# Patient Record
Sex: FEMALE | Race: WHITE | NOT HISPANIC OR LATINO | Employment: OTHER | URBAN - METROPOLITAN AREA
[De-identification: names, ages, dates, MRNs, and addresses within clinical notes are randomized per-mention and may not be internally consistent; named-entity substitution may affect disease eponyms.]

---

## 2017-03-02 ENCOUNTER — TELEPHONE (OUTPATIENT)
Dept: CARDIOLOGY | Facility: CLINIC | Age: 67
End: 2017-03-02

## 2017-03-02 NOTE — TELEPHONE ENCOUNTER
----- Message from RT Georgie sent at 3/1/2017 10:06 AM CST -----  Contact: pt    pt , requesting medication refill: Pravastatin 10 mg, [facility] [place],  Thanks.

## 2017-04-18 ENCOUNTER — TELEPHONE (OUTPATIENT)
Dept: CARDIOLOGY | Facility: CLINIC | Age: 67
End: 2017-04-18

## 2017-04-18 NOTE — TELEPHONE ENCOUNTER
----- Message from Theron Salazar sent at 4/18/2017 12:02 PM CDT -----  Pt called stated that she need lab orders before the next visit/pls call when orders are put in at [phone]

## 2017-04-19 NOTE — TELEPHONE ENCOUNTER
----- Message from Gloria Obrien sent at 4/19/2017  3:22 PM CDT -----  Patient states that Rx Toprol was denied by insurance and need to speak to you.  Call [phone].

## 2017-04-24 RX ORDER — PNV NO.95/FERROUS FUM/FOLIC AC 28MG-0.8MG
1 TABLET ORAL 2 TIMES DAILY WITH MEALS
COMMUNITY
End: 2021-02-12

## 2017-04-24 RX ORDER — METOPROLOL SUCCINATE 25 MG/1
37.5 TABLET, EXTENDED RELEASE ORAL NIGHTLY
Refills: 0 | COMMUNITY
Start: 2017-03-28 | End: 2017-04-27 | Stop reason: SDUPTHER

## 2017-04-24 RX ORDER — PRAVASTATIN SODIUM 10 MG/1
TABLET ORAL
Refills: 0 | COMMUNITY
Start: 2017-04-01 | End: 2017-04-27 | Stop reason: SINTOL

## 2017-04-24 RX ORDER — FLUCONAZOLE 150 MG/1
TABLET ORAL
Refills: 0 | COMMUNITY
Start: 2017-02-14 | End: 2017-04-27

## 2017-04-24 RX ORDER — MULTIVITAMIN
1 TABLET ORAL DAILY
COMMUNITY

## 2017-04-24 RX ORDER — ASPIRIN 81 MG/1
81 TABLET ORAL DAILY
COMMUNITY

## 2017-04-24 RX ORDER — GLUCOSAMINE/CHONDRO SU A 500-400 MG
1 TABLET ORAL DAILY
COMMUNITY
End: 2017-04-27

## 2017-04-24 RX ORDER — RAMIPRIL 1.25 MG/1
1.25 CAPSULE ORAL DAILY
COMMUNITY
End: 2017-04-27

## 2017-04-24 RX ORDER — UBIDECARENONE 30 MG
200 CAPSULE ORAL 2 TIMES DAILY
COMMUNITY
End: 2020-01-21

## 2017-04-24 RX ORDER — AZELASTINE 1 MG/ML
1 SPRAY, METERED NASAL
COMMUNITY
End: 2023-03-07 | Stop reason: SDUPTHER

## 2017-04-24 RX ORDER — AMLODIPINE BESYLATE 2.5 MG/1
2.5 TABLET ORAL DAILY
COMMUNITY
End: 2017-04-27

## 2017-04-27 ENCOUNTER — OFFICE VISIT (OUTPATIENT)
Dept: CARDIOLOGY | Facility: CLINIC | Age: 67
End: 2017-04-27
Payer: MEDICARE

## 2017-04-27 ENCOUNTER — TELEPHONE (OUTPATIENT)
Dept: CARDIOLOGY | Facility: CLINIC | Age: 67
End: 2017-04-27

## 2017-04-27 VITALS
WEIGHT: 109.38 LBS | DIASTOLIC BLOOD PRESSURE: 82 MMHG | HEART RATE: 102 BPM | BODY MASS INDEX: 20.65 KG/M2 | SYSTOLIC BLOOD PRESSURE: 138 MMHG | HEIGHT: 61 IN

## 2017-04-27 DIAGNOSIS — I49.49 PREMATURE BEATS: ICD-10-CM

## 2017-04-27 DIAGNOSIS — T50.905A DRUG SIDE EFFECTS, INITIAL ENCOUNTER: ICD-10-CM

## 2017-04-27 DIAGNOSIS — E78.00 HYPERCHOLESTEROLEMIA: ICD-10-CM

## 2017-04-27 DIAGNOSIS — I10 ESSENTIAL HYPERTENSION: Primary | ICD-10-CM

## 2017-04-27 PROCEDURE — 99213 OFFICE O/P EST LOW 20 MIN: CPT | Mod: S$PBB,,, | Performed by: INTERNAL MEDICINE

## 2017-04-27 PROCEDURE — 99213 OFFICE O/P EST LOW 20 MIN: CPT | Mod: PBBFAC,PO | Performed by: INTERNAL MEDICINE

## 2017-04-27 PROCEDURE — 99999 PR PBB SHADOW E&M-EST. PATIENT-LVL III: CPT | Mod: PBBFAC,,, | Performed by: INTERNAL MEDICINE

## 2017-04-27 RX ORDER — OMEPRAZOLE 20 MG/1
40 CAPSULE, DELAYED RELEASE ORAL DAILY
COMMUNITY
End: 2019-05-01

## 2017-04-27 RX ORDER — LORAZEPAM 0.5 MG/1
0.25 TABLET ORAL EVERY 6 HOURS PRN
COMMUNITY
End: 2018-01-31

## 2017-04-27 RX ORDER — METOPROLOL SUCCINATE 25 MG/1
37.5 TABLET, EXTENDED RELEASE ORAL NIGHTLY
Qty: 45 TABLET | Refills: 6 | Status: SHIPPED | OUTPATIENT
Start: 2017-04-27 | End: 2017-11-08 | Stop reason: SDUPTHER

## 2017-04-27 NOTE — TELEPHONE ENCOUNTER
Pt c/o pain in shoulders and neck, advised to make appt with PCP for muscle pain, pt has appt with Dr Lisa at 3pm today, pt verbalizes understanding.

## 2017-04-27 NOTE — PROGRESS NOTES
Subjective:    Patient ID:  Cecilia Valenzuela is a 67 y.o. female who presents for Hypertension  premature beats    HPI  NO LABS, STOPPED RAMIPRIL B/O LOW BP, INTOLERANT OF GENERIC METOPROLOL B/O SE'S AND NIGHTMARES, HAS BEEN SITTING WITH HER SICK MOTHER IN COLD ENVIRONMENT, STIFF, , PT DOES NOT WANT TO CHANGE MEDS, BP LOG OK, SEE ROS  Past Medical History:   Diagnosis Date    Coronary artery disease     Hyperlipidemia     Hypertension     Neurofibromatosis     Osteopenia     Premature beats     Raynaud's disease     Thyroid nodule      Past Surgical History:   Procedure Laterality Date    BACK SURGERY      CATARACT EXTRACTION Bilateral      SECTION      x5     History reviewed. No pertinent family history.  Social History     Social History    Marital status:      Spouse name: N/A    Number of children: N/A    Years of education: N/A     Social History Main Topics    Smoking status: Never Smoker    Smokeless tobacco: Never Used    Alcohol use No    Drug use: No    Sexual activity: Not Asked     Other Topics Concern    None     Social History Narrative       Review of patient's allergies indicates:   Allergen Reactions    Augmentin [amoxicillin-pot clavulanate]     Dilaudid [hydromorphone]     Neosporin [hydrocortisone]     Norvasc [amlodipine]     Tetracyclines        Current Outpatient Prescriptions:     aspirin (ECOTRIN) 81 MG EC tablet, Take 81 mg by mouth once daily., Disp: , Rfl:     azelastine (ASTELIN) 137 mcg (0.1 %) nasal spray, 1 spray by Nasal route 2 (two) times daily., Disp: , Rfl:     calcium-vitamin D3 (CALCIUM 500 + D) 500 mg(1,250mg) -200 unit per tablet, Take 1 tablet by mouth 2 (two) times daily with meals., Disp: , Rfl:     co-enzyme Q-10 30 mg capsule, Take 100 mg by mouth 3 (three) times daily. 200mg daily, Disp: , Rfl:     lorazepam (ATIVAN) 0.5 MG tablet, Take 0.25 mg by mouth every 6 (six) hours as needed for Anxiety., Disp: , Rfl:      multivitamin (THERAGRAN) per tablet, Take 1 tablet by mouth once daily., Disp: , Rfl:     omeprazole (PRILOSEC) 20 MG capsule, Take 40 mg by mouth once daily., Disp: , Rfl:     PATADAY 0.2 % Drop, instill 1 drop into both eyes once daily, Disp: , Rfl: 0    TOPROL XL 25 mg 24 hr tablet, Take 1.5 tablets (37.5 mg total) by mouth nightly., Disp: 45 tablet, Rfl: 6    Review of Systems   Constitution: Negative for chills, diaphoresis, weakness, malaise/fatigue and night sweats.   HENT: Negative for congestion, hearing loss and nosebleeds.    Eyes: Negative for blurred vision, discharge, double vision and visual disturbance.   Cardiovascular: Negative for chest pain, claudication, cyanosis, dyspnea on exertion, irregular heartbeat, leg swelling, near-syncope, orthopnea, palpitations, paroxysmal nocturnal dyspnea and syncope.   Respiratory: Negative for cough, hemoptysis, shortness of breath, sleep disturbances due to breathing, sputum production and wheezing.    Endocrine: Negative for cold intolerance, heat intolerance and polyuria.   Hematologic/Lymphatic: Negative for adenopathy. Does not bruise/bleed easily.   Skin: Negative for color change, itching and nail changes.   Musculoskeletal: Positive for stiffness. Negative for back pain, falls and joint pain. Muscle cramps: KNOTS.   Gastrointestinal: Negative for bloating, abdominal pain, change in bowel habit, constipation, dysphagia, flatus, heartburn, hematemesis, jaundice, melena and vomiting.   Genitourinary: Negative for dysuria, flank pain, frequency and incomplete emptying.   Neurological: Negative for brief paralysis, difficulty with concentration, disturbances in coordination, dizziness, focal weakness, light-headedness, loss of balance, numbness, paresthesias, seizures, sensory change and tremors.   Psychiatric/Behavioral: Negative for altered mental status, depression, memory loss and substance abuse. The patient is not nervous/anxious.   "  Allergic/Immunologic: Negative for persistent infections.        Objective:      Vitals:    04/27/17 1449   BP: (!) 157/87   Pulse: 102   Weight: 49.6 kg (109 lb 5.6 oz)   Height: 5' 1" (1.549 m)   PainSc: 10-Worst pain ever     Body mass index is 20.66 kg/(m^2).    Physical Exam   Constitutional: She is oriented to person, place, and time. She appears well-developed and well-nourished. She is active.   HENT:   Head: Normocephalic and atraumatic.   Mouth/Throat: Oropharynx is clear and moist and mucous membranes are normal.   Eyes: Conjunctivae and EOM are normal. Pupils are equal, round, and reactive to light.   Neck: Trachea normal and normal range of motion. Neck supple. Normal carotid pulses, no hepatojugular reflux and no JVD present. Carotid bruit is not present. No tracheal deviation, no edema and no erythema present. No thyromegaly present.   Cardiovascular: Normal rate, regular rhythm and normal heart sounds.   No extrasystoles are present. PMI is not displaced.  Exam reveals no gallop and no friction rub.    No murmur heard.  Pulses:       Carotid pulses are 2+ on the right side, and 2+ on the left side.       Radial pulses are 2+ on the right side, and 2+ on the left side.        Femoral pulses are 2+ on the right side, and 2+ on the left side.       Popliteal pulses are 2+ on the right side, and 2+ on the left side.        Dorsalis pedis pulses are 2+ on the right side, and 2+ on the left side.        Posterior tibial pulses are 2+ on the right side, and 2+ on the left side.   Pulmonary/Chest: Effort normal and breath sounds normal. No tachypnea and no bradypnea. She has no wheezes. She has no rales. She exhibits no tenderness.   Abdominal: Soft. Bowel sounds are normal. She exhibits no distension and no mass. There is no hepatosplenomegaly. There is no tenderness. There is no guarding and no CVA tenderness.   Musculoskeletal: Normal range of motion. She exhibits no edema or tenderness.   SLOW GAIT "   Lymphadenopathy:     She has no cervical adenopathy.   Neurological: She is alert and oriented to person, place, and time. She has normal strength. She displays no tremor. No cranial nerve deficit. She exhibits normal muscle tone. Coordination normal.   Skin: Skin is warm and dry. No rash noted. No cyanosis or erythema. No pallor.   Psychiatric: She has a normal mood and affect. Her speech is normal and behavior is normal. Judgment and thought content normal.               ..    Chemistry    No results found for: NA, K, CL, CO2, BUN, CREATININE, GLU No results found for: CALCIUM, ALKPHOS, AST, ALT, BILITOT         ..No results found for: CHOL  No results found for: HDL  No results found for: LDLCALC  No results found for: TRIG  No results found for: CHOLHDL  ..No results found for: WBC, HGB, HCT, MCV, PLT    Test(s) Reviewed  I have reviewed the following in detail:  [] Stress test   [] Angiography   [] Echocardiogram   [] Labs   [x] Other:       Assessment:         ICD-10-CM ICD-9-CM   1. Essential hypertension I10 401.9   2. Premature beats I49.49 427.60   3. Drug side effects, initial encounter T88.7XXA E947.9   4. Hypercholesterolemia E78.00 272.0     Problem List Items Addressed This Visit        Cardiology Problems    Essential hypertension - Primary    Relevant Orders    Comprehensive metabolic panel    Lipid panel    Premature beats    Relevant Orders    Comprehensive metabolic panel    Lipid panel    Hypercholesterolemia    Relevant Orders    Comprehensive metabolic panel    Lipid panel      Other Visit Diagnoses     Drug side effects, initial encounter               Plan:     DC PRAVACHOL, ? SE'S,CA SCORE WAS 0,  WATCH BP, CONTINUE TOPROL, PT DECLINES CHANGE B/O SE'S, ALL CV CLINICALLY STABLE, NO ANGINA, NO HF, NO TIA, NO CLINICAL ARRHYTHMIA,CONTINUE CURRENT MEDS, EDUCATION, DIET, EXERCISE, RTC IN 6-7 MO WITH LABS, BP LOG      Essential hypertension  -     Comprehensive metabolic panel; Future; Expected  date: 4/27/17  -     Lipid panel; Future; Expected date: 4/27/17    Premature beats  -     Comprehensive metabolic panel; Future; Expected date: 4/27/17  -     Lipid panel; Future; Expected date: 4/27/17    Drug side effects, initial encounter    Hypercholesterolemia  -     Comprehensive metabolic panel; Future; Expected date: 4/27/17  -     Lipid panel; Future; Expected date: 4/27/17    Other orders  -     TOPROL XL 25 mg 24 hr tablet; Take 1.5 tablets (37.5 mg total) by mouth nightly.  Dispense: 45 tablet; Refill: 6    RTC Low level/low impact aerobic exercise 5x's/wk. Heart healthy diet and risk factor modification.    See labs and med orders.    Aerobic exercise 5x's/wk. Heart healthy diet and risk factor modification.    See labs and med orders.

## 2017-04-27 NOTE — PATIENT INSTRUCTIONS
About Arrhythmias    Electrical impulses cause the normal heart to beat 60 to 100 times a minute while at rest. These impulses come from a natural pacemaker deep inside the heart muscle. Each impulse causes the heart muscle to contract. This causes the blood to flow through the heart and out to the tissues and organs of your body.  An arrhythmia is a change from the normal speed or pattern of these electrical impulses. This can cause the heart to beat too fast (tachycardia); or too slow (bradycardia); or in an unsteady pattern (irregular rhythm).  Symptoms of arrhythmias  Different people experience arrhythmias differently. Sometimes they may not have symptoms, but just notice a change in their pulse. Symptoms can include:  · Fluttering feeling in the chest  · Shortness of breath  · Chest pain or pressure  · Neck fullness  · Lightheadedness or dizziness  · Fainting or almost fainting  · Palpitations (the sense that your heart is fluttering or beating fast or hard or irregularly)  · Tiredness, fatigue, or weakness  · Cardiac arrest  Causes of arrhythmias  Arrhythmias are most often due to heart disease such as:  · Coronary artery disease  · Heart valve disease  · Enlarged heart  · High blood pressure  · Heart failure  Other causes of  arrhythmia include:  · Certain medicines (such as asthma inhalers and decongestants)  · Some herbal supplements  · Cardiac stimulant drugs (such as cocaine, amphetamine, diet pills, certain decongestant cold medicines, caffeine, and nicotine)  · Excessive alcohol use  · Anxiety and panic disorder  · Thyroid disease  · Anemia  · Diabetes  · Sleep apnea  · Obesity  · Congenital heart disease  · Cardiac genetic diseases  Arrhythmias can often be prevented. The cause and type of arrhythmia determines the best treatment. Sometimes your doctor may want to monitor your heart rate over a 24-hour period or longer. This can help identify the cause of your arrhythmia and find the best treatment.  This can be done with a Holter monitor, a portable EKG recording device attached by wires to your chest. Or you may get an event monitor, which you can place over the skin in front of your heart to record heart rhythms. You can carry this with you as you go about your routine activities during the monitoring period. Implantable loop recorders may also be used to monitor the heart rhythm for up to 2 years. This miniature device is placed underneath the skin overlying the heart.  Home care  The following guidelines will help you care for yourself at home:  · Avoid cardiac stimulants (such as cocaine, amphetamine, diet pills, certain decongestant cold medicines, caffeine, and nicotine).  · If you smoke, stop smoking. Contact your doctor or a local stop-smoking program for help.  · Tell your doctor about any prescription, over-the-counter, or herbal medicines you take. These may be affecting your heart rhythm.  Follow-up care  Follow up with your healthcare provider, or as advised. If a Holter monitor has been recommended, contact the cardiologist you have been referred to as soon as you can  the device. Other outpatient tests may also be arranged for you at that time.  Call 911  This is the fastest and safest way to get to the emergency department. The paramedics can also start treatment on the way to the hospital, if needed.  Don't wait until your symptoms are severe to call 911. Other reasons to call 911 besides chest pain include:  · Chest, shoulder, arm, neck, or back pain  · Shortness of breath  · Feeling lightheaded, faint, or dizzy  · Unexplained fainting  · Rapid heart beat  · Slower than usual heart rate compared to your normal  · Very irregular heartbeat  · Chest pain (angina) with weakness, dizziness, heavy sweating, nausea, or vomiting  · Extreme drowsiness, or confusion  · Weakness of an arm or leg or one side of the face  · Difficulty with speech or vision  When to seek medical advice  Remember,  "things are not always like they are on TV. Sometimes it is not so obvious. You may only feel weak or just "not right." If it is not clear or if you have any doubt, call for advice.  · Seek help for chest pain, or it feels different from usual, even if your symptoms are mild.  · Don't drive yourself. Have someone else drive. If no one can drive you, call 911.  · If your doctor has given you medicines to take when you have symptoms, take them, but do not delay getting help while trying to find them.  Date Last Reviewed: 4/25/2016 © 2000-2016 [employer]. [address] [place] [address], [place] [address]. All rights reserved. This information is not intended as a substitute for professional medical care. Always follow your healthcare professional's instructions.        Heart Disease Education    The heart beats 60 to 100 times per minute, 24 hours a day. This equals almost 1000,000 times a day. It pumps blood with oxygen and nutrients to the tissues and organs of the body. But the heart is a muscle and needs its own supply of blood. Blood flow to the heart is supplied by the coronary arteries. Coronary artery disease (atherosclerosis) is a result of cholesterol, saturated fat, and calcium deposits (plaques) that build up inside the walls. This causes inflammation within the coronary arteries. These plaques narrow the artery and reduce blood flow to the heart muscle. The reduction in blood flow to the heart muscle decreases oxygen supply to the heart. If the narrowing is significant enough, the oxygen supply to one or more regions of the heart can be temporarily or permanently shut down. This can cause chest pain, and possibly death of heart tissue (heart attack).  Types of chest pain  Angina is the name for pain in the heart muscle. Angina is a warning sign of serious heart disease. When untreated it can lead to a heart attack, also known as acute myocardial infarction, or AMI. Angina occurs when there is not " enough blood and oxygen flowing to the heart for the amount of work it is doing. This most often happens during physical exertion, when the heart is working hardest. It is usually relieved by rest or nitroglycerin. Angina may also occur after a large meal when extra blood is sent to the digestive organs and less goes to the heart. In the case of advanced or unstable heart disease, angina can occur at rest or awaken you from sleep. Angina usually lasts from a few minutes up to 20 minutes or more. When treated early, the effects of angina can be reversed without permanent damage to the heart. Angina is a serious condition and needs to be evaluated by a medical professional immediately.  There are two types of angina -- stable and unstable:  · Stable angina usually occurs with a predictable level of activity. Being stable, its character, severity, and occurrence do not change much over time. It usually starts with activity, and resolves with rest or taking your medicine as instructed by your doctor. The symptoms usually do not last long.  · Unstable angina changes or gets worse over time. It is different from whatever you are used to. It may feel different or worse, begin without cause, occur with exercise or exertion, wake you up from sleep, and last longer. It may not respond in the same way as it does when you take your usual medicines for an attack. This type of angina can be a warning sign of an impending heart attack.     A heart attack is usually the result of a blood clot that suddenly forms in a coronary artery that has been narrowed with plaque. When this occurs, blood flow may be cut off to a part of the heart muscle, causing the cells to die. This weakens the pumping action of the heart, which affects the delivery of blood to all the other organs in the body including the brain. This damage is not reversible. However, early treatment can limit the amount of damage.  The pain you feel with angina and a heart  "attack may have a similar quality. However, it is usually different in intensity and duration. Here are some typical descriptions of a heart attack:  · It is most often experienced as a squeezing, crushing, pressure-like sensation in the center of the chest.  · It is sometimes described as something heavy sitting on my chest.  · It may feel more like a bad case of indigestion.  · The pain may spread from the chest to the arm, shoulder, throat or jaw.  · Sometimes the pain is not felt in the chest at all, but only in the arm, shoulder, throat or jaw.  · There may also be nausea, vomiting, dizziness or light-headedness, sweating and trouble breathing.  · Palpitations, or your heart beating rapidly  · A new, irregular heart beat  · Unexplained weakness  You may not be able to tell the difference between "bad" angina and a heart attack at home. Seek help if your symptoms are different than usual. Do not be in denial or just try to "tough it out."  Call 911  This is the fastest and safest way to get to the emergency department. The paramedics can also start treatment on the way to the hospital, saving valuable time for your heart.  · If the angina gets worse, if it continues, or if it stops and returns, call 911 immediately. Do not delay. You may be having a heart attack.  · After you call 911, take a second tablet or spray unless instructed otherwise. When repeating doses, sit down if possible, because it can make you feel lightheaded or dizzy. Wait another 5 minutes. If the angina still does not go away, take a third tablet or spray. Do not take more than 3 tablets or sprays within 15 minutes. Stay on the phone with 911 for further instruction.  · Your healthcare provider may give you slightly different instructions than those above. If so, follow them carefully.  Do not wait until symptoms become severe to call 911.  Other reasons to call 911 include:  · Trouble breathing  · Feeling lightheaded, faint, or " "dizzy  · Rapid heart beat  · Slower than usual heart rate compared to your normal  · Angina with weakness, dizziness, fainting, heavy sweating, nausea, or vomiting  · Extreme drowsiness, confusion  · Weakness of an arm or leg or one side of the face  · Difficulty with speech or vision  When to seek medical care  Remember, the signs and symptoms of a heart attack are not always like they are on TV. Sometimes they are not so obvious. You may only feel weak, or just not right. If it is not clear or if you have any doubt, call for advice.  · Seek help if there is a change in the type of pain, if it feels different, or if your symptoms are mild.  · Do not drive yourself. Have someone else drive you. If no one can drive, call 911.  · Do not delay. Fast diagnosis and treatment can prevent or limit the amount of heart damage during a heart attack.  · Do not go to your doctor's office or a clinic as they may not be able to provide all the testing and treatment required for this condition.  · If your doctor has given you medicine to take when symptoms occur, take them but don't delay getting help trying to locate medicines.  What happens in the emergency department  The emergency department is connected to your local emergency medical system (EMS) through 911. That's why during a cardiac emergency, calling 911 is the fastest way to get help. The goal of the emergency department is to rapidly screen, evaluate, and treat people.  Once you are there, an electrocardiogram (ECG or heart tracing) will be done. Blood samples may be taken to look for the presence of heart enzymes that leak from damaged heart cells and show if a heart attack is occurring. You will often be evaluated by a heart specialist (cardiologist) who decides the best course of action. In the case of severe angina or early heart attack, and depending on the circumstances, powerful "clot busting" medicines can be used to dissolve blood clots in the coronary " artery. In other cases, you may be taken to a cardiac catheterization lab. Here, a tiny balloon-tipped catheter is advanced through blood vessels to the heart. There the balloon is inflated pushing open the blood vessel restoring blood flow.  Risk factors for heart disease  Risk factors for heart disease are a combination of genetic and lifestyle. Many risk factors work by either directly or indirectly damaging the blood vessels of the heart, or by increasing the risk of forming blood or cholesterol clots, which then clog up and block the arteries.     Examples of physical lifestyle risk factors:  · Cigarette smoking  · High blood pressure  · High blood cholesterol  · Use of stimulant drugs such as cocaine, crack, and amphetamines  · Eating a high-fat, high-cholesterol meal  · Diabetes   · Obesity which increases risk for diabetes and high blood pressure  · Lack of regular physical activity     Examples of emotional lifestyle factors:  · Chronic high stress levels release stress hormones. These raise blood pressure and cholesterol level and makes blood clot more easily.  · Held-in anger, hostile or cynical attitude  · Social and emotional isolation, lack of intimacy  · Loss of relationship  · Depression  Other factors that increase the risk of heart attack that you cannot control :  · Age. The older you get beyond 40, the greater is your risk of significant coronary artery disease.  · Gender. More men than women get heart disease; but once past menopause, women who are not taking estrogen replacement have the same risk as men for a heart attack.  · Family history. If your mother, father, brother or sister has coronary artery disease, your risk of having it is higher than a person your age without this family history.  What can you do to decrease your risk  To reduce your risk of heart disease:  · Get regular checkups with your doctor.  · Take your medicines for blood pressure, cholesterol or diabetes as  directed.  · Watch your diet. Eat a heart healthy diet choosing fresh foods, less salt, cholesterol, and fat  · Stop smoking. Get help if needed.  · Get regular exercise.  · Manage stress.  · Carry a list of medicines and doses in your wallet.  Date Last Reviewed: 12/30/2015  © 6607-5975 [employer]. [address] [place] [address], [place] [address]. All rights reserved. This information is not intended as a substitute for professional medical care. Always follow your healthcare professional's instructions.        Controlling High Blood Pressure  High blood pressure (hypertension) is often called the silent killer. This is because many people who have it dont know it. High blood pressure is defined as 140/90 mm Hg or higher. Know your blood pressure and remember to check it regularly. Doing so can save your life. Here are some things you can do to help control your blood pressure.    Choose heart-healthy foods  · Select low-salt, low-fat foods. Limit sodium intake to 2,400 mg per day or the amount suggested by your healthcare provider.  · Limit canned, dried, cured, packaged, and fast foods. These can contain a lot of salt.  · Eat 8 to 10 servings of fruits and vegetables every day.  · Choose lean meats, fish, or chicken.  · Eat whole-grain pasta, brown rice, and beans.  · Eat 2 to 3 servings of low-fat or fat-free dairy products.  · Ask your doctor about the DASH eating plan. This plan helps reduce blood pressure.  · When you go to a restaurant, ask that your meal be prepared with no added salt.  Maintain a healthy weight  · Ask your healthcare provider how many calories to eat a day. Then stick to that number.  · Ask your healthcare provider what weight range is healthiest for you. If you are overweight, a weight loss of only 3% to 5% of your body weight can help lower blood pressure. Generally, a good weight loss goal is to lose 10% of your body weight in a year.  · Limit snacks and sweets.  · Get regular  exercise.  Get up and get active  · Choose activities you enjoy. Find ones you can do with friends or family. This includes bicycling, dancing, walking, and jogging.  · Park farther away from building entrances.  · Use stairs instead of the elevator.  · When you can, walk or bike instead of driving.  · [place] leaves, garden, or do household repairs.  · Be active at a moderate to vigorous level of physical activity for at least 40 minutes for a minimum of 3 to 4 days a week.   Manage stress  · Make time to relax and enjoy life. Find time to laugh.  · Communicate your concerns with your loved ones and your healthcare provider.  · Visit with family and friends, and keep up with hobbies.  Limit alcohol and quit smoking  · Men should have no more than 2 drinks per day.  · Women should have no more than 1 drink per day.  · Talk with your healthcare provider about quitting smoking. Smoking significantly increases your risk for heart disease and stroke. Ask your healthcare provider about community smoking cessation programs and other options.  Medicines  If lifestyle changes arent enough, your healthcare provider may prescribe high blood pressure medicine. Take all medicines as prescribed. If you have any questions about your medicines, ask your healthcare provider before stopping or changing them.   Date Last Reviewed: 4/27/2016  © 1402-7150 [employer]. [address] [place] [address], [place] [address]. All rights reserved. This information is not intended as a substitute for professional medical care. Always follow your healthcare professional's instructions.

## 2017-04-27 NOTE — MR AVS SNAPSHOT
[place] - [facility]iology  [address]  [facility] [address]  Phone: [phone]                  Cecilia Valenzuela   2017 3:00 PM   Office Visit    Description:  Female : 1950   Provider:  Aida Lisa MD   Department:  [place] - Cardiology           Reason for Visit     Hypertension           Diagnoses this Visit        Comments    Essential hypertension    -  Primary     Premature beats         Drug side effects, initial encounter         Hypercholesterolemia                To Do List           Goals (5 Years of Data)     None       These Medications        Disp Refills Start End    TOPROL XL 25 mg 24 hr tablet 45 tablet 6 2017     Take 1.5 tablets (37.5 mg total) by mouth nightly. - Oral    [facility] [facility], [place] - [address] [facility] Ph #: [phone]         [facility]     [facility] -  Assistance  Unless otherwise directed by your provider, please contact [facility], our nurse care line that is available for  assistance.     Registered nurses in the [facility] provide: appointment scheduling, clinical advisement, health education, and other advisory services.  Call: [phone] (toll free)               Medications           Message regarding Medications     Verify the changes and/or additions to your medication regime listed below are the same as discussed with your clinician today.  If any of these changes or additions are incorrect, please notify your healthcare provider.        CHANGE how you are taking these medications     Start Taking Instead of    TOPROL XL 25 mg 24 hr tablet TOPROL XL 25 mg 24 hr tablet    Dosage:  Take 1.5 tablets (37.5 mg total) by mouth nightly. Dosage:  Take 37.5 mg by mouth nightly.    Reason for Change:  Reorder       STOP taking these medications     amlodipine (NORVASC) 2.5 MG tablet Take 2.5 mg by mouth once daily.    fluconazole (DIFLUCAN)  "150 MG Tab take 1 tablet by mouth every 72 HOURS    glucosamine-chondroitin 500-400 mg tablet Take 1 tablet by mouth once daily.    ramipril (ALTACE) 1.25 MG capsule Take 1.25 mg by mouth once daily.    pravastatin (PRAVACHOL) 10 MG tablet            Verify that the below list of medications is an accurate representation of the medications you are currently taking.  If none reported, the list may be blank. If incorrect, please contact your healthcare provider. Carry this list with you in case of emergency.           Current Medications     aspirin (ECOTRIN) 81 MG EC tablet Take 81 mg by mouth once daily.    azelastine (ASTELIN) 137 mcg (0.1 %) nasal spray 1 spray by Nasal route 2 (two) times daily.    calcium-vitamin D3 (CALCIUM 500 + D) 500 mg(1,250mg) -200 unit per tablet Take 1 tablet by mouth 2 (two) times daily with meals.    co-enzyme Q-10 30 mg capsule Take 100 mg by mouth 3 (three) times daily. 200mg daily    lorazepam (ATIVAN) 0.5 MG tablet Take 0.25 mg by mouth every 6 (six) hours as needed for Anxiety.    multivitamin (THERAGRAN) per tablet Take 1 tablet by mouth once daily.    omeprazole (PRILOSEC) 20 MG capsule Take 40 mg by mouth once daily.    PATADAY 0.2 % Drop instill 1 drop into both eyes once daily    TOPROL XL 25 mg 24 hr tablet Take 1.5 tablets (37.5 mg total) by mouth nightly.           Clinical Reference Information           Your Vitals Were     BP Pulse Height Weight BMI    138/82 102 5' 1" (1.549 m) 49.6 kg (109 lb 5.6 oz) 20.66 kg/m2      Blood Pressure          Most Recent Value    BP  138/82      Allergies as of 4/27/2017     Augmentin [Amoxicillin-pot Clavulanate]    Dilaudid [Hydromorphone]    Neosporin [Hydrocortisone]    Norvasc [Amlodipine]    Tetracyclines      Immunizations Administered on Date of Encounter - 4/27/2017     None      Orders Placed During Today's Visit     Future Labs/Procedures Expected by Expires    Comprehensive metabolic panel  4/27/2017 6/26/2018    Lipid panel "  4/27/2017 6/26/2018      Instructions      About Arrhythmias    Electrical impulses cause the normal heart to beat 60 to 100 times a minute while at rest. These impulses come from a natural pacemaker deep inside the heart muscle. Each impulse causes the heart muscle to contract. This causes the blood to flow through the heart and out to the tissues and organs of your body.  An arrhythmia is a change from the normal speed or pattern of these electrical impulses. This can cause the heart to beat too fast (tachycardia); or too slow (bradycardia); or in an unsteady pattern (irregular rhythm).  Symptoms of arrhythmias  Different people experience arrhythmias differently. Sometimes they may not have symptoms, but just notice a change in their pulse. Symptoms can include:  · Fluttering feeling in the chest  · Shortness of breath  · Chest pain or pressure  · Neck fullness  · Lightheadedness or dizziness  · Fainting or almost fainting  · Palpitations (the sense that your heart is fluttering or beating fast or hard or irregularly)  · Tiredness, fatigue, or weakness  · Cardiac arrest  Causes of arrhythmias  Arrhythmias are most often due to heart disease such as:  · Coronary artery disease  · Heart valve disease  · Enlarged heart  · High blood pressure  · Heart failure  Other causes of  arrhythmia include:  · Certain medicines (such as asthma inhalers and decongestants)  · Some herbal supplements  · Cardiac stimulant drugs (such as cocaine, amphetamine, diet pills, certain decongestant cold medicines, caffeine, and nicotine)  · Excessive alcohol use  · Anxiety and panic disorder  · Thyroid disease  · Anemia  · Diabetes  · Sleep apnea  · Obesity  · Congenital heart disease  · Cardiac genetic diseases  Arrhythmias can often be prevented. The cause and type of arrhythmia determines the best treatment. Sometimes your doctor may want to monitor your heart rate over a 24-hour period or longer. This can help identify the cause of  your arrhythmia and find the best treatment. This can be done with a Holter monitor, a portable EKG recording device attached by wires to your chest. Or you may get an event monitor, which you can place over the skin in front of your heart to record heart rhythms. You can carry this with you as you go about your routine activities during the monitoring period. Implantable loop recorders may also be used to monitor the heart rhythm for up to 2 years. This miniature device is placed underneath the skin overlying the heart.  Home care  The following guidelines will help you care for yourself at home:  · Avoid cardiac stimulants (such as cocaine, amphetamine, diet pills, certain decongestant cold medicines, caffeine, and nicotine).  · If you smoke, stop smoking. Contact your doctor or a local stop-smoking program for help.  · Tell your doctor about any prescription, over-the-counter, or herbal medicines you take. These may be affecting your heart rhythm.  Follow-up care  Follow up with your healthcare provider, or as advised. If a Holter monitor has been recommended, contact the cardiologist you have been referred to as soon as you can  the device. Other outpatient tests may also be arranged for you at that time.  Call 911  This is the fastest and safest way to get to the emergency department. The paramedics can also start treatment on the way to the hospital, if needed.  Don't wait until your symptoms are severe to call 911. Other reasons to call 911 besides chest pain include:  · Chest, shoulder, arm, neck, or back pain  · Shortness of breath  · Feeling lightheaded, faint, or dizzy  · Unexplained fainting  · Rapid heart beat  · Slower than usual heart rate compared to your normal  · Very irregular heartbeat  · Chest pain (angina) with weakness, dizziness, heavy sweating, nausea, or vomiting  · Extreme drowsiness, or confusion  · Weakness of an arm or leg or one side of the face  · Difficulty with speech or  "vision  When to seek medical advice  Remember, things are not always like they are on TV. Sometimes it is not so obvious. You may only feel weak or just "not right." If it is not clear or if you have any doubt, call for advice.  · Seek help for chest pain, or it feels different from usual, even if your symptoms are mild.  · Don't drive yourself. Have someone else drive. If no one can drive you, call 911.  · If your doctor has given you medicines to take when you have symptoms, take them, but do not delay getting help while trying to find them.  Date Last Reviewed: 4/25/2016  © 6023-1930 [employer]. [address] [place] [address], [place] [address]. All rights reserved. This information is not intended as a substitute for professional medical care. Always follow your healthcare professional's instructions.        Heart Disease Education    The heart beats 60 to 100 times per minute, 24 hours a day. This equals almost 1000,000 times a day. It pumps blood with oxygen and nutrients to the tissues and organs of the body. But the heart is a muscle and needs its own supply of blood. Blood flow to the heart is supplied by the coronary arteries. Coronary artery disease (atherosclerosis) is a result of cholesterol, saturated fat, and calcium deposits (plaques) that build up inside the walls. This causes inflammation within the coronary arteries. These plaques narrow the artery and reduce blood flow to the heart muscle. The reduction in blood flow to the heart muscle decreases oxygen supply to the heart. If the narrowing is significant enough, the oxygen supply to one or more regions of the heart can be temporarily or permanently shut down. This can cause chest pain, and possibly death of heart tissue (heart attack).  Types of chest pain  Angina is the name for pain in the heart muscle. Angina is a warning sign of serious heart disease. When untreated it can lead to a heart attack, also known as acute myocardial " infarction, or AMI. Angina occurs when there is not enough blood and oxygen flowing to the heart for the amount of work it is doing. This most often happens during physical exertion, when the heart is working hardest. It is usually relieved by rest or nitroglycerin. Angina may also occur after a large meal when extra blood is sent to the digestive organs and less goes to the heart. In the case of advanced or unstable heart disease, angina can occur at rest or awaken you from sleep. Angina usually lasts from a few minutes up to 20 minutes or more. When treated early, the effects of angina can be reversed without permanent damage to the heart. Angina is a serious condition and needs to be evaluated by a medical professional immediately.  There are two types of angina -- stable and unstable:  · Stable angina usually occurs with a predictable level of activity. Being stable, its character, severity, and occurrence do not change much over time. It usually starts with activity, and resolves with rest or taking your medicine as instructed by your doctor. The symptoms usually do not last long.  · Unstable angina changes or gets worse over time. It is different from whatever you are used to. It may feel different or worse, begin without cause, occur with exercise or exertion, wake you up from sleep, and last longer. It may not respond in the same way as it does when you take your usual medicines for an attack. This type of angina can be a warning sign of an impending heart attack.     A heart attack is usually the result of a blood clot that suddenly forms in a coronary artery that has been narrowed with plaque. When this occurs, blood flow may be cut off to a part of the heart muscle, causing the cells to die. This weakens the pumping action of the heart, which affects the delivery of blood to all the other organs in the body including the brain. This damage is not reversible. However, early treatment can limit the amount  "of damage.  The pain you feel with angina and a heart attack may have a similar quality. However, it is usually different in intensity and duration. Here are some typical descriptions of a heart attack:  · It is most often experienced as a squeezing, crushing, pressure-like sensation in the center of the chest.  · It is sometimes described as something heavy sitting on my chest.  · It may feel more like a bad case of indigestion.  · The pain may spread from the chest to the arm, shoulder, throat or jaw.  · Sometimes the pain is not felt in the chest at all, but only in the arm, shoulder, throat or jaw.  · There may also be nausea, vomiting, dizziness or light-headedness, sweating and trouble breathing.  · Palpitations, or your heart beating rapidly  · A new, irregular heart beat  · Unexplained weakness  You may not be able to tell the difference between "bad" angina and a heart attack at home. Seek help if your symptoms are different than usual. Do not be in denial or just try to "tough it out."  Call 911  This is the fastest and safest way to get to the emergency department. The paramedics can also start treatment on the way to the hospital, saving valuable time for your heart.  · If the angina gets worse, if it continues, or if it stops and returns, call 911 immediately. Do not delay. You may be having a heart attack.  · After you call 911, take a second tablet or spray unless instructed otherwise. When repeating doses, sit down if possible, because it can make you feel lightheaded or dizzy. Wait another 5 minutes. If the angina still does not go away, take a third tablet or spray. Do not take more than 3 tablets or sprays within 15 minutes. Stay on the phone with 911 for further instruction.  · Your healthcare provider may give you slightly different instructions than those above. If so, follow them carefully.  Do not wait until symptoms become severe to call 911.  Other reasons to call 911 include:  · Trouble " "breathing  · Feeling lightheaded, faint, or dizzy  · Rapid heart beat  · Slower than usual heart rate compared to your normal  · Angina with weakness, dizziness, fainting, heavy sweating, nausea, or vomiting  · Extreme drowsiness, confusion  · Weakness of an arm or leg or one side of the face  · Difficulty with speech or vision  When to seek medical care  Remember, the signs and symptoms of a heart attack are not always like they are on TV. Sometimes they are not so obvious. You may only feel weak, or just not right. If it is not clear or if you have any doubt, call for advice.  · Seek help if there is a change in the type of pain, if it feels different, or if your symptoms are mild.  · Do not drive yourself. Have someone else drive you. If no one can drive, call 911.  · Do not delay. Fast diagnosis and treatment can prevent or limit the amount of heart damage during a heart attack.  · Do not go to your doctor's office or a clinic as they may not be able to provide all the testing and treatment required for this condition.  · If your doctor has given you medicine to take when symptoms occur, take them but don't delay getting help trying to locate medicines.  What happens in the emergency department  The emergency department is connected to your local emergency medical system (EMS) through 911. That's why during a cardiac emergency, calling 911 is the fastest way to get help. The goal of the emergency department is to rapidly screen, evaluate, and treat people.  Once you are there, an electrocardiogram (ECG or heart tracing) will be done. Blood samples may be taken to look for the presence of heart enzymes that leak from damaged heart cells and show if a heart attack is occurring. You will often be evaluated by a heart specialist (cardiologist) who decides the best course of action. In the case of severe angina or early heart attack, and depending on the circumstances, powerful "clot busting" medicines can be used to " dissolve blood clots in the coronary artery. In other cases, you may be taken to a cardiac catheterization lab. Here, a tiny balloon-tipped catheter is advanced through blood vessels to the heart. There the balloon is inflated pushing open the blood vessel restoring blood flow.  Risk factors for heart disease  Risk factors for heart disease are a combination of genetic and lifestyle. Many risk factors work by either directly or indirectly damaging the blood vessels of the heart, or by increasing the risk of forming blood or cholesterol clots, which then clog up and block the arteries.     Examples of physical lifestyle risk factors:  · Cigarette smoking  · High blood pressure  · High blood cholesterol  · Use of stimulant drugs such as cocaine, crack, and amphetamines  · Eating a high-fat, high-cholesterol meal  · Diabetes   · Obesity which increases risk for diabetes and high blood pressure  · Lack of regular physical activity     Examples of emotional lifestyle factors:  · Chronic high stress levels release stress hormones. These raise blood pressure and cholesterol level and makes blood clot more easily.  · Held-in anger, hostile or cynical attitude  · Social and emotional isolation, lack of intimacy  · Loss of relationship  · Depression  Other factors that increase the risk of heart attack that you cannot control :  · Age. The older you get beyond 40, the greater is your risk of significant coronary artery disease.  · Gender. More men than women get heart disease; but once past menopause, women who are not taking estrogen replacement have the same risk as men for a heart attack.  · Family history. If your mother, father, brother or sister has coronary artery disease, your risk of having it is higher than a person your age without this family history.  What can you do to decrease your risk  To reduce your risk of heart disease:  · Get regular checkups with your doctor.  · Take your medicines for blood pressure,  cholesterol or diabetes as directed.  · Watch your diet. Eat a heart healthy diet choosing fresh foods, less salt, cholesterol, and fat  · Stop smoking. Get help if needed.  · Get regular exercise.  · Manage stress.  · Carry a list of medicines and doses in your wallet.  Date Last Reviewed: 12/30/2015  © 6703-0354 [employer]. [address] [place] [address], [place] [address]. All rights reserved. This information is not intended as a substitute for professional medical care. Always follow your healthcare professional's instructions.        Controlling High Blood Pressure  High blood pressure (hypertension) is often called the silent killer. This is because many people who have it dont know it. High blood pressure is defined as 140/90 mm Hg or higher. Know your blood pressure and remember to check it regularly. Doing so can save your life. Here are some things you can do to help control your blood pressure.    Choose heart-healthy foods  · Select low-salt, low-fat foods. Limit sodium intake to 2,400 mg per day or the amount suggested by your healthcare provider.  · Limit canned, dried, cured, packaged, and fast foods. These can contain a lot of salt.  · Eat 8 to 10 servings of fruits and vegetables every day.  · Choose lean meats, fish, or chicken.  · Eat whole-grain pasta, brown rice, and beans.  · Eat 2 to 3 servings of low-fat or fat-free dairy products.  · Ask your doctor about the DASH eating plan. This plan helps reduce blood pressure.  · When you go to a restaurant, ask that your meal be prepared with no added salt.  Maintain a healthy weight  · Ask your healthcare provider how many calories to eat a day. Then stick to that number.  · Ask your healthcare provider what weight range is healthiest for you. If you are overweight, a weight loss of only 3% to 5% of your body weight can help lower blood pressure. Generally, a good weight loss goal is to lose 10% of your body weight in a year.  · Limit snacks  and sweets.  · Get regular exercise.  Get up and get active  · Choose activities you enjoy. Find ones you can do with friends or family. This includes bicycling, dancing, walking, and jogging.  · Park farther away from building entrances.  · Use stairs instead of the elevator.  · When you can, walk or bike instead of driving.  · [place] leaves, garden, or do household repairs.  · Be active at a moderate to vigorous level of physical activity for at least 40 minutes for a minimum of 3 to 4 days a week.   Manage stress  · Make time to relax and enjoy life. Find time to laugh.  · Communicate your concerns with your loved ones and your healthcare provider.  · Visit with family and friends, and keep up with hobbies.  Limit alcohol and quit smoking  · Men should have no more than 2 drinks per day.  · Women should have no more than 1 drink per day.  · Talk with your healthcare provider about quitting smoking. Smoking significantly increases your risk for heart disease and stroke. Ask your healthcare provider about community smoking cessation programs and other options.  Medicines  If lifestyle changes arent enough, your healthcare provider may prescribe high blood pressure medicine. Take all medicines as prescribed. If you have any questions about your medicines, ask your healthcare provider before stopping or changing them.   Date Last Reviewed: 4/27/2016 © 2000-2016 [employer]. [address] [place] [address]. All rights reserved. This information is not intended as a substitute for professional medical care. Always follow your healthcare professional's instructions.             Language Assistance Services     ATTENTION: Language assistance services are available, free of charge. Please call [phone].      ATENCIÓN: Si habla español, tiene a springer disposición servicios gratuitos de asistencia lingüística. Llame al [phone].     PACO Ý: N?u b?n nói Ti?ng Vi?t, có các d?ch v? h? tr? ngôn  ng? mi?n phí dành cho b?n. G?i s? [phone].         [facility] complies with applicable Federal civil rights laws and does not discriminate on the basis of race, color, national origin, age, disability, or sex.

## 2017-04-27 NOTE — TELEPHONE ENCOUNTER
----- Message from Esperanza Rod sent at 4/27/2017  8:17 AM CDT -----  Patient is calling because she woke up in a lot of pain this morning and needs advice. She has an appointment this evening at 3:00pm. Please call back at [phone].

## 2017-05-12 ENCOUNTER — LAB VISIT (OUTPATIENT)
Dept: LAB | Facility: HOSPITAL | Age: 67
End: 2017-05-12
Payer: MEDICARE

## 2017-05-12 ENCOUNTER — OFFICE VISIT (OUTPATIENT)
Dept: CARDIOLOGY | Facility: CLINIC | Age: 67
End: 2017-05-12
Payer: MEDICARE

## 2017-05-12 VITALS
WEIGHT: 108.25 LBS | SYSTOLIC BLOOD PRESSURE: 144 MMHG | HEART RATE: 80 BPM | BODY MASS INDEX: 20.44 KG/M2 | DIASTOLIC BLOOD PRESSURE: 84 MMHG | HEIGHT: 61 IN

## 2017-05-12 DIAGNOSIS — E78.00 HYPERCHOLESTEROLEMIA: ICD-10-CM

## 2017-05-12 DIAGNOSIS — I10 ESSENTIAL HYPERTENSION: Primary | ICD-10-CM

## 2017-05-12 DIAGNOSIS — M62.81 MUSCLE WEAKNESS: ICD-10-CM

## 2017-05-12 DIAGNOSIS — I10 ESSENTIAL HYPERTENSION: ICD-10-CM

## 2017-05-12 DIAGNOSIS — M79.10 MYALGIA: ICD-10-CM

## 2017-05-12 LAB
ALBUMIN SERPL BCP-MCNC: 3.7 G/DL
ALP SERPL-CCNC: 89 U/L
ALT SERPL W/O P-5'-P-CCNC: 54 U/L
ANION GAP SERPL CALC-SCNC: 12 MMOL/L
AST SERPL-CCNC: 34 U/L
BILIRUB SERPL-MCNC: 0.3 MG/DL
BUN SERPL-MCNC: 21 MG/DL
CALCIUM SERPL-MCNC: 10.1 MG/DL
CHLORIDE SERPL-SCNC: 102 MMOL/L
CK SERPL-CCNC: 58 U/L
CO2 SERPL-SCNC: 26 MMOL/L
CREAT SERPL-MCNC: 0.8 MG/DL
ERYTHROCYTE [SEDIMENTATION RATE] IN BLOOD BY WESTERGREN METHOD: 25 MM/HR
EST. GFR  (AFRICAN AMERICAN): >60 ML/MIN/1.73 M^2
EST. GFR  (NON AFRICAN AMERICAN): >60 ML/MIN/1.73 M^2
GLUCOSE SERPL-MCNC: 91 MG/DL
POTASSIUM SERPL-SCNC: 4.6 MMOL/L
PROT SERPL-MCNC: 8.6 G/DL
SODIUM SERPL-SCNC: 140 MMOL/L

## 2017-05-12 PROCEDURE — 99212 OFFICE O/P EST SF 10 MIN: CPT | Mod: S$PBB,,, | Performed by: INTERNAL MEDICINE

## 2017-05-12 PROCEDURE — 80053 COMPREHEN METABOLIC PANEL: CPT | Mod: PO

## 2017-05-12 PROCEDURE — 36415 COLL VENOUS BLD VENIPUNCTURE: CPT | Mod: PO

## 2017-05-12 PROCEDURE — 99999 PR PBB SHADOW E&M-EST. PATIENT-LVL III: CPT | Mod: PBBFAC,,, | Performed by: INTERNAL MEDICINE

## 2017-05-12 PROCEDURE — 82550 ASSAY OF CK (CPK): CPT | Mod: PO

## 2017-05-12 PROCEDURE — 85651 RBC SED RATE NONAUTOMATED: CPT | Mod: PO

## 2017-05-12 NOTE — PROGRESS NOTES
Subjective:    Patient ID:  Cecilia Valenzuela is a 67 y.o. female who presents for Follow-up (pain in neck and shoulders)      HPI  REQUESTED OV, FEELS BETTER OFF STATIN, , STILL SOME SHOULDER DISCOMFORT, WEAKNESS, /PAIN, BP FLUCTUATES, MOSTLY OK, TOOK ANTI-FUNGAL MEDS FOR YEAST BACK IN February,SEE ROS  Past Medical History:   Diagnosis Date    Coronary artery disease     Hyperlipidemia     Hypertension     Neurofibromatosis     Osteopenia     Premature beats     Raynaud's disease     Thyroid nodule      Past Surgical History:   Procedure Laterality Date    BACK SURGERY      CATARACT EXTRACTION Bilateral      SECTION      x5     History reviewed. No pertinent family history.  Social History     Social History    Marital status:      Spouse name: N/A    Number of children: N/A    Years of education: N/A     Social History Main Topics    Smoking status: Never Smoker    Smokeless tobacco: Never Used    Alcohol use No    Drug use: No    Sexual activity: Not Asked     Other Topics Concern    None     Social History Narrative       Review of patient's allergies indicates:   Allergen Reactions    Augmentin [amoxicillin-pot clavulanate]     Dilaudid [hydromorphone]     Neosporin [hydrocortisone]     Norvasc [amlodipine]     Tetracyclines        Current Outpatient Prescriptions:     aspirin (ECOTRIN) 81 MG EC tablet, Take 81 mg by mouth once daily., Disp: , Rfl:     azelastine (ASTELIN) 137 mcg (0.1 %) nasal spray, 1 spray by Nasal route 2 (two) times daily., Disp: , Rfl:     calcium-vitamin D3 (CALCIUM 500 + D) 500 mg(1,250mg) -200 unit per tablet, Take 1 tablet by mouth 2 (two) times daily with meals., Disp: , Rfl:     co-enzyme Q-10 30 mg capsule, Take 100 mg by mouth 3 (three) times daily. 200mg daily, Disp: , Rfl:     lorazepam (ATIVAN) 0.5 MG tablet, Take 0.25 mg by mouth every 6 (six) hours as needed for Anxiety., Disp: , Rfl:     multivitamin (THERAGRAN) per tablet,  Take 1 tablet by mouth once daily., Disp: , Rfl:     omeprazole (PRILOSEC) 20 MG capsule, Take 40 mg by mouth once daily., Disp: , Rfl:     PATADAY 0.2 % Drop, instill 1 drop into both eyes once daily, Disp: , Rfl: 0    TOPROL XL 25 mg 24 hr tablet, Take 1.5 tablets (37.5 mg total) by mouth nightly., Disp: 45 tablet, Rfl: 6    Review of Systems   Constitution: Negative for chills, diaphoresis, weakness, malaise/fatigue and night sweats.   HENT: Negative for congestion, hearing loss and nosebleeds.    Eyes: Negative for blurred vision, discharge, double vision and visual disturbance.   Cardiovascular: Negative for chest pain, claudication, cyanosis, dyspnea on exertion, irregular heartbeat, leg swelling, near-syncope, orthopnea, palpitations, paroxysmal nocturnal dyspnea and syncope.   Respiratory: Negative for cough, hemoptysis, shortness of breath, sleep disturbances due to breathing, sputum production and wheezing.    Endocrine: Negative for cold intolerance, heat intolerance and polyuria.   Hematologic/Lymphatic: Negative for adenopathy. Does not bruise/bleed easily.   Skin: Negative for color change, itching and nail changes.   Musculoskeletal: Positive for myalgias (feels like nerve pain). Negative for back pain, falls and joint pain. Muscle cramps: BETTER. Muscle weakness: SOME, SHOULDERS.   Gastrointestinal: Negative for bloating, abdominal pain, change in bowel habit, constipation, dysphagia, flatus, heartburn, hematemesis, jaundice, melena and vomiting.   Genitourinary: Negative for bladder incontinence, dysuria, flank pain and frequency.   Neurological: Negative for brief paralysis, difficulty with concentration, disturbances in coordination, excessive daytime sleepiness, dizziness, focal weakness, light-headedness, loss of balance, numbness, paresthesias, seizures, sensory change and tremors.   Psychiatric/Behavioral: Negative for altered mental status, depression, memory loss and substance abuse. The  "patient is not nervous/anxious.    Allergic/Immunologic: Negative for persistent infections.        Objective:      Vitals:    05/12/17 1100   BP: (!) 149/86   Pulse: 80   Weight: 49.1 kg (108 lb 3.9 oz)   Height: 5' 1" (1.549 m)   PainSc:   6     Body mass index is 20.45 kg/(m^2).    Physical Exam   Constitutional: She is oriented to person, place, and time. She appears well-developed and well-nourished. She is active.   HENT:   Head: Normocephalic and atraumatic.   Mouth/Throat: Oropharynx is clear and moist and mucous membranes are normal.   Eyes: Conjunctivae and EOM are normal. Pupils are equal, round, and reactive to light.   Neck: Trachea normal and normal range of motion. Neck supple. Normal carotid pulses, no hepatojugular reflux and no JVD present. Carotid bruit is not present. No tracheal deviation, no edema and no erythema present. No thyromegaly present.   Cardiovascular: Normal rate, regular rhythm and normal heart sounds.   No extrasystoles are present. PMI is not displaced.  Exam reveals no gallop and no friction rub.    No murmur heard.  Pulses:       Carotid pulses are 2+ on the right side, and 2+ on the left side.       Radial pulses are 2+ on the right side, and 2+ on the left side.        Posterior tibial pulses are 2+ on the right side, and 2+ on the left side.   Pulmonary/Chest: Effort normal and breath sounds normal. No tachypnea and no bradypnea. She has no wheezes. She has no rales. She exhibits no tenderness.   Abdominal: Soft. Bowel sounds are normal. She exhibits no distension and no mass. There is no hepatosplenomegaly. There is no tenderness. There is no guarding and no CVA tenderness.   Musculoskeletal: Normal range of motion. She exhibits no edema or tenderness.   SLOW GAIT   Lymphadenopathy:     She has no cervical adenopathy.   Neurological: She is alert and oriented to person, place, and time. She has normal strength. She displays no tremor. No cranial nerve deficit. She exhibits " normal muscle tone. Coordination normal.   Skin: Skin is warm and dry. No rash noted. No cyanosis or erythema. No pallor.   Psychiatric: Her speech is normal and behavior is normal. Thought content normal. Anxious: SLIGHTLY.               ..    Chemistry    No results found for: NA, K, CL, CO2, BUN, CREATININE, GLU No results found for: CALCIUM, ALKPHOS, AST, ALT, BILITOT         ..No results found for: CHOL  No results found for: HDL  No results found for: LDLCALC  No results found for: TRIG  No results found for: CHOLHDL  ..No results found for: WBC, HGB, HCT, MCV, PLT    Test(s) Reviewed  I have reviewed the following in detail:  [] Stress test   [] Angiography   [] Echocardiogram   [] Labs   [] Other:       Assessment:         ICD-10-CM ICD-9-CM   1. Essential hypertension I10 401.9   2. Muscle weakness M62.81 728.87   3. Hypercholesterolemia E78.00 272.0     Problem List Items Addressed This Visit        Cardiology Problems    Essential hypertension - Primary    Relevant Orders    CK    Sedimentation rate, manual    Comprehensive metabolic panel    Hypercholesterolemia    Relevant Orders    CK    Sedimentation rate, manual    Comprehensive metabolic panel      Other Visit Diagnoses     Muscle weakness        Relevant Orders    CK    Sedimentation rate, manual    Comprehensive metabolic panel           Plan:         CHECK LABS TODAY, SX ?? RHEUMATOLOGIC, TO SEE RHEUMATOLOGIST IN [place], DR ORTEGA, , WATCH BP, BRING MACHINE, RTC IN FEW MON AS SCHEDULED  Essential hypertension  -     CK; Future; Expected date: 5/12/17  -     Sedimentation rate, manual; Future; Expected date: 5/12/17  -     Comprehensive metabolic panel; Future; Expected date: 5/12/17    Muscle weakness  -     CK; Future; Expected date: 5/12/17  -     Sedimentation rate, manual; Future; Expected date: 5/12/17  -     Comprehensive metabolic panel; Future; Expected date: 5/12/17    Hypercholesterolemia  -     CK; Future; Expected date: 5/12/17  -      Sedimentation rate, manual; Future; Expected date: 5/12/17  -     Comprehensive metabolic panel; Future; Expected date: 5/12/17  RTC Low level/low impact aerobic exercise 5x's/wk. Heart healthy diet and risk factor modification.    See labs and med orders.    Aerobic exercise 5x's/wk. Heart healthy diet and risk factor modification.    See labs and med orders.

## 2017-05-12 NOTE — PATIENT INSTRUCTIONS
Eating Heart-Healthy Foods  Eating has a big impact on your heart health. In fact, eating healthier can improve several of your heart risks at once. For instance, it helps you manage weight, cholesterol, and blood pressure. Here are ideas to help you make heart-healthy changes without giving up all the foods and flavors you love.  Getting started  · Talk with your health care provider about eating plans, such as the DASH or Mediterranean diet. You may also be referred to a dietitian.  · Change a few things at a time. Give yourself time to get used to a few eating changes before adding more.  · Work to create a tasty, healthy eating plan that you can stick to for the rest of your life.    Goals for healthy eating  Below are some tips to improve your eating habits:  · Limit saturated fats and trans fats. Saturated fats raise your levels of cholesterol, so keep these fats to a minimum. They are found in foods such as fatty meats, whole milk, cheese, and palm and coconut oils. Avoid trans fats because they lower good cholesterol as well as raise bad cholesterol. Trans fats are most often found in processed foods.  · Reduce sodium (salt) intake. Eating too much salt may increase your blood pressure. Limit your sodium intake to 2,300 milligrams (mg) per day, or less if your health care provider recommends it. Dining out less often and eating fewer processed foods are two great ways to decrease the amount of salt you consume.  · Managing calories. A calorie is a unit of energy. Your body burns calories for fuel, but if you eat more calories than your body burns, the extras are stored as fat. Your health care provider can help you create a diet plan to manage your calories. This will likely include eating healthier foods as well as exercising regularly. To help you track your progress, keep a diary to record what you eat and how often you exercise.  Choose the right foods  Aim to make these foods staples of your diet. If  you have diabetes, you may have different recommendations than what is listed here:  · Fruits and vegetable provide plenty of nutrients without a lot of calories. At meals, fill half your plate with these foods. Split the other half of your plate between whole grains and lean protein.  · Whole grains are high in fiber and rich in vitamins and nutrients. Good choices include whole-wheat bread, pasta, and brown rice.  · Lean proteins give you nutrition with less fat. Good choices include fish, skinless chicken, and beans.  · Low-fat or nonfat dairy provides nutrients without a lot of fat. Try low-fat or nonfat milk, cheese, or yogurt.  · Healthy fats can be good for you in small amounts. These are unsaturated fats, such as olive oil, nuts, and fish. Try to have at least 2 servings per week of fatty fish such as salmon, sardines, mackerel, rainbow trout, and albacore tuna. These contain omega-3 fatty acids, which are good for your heart. Flaxseed is another source of a heart-healthy fat.  More on heart healthy eating    Read food labels  Healthy eating starts at the grocery store. Be sure to pay attention to food labels on packaged foods. Look for products that are high in fiber and protein, and low in saturated fat, cholesterol, and sodium. Avoid products that contain trans fat. And pay close attention to serving size. For instance, if you plan to eat two servings, double all the numbers on the label.  Prepare food right  A key part of healthy cooking is cutting down on added fat and salt. Look on the internet for lower-fat, lower-sodium recipes. Also, try these tips:  · Remove fat from meat and skin from poultry before cooking.  · Skim fat from the surface of soups and sauces.  · Broil, boil, bake, steam, grill, and microwave food without added fats.  · Choose ingredients that spice up your food without adding calories, fat, or sodium. Try these items: horseradish, hot sauce, lemon, mustard, nonfat salad dressings,  and vinegar. For salt-free herbs and spices, try basil, cilantro, cinnamon, pepper, and rosemary.  Date Last Reviewed: 6/25/2015 © 2000-2016 [employer]. [address] [place] [address], [place] [address]. All rights reserved. This information is not intended as a substitute for professional medical care. Always follow your healthcare professional's instructions.        Heart Disease Education    The heart beats 60 to 100 times per minute, 24 hours a day. This equals almost 1000,000 times a day. It pumps blood with oxygen and nutrients to the tissues and organs of the body. But the heart is a muscle and needs its own supply of blood. Blood flow to the heart is supplied by the coronary arteries. Coronary artery disease (atherosclerosis) is a result of cholesterol, saturated fat, and calcium deposits (plaques) that build up inside the walls. This causes inflammation within the coronary arteries. These plaques narrow the artery and reduce blood flow to the heart muscle. The reduction in blood flow to the heart muscle decreases oxygen supply to the heart. If the narrowing is significant enough, the oxygen supply to one or more regions of the heart can be temporarily or permanently shut down. This can cause chest pain, and possibly death of heart tissue (heart attack).  Types of chest pain  Angina is the name for pain in the heart muscle. Angina is a warning sign of serious heart disease. When untreated it can lead to a heart attack, also known as acute myocardial infarction, or AMI. Angina occurs when there is not enough blood and oxygen flowing to the heart for the amount of work it is doing. This most often happens during physical exertion, when the heart is working hardest. It is usually relieved by rest or nitroglycerin. Angina may also occur after a large meal when extra blood is sent to the digestive organs and less goes to the heart. In the case of advanced or unstable heart disease, angina can occur at  rest or awaken you from sleep. Angina usually lasts from a few minutes up to 20 minutes or more. When treated early, the effects of angina can be reversed without permanent damage to the heart. Angina is a serious condition and needs to be evaluated by a medical professional immediately.  There are two types of angina -- stable and unstable:  · Stable angina usually occurs with a predictable level of activity. Being stable, its character, severity, and occurrence do not change much over time. It usually starts with activity, and resolves with rest or taking your medicine as instructed by your doctor. The symptoms usually do not last long.  · Unstable angina changes or gets worse over time. It is different from whatever you are used to. It may feel different or worse, begin without cause, occur with exercise or exertion, wake you up from sleep, and last longer. It may not respond in the same way as it does when you take your usual medicines for an attack. This type of angina can be a warning sign of an impending heart attack.     A heart attack is usually the result of a blood clot that suddenly forms in a coronary artery that has been narrowed with plaque. When this occurs, blood flow may be cut off to a part of the heart muscle, causing the cells to die. This weakens the pumping action of the heart, which affects the delivery of blood to all the other organs in the body including the brain. This damage is not reversible. However, early treatment can limit the amount of damage.  The pain you feel with angina and a heart attack may have a similar quality. However, it is usually different in intensity and duration. Here are some typical descriptions of a heart attack:  · It is most often experienced as a squeezing, crushing, pressure-like sensation in the center of the chest.  · It is sometimes described as something heavy sitting on my chest.  · It may feel more like a bad case of indigestion.  · The pain may  "spread from the chest to the arm, shoulder, throat or jaw.  · Sometimes the pain is not felt in the chest at all, but only in the arm, shoulder, throat or jaw.  · There may also be nausea, vomiting, dizziness or light-headedness, sweating and trouble breathing.  · Palpitations, or your heart beating rapidly  · A new, irregular heart beat  · Unexplained weakness  You may not be able to tell the difference between "bad" angina and a heart attack at home. Seek help if your symptoms are different than usual. Do not be in denial or just try to "tough it out."  Call 911  This is the fastest and safest way to get to the emergency department. The paramedics can also start treatment on the way to the hospital, saving valuable time for your heart.  · If the angina gets worse, if it continues, or if it stops and returns, call 911 immediately. Do not delay. You may be having a heart attack.  · After you call 911, take a second tablet or spray unless instructed otherwise. When repeating doses, sit down if possible, because it can make you feel lightheaded or dizzy. Wait another 5 minutes. If the angina still does not go away, take a third tablet or spray. Do not take more than 3 tablets or sprays within 15 minutes. Stay on the phone with 911 for further instruction.  · Your healthcare provider may give you slightly different instructions than those above. If so, follow them carefully.  Do not wait until symptoms become severe to call 911.  Other reasons to call 911 include:  · Trouble breathing  · Feeling lightheaded, faint, or dizzy  · Rapid heart beat  · Slower than usual heart rate compared to your normal  · Angina with weakness, dizziness, fainting, heavy sweating, nausea, or vomiting  · Extreme drowsiness, confusion  · Weakness of an arm or leg or one side of the face  · Difficulty with speech or vision  When to seek medical care  Remember, the signs and symptoms of a heart attack are not always like they are on TV. " "Sometimes they are not so obvious. You may only feel weak, or just not right. If it is not clear or if you have any doubt, call for advice.  · Seek help if there is a change in the type of pain, if it feels different, or if your symptoms are mild.  · Do not drive yourself. Have someone else drive you. If no one can drive, call 911.  · Do not delay. Fast diagnosis and treatment can prevent or limit the amount of heart damage during a heart attack.  · Do not go to your doctor's office or a clinic as they may not be able to provide all the testing and treatment required for this condition.  · If your doctor has given you medicine to take when symptoms occur, take them but don't delay getting help trying to locate medicines.  What happens in the emergency department  The emergency department is connected to your local emergency medical system (EMS) through 911. That's why during a cardiac emergency, calling 911 is the fastest way to get help. The goal of the emergency department is to rapidly screen, evaluate, and treat people.  Once you are there, an electrocardiogram (ECG or heart tracing) will be done. Blood samples may be taken to look for the presence of heart enzymes that leak from damaged heart cells and show if a heart attack is occurring. You will often be evaluated by a heart specialist (cardiologist) who decides the best course of action. In the case of severe angina or early heart attack, and depending on the circumstances, powerful "clot busting" medicines can be used to dissolve blood clots in the coronary artery. In other cases, you may be taken to a cardiac catheterization lab. Here, a tiny balloon-tipped catheter is advanced through blood vessels to the heart. There the balloon is inflated pushing open the blood vessel restoring blood flow.  Risk factors for heart disease  Risk factors for heart disease are a combination of genetic and lifestyle. Many risk factors work by either directly or indirectly " damaging the blood vessels of the heart, or by increasing the risk of forming blood or cholesterol clots, which then clog up and block the arteries.     Examples of physical lifestyle risk factors:  · Cigarette smoking  · High blood pressure  · High blood cholesterol  · Use of stimulant drugs such as cocaine, crack, and amphetamines  · Eating a high-fat, high-cholesterol meal  · Diabetes   · Obesity which increases risk for diabetes and high blood pressure  · Lack of regular physical activity     Examples of emotional lifestyle factors:  · Chronic high stress levels release stress hormones. These raise blood pressure and cholesterol level and makes blood clot more easily.  · Held-in anger, hostile or cynical attitude  · Social and emotional isolation, lack of intimacy  · Loss of relationship  · Depression  Other factors that increase the risk of heart attack that you cannot control :  · Age. The older you get beyond 40, the greater is your risk of significant coronary artery disease.  · Gender. More men than women get heart disease; but once past menopause, women who are not taking estrogen replacement have the same risk as men for a heart attack.  · Family history. If your mother, father, brother or sister has coronary artery disease, your risk of having it is higher than a person your age without this family history.  What can you do to decrease your risk  To reduce your risk of heart disease:  · Get regular checkups with your doctor.  · Take your medicines for blood pressure, cholesterol or diabetes as directed.  · Watch your diet. Eat a heart healthy diet choosing fresh foods, less salt, cholesterol, and fat  · Stop smoking. Get help if needed.  · Get regular exercise.  · Manage stress.  · Carry a list of medicines and doses in your wallet.  Date Last Reviewed: 12/30/2015 © 2000-2016 [employer]. [address] [place] [address], [place] [address]. All rights reserved. This information is not intended as  a substitute for professional medical care. Always follow your healthcare professional's instructions.        Controlling High Blood Pressure  High blood pressure (hypertension) is often called the silent killer. This is because many people who have it dont know it. High blood pressure is defined as 140/90 mm Hg or higher. Know your blood pressure and remember to check it regularly. Doing so can save your life. Here are some things you can do to help control your blood pressure.    Choose heart-healthy foods  · Select low-salt, low-fat foods. Limit sodium intake to 2,400 mg per day or the amount suggested by your healthcare provider.  · Limit canned, dried, cured, packaged, and fast foods. These can contain a lot of salt.  · Eat 8 to 10 servings of fruits and vegetables every day.  · Choose lean meats, fish, or chicken.  · Eat whole-grain pasta, brown rice, and beans.  · Eat 2 to 3 servings of low-fat or fat-free dairy products.  · Ask your doctor about the DASH eating plan. This plan helps reduce blood pressure.  · When you go to a restaurant, ask that your meal be prepared with no added salt.  Maintain a healthy weight  · Ask your healthcare provider how many calories to eat a day. Then stick to that number.  · Ask your healthcare provider what weight range is healthiest for you. If you are overweight, a weight loss of only 3% to 5% of your body weight can help lower blood pressure. Generally, a good weight loss goal is to lose 10% of your body weight in a year.  · Limit snacks and sweets.  · Get regular exercise.  Get up and get active  · Choose activities you enjoy. Find ones you can do with friends or family. This includes bicycling, dancing, walking, and jogging.  · Park farther away from building entrances.  · Use stairs instead of the elevator.  · When you can, walk or bike instead of driving.  · [place] leaves, garden, or do household repairs.  · Be active at a moderate to vigorous level of physical activity  for at least 40 minutes for a minimum of 3 to 4 days a week.   Manage stress  · Make time to relax and enjoy life. Find time to laugh.  · Communicate your concerns with your loved ones and your healthcare provider.  · Visit with family and friends, and keep up with hobbies.  Limit alcohol and quit smoking  · Men should have no more than 2 drinks per day.  · Women should have no more than 1 drink per day.  · Talk with your healthcare provider about quitting smoking. Smoking significantly increases your risk for heart disease and stroke. Ask your healthcare provider about community smoking cessation programs and other options.  Medicines  If lifestyle changes arent enough, your healthcare provider may prescribe high blood pressure medicine. Take all medicines as prescribed. If you have any questions about your medicines, ask your healthcare provider before stopping or changing them.   Date Last Reviewed: 4/27/2016 © 2000-2016 [employer]. [address] [place] [id number]. All rights reserved. This information is not intended as a substitute for professional medical care. Always follow your healthcare professional's instructions.        Myalgias  Myalgias are another word for muscle aches and soreness. This is a symptom, not a disease. Myalgias can have many causes. A cold, the flu, or an acute infection can cause them. So can any illness with a high fever. They may happen after exertion (such as heavy exercise) or trauma (such as an accident or fall). Some medicines (such as statins and certain antidepressants) can cause myalgias. They can also be a symptom of chronic or ongoing medical problems (such as lupus, chronic fatigue, or hypothyroidism). With these illnesses, other serious symptoms often occur in addition to muscle pain and soreness.    Myalgias most often go away on their own. If they don't go away, come back, or are severe, testing may be needed to help find the cause.  Home  care  · Rest until you feel better.  · Follow instructions that you were given for how to care for yourself. This may depend on the cause of your myalgias.   · If myalgia is thought to be due to a medicine, be sure to talk to the doctor that prescribed the medicine about the best course of action.  · To control pain, take prescription or over-the-counter medicines as directed. Unless told not to, you can try acetaminophen or ibuprofen.  Follow-up care  Follow up with your healthcare provider or as advised by our staff. If your symptoms do not go away in a few days or if they come back, follow up with your healthcare provider for an exam and testing.  When to see medical advice  Call your healthcare provider for any of the following:  · Fever of 100.4°F (38ºC) or higher, or as directed by your healthcare provider  · Pain that gets worse and not better, or that goes away and comes back  · New joint pains  · New rash  · Severe headache, neck pain, drowsiness, or confusion  Date Last Reviewed: 5/14/2015  © 6403-0751 [email or web address]. [address] [place] [address], [place] [address]. All rights reserved. This information is not intended as a substitute for professional medical care. Always follow your healthcare professional's instructions.

## 2017-05-16 ENCOUNTER — TELEPHONE (OUTPATIENT)
Dept: CARDIOLOGY | Facility: CLINIC | Age: 67
End: 2017-05-16

## 2017-05-16 NOTE — TELEPHONE ENCOUNTER
----- Message from Gloria Obrien sent at 5/16/2017 10:00 AM CDT -----  Please call in reference to lab work.  Call [phone]

## 2017-05-17 ENCOUNTER — OFFICE VISIT (OUTPATIENT)
Dept: RHEUMATOLOGY | Facility: CLINIC | Age: 67
End: 2017-05-17
Payer: MEDICARE

## 2017-05-17 ENCOUNTER — HOSPITAL ENCOUNTER (OUTPATIENT)
Dept: RADIOLOGY | Facility: HOSPITAL | Age: 67
Discharge: HOME | End: 2017-05-17
Attending: INTERNAL MEDICINE
Payer: MEDICARE

## 2017-05-17 VITALS
SYSTOLIC BLOOD PRESSURE: 140 MMHG | WEIGHT: 108.25 LBS | HEIGHT: 61 IN | BODY MASS INDEX: 20.44 KG/M2 | DIASTOLIC BLOOD PRESSURE: 84 MMHG | HEART RATE: 70 BPM

## 2017-05-17 DIAGNOSIS — M50.30 DDD (DEGENERATIVE DISC DISEASE), CERVICAL: ICD-10-CM

## 2017-05-17 DIAGNOSIS — M79.10 MYALGIA: ICD-10-CM

## 2017-05-17 DIAGNOSIS — M89.9 DISORDER OF BONE AND CARTILAGE: ICD-10-CM

## 2017-05-17 DIAGNOSIS — M25.50 POLYARTHRALGIA: Primary | ICD-10-CM

## 2017-05-17 DIAGNOSIS — R53.83 FATIGUE, UNSPECIFIED TYPE: ICD-10-CM

## 2017-05-17 DIAGNOSIS — M94.9 DISORDER OF BONE AND CARTILAGE: ICD-10-CM

## 2017-05-17 DIAGNOSIS — M35.3 PMR (POLYMYALGIA RHEUMATICA): ICD-10-CM

## 2017-05-17 DIAGNOSIS — M25.50 POLYARTHRALGIA: ICD-10-CM

## 2017-05-17 DIAGNOSIS — M85.80 OSTEOPENIA, UNSPECIFIED LOCATION: ICD-10-CM

## 2017-05-17 PROCEDURE — 99999 PR PBB SHADOW E&M-EST. PATIENT-LVL III: CPT | Mod: PBBFAC,,, | Performed by: INTERNAL MEDICINE

## 2017-05-17 PROCEDURE — 99213 OFFICE O/P EST LOW 20 MIN: CPT | Mod: PBBFAC,25,PO | Performed by: INTERNAL MEDICINE

## 2017-05-17 PROCEDURE — 99205 OFFICE O/P NEW HI 60 MIN: CPT | Mod: S$PBB,,, | Performed by: INTERNAL MEDICINE

## 2017-05-17 PROCEDURE — 77077 JOINT SURVEY SINGLE VIEW: CPT | Mod: TC

## 2017-05-17 PROCEDURE — 77077 JOINT SURVEY SINGLE VIEW: CPT | Mod: 26,,, | Performed by: RADIOLOGY

## 2017-05-17 RX ORDER — GABAPENTIN 100 MG/1
300 CAPSULE ORAL NIGHTLY
Qty: 90 CAPSULE | Refills: 2 | Status: SHIPPED | OUTPATIENT
Start: 2017-05-17 | End: 2017-08-18

## 2017-05-17 RX ORDER — MAGNESIUM 250 MG
250 TABLET ORAL DAILY
COMMUNITY
End: 2022-10-11

## 2017-05-17 RX ORDER — DICLOFENAC SODIUM 10 MG/G
2 GEL TOPICAL 4 TIMES DAILY
Qty: 1 TUBE | Refills: 2 | Status: SHIPPED | OUTPATIENT
Start: 2017-05-17 | End: 2017-08-18

## 2017-05-17 ASSESSMENT — ROUTINE ASSESSMENT OF PATIENT INDEX DATA (RAPID3)
WHEN YOU AWAKENED IN THE MORNING OVER THE LAST WEEK, PLEASE INDICATE THE AMOUNT OF TIME IT TAKES UNTIL YOU ARE AS LIMBER AS YOU WILL BE FOR THE DAY: 30-40 MIN
TOTAL RAPID3 SCORE: 5.44
PAIN SCORE: 7.5
MDHAQ FUNCTION SCORE: 1.3
FATIGUE SCORE: 4
PATIENT GLOBAL ASSESSMENT SCORE: 4.5
PSYCHOLOGICAL DISTRESS SCORE: 1.1
AM STIFFNESS SCORE: 1, YES

## 2017-05-17 NOTE — MR AVS SNAPSHOT
[facility]  [address] [place]  [place] [address]  Phone: [phone]  Fax: [phone]                  Cecilia Valenzuela   2017 11:00 AM   Office Visit    Description:  Female : 1950   Provider:  Dede Duncan MD   Department:  [facility]           Reason for Visit     Consult           Diagnoses this Visit        Comments    Polyarthralgia    -  Primary     DDD (degenerative disc disease), cervical         Disorder of bone and cartilage                To Do List           Future Appointments        Provider Department Dept Phone    2017 12:10 PM LAB, [facility] [phone]    2017 12:30 PM [facility] XR5PORT [facility] [phone]      Goals (5 Years of Data)     None       These Medications        Disp Refills Start End    gabapentin (NEURONTIN) 100 MG capsule 90 capsule 2 2017    Take 3 capsules (300 mg total) by mouth every evening. - Oral    Pharmacy: [facility] [employer] Ph #: [phone]       diclofenac sodium (VOLTAREN) 1 % Gel 1 Tube 2 2017    Apply 2 g topically 4 (four) times daily. - Topical    Pharmacy: [address] Ph #: [phone]         [facility]     [facility] Care [facility] -  Assistance  Unless otherwise directed by your provider, please contact [facility], our nurse care line that is available for 24/ assistance.     Registered nurses in the [facility] provide: appointment scheduling, clinical advisement, health education, and other advisory services.  Call: [phone] (toll free)               Medications           Message regarding Medications     Verify the changes and/or additions to your medication regime listed below are the same as discussed with your clinician today.  If any of  "these changes or additions are incorrect, please notify your healthcare provider.        START taking these NEW medications        Refills    gabapentin (NEURONTIN) 100 MG capsule 2    Sig: Take 3 capsules (300 mg total) by mouth every evening.    Class: Normal    Route: Oral    diclofenac sodium (VOLTAREN) 1 % Gel 2    Sig: Apply 2 g topically 4 (four) times daily.    Class: Normal    Route: Topical           Verify that the below list of medications is an accurate representation of the medications you are currently taking.  If none reported, the list may be blank. If incorrect, please contact your healthcare provider. Carry this list with you in case of emergency.           Current Medications     aspirin (ECOTRIN) 81 MG EC tablet Take 81 mg by mouth once daily.    azelastine (ASTELIN) 137 mcg (0.1 %) nasal spray 1 spray by Nasal route 2 (two) times daily.    calcium-vitamin D3 (CALCIUM 500 + D) 500 mg(1,250mg) -200 unit per tablet Take 1 tablet by mouth 2 (two) times daily with meals.    co-enzyme Q-10 30 mg capsule Take 100 mg by mouth 3 (three) times daily. 200mg daily    lorazepam (ATIVAN) 0.5 MG tablet Take 0.25 mg by mouth every 6 (six) hours as needed for Anxiety.    magnesium 250 mg Tab Take 250 mg by mouth once daily.    multivitamin (THERAGRAN) per tablet Take 1 tablet by mouth once daily.    omeprazole (PRILOSEC) 20 MG capsule Take 40 mg by mouth once daily.    PATADAY 0.2 % Drop instill 1 drop into both eyes once daily    TOPROL XL 25 mg 24 hr tablet Take 1.5 tablets (37.5 mg total) by mouth nightly.    diclofenac sodium (VOLTAREN) 1 % Gel Apply 2 g topically 4 (four) times daily.    gabapentin (NEURONTIN) 100 MG capsule Take 3 capsules (300 mg total) by mouth every evening.           Clinical Reference Information           Your Vitals Were     BP Pulse Height Weight BMI    140/84 70 5' 1" (1.549 m) 49.1 kg (108 lb 3.9 oz) 20.45 kg/m2      Blood Pressure          Most Recent Value    BP  (!)  140/84 "      Allergies as of 5/17/2017     Augmentin [Amoxicillin-pot Clavulanate]    Dilaudid [Hydromorphone]    Neosporin [Hydrocortisone]    Norvasc [Amlodipine]    Tetracyclines      Immunizations Administered on Date of Encounter - 5/17/2017     None      Orders Placed During Today's Visit     Future Labs/Procedures Expected by Expires    Aldolase  5/17/2017 7/16/2018    MAGDA  5/17/2017 7/16/2018    C-reactive protein  5/17/2017 7/16/2018    Cyclic citrul peptide antibody, IgG  5/17/2017 7/16/2018    Rheumatoid factor  5/17/2017 7/16/2018    Sjogrens syndrome-A extractable nuclear antibody  5/17/2017 7/16/2018    Vitamin D  5/17/2017 7/16/2018    XR Arthritis Survey  5/17/2017 5/17/2018      Language Assistance Services     ATTENTION: Language assistance services are available, free of charge. Please call [phone].      ATENCIÓN: Si habla richañol, tiene a springer disposición servicios gratuitos de asistencia lingüística. Llame al [phone].     CHÚ Ý: N?u b?n nói Ti?ng Vi?t, có các d?ch v? h? tr? ngôn ng? mi?n phí lilih cho b?n. G?i s? [phone].         [facility] complies with applicable Federal civil rights laws and does not discriminate on the basis of race, color, national origin, age, disability, or sex.

## 2017-05-17 NOTE — PROGRESS NOTES
"Subjective:       Patient ID: Cecilia Valenzuela is a 67 y.o. female.    Chief Complaint: Myalgia   HPI She c/o polyarthralgia and myalgia for the last 20 years. Myalgias worsened in April- Parvastatin was dc on April- significantly helped the pain. Has DDD - S/P laminectomy of Lspine 30 years ago.   Still has myalgias in the arms and legs. Pain is aching type, worse with activity, better with rest. No joint effusion. Early morning stiffness lasts for 2-3 hrs  Denies B/L sciatica, saddle anesthesia, spasticity, bladder or bowel incontinence, motor weakness of the upper or lower extremities    She denies fever, weight loss, dry eyes or mouth, photosensitivity, rash, ulcer, raynaud's phenomenon, alopecia, dysphagia, diarrhea or blood in the stools  Mother has H/O RA in remission at this time   Review of Systems   Constitutional: Positive for fatigue. Negative for fever.   HENT: Negative for ear discharge and ear pain.    Eyes: Negative for pain and redness.   Respiratory: Negative for cough and shortness of breath.    Cardiovascular: Negative for chest pain and palpitations.   Gastrointestinal: Negative for abdominal distention and abdominal pain.   Genitourinary: Negative for genital sores and hematuria.   Musculoskeletal: Positive for arthralgias and myalgias. Negative for joint swelling.   Neurological: Negative for tremors and seizures.   Psychiatric/Behavioral: Negative for agitation and hallucinations.         Objective:   BP (!) 140/84  Pulse 70  Ht 5' 1" (1.549 m)  Wt 49.1 kg (108 lb 3.9 oz)  BMI 20.45 kg/m2     Physical Exam   Nursing note and vitals reviewed.  Constitutional: She is oriented to person, place, and time and well-developed, well-nourished, and in no distress.   HENT:   Head: Normocephalic and atraumatic.   Eyes: Conjunctivae and EOM are normal. Pupils are equal, round, and reactive to light.   Neck: Neck supple. No tracheal deviation present. No thyromegaly present.   Cardiovascular: " Normal rate and regular rhythm.  Exam reveals no friction rub.    Pulmonary/Chest: Effort normal and breath sounds normal.   Abdominal: Soft. Bowel sounds are normal. She exhibits no mass.       Right Side Rheumatological Exam     Muscle Strength (0-5 scale):  Neck Flexion:  5  Neck Extension: 5  Deltoid:  5  Biceps: 5/5   Triceps:  5  : 5/5   Iliopsoas: 5  Quadriceps:  5   Distal Lower Extremity: 5    Left Side Rheumatological Exam     Muscle Strength (0-5 scale):  Neck Flexion:  5  Neck Extension: 5  Deltoid:  5  Biceps: 5/5   Triceps:  5  :  5/5   Iliopsoas: 5  Quadriceps:  5   Distal Lower Extremity: 5      Neurological: She is alert and oriented to person, place, and time. She exhibits normal muscle tone.   Skin: Skin is warm and dry.     Psychiatric: Memory and affect normal.   Musculoskeletal: She exhibits no edema.   Neck with decreased range of motion in all directions   Bilateral shoulders with intact ROM   Elbows without any swelling or tenderness  Bilateral first CMC tenderness  No swelling or tenderness of bilateral wrists, MCPs, PIPs, DIPs  SLR T negative  Bilateral hips with external rotation 25° and  internal rotation 15°   Bilateral knee crepitus  Both ankles and feet nontender, without synovitis           No results found for: WBC, HGB, HCT, MCV, PLT  CMP  Sodium   Date Value Ref Range Status   05/12/2017 140 136 - 145 mmol/L Final     Potassium   Date Value Ref Range Status   05/12/2017 4.6 3.5 - 5.1 mmol/L Final     Chloride   Date Value Ref Range Status   05/12/2017 102 95 - 110 mmol/L Final     CO2   Date Value Ref Range Status   05/12/2017 26 23 - 29 mmol/L Final     Glucose   Date Value Ref Range Status   05/12/2017 91 70 - 110 mg/dL Final     BUN, Bld   Date Value Ref Range Status   05/12/2017 21 8 - 23 mg/dL Final     Creatinine   Date Value Ref Range Status   05/12/2017 0.8 0.5 - 1.4 mg/dL Final     Calcium   Date Value Ref Range Status   05/12/2017 10.1 8.7 - 10.5 mg/dL Final      Total Protein   Date Value Ref Range Status   05/12/2017 8.6 (H) 6.0 - 8.4 g/dL Final     Albumin   Date Value Ref Range Status   05/12/2017 3.7 3.5 - 5.2 g/dL Final     Total Bilirubin   Date Value Ref Range Status   05/12/2017 0.3 0.1 - 1.0 mg/dL Final     Comment:     For infants and newborns, interpretation of results should be based  on gestational age, weight and in agreement with clinical  observations.  Premature Infant recommended reference ranges:  Up to 24 hours.............<8.0 mg/dL  Up to 48 hours............<12.0 mg/dL  3-5 days..................<15.0 mg/dL  6-29 days.................<15.0 mg/dL       Alkaline Phosphatase   Date Value Ref Range Status   05/12/2017 89 55 - 135 U/L Final     AST   Date Value Ref Range Status   05/12/2017 34 10 - 40 U/L Final     ALT   Date Value Ref Range Status   05/12/2017 54 (H) 10 - 44 U/L Final     Anion Gap   Date Value Ref Range Status   05/12/2017 12 8 - 16 mmol/L Final     eGFR if    Date Value Ref Range Status   05/12/2017 >60 >60 mL/min/1.73 m^2 Final     eGFR if non    Date Value Ref Range Status   05/12/2017 >60 >60 mL/min/1.73 m^2 Final     Comment:     Calculation used to obtain the estimated glomerular filtration  rate (eGFR) is the CKD-EPI equation. Since race is unknown   in our information system, the eGFR values for   -American and Non--American patients are given   for each creatinine result.       Lab Results   Component Value Date    SEDRATE 25 (H) 05/12/2017         Assessment:   Polyarthralgia- with joint stiffness in the morning-?  PMR  DDD of C spine  OA of multiple joints   Diffused myalgia-Rule out myositis  Fatigue-Rule out disorders of bone and cartilage/vitamin d deficiency  Osteopenia       Plan:   Check MAGDA, SSA, RF, CCP, ESR, CRP, Arthritis survey  Check  Aldolase,  25 hydroxy Vit D   Offered EMG testing but patient refused  Start gabapentin 100 mg by mouth daily at bedtime  Slowly  escalate the dose to 300 mg every daily at bedtime as tolerated.    Discussed side effects of gabapentin, advised to hold for sedation, advised not to operate machinery  Start Voltaren gel for osteoarthritis  Advised to use only one NSAID at the time  Counseled for fall prevention  Return to clinic in 1 month with labs    Addendum-high inflammatory markers + joint stiffness- likely PMR. Patient educated about PMR and GCA.  Discussed prognosis in length and also discussed the consequences of untreated PMR including GCA.  She voiced understanding. Discussed treatment of PMR-patient does have history of steroid induced tachycardia but never took prednisone with Ativan.  She is willing to try prednisone with Ativan.    Patient started on very low-dose of prednisone-2.5 mg in the morning with Ativan 0.25 mg-if she is able to tolerate low-dose prednisone-slowly escalate to 5 mg a day.  Discussed side effects of steroids.  Advised to take in front of a family member.  Call back if she cannot tolerate the medication.  She voiced understanding

## 2017-05-17 NOTE — PROGRESS NOTES
05/17/17 1106   OTHER   MDHAQ Score 1.3   Psychologic Score 1.1   Pain Score 7.5   Morning Stiffness Score Yes   Number of minutes or hours until limber 30-40 min   Fatigue Score 4   Global Health Score 4.5   RAPID3 Score 5.44

## 2017-05-18 ENCOUNTER — TELEPHONE (OUTPATIENT)
Dept: RHEUMATOLOGY | Facility: CLINIC | Age: 67
End: 2017-05-18

## 2017-05-18 RX ORDER — PREDNISONE 2.5 MG/1
2.5 TABLET ORAL DAILY
Qty: 60 TABLET | Refills: 0 | Status: SHIPPED | OUTPATIENT
Start: 2017-05-18 | End: 2017-05-28

## 2017-05-18 NOTE — TELEPHONE ENCOUNTER
----- Message from Dede Duncan MD sent at 5/18/2017  2:18 PM CDT -----  RTC 1 month with ESR and CRP  Thanks SS

## 2017-05-19 ENCOUNTER — TELEPHONE (OUTPATIENT)
Dept: RHEUMATOLOGY | Facility: CLINIC | Age: 67
End: 2017-05-19

## 2017-05-19 ENCOUNTER — TELEPHONE (OUTPATIENT)
Dept: CARDIOLOGY | Facility: CLINIC | Age: 67
End: 2017-05-19

## 2017-05-19 NOTE — TELEPHONE ENCOUNTER
----- Message from Aida Lisa MD sent at 5/18/2017  4:53 PM CDT -----  Contact: pt  yes  ----- Message -----     From: Leah Gamez LPN     Sent: 5/18/2017   2:59 PM       To: Aida Lisa MD        ----- Message -----     From: Rocky Murray     Sent: 5/18/2017   1:28 PM       To: Maria L Parra A Staff    Pt went and seen a Rheumatologist and she wants to put pt on Steroids, pt wants to know if that's ok   Call Back#[phone]  Thanks

## 2017-05-19 NOTE — TELEPHONE ENCOUNTER
----- Message from Erna Somers sent at 5/19/2017 10:14 AM CDT -----  Patient is requesting a return call regarding her medication, contact patient at [phone].    Thank you

## 2017-08-02 ENCOUNTER — TELEPHONE (OUTPATIENT)
Dept: CARDIOLOGY | Facility: CLINIC | Age: 67
End: 2017-08-02

## 2017-08-02 NOTE — TELEPHONE ENCOUNTER
----- Message from Justyna Valenzuela sent at 8/2/2017  2:17 PM CDT -----  [facility] [phone]  Patient is schedule for biopsy on Monday 08/07/2017. The tech called asking for orders to hold aspirin. Thanks!

## 2017-08-03 ENCOUNTER — TELEPHONE (OUTPATIENT)
Dept: CARDIOLOGY | Facility: CLINIC | Age: 67
End: 2017-08-03

## 2017-08-07 ENCOUNTER — TELEPHONE (OUTPATIENT)
Dept: CARDIOLOGY | Facility: CLINIC | Age: 67
End: 2017-08-07

## 2017-08-07 NOTE — TELEPHONE ENCOUNTER
----- Message from Carmelita Rodriguez sent at 8/7/2017  7:08 AM CDT -----  Please call patient in regards to weather she needs to take a an tibiotic before her needle biopsy this morning, [phone] (home)

## 2017-08-10 ENCOUNTER — TELEPHONE (OUTPATIENT)
Dept: SURGERY | Facility: CLINIC | Age: 67
End: 2017-08-10

## 2017-08-10 NOTE — TELEPHONE ENCOUNTER
Received call from Clemencia at [facility] about patient's new cancer diagnosis. Patient is requesting Dr. Barreto for her breast surgeon. Since Dr. Barreto is not at the breast clinic this Friday, scheduled at general surgery for Tuesday. Called patient to see what time she would like to come, she chose 11am. Clemencia aware that patient has been scheduled, gave patient directions to general surgery and my phone number in case she needs anything prior to her appt.

## 2017-08-15 ENCOUNTER — OFFICE VISIT (OUTPATIENT)
Dept: SURGERY | Facility: CLINIC | Age: 67
End: 2017-08-15
Payer: MEDICARE

## 2017-08-15 VITALS
HEIGHT: 61 IN | BODY MASS INDEX: 20.73 KG/M2 | TEMPERATURE: 98 F | HEART RATE: 80 BPM | DIASTOLIC BLOOD PRESSURE: 81 MMHG | SYSTOLIC BLOOD PRESSURE: 165 MMHG | WEIGHT: 109.81 LBS

## 2017-08-15 DIAGNOSIS — C50.111 MALIGNANT NEOPLASM OF CENTRAL PORTION OF RIGHT BREAST IN FEMALE, ESTROGEN RECEPTOR POSITIVE: Primary | ICD-10-CM

## 2017-08-15 DIAGNOSIS — Z17.0 MALIGNANT NEOPLASM OF CENTRAL PORTION OF RIGHT BREAST IN FEMALE, ESTROGEN RECEPTOR POSITIVE: Primary | ICD-10-CM

## 2017-08-15 PROCEDURE — 99204 OFFICE O/P NEW MOD 45 MIN: CPT | Mod: S$PBB,,, | Performed by: SURGERY

## 2017-08-15 PROCEDURE — 3008F BODY MASS INDEX DOCD: CPT | Mod: ,,, | Performed by: SURGERY

## 2017-08-15 PROCEDURE — 3077F SYST BP >= 140 MM HG: CPT | Mod: ,,, | Performed by: SURGERY

## 2017-08-15 PROCEDURE — 1126F AMNT PAIN NOTED NONE PRSNT: CPT | Mod: ,,, | Performed by: SURGERY

## 2017-08-15 PROCEDURE — 99213 OFFICE O/P EST LOW 20 MIN: CPT | Mod: PBBFAC | Performed by: SURGERY

## 2017-08-15 PROCEDURE — 1159F MED LIST DOCD IN RCRD: CPT | Mod: ,,, | Performed by: SURGERY

## 2017-08-15 PROCEDURE — 99999 PR PBB SHADOW E&M-EST. PATIENT-LVL III: CPT | Mod: PBBFAC,,, | Performed by: SURGERY

## 2017-08-15 PROCEDURE — 3079F DIAST BP 80-89 MM HG: CPT | Mod: ,,, | Performed by: SURGERY

## 2017-08-15 NOTE — PROGRESS NOTES
General Surgery Clinic Note - H and P    Subjective:     Procedures:      Cecilia Valenzuela is a 67 y.o. female who presents to clinic for breast cancer. BIRADs 5 lesion and underwent biopsy with showed invasive lobular carcinoma. She reports no breast pain (other than biopsy site), no palpable masses, and no nipple discharge / retractions.       Family hx: 2 sisters with breast Ca, mother w/breast ca, aunt w/breast ca      Past Medical History:   Diagnosis Date    Arthritis     Atherosclerosis     in left carotid artery    GERD (gastroesophageal reflux disease)     Hyperlipidemia     Hypertension     Mitral valve prolapse     Neurofibromatosis     Osteopenia     Premature beats     Raynaud's disease     Seborrheic keratosis     Thyroid nodule     Tinnitus        Past Surgical History:   Procedure Laterality Date    BACK SURGERY      CATARACT EXTRACTION Bilateral      SECTION      x5    HYSTERECTOMY         Social History     Social History    Marital status:      Spouse name: N/A    Number of children: N/A    Years of education: N/A     Occupational History    Not on file.     Social History Main Topics    Smoking status: Never Smoker    Smokeless tobacco: Never Used    Alcohol use No    Drug use: No    Sexual activity: Not on file     Other Topics Concern    Not on file     Social History Narrative    No narrative on file       Review of patient's allergies indicates:   Allergen Reactions    Augmentin [amoxicillin-pot clavulanate]     Dilaudid [hydromorphone]     Neosporin [hydrocortisone]     Norvasc [amlodipine]     Tetracyclines          Objective:     PHYSICAL EXAM:  Vital Signs (Most Recent)  Temp: 97.7 °F (36.5 °C) (08/15/17 1115)  Pulse: 80 (08/15/17 1115)  BP: (!) 165/81 (08/15/17 1115)    ROS    Physical Exam:  General: NAD   Neuro: aaox4, perrl  Right breast 2.5 cm mass some bruising with associated tenderness  No palpable axillary lymphadenopathy  Left  breast with no masses / nipple retraction / discharge  No palpable axillary lymphadenopathy  Respiratory: resps even unlabored  Cardiac: cap refill <2 sec  Abdomen: Normal, benign.  Extremities: Warm dry and intact    Pathology- reviewed  Specimens     None            Assessment:     67 y.o. female with invasive lobular carcinoma    Plan:     - Discussed with her that she is not the ideal candidate for BCT and that we recommend masectomy  - Genetic referral for significant family hx    Oskar Salcido MD   [facility]

## 2017-08-15 NOTE — LETTER
August 15, 2017      Outside Doctor  [address] [place] [id number]           [facility] - General Surgery  [address]  [place] [address]  Phone: [phone]          Patient: Cecilia Valenzuela   MR Number: [id number]   YOB: 1950   Date of Visit: 8/15/2017       Dear Outside Doctor:    Thank you for referring Cecilia Valenzuela to me for evaluation. Attached you will find relevant portions of my assessment and plan of care.    If you have questions, please do not hesitate to call me. I look forward to following Cecilia Valenzuela along with you.    Sincerely,    Oskar Salcido MD    Enclosure  CC:  No Recipients    If you would like to receive this communication electronically, please contact [email or web address] or [phone] to request more information on Language Learning Class Link access.    For providers and/or their staff who would like to refer a patient to [facility], please contact us through our one-stop-shop provider referral line, [facility], at [phone].    If you feel you have received this communication in error or would no longer like to receive these types of communications, please e-mail [email or web address]

## 2017-08-16 ENCOUNTER — OFFICE VISIT (OUTPATIENT)
Dept: GENETICS | Facility: CLINIC | Age: 67
End: 2017-08-16
Payer: MEDICARE

## 2017-08-16 ENCOUNTER — LAB VISIT (OUTPATIENT)
Dept: LAB | Facility: HOSPITAL | Age: 67
End: 2017-08-16
Attending: MEDICAL GENETICS
Payer: MEDICARE

## 2017-08-16 VITALS — WEIGHT: 108.44 LBS | BODY MASS INDEX: 19.96 KG/M2 | HEIGHT: 62 IN

## 2017-08-16 DIAGNOSIS — C50.911 MALIGNANT NEOPLASM OF RIGHT FEMALE BREAST, UNSPECIFIED ESTROGEN RECEPTOR STATUS, UNSPECIFIED SITE OF BREAST: Primary | ICD-10-CM

## 2017-08-16 DIAGNOSIS — Z80.3 FAMILY HISTORY OF BREAST CANCER: ICD-10-CM

## 2017-08-16 DIAGNOSIS — C50.911 MALIGNANT NEOPLASM OF RIGHT FEMALE BREAST, UNSPECIFIED ESTROGEN RECEPTOR STATUS, UNSPECIFIED SITE OF BREAST: ICD-10-CM

## 2017-08-16 PROCEDURE — 99213 OFFICE O/P EST LOW 20 MIN: CPT | Mod: PBBFAC,PO | Performed by: GENETIC COUNSELOR, MS

## 2017-08-16 PROCEDURE — 36415 COLL VENOUS BLD VENIPUNCTURE: CPT | Mod: PO

## 2017-08-16 PROCEDURE — 99499 UNLISTED E&M SERVICE: CPT | Mod: S$PBB,,, | Performed by: GENETIC COUNSELOR, MS

## 2017-08-16 PROCEDURE — 96040 PR GENETIC COUNSELING, EACH 30 MIN: CPT | Mod: ,,, | Performed by: GENETIC COUNSELOR, MS

## 2017-08-16 PROCEDURE — 99999 PR PBB SHADOW E&M-EST. PATIENT-LVL III: CPT | Mod: PBBFAC,,, | Performed by: GENETIC COUNSELOR, MS

## 2017-08-16 NOTE — LETTER
August 17, 2017      Andreas Barreto MD  [address]  [facility] [address]           [facility]  [address]  [facility] [address]  Phone: [phone]          Patient: Cecilia Valenzuela   MR Number: [id number]   YOB: 1950   Date of Visit: 8/16/2017       Dear Dr. Andreas Barreto:    Thank you for referring Cecilia Valenzuela to me for evaluation. Attached you will find relevant portions of my assessment and plan of care.    If you have questions, please do not hesitate to call me. I look forward to following Cecilia Valenzuela along with you.    Sincerely,    Pattie Hernández, MS    Enclosure  CC:  No Recipients    If you would like to receive this communication electronically, please contact [email or web address] or [phone] to request more information on [employer] Link access.    For providers and/or their staff who would like to refer a patient to [facility], please contact us through our one-stop-shop provider referral line, [facility], at [phone].    If you feel you have received this communication in error or would no longer like to receive these types of communications, please e-mail [email or web address]

## 2017-08-17 NOTE — PROGRESS NOTES
Cecilia Valenzuela   DOS: 17  : [birth year]  MRN: [id number]    REFERING MD: Andreas Barreto MD    REASON FOR CONSULT: Our Medical Genetics Service was asked to evaluate this 67-year-old  female with personal and family history of breast cancer.  Mrs. Valenzuela presents to this visit unaccompanied.     HISTORY OF PRESENT ILLNESS: Mrs. Valenzuela presented for a screening mammogram on 3/22/17 which identified focal asymmetry in the right breast. On 17, the patient had her first breast biopsy and pathology showed ER/DE+ invasive lobular carcinoma.     Mrs. Valenzuela reported that menarche was at age 12. She had her first child at 35. The patient had a hysterectomy (ovaries intact) due to heavy bleeding. She started screening mammograms in her 50s (she admits to skipping a few years). Mrs. Valenzuela has never had a colonoscopy.       FAMILY HISTORY: At this visit, a 3 generation pedigree was constructed and will be scanned in the patients chart. Mrs. Valenzuela has 4 children and they have no history of cancer. The patient has two sisters with a history of breast cancer. One sister was diagnosed in her 40s and the other was diagnosed in her 50s. The patients mother had breast cancer in her 80s. There was a maternal aunt who had breast cancer in her 60s. The patients father, who was a smoker, was diagnosed with mouth cancer in his 70s. The remainder of the family history was not consistent with other cancers. Paternal ancestry is [place] and [place]. Maternal ancestry is [place] and [place]. Ashkenazi Scientology ancestry was denied.     IMPRESSION: We reviewed the patients medical and family history and discussed the genetics of cancer, cancer risks associated with a hereditary predisposition to cancer. The benefits, risks, and limitations of genetic testing were discussed in detail.  The patient was counseled about the multifactorial nature of breast cancer.  The patients personal and family history of  breast cancer raises the possibility of a hereditary susceptibility. We discussed Hereditary Breast and Ovarian Cancer Syndrome, also known as HBOC.    HBOC is an autosomal dominant disorder which predisposes individuals to cancer in the variety of organs. HBOC associated cancers include breast (general population 12% vs 46-87% in HBOC), ovary (1.6% versus 16.5-63%), prostate (14% versus up to 20%), pancreas (1.5% versus up to 16%), melanoma (3% versus >3%), and male breast (below 1% versus 6.8%). HBOC is caused by germline mutations in tumor suppressor genes (BRCA1, BRCA2). ?    There are several other genes that may also account for a substantial proportion of hereditary breast cancer cases (MOISES, CDH1, CHEK2, PALB2, PTEN and TP53 in addition to BRCA1 / BRCA2). The National Comprehensive Cancer Network has outlined increased breast screening recommendation for every gene on this panel.  We reviewed that if Mrs. Valenzuela carries a mutation in one of these genes, each of her children would have a 50% chance of having the same mutation. If a mutation is found in any these genes, we can also offer targeted mutation testing to the patients relatives. The risks of identifying a variant of unknown clinical significance were reviewed with the patient. We reviewed that a negative result certainly does not rule out a hereditary predisposition to cancer. Mrs. Valenzuela expressed understanding of the information discussed and has opted to purse testing at this time.       RECOMMENDATIONS:   1. Breast Cancer High/Moderate Risk Panel (MOISES, BRCA1, BRCA2, CDH1, CHEK2, PALB2, PTEN, TP53)  2. If positive, advise the patient for appropriate healthcare management.  3. If positive, offer testing to appropriate relatives.  4. Follow-up when results are complete.        REFERENCE:  Gali N, Taty MB, Bobby GL. BRCA1 and BRCA2 Hereditary Breast and Ovarian Cancer. 1998 Sep 4 [Updated 2013 Sep 26]. In: Wanda RA, Reji TD, Yaya CR, et  al., editors. Carol [Internet]. [place]): [facility], [place]; 1993-. Available from: http://www.ncbi.nlm.nih.gov/books/IOJ2875/        Time spent: 60 minutes, more than 50% was spent in counseling. The note is in epic.       Mian Mckeon M.D.                                                               Pattie Hernández MS  Section Head - Medical Genetics                                                  Genetic Counselor           [facility]

## 2017-08-18 ENCOUNTER — PATIENT MESSAGE (OUTPATIENT)
Dept: SURGERY | Facility: CLINIC | Age: 67
End: 2017-08-18

## 2017-08-18 ENCOUNTER — DOCUMENTATION ONLY (OUTPATIENT)
Dept: SURGERY | Facility: CLINIC | Age: 67
End: 2017-08-18

## 2017-08-18 ENCOUNTER — TELEPHONE (OUTPATIENT)
Dept: SURGERY | Facility: CLINIC | Age: 67
End: 2017-08-18

## 2017-08-18 ENCOUNTER — OFFICE VISIT (OUTPATIENT)
Dept: SURGERY | Facility: CLINIC | Age: 67
End: 2017-08-18
Payer: MEDICARE

## 2017-08-18 VITALS
HEIGHT: 62 IN | HEART RATE: 83 BPM | TEMPERATURE: 98 F | BODY MASS INDEX: 20.06 KG/M2 | DIASTOLIC BLOOD PRESSURE: 83 MMHG | WEIGHT: 109 LBS | SYSTOLIC BLOOD PRESSURE: 178 MMHG

## 2017-08-18 DIAGNOSIS — C50.911 MALIGNANT NEOPLASM OF RIGHT FEMALE BREAST, UNSPECIFIED ESTROGEN RECEPTOR STATUS, UNSPECIFIED SITE OF BREAST: Primary | ICD-10-CM

## 2017-08-18 DIAGNOSIS — Z91.89 AT RISK FOR LYMPHEDEMA: Primary | ICD-10-CM

## 2017-08-18 DIAGNOSIS — Z17.0 MALIGNANT NEOPLASM OF CENTRAL PORTION OF RIGHT BREAST IN FEMALE, ESTROGEN RECEPTOR POSITIVE: Primary | ICD-10-CM

## 2017-08-18 DIAGNOSIS — C50.111 MALIGNANT NEOPLASM OF CENTRAL PORTION OF RIGHT BREAST IN FEMALE, ESTROGEN RECEPTOR POSITIVE: Primary | ICD-10-CM

## 2017-08-18 PROCEDURE — 1125F AMNT PAIN NOTED PAIN PRSNT: CPT | Mod: ,,, | Performed by: SURGERY

## 2017-08-18 PROCEDURE — 3077F SYST BP >= 140 MM HG: CPT | Mod: ,,, | Performed by: SURGERY

## 2017-08-18 PROCEDURE — 99213 OFFICE O/P EST LOW 20 MIN: CPT | Mod: S$PBB,,, | Performed by: SURGERY

## 2017-08-18 PROCEDURE — 99213 OFFICE O/P EST LOW 20 MIN: CPT | Mod: PBBFAC,PO | Performed by: SURGERY

## 2017-08-18 PROCEDURE — 99999 PR PBB SHADOW E&M-EST. PATIENT-LVL III: CPT | Mod: PBBFAC,,, | Performed by: SURGERY

## 2017-08-18 PROCEDURE — 1159F MED LIST DOCD IN RCRD: CPT | Mod: ,,, | Performed by: SURGERY

## 2017-08-18 PROCEDURE — 3008F BODY MASS INDEX DOCD: CPT | Mod: ,,, | Performed by: SURGERY

## 2017-08-18 PROCEDURE — 3079F DIAST BP 80-89 MM HG: CPT | Mod: ,,, | Performed by: SURGERY

## 2017-08-18 NOTE — PROGRESS NOTES
Nurse Navigator Note:     Met with patient during her consult with Dr. Barreto. Patient and I reviewed the information she discussed with Dr. Barreto, including treatment options, diagnosis, and future plans for workup. Patient and I went through the new patient binder, explained some of the information and why it is provided.     Also offered patient consults with our other specialty clinics: Dr. Scott for gynecological health during treatment, Lisa Mota for physical therapy evaluation, Dr. Garay for psychological support, and Elvia Espana for nutritional counseling. Explained to patient that all of these support services are completely optional. Discussed that physical therapy is the only service that is recommended pre-op specifically, everything else can be requested at a later time. Patient was given a copy of her appointments, Dr. Barreto's card, and my card. Encouraged her to call me if she has any questions or concerns or would like to schedule any additional appointments. Verbalized understanding of all information.

## 2017-08-18 NOTE — PROGRESS NOTES
Patient returns with her   Spent 30 minutes reviewing her case  We read through her mammogram and pathology reports and looked at her mammograms on the computer screen  She understands why I think she is a better mastectomy candidate (palpable size is about 3 cm - despite the U/S estimate of a much small tumor)  We also reviewed the location of the tumor in the posterior central part of the breast but a little more lateral and superior than central  The patient is no interested in aesthetics or reconstruction  She prefers to avoid XRT if possible  For all of these reasons she agrees to proceed with mastectomy  Her genetic testing should be back by the week of the 11th  Surgery is scheduled for the 14th  I will see her back on the 13th to answer any lingering questions  The patient knows she can call, email, or return for an appointment before the 13th if necessary

## 2017-08-18 NOTE — TELEPHONE ENCOUNTER
Patient called requesting that we set her up with PT. Scheduled for Monday 9/28 at 11am per patient request. Reviewed location of [facility], no questions or concerns at this time

## 2017-08-21 ENCOUNTER — PATIENT MESSAGE (OUTPATIENT)
Dept: CARDIOLOGY | Facility: CLINIC | Age: 67
End: 2017-08-21

## 2017-08-22 ENCOUNTER — PATIENT MESSAGE (OUTPATIENT)
Dept: CARDIOLOGY | Facility: CLINIC | Age: 67
End: 2017-08-22

## 2017-08-25 ENCOUNTER — TELEPHONE (OUTPATIENT)
Dept: GENETICS | Facility: CLINIC | Age: 67
End: 2017-08-25

## 2017-08-25 LAB
MISCELLANEOUS TEST NAME: NORMAL
REFERENCE LAB: NORMAL
SPECIMEN TYPE: NORMAL
TEST RESULT: NORMAL

## 2017-08-25 NOTE — TELEPHONE ENCOUNTER
Spoke to Mrs. Valenzuela to disclose that there were mutations or variants identified in the  Breast High/Moderate Risk Panel (MOISES, BRCA1, BRCA2, CDH1, CHEK2, PALB2, PTEN, TP53). A copy of the report will be mailed to the home.

## 2017-09-01 ENCOUNTER — OFFICE VISIT (OUTPATIENT)
Dept: SURGERY | Facility: CLINIC | Age: 67
End: 2017-09-01
Payer: MEDICARE

## 2017-09-01 VITALS
HEART RATE: 94 BPM | TEMPERATURE: 98 F | WEIGHT: 110.31 LBS | HEIGHT: 62 IN | DIASTOLIC BLOOD PRESSURE: 85 MMHG | SYSTOLIC BLOOD PRESSURE: 182 MMHG | BODY MASS INDEX: 20.3 KG/M2

## 2017-09-01 DIAGNOSIS — C50.411 MALIGNANT NEOPLASM OF UPPER-OUTER QUADRANT OF RIGHT BREAST IN FEMALE, ESTROGEN RECEPTOR POSITIVE: ICD-10-CM

## 2017-09-01 DIAGNOSIS — Z01.818 PREOP TESTING: Primary | ICD-10-CM

## 2017-09-01 DIAGNOSIS — Z17.0 MALIGNANT NEOPLASM OF UPPER-OUTER QUADRANT OF RIGHT BREAST IN FEMALE, ESTROGEN RECEPTOR POSITIVE: ICD-10-CM

## 2017-09-01 PROCEDURE — 1159F MED LIST DOCD IN RCRD: CPT | Mod: ,,, | Performed by: SURGERY

## 2017-09-01 PROCEDURE — 1126F AMNT PAIN NOTED NONE PRSNT: CPT | Mod: ,,, | Performed by: SURGERY

## 2017-09-01 PROCEDURE — 99213 OFFICE O/P EST LOW 20 MIN: CPT | Mod: PBBFAC,PO | Performed by: SURGERY

## 2017-09-01 PROCEDURE — 99214 OFFICE O/P EST MOD 30 MIN: CPT | Mod: S$PBB,,, | Performed by: SURGERY

## 2017-09-01 PROCEDURE — 99999 PR PBB SHADOW E&M-EST. PATIENT-LVL III: CPT | Mod: PBBFAC,,, | Performed by: SURGERY

## 2017-09-01 PROCEDURE — 3077F SYST BP >= 140 MM HG: CPT | Mod: ,,, | Performed by: SURGERY

## 2017-09-01 PROCEDURE — 3079F DIAST BP 80-89 MM HG: CPT | Mod: ,,, | Performed by: SURGERY

## 2017-09-01 RX ORDER — IBUPROFEN 200 MG
200 TABLET ORAL NIGHTLY
COMMUNITY
End: 2020-04-23

## 2017-09-01 NOTE — PROGRESS NOTES
General Surgery Clinic Note - H and P    Subjective:     Procedures:      Cecilia Valenzuela is a 67 y.o. female who presents to clinic for breast cancer. BIRADs 5 lesion and underwent biopsy with showed invasive lobular carcinoma. She reports no breast pain (other than biopsy site), no palpable masses, and no nipple discharge / retractions.       Family hx: 2 sisters with breast Ca, mother w/breast ca, aunt w/breast ca      Past Medical History:   Diagnosis Date    Arthritis     Atherosclerosis     in left carotid artery    GERD (gastroesophageal reflux disease)     Hyperlipidemia     Hypertension     Mitral valve prolapse     Neurofibromatosis     Osteopenia     Premature beats     Raynaud's disease     Seborrheic keratosis     Thyroid nodule     Tinnitus        Past Surgical History:   Procedure Laterality Date    BACK SURGERY      CATARACT EXTRACTION Bilateral      SECTION      x5    HYSTERECTOMY         Social History     Social History    Marital status:      Spouse name: N/A    Number of children: N/A    Years of education: N/A     Occupational History    Not on file.     Social History Main Topics    Smoking status: Never Smoker    Smokeless tobacco: Never Used    Alcohol use No    Drug use: No    Sexual activity: Not on file     Other Topics Concern    Not on file     Social History Narrative    No narrative on file       Review of patient's allergies indicates:   Allergen Reactions    Augmentin [amoxicillin-pot clavulanate]     Dilaudid [hydromorphone]     Neosporin [hydrocortisone]     Norvasc [amlodipine]     Tetracyclines          Objective:     PHYSICAL EXAM:  Vital Signs (Most Recent)  Temp: 97.9 °F (36.6 °C) (17 1453)  Pulse: 94 (17 1453)  BP: (!) 182/85 (17 1453)    Review of Systems   Constitutional: Negative for chills and fever.   Respiratory: Negative for shortness of breath.    Cardiovascular: Negative for chest pain.    Gastrointestinal: Negative for nausea and vomiting.   Musculoskeletal: Positive for back pain, joint pain and neck pain.       Physical Exam:  General: NAD   Neuro: aaox4, perrl  Right breast 2.5 cm mass some bruising with associated tenderness  No palpable axillary lymphadenopathy  Left breast with no masses / nipple retraction / discharge  No palpable axillary lymphadenopathy  Respiratory: resps even unlabored  Cardiac: cap refill <2 sec  Abdomen: Normal, benign.  Extremities: Warm dry and intact    Pathology- reviewed  Specimens     None            Assessment:     67 y.o. female with invasive lobular carcinoma of the right breast    Plan:     - Plan for right mastectomy with SLNB on 9/14/17  - Genetic testing negative  - All questions and concerns addressed  - Consent signed in clinic    Gifty Waddell MD  Surgery Resident, PGY-2  Pager [phone]  09/01/2017    I have personally performed a detailed history and physical examination on this patient. My findings are summarized in the resident's note included in the record.  Plan on mastectomy (patient not interested in reconstruction)  Understands that we will do a frozen section of her sentinel node  A node dissection might be necessary of the patient proves to be SL node positive on  Subsequent analysis after a negative frozen

## 2017-09-07 ENCOUNTER — PATIENT MESSAGE (OUTPATIENT)
Dept: CARDIOLOGY | Facility: CLINIC | Age: 67
End: 2017-09-07

## 2017-09-08 ENCOUNTER — CLINICAL SUPPORT (OUTPATIENT)
Dept: REHABILITATION | Facility: HOSPITAL | Age: 67
End: 2017-09-08
Attending: SURGERY
Payer: MEDICARE

## 2017-09-08 ENCOUNTER — HOSPITAL ENCOUNTER (OUTPATIENT)
Dept: CARDIOLOGY | Facility: CLINIC | Age: 67
Discharge: HOME | End: 2017-09-08
Payer: MEDICARE

## 2017-09-08 DIAGNOSIS — C50.411 MALIGNANT NEOPLASM OF UPPER-OUTER QUADRANT OF RIGHT FEMALE BREAST: ICD-10-CM

## 2017-09-08 DIAGNOSIS — Z01.818 PREOP TESTING: ICD-10-CM

## 2017-09-08 DIAGNOSIS — Z91.89 AT RISK FOR LYMPHEDEMA: ICD-10-CM

## 2017-09-08 PROCEDURE — G8979 MOBILITY GOAL STATUS: HCPCS | Mod: CH

## 2017-09-08 PROCEDURE — 97162 PT EVAL MOD COMPLEX 30 MIN: CPT

## 2017-09-08 PROCEDURE — 93005 ELECTROCARDIOGRAM TRACING: CPT | Mod: PBBFAC | Performed by: INTERNAL MEDICINE

## 2017-09-08 PROCEDURE — 93010 ELECTROCARDIOGRAM REPORT: CPT | Mod: S$PBB,,, | Performed by: INTERNAL MEDICINE

## 2017-09-08 PROCEDURE — G8980 MOBILITY D/C STATUS: HCPCS | Mod: CH

## 2017-09-08 PROCEDURE — 93702 BIS XTRACELL FLUID ANALYSIS: CPT

## 2017-09-08 PROCEDURE — G8978 MOBILITY CURRENT STATUS: HCPCS | Mod: CH

## 2017-09-08 RX ORDER — SODIUM CHLORIDE 9 MG/ML
INJECTION, SOLUTION INTRAVENOUS CONTINUOUS
Status: CANCELLED | OUTPATIENT
Start: 2017-09-08

## 2017-09-08 NOTE — PROGRESS NOTES
OUTPATIENT PHYSICAL THERAPY  PHYSICAL THERAPY EVALUATION    Name: Cecilia Valenzuela  Clinic Number: [id number]    Diagnosis:   Encounter Diagnosis   Name Primary?    At risk for lymphedema      Physician: Andreas Barreto MD  Treatment Orders: PT Eval and Treat  Past Medical History:   Diagnosis Date    Arthritis     Atherosclerosis     in left carotid artery    GERD (gastroesophageal reflux disease)     Hyperlipidemia     Hypertension     Mitral valve prolapse     Neurofibromatosis     Osteopenia     Premature beats     Raynaud's disease     Seborrheic keratosis     Thyroid nodule     Tinnitus      Past Surgical History:   Procedure Laterality Date    BACK SURGERY      CATARACT EXTRACTION Bilateral      SECTION      x5    HYSTERECTOMY          Evaluation Date: 17  Visit # authorized: 20  Visit #: 1  Authorization period: 17  Plan of care Expiration: will establish after sx  Insurance: Medicare part B    Physician: Andreas Barreto MD  Precautions: none    Time IN: 11:00am  Time out: 12:00pm  1:1 treatment time: 60  History   History of Present Illness: Cecilia is a 67 y.o. female that presents to  [facility] at the [facility] secondary to recent dx at risk for lymphedema.   Dx: R breast cancer - invasive lobular carcinoma of the R breast.     Surgery date: 17 -   R side - R mastectomy, no reconstruction with R SLNB  Pt presents today for L Dex testing: to perform baseline measurements pre-surgery to be able to detect lymphedema post surgery, UE muscle testing, postural and ROM assessment along with education of risk of lymphedema and surgical precautions post surgery. Circumferential measurements will also be taken pre-surgery of BL UEs for early detection of lymphedema post surgery. Pt will also be instructed in exercises to perform pre-surgery to insure best outcomes post surgery.   Pt had many questions during her visit today  "about how her upcoming surgery would affect her orthopedic conditions. She reports that she has had 2 back surgeries 28 years ago, has "abdominal adhesions" from surgeries after her children were born and neck pain since April 2017. She reports that she is concerned her neck pain will increase after surgery. She has "3 degenerating discs with ligament" involvement. She has been managing her neck pain with Advil and tylenol and it seems to be doing well.     Hand dominance: R handed   Prior Therapy: aquatic therapy  - in late 30's, neck after Gloria   Nutrition:  Thin  Social History: lives with    Place of Residence (Steps/Adaptations): house 1 story   Current functional status:  Driving, independent with ADLs and ambulation   Exercise routine prior to onset : walking and using stationary bike - 3/4 x week and walk a mile   Work:  Not working                   Job description includes:  NA    Subjective   Pt states: No significant comment made  Pain: 0/10 on VAS.     Objective   Mental status :alert  - Follows commands: 100% of time   - Speech: no deficits   - Mood/behavior: calm, behavior appropriate to situation   Mental status   - Memory - Intact recent and remote   - Attention/concentration - Normal months-of-year backwards, vigilant during exam   - Language - Naming & repetition intact, +2-step commands   - Fund of knowledge - Aware of current events   - Executive - Well-organized thoughts     Posture/Alignment   Postural examination/scapula alignment: rounded shoulders,   Joint integrity: WFLs  Skin integrity: intact  Edema: none noted    Sensation: Light Touch: Intact       Proprioception: Intact  - appearance: well groomed     ROM:   UPPER EXTREMITY--AROM/PROM  (R) UE: WNLs  (L) UE: WNLs     Shoulder Range of Motion:   ACTIVE ROM LEFT RIGHT   Flexion 150 145   Abduction 130 135   Horizontal Abd WFL WFL   Extension WFL WFL   IR WFL WFL   ER WFL WFL           Strength: manual muscle test grades below "   Upper Extremity Strength   (R) UE (L) UE   Shoulder flexion: 4+/5 4+/5   Shoulder Abduction: 4+/5 4+/5   Shoulder IR 4-/5 4-/5   Shoulder ER 4/5 4/5   Elbow flexion: 4+/5 4+/5   Elbow extension: 4/5 4/5   Wrist flexion: 4/5 4/5   Wrist extension: 4/5 4/5    4+/5 4+/5       L DEX Performed during Evaluation:   Pt denies pacemaker, defibrillator and pregnancy   Hand dominance  R hand   Affected side R   L dex score  .5     Visual/Auditory: denies changes     Coordination:   - fine motor: WFL  - UE coordination: intact     - LE coordination:  Not tested     Functional Mobility (Bed mobility, transfers)  Bed mobility: I =  independent   Roll to left: I  Roll to right: I  Supine to prone: I  Scooting to edge of bed: I  Supine to sit: I  Sit to supine: I  Transfers to bed: I  Transfers to toilet: I  Sit to stand:  I  Stand pivot:  I  Car transfers: I      ADL's:  Feeding: I = independent   Grooming: I  Hygiene: I  UB Dressing: I  LB Dressing: I  Toileting: I  Bathing: I    Gait Assessment:   - AD used: none  - Assistance: independent  - Distance: community distances       Endurance Deficit: none      Patient Education   - role of PT in multi - disciplinary team, goals for PT  - Pt was educated in lymphedema etiology and management plans.    - Pt was provided with written risk reductions and precautions for managing lymphedema.   - Reviewed ODETTE drain care instructions.     ROM/lifting Precautions post surgery discussed -  until drains have been removed:  - do not lift affected arm above 90 degrees of shoulder flexion  - do not lift over 5 lbs  - do not pull or push heavy objects  - do not sleep on your stomach or surgery side     Written Home Exercises Provided and Patient Education: Handouts given   Pt was instructed in and performed therapeutic exercise for postural correction and alignment, stretching and soft tissue mobility, and strengthening.     Exercises included: handout given see patient instructions   -  exaggerated deep breathing and relaxation  - scapular retractions  - ball squeeze  - elbow flexion/extension  - pendulum swing  - wall stretches     Pt was able to demonstrate and report understanding and performance    Pt has no cultural, educational or language barriers to learning provided.    Functional Limitations Reporting     Category: mobility  Tool: FIM for mobility  Score: 0% Limitation   Current/ : CH = 0 % impaired, limited or restricted  Goal/ : CH = 0 % impaired, limited or restricted   Discharge/ CH = 0 % impaired, limited or restricted        Modifier  Impairment Limitation Restriction    CH  0 % impaired, limited or restricted    CI  @ least 1% but less than 20% impaired, limited or restricted    CJ  @ least 20%<40% impaired, limited or restricted    CK  @ least 40%<60% impaired, limited or restricted    CL  @ least 60% <80% impaired, limited or restricted    CM  @ least 80%<100% impaired limited or restricted    CN  100% impaired, limited or restricted     Assessment   This is a 67 y.o. female referred to outpatient physical therapy and presents with a medical diagnosis of at risk for lymphedema secondary to a dx of invasive lobular carcinoma of the R breast and was seen today pre-operatively to screen for strength and ROM impairments, perform L dex testing to determine baseline measurements to aid in the early detection of lymphedema and provide pt education on exercises/precations post surgery on 9/14/17.  Pt demonstrates ROM impairments of BL shoulders as well as some generalized shoulder/postural weakness. Educated patient on stretches and strengthening exercises to perform before surgery.     Pt educated in lymphedema risks/precautions as well as ROM/lifting precautions post surgery - pt demonstrated/verbalized understanding. No goals established this visit as goals for PT will be established post surgery at follow up.      Anticipated barriers to physical therapy: pt lives 1.5 hours  away from clinic      Pt's spiritual, cultural and educational needs considered and pt agreeable to plan of care and goals as stated below:     Medical necessity is demonstrated by the following IMPAIRMENTS/PROMBLEM LIST:  History  Co-morbidities and personal factors that may impact the plan of care Examination  Body Structures and Functions, activity limitations and participation restrictions that may impact the plan of care    Clinical Presentation   Co-morbidities:   Breast cancer  Prior back surgery  Neck pain  Abdominal weakness    Personal Factors:   no deficits Body Regions:   neck  upper extremities  trunk    Body Systems:   gross symmetry  ROM  strength         Activity limitations:   Mobility  no deficits    Self care  no deficits    Domestic Life  no deficits    Interactions/Relationships  no deficits    Life Areas  no deficits    Community and Social Life  no deficits         evolving clinical presentation with changing clinical characteristics                      moderate   moderate  high Decision Making/ Complexity Score:  moderate       Plan   Schedule patient for follow up with Physical therapy post surgery. Goals for therapy post surgery will be established at that time.     Therapist: Lisa Mota, PT  9/8/2017

## 2017-09-08 NOTE — PATIENT INSTRUCTIONS
Scapular Retraction (Standing)    With arms at sides, squeeze shoulder blades together. Do not shrug and do not hold your breath. Hold 5 seconds. Repeat 15 times 3 sessions 1-2 x day.       Exaggerated Breathing and Relaxation      Practice deep breathing frequently in the first few days following surgery even before you begin exercising your arm. Inhale slowly and deeply through the nose and exhale through pursed lips. Concentrate on relaxing as you let the air out of your lungs. Repeat three (3) to four (4) times, remembering to breath in deeply and then relaxing. This exercise helps to ease the sensation of pulling and discomfort that may be experienced while exercising.      Ball Squeeze    Perform this exercise three (3) times a day for three (3) to four (4) minutes each time.    The ball squeeze helps to prevent or reduce temporary swelling that may occur in the affected arm. This exercise may be performed standing, sitting or while lying in bed. During this exercise the affected arm should be slightly bent and held upward. Support your arm with a pillow if you are uncomfortable holding it up.    a. Hold a rubber ball in your hand on the affected side and lift your arm upward.  b. Alternate squeezing and relaxing the ball.      Pendulum Swing    Perform this exercise three (2) times a day with ten (10) repetitions.    a. Rest your other hand on the back of a chair or table to steady yourself. Bend forward at the wrist with the involved arm hanging freely toward the floor.  b. Gently swing the involved arm forward and backward, gradually increasing the size of the swing.  c. Next, gently swing the involved arm side to side, gradually increasing the size of the swing.  d. Then make small circles with the involved arm and gradually increase the size of the [place]. Repeat making circles in the opposite direction beginning with small circles and gradually increasing the [place] size.      Wall Climb    Perform this  exercise three (2) times a day with ten (10) repetitions.    Stand and FACE THE WALL with your toes 10 to 12 inches away from the wall.    a. Place the fingers of your affected arm on the wall and slowly walk your fingers up the wall. Let your fingers climb the wall as high as possible without feeling pulling or pain.  b. Hold this stretch for 15 to 20 seconds then move your fingers back down the wall.  c. Try to go a little higher each time. It may relax you a bit if you rest your head against the wall.    Repeat the above exercises standing with your side to the wall.      Scapular: Retraction in External Rotation    With hands clasped behind head, elbows up, pull elbows back, pinching shoulder blades together. Hold 30 seconds.  Repeat 5 times. Do 1-2 sessions per day.

## 2017-09-13 ENCOUNTER — ANESTHESIA EVENT (OUTPATIENT)
Dept: SURGERY | Facility: HOSPITAL | Age: 67
End: 2017-09-13
Payer: MEDICARE

## 2017-09-13 ENCOUNTER — TELEPHONE (OUTPATIENT)
Dept: SURGERY | Facility: CLINIC | Age: 67
End: 2017-09-13

## 2017-09-13 NOTE — PRE-PROCEDURE INSTRUCTIONS
PreOp Instructions given:     - Verbal medication information (what to hold and what to take)   - NPO guidelines   - Arrival place directions given;- Bathing with antibacterial soap   - Don't wear any jewelry or bring any valuables AM of surgery   - No makeup or moisturizer to face   - No perfume/cologne, powder, lotions or aftershave     Pt. verbalized understanding.     Denies any family history of side effects or issues with anesthesia or sedation.

## 2017-09-13 NOTE — ANESTHESIA PREPROCEDURE EVALUATION
[facility]  Anesthesia Pre-Operative Evaluation         Patient Name: Cecilia Valenzuela  YOB: 1950  MRN: [id number]    SUBJECTIVE:     Pre-operative evaluation for Procedure(s) (LRB):  MASTECTOMY 23 hr stay (Right)  INJECTION-NODE-SENTINEL (Right)  BIOPSY-LYMPH NODE-SENTINEL (Right)     09/13/2017    Cecilia Valenzuela is a 67 y.o. female w/ a significant PMHx of HTN, HLD, GERD, previous mitral valve proplapse (medically managed, sees Dr Lisa) and lobular carcinoma of the R breast who presents for the above procedure.      LDA: None documented.    Prev airway: None documented.    Drips: None documented.    Patient Active Problem List   Diagnosis    Essential hypertension    Premature beats    Hypercholesterolemia    Myalgia    At risk for lymphedema       Review of patient's allergies indicates:   Allergen Reactions    Augmentin [amoxicillin-pot clavulanate]     Dilaudid [hydromorphone]     Neosporin [hydrocortisone]     Norvasc [amlodipine]     Tetracyclines        No current facility-administered medications for this encounter.     Current Outpatient Prescriptions:     acetaminophen (TYLENOL) 325 MG tablet, Take 325 mg by mouth 2 (two) times daily., Disp: , Rfl:     aspirin (ECOTRIN) 81 MG EC tablet, Take 81 mg by mouth once daily., Disp: , Rfl:     azelastine (ASTELIN) 137 mcg (0.1 %) nasal spray, 1 spray by Nasal route as needed. , Disp: , Rfl:     calcium-vitamin D3 (CALCIUM 500 + D) 500 mg(1,250mg) -200 unit per tablet, Take 1 tablet by mouth 2 (two) times daily with meals., Disp: , Rfl:     co-enzyme Q-10 30 mg capsule, Take 200 mg by mouth 2 (two) times daily. 200mg daily , Disp: , Rfl:     GLUCOSAMINE/CHONDR OCHOA A SOD (GLUCOSAMINE-CHONDROITIN) 1,500-1,200 mg/30 mL Liqd, Take 1 tablet by mouth once daily., Disp: , Rfl:     IBUPROFEN (ADVIL ORAL), Take by mouth., Disp: , Rfl:     lorazepam (ATIVAN) 0.5 MG tablet, Take 0.25 mg by mouth every 6 (six) hours  as needed for Anxiety., Disp: , Rfl:     magnesium 250 mg Tab, Take 250 mg by mouth once daily., Disp: , Rfl:     multivitamin (THERAGRAN) per tablet, Take 1 tablet by mouth once daily., Disp: , Rfl:     omeprazole (PRILOSEC) 20 MG capsule, Take 40 mg by mouth once daily., Disp: , Rfl:     PATADAY 0.2 % Drop, instill 1 drop into both eyes once daily, Disp: , Rfl: 0    TOPROL XL 25 mg 24 hr tablet, Take 1.5 tablets (37.5 mg total) by mouth nightly., Disp: 45 tablet, Rfl: 6    No current facility-administered medications on file prior to encounter.      Current Outpatient Prescriptions on File Prior to Encounter   Medication Sig Dispense Refill    acetaminophen (TYLENOL) 325 MG tablet Take 325 mg by mouth 2 (two) times daily.      aspirin (ECOTRIN) 81 MG EC tablet Take 81 mg by mouth once daily.      azelastine (ASTELIN) 137 mcg (0.1 %) nasal spray 1 spray by Nasal route as needed.       calcium-vitamin D3 (CALCIUM 500 + D) 500 mg(1,250mg) -200 unit per tablet Take 1 tablet by mouth 2 (two) times daily with meals.      co-enzyme Q-10 30 mg capsule Take 200 mg by mouth 2 (two) times daily. 200mg daily       lorazepam (ATIVAN) 0.5 MG tablet Take 0.25 mg by mouth every 6 (six) hours as needed for Anxiety.      magnesium 250 mg Tab Take 250 mg by mouth once daily.      multivitamin (THERAGRAN) per tablet Take 1 tablet by mouth once daily.      omeprazole (PRILOSEC) 20 MG capsule Take 40 mg by mouth once daily.      PATADAY 0.2 % Drop instill 1 drop into both eyes once daily  0    TOPROL XL 25 mg 24 hr tablet Take 1.5 tablets (37.5 mg total) by mouth nightly. 45 tablet 6       Past Surgical History:   Procedure Laterality Date    BACK SURGERY      CATARACT EXTRACTION Bilateral      SECTION      x5    HYSTERECTOMY         Social History     Social History    Marital status:      Spouse name: N/A    Number of children: N/A    Years of education: N/A     Occupational History    Not on  file.     Social History Main Topics    Smoking status: Never Smoker    Smokeless tobacco: Never Used    Alcohol use No    Drug use: No    Sexual activity: Not on file     Other Topics Concern    Not on file     Social History Narrative    No narrative on file       OBJECTIVE:     Vital Signs Range (Last 24H):         Significant Labs:  Lab Results   Component Value Date    ALT 54 (H) 2017    AST 34 2017     2017    K 4.6 2017     2017    CREATININE 0.8 2017    BUN 21 2017    CO2 26 2017     No results found for: WBC, HGB, HCT, MCV, PLT      Diagnostic Studies: No relevant studies.    EK2017  Vent. Rate : 067 BPM     Atrial Rate : 067 BPM     P-R Int : 170 ms          QRS Dur : 072 ms      QT Int : 370 ms       P-R-T Axes : 067 068 048 degrees     QTc Int : 390 ms    Normal sinus rhythm  Normal ECG  No previous ECGs available    2D ECHO:  No results found for this or any previous visit.       ASSESSMENT/PLAN:         Anesthesia Evaluation    I have reviewed the Patient Summary Reports.     I have reviewed the Medications.     Review of Systems  Anesthesia Hx:  Denies Hx of Anesthetic complications  History of prior surgery of interest to airway management or planning:  Denies Personal Hx of Anesthesia complications.   EENT/Dental:EENT/Dental Normal   Cardiovascular:   Hypertension Valvular problems/Murmurs, MVP MVP diagnosed about 15 years ago, per patient, she is on Toprol for it. No echo in our system but patient says her EF is normal. Followed by Dr. Lisa at [facility]   Pulmonary:  Pulmonary Normal    Hepatic/GI:   GERD    Musculoskeletal:   Arthritis  Lumbar and C spine arthritis   Endocrine:   Denies Diabetes.        Physical Exam  General:  Well nourished    Airway/Jaw/Neck:  Airway Findings: Mouth Opening: Normal Tongue: Normal  General Airway Assessment: Adult  Mallampati: II  TM Distance: 4 - 6 cm  Jaw/Neck Findings:      Neck ROM: Extension Decreased, Mild, Decreased flexion, Decreased Lateral Motion  Neck Findings: Normal     Dental:  Dental Findings: In tact   Chest/Lungs:  Chest/Lungs Findings: Clear to auscultation, Normal Respiratory Rate     Heart/Vascular:  Heart Findings: Rate: Normal             Anesthesia Plan  Type of Anesthesia, risks & benefits discussed:  Anesthesia Type:  general  Patient's Preference:   Intra-op Monitoring Plan: standard ASA monitors  Intra-op Monitoring Plan Comments:   Post Op Pain Control Plan: per primary service following discharge from PACU and multimodal analgesia  Post Op Pain Control Plan Comments:   Induction:   IV  Beta Blocker:  Patient is on a Beta-Blocker and has received one dose within the past 24 hours (No further documentation required).       Informed Consent: Patient understands risks and agrees with Anesthesia plan.  Questions answered. Anesthesia consent signed with patient.  ASA Score: 3     Day of Surgery Review of History & Physical:    H&P update referred to the surgeon.         Ready For Surgery From Anesthesia Perspective.

## 2017-09-14 ENCOUNTER — SURGERY (OUTPATIENT)
Age: 67
End: 2017-09-14

## 2017-09-14 ENCOUNTER — HOSPITAL ENCOUNTER (OUTPATIENT)
Dept: RADIOLOGY | Facility: HOSPITAL | Age: 67
Discharge: HOME | End: 2017-09-14
Attending: SURGERY
Payer: MEDICARE

## 2017-09-14 ENCOUNTER — HOSPITAL ENCOUNTER (OUTPATIENT)
Facility: HOSPITAL | Age: 67
Discharge: HOME | End: 2017-09-15
Attending: SURGERY | Admitting: SURGERY
Payer: MEDICARE

## 2017-09-14 ENCOUNTER — ANESTHESIA (OUTPATIENT)
Dept: SURGERY | Facility: HOSPITAL | Age: 67
End: 2017-09-14
Payer: MEDICARE

## 2017-09-14 DIAGNOSIS — C50.411 MALIGNANT NEOPLASM OF UPPER-OUTER QUADRANT OF RIGHT FEMALE BREAST: ICD-10-CM

## 2017-09-14 DIAGNOSIS — C50.911 MALIGNANT NEOPLASM OF RIGHT FEMALE BREAST, UNSPECIFIED ESTROGEN RECEPTOR STATUS, UNSPECIFIED SITE OF BREAST: ICD-10-CM

## 2017-09-14 PROCEDURE — 63600175 PHARM REV CODE 636 W HCPCS: Performed by: STUDENT IN AN ORGANIZED HEALTH CARE EDUCATION/TRAINING PROGRAM

## 2017-09-14 PROCEDURE — 27000221 HC OXYGEN, UP TO 24 HOURS

## 2017-09-14 PROCEDURE — 88360 TUMOR IMMUNOHISTOCHEM/MANUAL: CPT | Mod: 26,,, | Performed by: PATHOLOGY

## 2017-09-14 PROCEDURE — 36000706: Performed by: SURGERY

## 2017-09-14 PROCEDURE — 88342 IMHCHEM/IMCYTCHM 1ST ANTB: CPT | Mod: 26,59,, | Performed by: PATHOLOGY

## 2017-09-14 PROCEDURE — 38525 BIOPSY/REMOVAL LYMPH NODES: CPT | Mod: 51,RT,, | Performed by: SURGERY

## 2017-09-14 PROCEDURE — 88307 TISSUE EXAM BY PATHOLOGIST: CPT | Mod: 59 | Performed by: PATHOLOGY

## 2017-09-14 PROCEDURE — 38792 RA TRACER ID OF SENTINL NODE: CPT | Mod: TC

## 2017-09-14 PROCEDURE — 25000003 PHARM REV CODE 250: Performed by: STUDENT IN AN ORGANIZED HEALTH CARE EDUCATION/TRAINING PROGRAM

## 2017-09-14 PROCEDURE — 63600175 PHARM REV CODE 636 W HCPCS: Performed by: ANESTHESIOLOGY

## 2017-09-14 PROCEDURE — S0077 INJECTION, CLINDAMYCIN PHOSP: HCPCS | Performed by: PHYSICIAN ASSISTANT

## 2017-09-14 PROCEDURE — 37000009 HC ANESTHESIA EA ADD 15 MINS: Performed by: SURGERY

## 2017-09-14 PROCEDURE — 25000003 PHARM REV CODE 250: Performed by: PHYSICIAN ASSISTANT

## 2017-09-14 PROCEDURE — 63600175 PHARM REV CODE 636 W HCPCS: Performed by: SURGERY

## 2017-09-14 PROCEDURE — 63600175 PHARM REV CODE 636 W HCPCS

## 2017-09-14 PROCEDURE — 36000707: Performed by: SURGERY

## 2017-09-14 PROCEDURE — 37000008 HC ANESTHESIA 1ST 15 MINUTES: Performed by: SURGERY

## 2017-09-14 PROCEDURE — 71000039 HC RECOVERY, EACH ADD'L HOUR: Performed by: SURGERY

## 2017-09-14 PROCEDURE — 25000003 PHARM REV CODE 250: Performed by: SURGERY

## 2017-09-14 PROCEDURE — 38900 IO MAP OF SENT LYMPH NODE: CPT | Mod: ,,, | Performed by: SURGERY

## 2017-09-14 PROCEDURE — 19303 MAST SIMPLE COMPLETE: CPT | Mod: RT,,, | Performed by: SURGERY

## 2017-09-14 PROCEDURE — 88307 TISSUE EXAM BY PATHOLOGIST: CPT | Mod: 26,,, | Performed by: PATHOLOGY

## 2017-09-14 PROCEDURE — 38792 RA TRACER ID OF SENTINL NODE: CPT | Mod: RT,GC,, | Performed by: RADIOLOGY

## 2017-09-14 PROCEDURE — D9220A PRA ANESTHESIA: Mod: ,,, | Performed by: ANESTHESIOLOGY

## 2017-09-14 PROCEDURE — 71000033 HC RECOVERY, INTIAL HOUR: Performed by: SURGERY

## 2017-09-14 PROCEDURE — 94761 N-INVAS EAR/PLS OXIMETRY MLT: CPT

## 2017-09-14 PROCEDURE — 25000003 PHARM REV CODE 250

## 2017-09-14 PROCEDURE — 88360 TUMOR IMMUNOHISTOCHEM/MANUAL: CPT | Performed by: PATHOLOGY

## 2017-09-14 PROCEDURE — 88331 PATH CONSLTJ SURG 1 BLK 1SPC: CPT | Mod: 26,,, | Performed by: PATHOLOGY

## 2017-09-14 PROCEDURE — C1729 CATH, DRAINAGE: HCPCS | Performed by: SURGERY

## 2017-09-14 RX ORDER — PROPOFOL 10 MG/ML
VIAL (ML) INTRAVENOUS
Status: DISCONTINUED | OUTPATIENT
Start: 2017-09-14 | End: 2017-09-14

## 2017-09-14 RX ORDER — ACETAMINOPHEN 325 MG/1
650 TABLET ORAL EVERY 4 HOURS PRN
Status: DISCONTINUED | OUTPATIENT
Start: 2017-09-14 | End: 2017-09-15 | Stop reason: HOSPADM

## 2017-09-14 RX ORDER — FENTANYL CITRATE 50 UG/ML
INJECTION, SOLUTION INTRAMUSCULAR; INTRAVENOUS
Status: COMPLETED
Start: 2017-09-14 | End: 2017-09-14

## 2017-09-14 RX ORDER — SODIUM CHLORIDE 9 MG/ML
INJECTION, SOLUTION INTRAVENOUS CONTINUOUS
Status: DISCONTINUED | OUTPATIENT
Start: 2017-09-14 | End: 2017-09-14

## 2017-09-14 RX ORDER — DEXAMETHASONE SODIUM PHOSPHATE 4 MG/ML
INJECTION, SOLUTION INTRA-ARTICULAR; INTRALESIONAL; INTRAMUSCULAR; INTRAVENOUS; SOFT TISSUE
Status: DISCONTINUED | OUTPATIENT
Start: 2017-09-14 | End: 2017-09-14

## 2017-09-14 RX ORDER — OXYCODONE AND ACETAMINOPHEN 5; 325 MG/1; MG/1
TABLET ORAL
Status: COMPLETED
Start: 2017-09-14 | End: 2017-09-14

## 2017-09-14 RX ORDER — KETAMINE HCL IN 0.9 % NACL 50 MG/5 ML
SYRINGE (ML) INTRAVENOUS
Status: DISCONTINUED | OUTPATIENT
Start: 2017-09-14 | End: 2017-09-14

## 2017-09-14 RX ORDER — SODIUM CHLORIDE 9 MG/ML
INJECTION, SOLUTION INTRAVENOUS CONTINUOUS
Status: DISCONTINUED | OUTPATIENT
Start: 2017-09-14 | End: 2017-09-15 | Stop reason: HOSPADM

## 2017-09-14 RX ORDER — LIDOCAINE HYDROCHLORIDE 10 MG/ML
1 INJECTION, SOLUTION EPIDURAL; INFILTRATION; INTRACAUDAL; PERINEURAL ONCE
Status: COMPLETED | OUTPATIENT
Start: 2017-09-14 | End: 2017-09-14

## 2017-09-14 RX ORDER — OXYCODONE AND ACETAMINOPHEN 5; 325 MG/1; MG/1
1 TABLET ORAL EVERY 4 HOURS PRN
Status: DISCONTINUED | OUTPATIENT
Start: 2017-09-14 | End: 2017-09-15 | Stop reason: HOSPADM

## 2017-09-14 RX ORDER — GLYCOPYRROLATE 0.2 MG/ML
INJECTION INTRAMUSCULAR; INTRAVENOUS
Status: DISCONTINUED | OUTPATIENT
Start: 2017-09-14 | End: 2017-09-14

## 2017-09-14 RX ORDER — ROCURONIUM BROMIDE 10 MG/ML
INJECTION, SOLUTION INTRAVENOUS
Status: DISCONTINUED | OUTPATIENT
Start: 2017-09-14 | End: 2017-09-14

## 2017-09-14 RX ORDER — KETOROLAC TROMETHAMINE 30 MG/ML
INJECTION, SOLUTION INTRAMUSCULAR; INTRAVENOUS
Status: DISPENSED
Start: 2017-09-14 | End: 2017-09-14

## 2017-09-14 RX ORDER — PHENYLEPHRINE HYDROCHLORIDE 10 MG/ML
INJECTION INTRAVENOUS
Status: DISCONTINUED | OUTPATIENT
Start: 2017-09-14 | End: 2017-09-14

## 2017-09-14 RX ORDER — KETOROLAC TROMETHAMINE 30 MG/ML
15 INJECTION, SOLUTION INTRAMUSCULAR; INTRAVENOUS EVERY 6 HOURS
Status: COMPLETED | OUTPATIENT
Start: 2017-09-14 | End: 2017-09-15

## 2017-09-14 RX ORDER — LIDOCAINE HCL/PF 100 MG/5ML
SYRINGE (ML) INTRAVENOUS
Status: DISCONTINUED | OUTPATIENT
Start: 2017-09-14 | End: 2017-09-14

## 2017-09-14 RX ORDER — ACETAMINOPHEN 10 MG/ML
INJECTION, SOLUTION INTRAVENOUS
Status: DISCONTINUED | OUTPATIENT
Start: 2017-09-14 | End: 2017-09-14

## 2017-09-14 RX ORDER — CLINDAMYCIN PHOSPHATE 900 MG/50ML
900 INJECTION, SOLUTION INTRAVENOUS
Status: COMPLETED | OUTPATIENT
Start: 2017-09-14 | End: 2017-09-14

## 2017-09-14 RX ORDER — FENTANYL CITRATE 50 UG/ML
INJECTION, SOLUTION INTRAMUSCULAR; INTRAVENOUS
Status: DISCONTINUED | OUTPATIENT
Start: 2017-09-14 | End: 2017-09-14

## 2017-09-14 RX ORDER — FENTANYL CITRATE 50 UG/ML
25 INJECTION, SOLUTION INTRAMUSCULAR; INTRAVENOUS EVERY 5 MIN PRN
Status: COMPLETED | OUTPATIENT
Start: 2017-09-14 | End: 2017-09-14

## 2017-09-14 RX ORDER — ISOSULFAN BLUE 50 MG/5ML
INJECTION, SOLUTION SUBCUTANEOUS
Status: DISCONTINUED | OUTPATIENT
Start: 2017-09-14 | End: 2017-09-14 | Stop reason: HOSPADM

## 2017-09-14 RX ORDER — HYDROMORPHONE HYDROCHLORIDE 1 MG/ML
0.5 INJECTION, SOLUTION INTRAMUSCULAR; INTRAVENOUS; SUBCUTANEOUS EVERY 4 HOURS PRN
Status: DISCONTINUED | OUTPATIENT
Start: 2017-09-14 | End: 2017-09-15 | Stop reason: HOSPADM

## 2017-09-14 RX ORDER — OXYCODONE AND ACETAMINOPHEN 5; 325 MG/1; MG/1
1 TABLET ORAL EVERY 4 HOURS PRN
Qty: 61 TABLET | Refills: 0 | Status: SHIPPED | OUTPATIENT
Start: 2017-09-14 | End: 2018-01-31

## 2017-09-14 RX ORDER — MEPERIDINE HYDROCHLORIDE 50 MG/ML
25 INJECTION INTRAMUSCULAR; INTRAVENOUS; SUBCUTANEOUS ONCE
Status: COMPLETED | OUTPATIENT
Start: 2017-09-14 | End: 2017-09-14

## 2017-09-14 RX ORDER — NEOSTIGMINE METHYLSULFATE 1 MG/ML
INJECTION, SOLUTION INTRAVENOUS
Status: DISCONTINUED | OUTPATIENT
Start: 2017-09-14 | End: 2017-09-14

## 2017-09-14 RX ORDER — ONDANSETRON 2 MG/ML
8 INJECTION INTRAMUSCULAR; INTRAVENOUS EVERY 8 HOURS PRN
Status: DISCONTINUED | OUTPATIENT
Start: 2017-09-14 | End: 2017-09-15 | Stop reason: HOSPADM

## 2017-09-14 RX ORDER — ONDANSETRON 2 MG/ML
4 INJECTION INTRAMUSCULAR; INTRAVENOUS ONCE
Status: COMPLETED | OUTPATIENT
Start: 2017-09-14 | End: 2017-09-14

## 2017-09-14 RX ORDER — SODIUM CHLORIDE 0.9 % (FLUSH) 0.9 %
3 SYRINGE (ML) INJECTION
Status: DISCONTINUED | OUTPATIENT
Start: 2017-09-14 | End: 2017-09-14 | Stop reason: HOSPADM

## 2017-09-14 RX ORDER — MEPERIDINE HYDROCHLORIDE 50 MG/ML
INJECTION INTRAMUSCULAR; INTRAVENOUS; SUBCUTANEOUS
Status: DISPENSED
Start: 2017-09-14 | End: 2017-09-14

## 2017-09-14 RX ORDER — MIDAZOLAM HYDROCHLORIDE 1 MG/ML
INJECTION, SOLUTION INTRAMUSCULAR; INTRAVENOUS
Status: DISCONTINUED | OUTPATIENT
Start: 2017-09-14 | End: 2017-09-14

## 2017-09-14 RX ADMIN — FENTANYL CITRATE 25 MCG: 50 INJECTION INTRAMUSCULAR; INTRAVENOUS at 09:09

## 2017-09-14 RX ADMIN — NEOSTIGMINE METHYLSULFATE 2.5 MG: 1 INJECTION INTRAVENOUS at 08:09

## 2017-09-14 RX ADMIN — FENTANYL CITRATE 25 MCG: 50 INJECTION INTRAMUSCULAR; INTRAVENOUS at 10:09

## 2017-09-14 RX ADMIN — PROMETHAZINE HYDROCHLORIDE 6.25 MG: 25 INJECTION INTRAMUSCULAR; INTRAVENOUS at 10:09

## 2017-09-14 RX ADMIN — ONDANSETRON 4 MG: 2 INJECTION INTRAMUSCULAR; INTRAVENOUS at 06:09

## 2017-09-14 RX ADMIN — LIDOCAINE HYDROCHLORIDE 60 MG: 20 INJECTION, SOLUTION INTRAVENOUS at 07:09

## 2017-09-14 RX ADMIN — ONDANSETRON 8 MG: 2 INJECTION INTRAMUSCULAR; INTRAVENOUS at 10:09

## 2017-09-14 RX ADMIN — OXYCODONE HYDROCHLORIDE AND ACETAMINOPHEN 1 TABLET: 5; 325 TABLET ORAL at 09:09

## 2017-09-14 RX ADMIN — MEPERIDINE HYDROCHLORIDE 25 MG: 50 INJECTION INTRAMUSCULAR; INTRAVENOUS; SUBCUTANEOUS at 10:09

## 2017-09-14 RX ADMIN — GLYCOPYRROLATE 0.4 MG: 0.2 INJECTION, SOLUTION INTRAMUSCULAR; INTRAVENOUS at 08:09

## 2017-09-14 RX ADMIN — SODIUM CHLORIDE: 0.9 INJECTION, SOLUTION INTRAVENOUS at 06:09

## 2017-09-14 RX ADMIN — PHENYLEPHRINE HYDROCHLORIDE 100 MCG: 10 INJECTION INTRAVENOUS at 07:09

## 2017-09-14 RX ADMIN — ACETAMINOPHEN 1000 MG: 10 INJECTION, SOLUTION INTRAVENOUS at 07:09

## 2017-09-14 RX ADMIN — KETOROLAC TROMETHAMINE 15 MG: 30 INJECTION, SOLUTION INTRAMUSCULAR at 05:09

## 2017-09-14 RX ADMIN — PROPOFOL 50 MG: 10 INJECTION, EMULSION INTRAVENOUS at 07:09

## 2017-09-14 RX ADMIN — ISOSULFAN BLUE 5 ML: 10 INJECTION, SOLUTION SUBCUTANEOUS at 07:09

## 2017-09-14 RX ADMIN — FENTANYL CITRATE 100 MCG: 50 INJECTION, SOLUTION INTRAMUSCULAR; INTRAVENOUS at 07:09

## 2017-09-14 RX ADMIN — SODIUM CHLORIDE: 0.9 INJECTION, SOLUTION INTRAVENOUS at 09:09

## 2017-09-14 RX ADMIN — LIDOCAINE HYDROCHLORIDE 0.1 ML: 10 INJECTION, SOLUTION EPIDURAL; INFILTRATION; INTRACAUDAL; PERINEURAL at 06:09

## 2017-09-14 RX ADMIN — FENTANYL CITRATE 25 MCG: 50 INJECTION, SOLUTION INTRAMUSCULAR; INTRAVENOUS at 08:09

## 2017-09-14 RX ADMIN — KETOROLAC TROMETHAMINE 15 MG: 30 INJECTION, SOLUTION INTRAMUSCULAR at 11:09

## 2017-09-14 RX ADMIN — CLINDAMYCIN IN 5 PERCENT DEXTROSE 900 MG: 18 INJECTION, SOLUTION INTRAVENOUS at 07:09

## 2017-09-14 RX ADMIN — Medication 20 MG: at 07:09

## 2017-09-14 RX ADMIN — DEXAMETHASONE SODIUM PHOSPHATE 8 MG: 4 INJECTION, SOLUTION INTRAMUSCULAR; INTRAVENOUS at 07:09

## 2017-09-14 RX ADMIN — SODIUM CHLORIDE, SODIUM GLUCONATE, SODIUM ACETATE, POTASSIUM CHLORIDE, MAGNESIUM CHLORIDE, SODIUM PHOSPHATE, DIBASIC, AND POTASSIUM PHOSPHATE: .53; .5; .37; .037; .03; .012; .00082 INJECTION, SOLUTION INTRAVENOUS at 08:09

## 2017-09-14 RX ADMIN — SODIUM CHLORIDE: 0.9 INJECTION, SOLUTION INTRAVENOUS at 04:09

## 2017-09-14 RX ADMIN — KETOROLAC TROMETHAMINE 15 MG: 30 INJECTION, SOLUTION INTRAMUSCULAR at 10:09

## 2017-09-14 RX ADMIN — ROCURONIUM BROMIDE 40 MG: 10 INJECTION, SOLUTION INTRAVENOUS at 07:09

## 2017-09-14 RX ADMIN — FENTANYL CITRATE 50 MCG: 50 INJECTION, SOLUTION INTRAMUSCULAR; INTRAVENOUS at 08:09

## 2017-09-14 RX ADMIN — MIDAZOLAM HYDROCHLORIDE 2 MG: 1 INJECTION, SOLUTION INTRAMUSCULAR; INTRAVENOUS at 07:09

## 2017-09-14 RX ADMIN — PROPOFOL 100 MG: 10 INJECTION, EMULSION INTRAVENOUS at 07:09

## 2017-09-14 NOTE — INTERVAL H&P NOTE
The patient has been examined and the H&P has been reviewed:    No changes from my recent H&P    Anesthesia/Surgery risks, benefits and alternative options discussed and understood by patient/family.          Active Hospital Problems    Diagnosis  POA    Malignant neoplasm of upper-outer quadrant of right female breast [C50.411]  Yes      Resolved Hospital Problems    Diagnosis Date Resolved POA   No resolved problems to display.

## 2017-09-14 NOTE — PROGRESS NOTES
"Pt continues to c/o 10/10 pain to right breast. Pt requesting IV demerol for pain relief; states "dilaudid doesn't work for pain on me; it only makes me nauseated" and "morphine doesn't work". Notified Dr. Britton with anesthesia. New orders received for 25mg IV demerol. Orders implemented; see MAR. Will continue to monitor.   "

## 2017-09-14 NOTE — PLAN OF CARE
Problem: Nausea/Vomiting (Adult)  Goal: Identify Related Risk Factors and Signs and Symptoms  Related risk factors and signs and symptoms are identified upon initiation of Human Response Clinical Practice Guideline (CPG)   Outcome: Ongoing (interventions implemented as appropriate)  Pt tolerated chicken broth, ice chips and water. No c/o N/V. Will try solid food for dinner. Will continue to monitor.

## 2017-09-14 NOTE — ANESTHESIA POSTPROCEDURE EVALUATION
"Anesthesia Post Evaluation    Patient: Cecilia Valenzuela    Procedure(s) Performed: Procedure(s) (LRB):  MASTECTOMY 23 hr stay (Right)  INJECTION-NODE-SENTINEL (Right)  BIOPSY-LYMPH NODE-SENTINEL (Right)    Final Anesthesia Type: general  Patient location during evaluation: PACU  Patient participation: Yes- Able to Participate  Level of consciousness: awake and alert and oriented  Post-procedure vital signs: reviewed and stable  Pain management: adequate  Airway patency: patent  PONV status at discharge: No PONV  Anesthetic complications: no      Cardiovascular status: blood pressure returned to baseline, hemodynamically stable and stable  Respiratory status: unassisted, spontaneous ventilation and room air  Hydration status: euvolemic  Follow-up not needed.        Visit Vitals  BP (!) 121/58   Pulse 79   Temp 36.4 °C (97.6 °F) (Oral)   Resp 16   Ht 5' 2" (1.575 m)   Wt 49.9 kg (110 lb)   SpO2 96%   Breastfeeding? No   BMI 20.12 kg/m²       Pain/Andreas Score: Pain Assessment Performed: Yes (9/14/2017 11:30 AM)  Presence of Pain: other (see comments) (states pain is tolerable) (9/14/2017 11:30 AM)  Pain Rating Prior to Med Admin: 10 (9/14/2017 10:59 AM)  Pain Rating Post Med Admin: 5 (9/14/2017 11:30 AM)  Andreas Score: 9 (9/14/2017 11:00 AM)      "

## 2017-09-14 NOTE — TRANSFER OF CARE
"Anesthesia Transfer of Care Note    Patient: Cecilia Valenzuela    Procedure(s) Performed: Procedure(s) (LRB):  MASTECTOMY 23 hr stay (Right)  INJECTION-NODE-SENTINEL (Right)  BIOPSY-LYMPH NODE-SENTINEL (Right)    Patient location: PACU    Anesthesia Type: general    Transport from OR: Transported from OR on 6-10 L/min O2 by face mask with adequate spontaneous ventilation    Post pain: adequate analgesia    Post assessment: no apparent anesthetic complications    Post vital signs: stable    Level of consciousness: awake, alert and oriented    Nausea/Vomiting: no nausea/vomiting    Complications: none    Transfer of care protocol was followed      Last vitals:   Visit Vitals  BP (!) 150/71   Pulse 92   Temp 36.1 °C (97 °F) (Axillary)   Resp 20   Ht 5' 2" (1.575 m)   Wt 49.9 kg (110 lb)   SpO2 100%   Breastfeeding? No   BMI 20.12 kg/m²     "

## 2017-09-14 NOTE — ANESTHESIA RELEASE NOTE
"Anesthesia Release from PACU Note    Patient: Cecilia Valenzuela    Procedure(s) Performed: Procedure(s) (LRB):  MASTECTOMY 23 hr stay (Right)  INJECTION-NODE-SENTINEL (Right)  BIOPSY-LYMPH NODE-SENTINEL (Right)    Anesthesia type: general    Post pain: Adequate analgesia    Post assessment: no apparent anesthetic complications, tolerated procedure well and no evidence of recall    Last Vitals:   Visit Vitals  BP (!) 121/58   Pulse 79   Temp 36.4 °C (97.6 °F) (Oral)   Resp 16   Ht 5' 2" (1.575 m)   Wt 49.9 kg (110 lb)   SpO2 96%   Breastfeeding? No   BMI 20.12 kg/m²       Post vital signs: stable    Level of consciousness: awake, alert  and oriented    Nausea/Vomiting: no nausea/no vomiting    Complications: none    Airway Patency: patent    Respiratory: unassisted, spontaneous ventilation, room air    Cardiovascular: stable and blood pressure at baseline    Hydration: euvolemic  "

## 2017-09-14 NOTE — NURSING TRANSFER
Nursing Transfer Note      9/14/2017     Transfer OBS 2    Transfer via stretcher    Transfer with    Transported by PCT    Medicines sent: IVF infusing    Chart send with patient: Yes    Notified: spouse    Patient reassessed at: 9/14/17 at 1146    Report given to receiving RNZia. Pt resting quietly in bed; arouses to voice; orientedx4. VSS. No distress noted at this time.

## 2017-09-14 NOTE — PROGRESS NOTES
"Pt c/o 10/10 pain to right breast; states pain is constant and she hasn't had any pain relief since arrival to PACU. Total of 5mg PO oxycodone and 150mcg IV fentanyl administered per orders. Pt states the "IV pain medicine isn't helping." VSS. No distress noted at this time. Pt resting quietly. Spoke with Dr. Bradford with surgery. Ok to administer IV toradol now per MD. MD to place new orders; see MAR. Will continue to monitor.   "

## 2017-09-14 NOTE — PLAN OF CARE
Problem: Patient Care Overview  Goal: Plan of Care Review  Outcome: Ongoing (interventions implemented as appropriate)  Report received from Tawanna RN in PACU. Pt arrived to unit via stretcher. VSS on RA. IVF infusing to L hand @ 125 cc/hr. ODETTE to R breast noted with bloody drainage. Telfa island drsg to R breast. CDI. Gauze fluff and surgical bra in place. TEDs/SCDs on. Pt c/o nausea would like to try some chicken broth. Will place order.  Bed in lowest locked position and call light within reach.  and son at bedside. NAD noted. Will continue to monitor.

## 2017-09-14 NOTE — PROGRESS NOTES
Dr. Barreto at bedside. Stated pt may take home meds. Pt to take Toprol 25 mg daily, Prilosec 20 mg daily and Co-enzyme Q-10 30 mg twice daily. Pt informed to notify nurse of home meds taken. Pt verbalized understanding.

## 2017-09-14 NOTE — H&P (VIEW-ONLY)
General Surgery Clinic Note - H and P    Subjective:     Procedures:      Cecilia Valenzuela is a 67 y.o. female who presents to clinic for breast cancer. BIRADs 5 lesion and underwent biopsy with showed invasive lobular carcinoma. She reports no breast pain (other than biopsy site), no palpable masses, and no nipple discharge / retractions.       Family hx: 2 sisters with breast Ca, mother w/breast ca, aunt w/breast ca      Past Medical History:   Diagnosis Date    Arthritis     Atherosclerosis     in left carotid artery    GERD (gastroesophageal reflux disease)     Hyperlipidemia     Hypertension     Mitral valve prolapse     Neurofibromatosis     Osteopenia     Premature beats     Raynaud's disease     Seborrheic keratosis     Thyroid nodule     Tinnitus        Past Surgical History:   Procedure Laterality Date    BACK SURGERY      CATARACT EXTRACTION Bilateral      SECTION      x5    HYSTERECTOMY         Social History     Social History    Marital status:      Spouse name: N/A    Number of children: N/A    Years of education: N/A     Occupational History    Not on file.     Social History Main Topics    Smoking status: Never Smoker    Smokeless tobacco: Never Used    Alcohol use No    Drug use: No    Sexual activity: Not on file     Other Topics Concern    Not on file     Social History Narrative    No narrative on file       Review of patient's allergies indicates:   Allergen Reactions    Augmentin [amoxicillin-pot clavulanate]     Dilaudid [hydromorphone]     Neosporin [hydrocortisone]     Norvasc [amlodipine]     Tetracyclines          Objective:     PHYSICAL EXAM:  Vital Signs (Most Recent)  Temp: 97.9 °F (36.6 °C) (17 1453)  Pulse: 94 (17 1453)  BP: (!) 182/85 (17 1453)    Review of Systems   Constitutional: Negative for chills and fever.   Respiratory: Negative for shortness of breath.    Cardiovascular: Negative for chest pain.    Gastrointestinal: Negative for nausea and vomiting.   Musculoskeletal: Positive for back pain, joint pain and neck pain.       Physical Exam:  General: NAD   Neuro: aaox4, perrl  Right breast 2.5 cm mass some bruising with associated tenderness  No palpable axillary lymphadenopathy  Left breast with no masses / nipple retraction / discharge  No palpable axillary lymphadenopathy  Respiratory: resps even unlabored  Cardiac: cap refill <2 sec  Abdomen: Normal, benign.  Extremities: Warm dry and intact    Pathology- reviewed  Specimens     None            Assessment:     67 y.o. female with invasive lobular carcinoma of the right breast    Plan:     - Plan for right mastectomy with SLNB on 9/14/17  - Genetic testing negative  - All questions and concerns addressed  - Consent signed in clinic    Gifty Waddell MD  Surgery Resident, PGY-2  Pager [phone]  09/01/2017    I have personally performed a detailed history and physical examination on this patient. My findings are summarized in the resident's note included in the record.  Plan on mastectomy (patient not interested in reconstruction)  Understands that we will do a frozen section of her sentinel node  A node dissection might be necessary of the patient proves to be SL node positive on  Subsequent analysis after a negative frozen

## 2017-09-14 NOTE — PLAN OF CARE
Problem: Patient Care Overview  Goal: Plan of Care Review  Outcome: Ongoing (interventions implemented as appropriate)  Plan of care reviewed with pt; pt verbalizes understanding. VSS. Pain rates pain 5/10 and tolerable at this time. Pt states nausea has resolved and tolerating sips of water. Surgical site stable. Dressing clean, dry and intact. ODETTE drainx1 intact with bloody drainage noted. TEDs/SCDs in place. See epic flowsheet for full assessment and vital signs.

## 2017-09-14 NOTE — OP NOTE
DATE OF PROCEDURE:  09/14/2017    PREOPERATIVE DIAGNOSIS:  Right breast cancer.    POSTOPERATIVE DIAGNOSIS:  Right breast cancer.    PROCEDURE:  Right total mastectomy, sentinel lymph node mapping and biopsy.    ANESTHESIA:  General.    ESTIMATED BLOOD LOSS:  Minimal.    SURGEON:  Andreas Barreto M.D.    ASSISTANT:  Timothy Bradford M.D. (RES).    INDICATIONS:  A 67-year-old patient diagnosed with invasive right breast cancer   who has opted for mastectomy.  The patient deferred surgery until the results of   her genetic testing returned, which were negative.  This led to her decision to   have a unilateral mastectomy.    OPERATIVE REPORT IN DETAIL:  The patient was brought to the Operating Room and   placed in supine position, prepped and draped in sterile fashion.  Once   satisfactory general anesthesia was induced, prior to the initiation of the   procedure, a retroareolar injection of technetium sulfur colloid and Lymphazurin   were performed until a concentrated focus of radioactivity was detected in the   axilla.  Incision was made in the standard elliptical fashion, oriented from   inferomedial to superolateral, flaps were then circumferentially raised cephalad   to the clavicle, medially to the midline, laterally to the mid clavicular line,   and inferiorly to the inframammary fold.  The breast was dissected off the   underlying pectoralis fascia and the lateral edge of the pectoralis was   skeletonized.  A Neoprobe was utilized to identify the concentrated focus of   radioactivity that soft tissue was grasped with an Allis clamp and then a single   sentinel node was identified and sent to Pathology for frozen section where it   was negative for tumor.  The superolateral portion of the breast was divided   arbitrarily from the inferior portion of the axilla.  The breast was oriented   with a lateral suture on the areolar edge.  This was sent for permanent section.    Hemostasis was achieved, a 19 Graeme drain  was brought in through an inferior   stab incision and then the overlying soft tissues were reapproximated with   absorbable suture and the drain was secured with nylon.  Needle, sponge and   instrument counts were correct.  The patient remained stable throughout the   procedure.      GF/HN  dd: 09/14/2017 08:33:16 (CDT)  td: 09/14/2017 09:45:44 (CDT)  Doc ID   #[id number]  Job ID #[id number]    CC:

## 2017-09-14 NOTE — PROGRESS NOTES
Pt's daughter and  at bedside and updated on plan of care. All questions answered. Family verbalizes understanding.

## 2017-09-14 NOTE — BRIEF OP NOTE
[facility]  Brief Operative Note    SUMMARY     Surgery Date: 9/14/2017     Surgeon(s) and Role:     * Andreas Barreto MD - Primary     * Timothy Bradford MD - Resident - Assisting        Pre-op Diagnosis:  Malignant neoplasm of right female breast, unspecified estrogen receptor status, unspecified site of breast [C50.911]    Post-op Diagnosis:  Post-Op Diagnosis Codes:     * Malignant neoplasm of right female breast, unspecified estrogen receptor status, unspecified site of breast [C50.911]    Procedure(s) (LRB):  MASTECTOMY 23 hr stay (Right)  INJECTION-NODE-SENTINEL (Right)  BIOPSY-LYMPH NODE-SENTINEL (Right)    Anesthesia: General    Description of Procedure: right mastectomy with right sentinel lymph node biopsy     Description of the findings of the procedure: Negative sentinel on frozen, no complications    Estimated Blood Loss: * No values recorded between 9/14/2017  7:35 AM and 9/14/2017  8:56 AM *         Specimens:   Specimen (12h ago through future)    Start     Ordered    09/14/17 0818  Specimen to Pathology - Surgery  Once     Comments:  1- right axillary sentinel node - frozen2- right total mastectomy. Stitch marks lateral areola - Permanent      09/14/17 0820

## 2017-09-15 VITALS
HEIGHT: 62 IN | RESPIRATION RATE: 18 BRPM | SYSTOLIC BLOOD PRESSURE: 159 MMHG | BODY MASS INDEX: 20.24 KG/M2 | WEIGHT: 110 LBS | DIASTOLIC BLOOD PRESSURE: 73 MMHG | OXYGEN SATURATION: 100 % | HEART RATE: 66 BPM | TEMPERATURE: 99 F

## 2017-09-15 PROCEDURE — 63600175 PHARM REV CODE 636 W HCPCS: Performed by: STUDENT IN AN ORGANIZED HEALTH CARE EDUCATION/TRAINING PROGRAM

## 2017-09-15 PROCEDURE — 25000003 PHARM REV CODE 250: Performed by: STUDENT IN AN ORGANIZED HEALTH CARE EDUCATION/TRAINING PROGRAM

## 2017-09-15 RX ADMIN — SODIUM CHLORIDE: 0.9 INJECTION, SOLUTION INTRAVENOUS at 12:09

## 2017-09-15 RX ADMIN — OXYCODONE HYDROCHLORIDE AND ACETAMINOPHEN 1 TABLET: 5; 325 TABLET ORAL at 10:09

## 2017-09-15 RX ADMIN — KETOROLAC TROMETHAMINE: 30 INJECTION, SOLUTION INTRAMUSCULAR at 06:09

## 2017-09-15 NOTE — DISCHARGE SUMMARY
[facility]  Discharge Note  Short Stay    Admit Date: 9/14/2017    Discharge Date and Time: 9/15/17    Attending Physician: Andreas Barreto MD     Discharge Provider: Costa Waddell    Diagnoses:  Active Hospital Problems    Diagnosis  POA    *Malignant neoplasm of upper-outer quadrant of right female breast [C50.411]  Yes      Resolved Hospital Problems    Diagnosis Date Resolved POA   No resolved problems to display.       Discharged Condition: good    Hospital Course: Patient was admitted for an outpatient procedure and tolerated the procedure well with no complications.    Final Diagnoses: Same as principal problem.    Disposition: Home or Self Care    Follow up/Patient Instructions:    Medications:  Reconciled Home Medications:   Current Discharge Medication List      START taking these medications    Details   oxycodone-acetaminophen (PERCOCET) 5-325 mg per tablet Take 1 tablet by mouth every 4 (four) hours as needed.  Qty: 61 tablet, Refills: 0         CONTINUE these medications which have NOT CHANGED    Details   acetaminophen (TYLENOL) 325 MG tablet Take 325 mg by mouth 2 (two) times daily.      azelastine (ASTELIN) 137 mcg (0.1 %) nasal spray 1 spray by Nasal route as needed.       calcium-vitamin D3 (CALCIUM 500 + D) 500 mg(1,250mg) -200 unit per tablet Take 1 tablet by mouth 2 (two) times daily with meals.      GLUCOSAMINE/CHONDR OCHOA A SOD (GLUCOSAMINE-CHONDROITIN) 1,500-1,200 mg/30 mL Liqd Take 1 tablet by mouth once daily.      lorazepam (ATIVAN) 0.5 MG tablet Take 0.25 mg by mouth every 6 (six) hours as needed for Anxiety.      magnesium 250 mg Tab Take 250 mg by mouth once daily.      multivitamin (THERAGRAN) per tablet Take 1 tablet by mouth once daily.      omeprazole (PRILOSEC) 20 MG capsule Take 40 mg by mouth once daily.      PATADAY 0.2 % Drop instill 1 drop into both eyes once daily  Refills: 0      TOPROL XL 25 mg 24 hr tablet Take 1.5 tablets (37.5 mg total) by mouth  nightly.  Qty: 45 tablet, Refills: 6      aspirin (ECOTRIN) 81 MG EC tablet Take 81 mg by mouth once daily.      co-enzyme Q-10 30 mg capsule Take 200 mg by mouth 2 (two) times daily. 200mg daily       IBUPROFEN (ADVIL ORAL) Take by mouth.             Discharge Procedure Orders  Diet general     Activity as tolerated     Shower on day dressing removed (No bath)     Call MD for:  worsening rash     Call MD for:  redness, tenderness, or signs of infection (pain, swelling, redness, odor or green/yellow discharge around incision site)     Call MD for:  severe uncontrolled pain     Call MD for:  persistent nausea and vomiting or diarrhea     Call MD for:  temperature >100.4     Remove dressing in 24 hours       Follow-up Information     Andreas Barreto MD In 2 weeks.    Specialties:  General Surgery, Surgery  Contact information:  [address]  [facility] [address]  [phone]                   Discharge Procedure Orders (must include Diet, Follow-up, Activity):    Discharge Procedure Orders (must include Diet, Follow-up, Activity)  Diet general     Activity as tolerated     Shower on day dressing removed (No bath)     Call MD for:  worsening rash     Call MD for:  redness, tenderness, or signs of infection (pain, swelling, redness, odor or green/yellow discharge around incision site)     Call MD for:  severe uncontrolled pain     Call MD for:  persistent nausea and vomiting or diarrhea     Call MD for:  temperature >100.4     Remove dressing in 24 hours

## 2017-09-15 NOTE — PLAN OF CARE
Problem: Patient Care Overview  Goal: Plan of Care Review  Outcome: Ongoing (interventions implemented as appropriate)  Pt. Remains on fall precautions,call bell in reach bed low and locked. Pain to right breast area effectively controlled with IV toradol.  No complaints of nausea or vomiting. Continue plan of care.

## 2017-09-15 NOTE — PLAN OF CARE
09/15/17 1132   Final Note   Assessment Type Final Discharge Note   Discharge Disposition Home   Hospital Follow Up  Appt(s) scheduled? No  (Pt can schedule f/u appt based on her schedule and transportation from [facility])   Right Care Referral Info   Post Acute Recommendation No Care

## 2017-09-15 NOTE — PROGRESS NOTES
pt given d/c instructions and verbalized understanding. pt PIV removed, pressure dressing applied, no acute distress noted. pt d/c'd from unit via wheelchair with transport, spouse, and belongings.

## 2017-09-15 NOTE — DISCHARGE INSTRUCTIONS
Do not wet incision for 48 hours after surgery.  Remove dressing in 48 hours after surgery, may shower after that time.  At that time, wash gently with warm soap and water at least once a day.  Do not scrub hard.  Do not soak incision in water for 2 weeks. Steri strips (small white pieces of surgical tape) will fall off on their own (10-14 days). No driving while still taking pain medications daily. Take a stool softener while taking pain medications daily.     Discharge Instructions: Caring for Your Armani-Alicea Drainage Tube  Your doctor discharges you with a Armani-Alicea drainage tube. Doctors commonly leave this drain within the abdominal cavity after surgery. It helps drain and collect blood and body fluid after surgery. This can prevent swelling and reduces the risk for infection. The tube is held in place by a few stitches. It is covered with a bandage. Your doctor will remove the drain when he or she determines you no longer need it.  Home care  · Dont sleep on the same side as the tube.  · Secure the tube and bag inside your clothing with a safety pin. This helps keep the tube from being pulled out.  · Empty your drain at least twice a day. Empty it more often if the drain is full. Wash  and dry your hands before emptying the drain.  ¨ Lift the opening on the drain.  ¨ Drain the fluid into a measuring cup.  ¨ Record the amount of fluid each time you empty the drain. Include the date and time it was emptied. Share this information with your doctor on your next visit.  ¨ Squeeze the bulb with your hands until you hear air coming out of the bulb if your doctor has instructed you to do so (sometimes the bulb is used as a reservoir without suction). Check with your doctor about specific drain instructions.  ¨ Close the opening.  · Change the dressing around the tube every day.  ¨ Wash your hands.  ¨ Remove the old bandage.  ¨ Wash your hands again.  ¨ Wet a cotton swab and clean the skin around the incision  and tube site. Use normal saline solution (salt and water). Or, you can use warm, soapy water.  ¨ Put a new bandage on the incision and tube site. Make the bandage large enough to cover the whole incision area.  ¨ Tape the bandage in place.  · Keep the bandage and tube site dry when you shower. Ask your healthcare provider about the best way to do this.  · Stripping the tube helps keep blood clots from blocking the tube. Ask your nurse how often you should strip the tube. Stripping may not be needed, depending on where and why your doctor placed the tube. It may even be dangerous in some cases.   ¨ Hold the tubing where it leaves the skin, with one hand. This keeps it from pulling on the skin.  ¨ Pinch the tubing with the thumb and first finger of your other hand.  ¨ Slowly and firmly pull your thumb and first finger down the tubing. You may find it helpful to hold an alcohol swab between your fingers and the tube to lubricate the tubing.  ¨ If the pulling hurts or feels like its coming out of the skin, stop. Begin again more gently.  Follow-up care  Make a follow-up appointment as directed by our staff.     Date Last Reviewed: 2/1/2017  © 2156-1434 [employer]. [address] [place] [address], [place] [address]. All rights reserved. This information is not intended as a substitute for professional medical care. Always follow your healthcare professional's instructions.

## 2017-09-19 ENCOUNTER — TELEPHONE (OUTPATIENT)
Dept: SURGERY | Facility: CLINIC | Age: 67
End: 2017-09-19

## 2017-09-19 NOTE — TELEPHONE ENCOUNTER
Pt calls in to report that her ODETTE drain is <30 cc. Pt reports 16cc yesterday, and today 8 cc, with hardly nothing in drain at the time of this call. Pt booked for an appt 9/20/17 at 1:45 pm, 8th floor clinic tower.

## 2017-09-20 ENCOUNTER — OFFICE VISIT (OUTPATIENT)
Dept: SURGERY | Facility: CLINIC | Age: 67
End: 2017-09-20
Payer: MEDICARE

## 2017-09-20 VITALS
WEIGHT: 108.56 LBS | BODY MASS INDEX: 19.98 KG/M2 | TEMPERATURE: 98 F | SYSTOLIC BLOOD PRESSURE: 178 MMHG | DIASTOLIC BLOOD PRESSURE: 84 MMHG | HEART RATE: 81 BPM | HEIGHT: 62 IN

## 2017-09-20 DIAGNOSIS — C50.911 MALIGNANT NEOPLASM OF RIGHT FEMALE BREAST, UNSPECIFIED ESTROGEN RECEPTOR STATUS, UNSPECIFIED SITE OF BREAST: Primary | ICD-10-CM

## 2017-09-20 PROCEDURE — 99024 POSTOP FOLLOW-UP VISIT: CPT | Mod: ,,, | Performed by: SURGERY

## 2017-09-20 PROCEDURE — 99213 OFFICE O/P EST LOW 20 MIN: CPT | Mod: PBBFAC | Performed by: SURGERY

## 2017-09-20 PROCEDURE — 99999 PR PBB SHADOW E&M-EST. PATIENT-LVL III: CPT | Mod: PBBFAC,,, | Performed by: SURGERY

## 2017-09-20 NOTE — PROGRESS NOTES
S/p right total mastectomy with sentinel node biopsy  [place] node negative by frozen section  Final path pending  Patient here because drain output < 20cc/day for two days  Removed intact without difficulty  Arm exercised demonstrated  Patient very anxious about shoulder movement  Has OT appointment  I think this will be important  Her wound looks good  Will call her when path final RTC 10/6 for follow up appointment

## 2017-09-25 ENCOUNTER — CLINICAL SUPPORT (OUTPATIENT)
Dept: REHABILITATION | Facility: HOSPITAL | Age: 67
End: 2017-09-25
Attending: SURGERY
Payer: MEDICARE

## 2017-09-25 DIAGNOSIS — M25.511 ACUTE PAIN OF RIGHT SHOULDER: ICD-10-CM

## 2017-09-25 DIAGNOSIS — Z91.89 AT RISK FOR LYMPHEDEMA: ICD-10-CM

## 2017-09-25 DIAGNOSIS — M25.611 DECREASED ROM OF RIGHT SHOULDER: ICD-10-CM

## 2017-09-25 PROCEDURE — 97110 THERAPEUTIC EXERCISES: CPT

## 2017-09-25 PROCEDURE — 97164 PT RE-EVAL EST PLAN CARE: CPT

## 2017-09-25 PROCEDURE — G8985 CARRY GOAL STATUS: HCPCS | Mod: CH

## 2017-09-25 PROCEDURE — G8984 CARRY CURRENT STATUS: HCPCS | Mod: CK

## 2017-09-25 NOTE — PATIENT INSTRUCTIONS
"Scapular Retraction (Standing) - gentle movements     With arms at sides, squeeze shoulder blades together. Do not shrug and do not hold your breath. Hold 2 seconds. Repeat 15 times 3 sessions 1-2 x day.         ROM: Abduction (Standing)    Thumbs up towards ceiling   Bring arms straight out from sides and raise as high as possible without pain.  Repeat 15 times. Do 3 sessions 1-2x per day.        Wall Climb - holding towel start with L side first   ELBOWS Straight     Perform this exercise three (2) times a day with ten (10) repetitions.    Stand and FACE THE WALL with your toes 10 to 12 inches away from the wall.    a. Place the fingers of your affected arm on the wall and slowly walk your fingers up the wall. Let your fingers climb the wall as high as possible without feeling pulling or pain.  b. Hold this stretch for 15 to 20 seconds then move your fingers back down the wall.  c. Try to go a little higher each time. It may relax you a bit if you rest your head against the wall.    Repeat the above exercises standing with your side to the wall.      [place] and [place]    Standing with hands on wall and elbows straight, pull arms back and rotate chest back, bending elbow.  Repeat 10 times each side. Do 2 times a day.      ROM: Flexion - Wand (Supine)    Lie on back holding wand. Raise arms over head.   Repeat 10 times and hold 10-30 seconds.  Do 2 sessions per day.       Scapular: Retraction in External Rotation - "Butterfly"     Breathe   With hands clasped behind head, elbows up, pull elbows back, pinching shoulder blades together. Hold 30 seconds.  Repeat 5 times. Do 2 sessions per day.        Scar Massage  Skin and scar massage: gentle shift and shuffle of skin above and below incision - not on incision  2-3 minutes before starting exercises - 2 x daily   Anterior/lateral chest       "

## 2017-09-27 NOTE — PLAN OF CARE
OUTPATIENT PHYSICAL THERAPY   RE - EVALUATION    Name: Cecilia Valenzuela  Clinic Number: [id number]    Diagnosis:   Encounter Diagnoses   Name Primary?    At risk for lymphedema     Acute pain of right shoulder     Decreased ROM of right shoulder      Physician: Andreas Barreto MD  Treatment Orders: PT Eval and Treat  Past Medical History:   Diagnosis Date    Arthritis     Atherosclerosis     in left carotid artery    GERD (gastroesophageal reflux disease)     Hyperlipidemia     Hypertension     Malignant neoplasm of upper-outer quadrant of right female breast 2017    Mitral valve prolapse     Neurofibromatosis     Osteopenia     Premature beats     Raynaud's disease     Seborrheic keratosis     Thyroid nodule     Tinnitus      Past Surgical History:   Procedure Laterality Date    BACK SURGERY      CATARACT EXTRACTION Bilateral      SECTION      x5    HYSTERECTOMY          Evaluation Date: 17  Visit # authorized: 20  Visit #: 2  Authorization period: 17  Plan of care Expiration: 2017 - 8 weeks  Insurance: Medicare part B    Physician: Andreas Barreto MD  Precautions: none    Time IN: 11:10 am  Time out: 12:15 pm  1:1 treatment time: 65  History   History of Present Illness: Cecilia is a 67 y.o. female that presents to  [facility] at the [facility] secondary s/p R total mastectomy, no reconstruction with SLNB on 2017.   Dx: R breast cancer - invasive lobular carcinoma of the R breast.     Surgery date: 17   R side - R mastectomy, no reconstruction with R SLNB on 17  Drains out: 2017  Functional changes: tightness in anterior chest wall, some swelling near axilla, impaired functional reach, lifting and carrying  ADLs: bathing, dressing is independent, starting to sleep well she reports     Exercises: has been compliant with all post op exercises given in her pre op visit     Hand  dominance: R handed   Prior Therapy: aquatic therapy  - in late 30's, neck after Gloria   Nutrition:  Thin  Social History: lives with    Place of Residence (Steps/Adaptations): house 1 story   Current functional status:  Driving, independent with ADLs and ambulation   Exercise routine prior to onset : walking and using stationary bike - 3/4 x week and walk a mile   Work:  Not working                   Job description includes:  NA    Subjective   Pt states: I have been doing all of my exercises you told me to do   Pain: 2-3/10 on VAS in R shoulder/anterior chest and axilla     Objective   Mental status :alert  - Follows commands: 100% of time   - Speech: no deficits   - Mood/behavior: calm, behavior appropriate to situation   Mental status   - Memory - Intact recent and remote   - Attention/concentration - Normal months-of-year backwards, vigilant during exam   - Language - Naming & repetition intact, +2-step commands   - Fund of knowledge - Aware of current events   - Executive - Well-organized thoughts     Posture/Alignment   Postural examination/scapula alignment: rounded shoulders,   Joint integrity: WFLs  Skin integrity: intact  Edema: none noted    Sensation: Light Touch: Intact       Proprioception: Intact  - appearance: well groomed     ROM:   UPPER EXTREMITY--AROM/PROM     Shoulder Range of Motion:   ACTIVE ROM LEFT RIGHT   Flexion 130 94   Abduction 95 80   Horizontal Abd WFL WFL   Extension NT NT   IR NT NT   ER 55 45           Strength: strength not tested due to patients discomfort     Visual/Auditory: denies changes     Coordination:   - fine motor: WFL  - UE coordination: intact     - LE coordination:  Not tested     Functional Mobility (Bed mobility, transfers)  Bed mobility: I =  independent   Roll to left: I  Roll to right: I  Supine to prone: I  Scooting to edge of bed: I  Supine to sit: I  Sit to supine: I  Transfers to bed: I  Transfers to toilet: I  Sit to stand:  I  Stand pivot:   I  Car transfers: I  Not driving       ADL's:  Feeding: I = independent   Grooming: I  Hygiene: I  UB Dressing: I  LB Dressing: I  Toileting: I  Bathing: I    Gait Assessment:   - AD used: none  - Assistance: independent  - Distance: community distances     Endurance Deficit: none      Patient Education   See EMR for patient instructions and HEP given this session.     Treatment      Cecilia received individual therapeutic exercises to improve postural correction and alignment, stretching and soft tissue mobility, and strengthening for 23 minutes including the following:   See patient instructions for exercises given and performed during re-evaluation  Assessment   This is a 67 y.o. female referred to outpatient physical therapy and presents with a medical diagnosis of at risk for lymphedema secondary to a dx of invasive lobular carcinoma of the R breast and was seen today post-operatively. Pt is s/p R total mastectomy, no reconstruction with SLNB on 9/14/2017. Pt demonstrates ROM impairments of BL shoulders as well as some generalized shoulder/postural weakness. Educated patient on stretches and strengthening exercises to perform post surgery - see patient instructions. Pt has been compliant with her post op exercises. Pt is very apprehensive to move her UEs that may impact her progress.     Anticipated barriers to physical therapy: pt lives 1.5 hours away from clinic      Pt's spiritual, cultural and educational needs considered and pt agreeable to plan of care and goals as stated below:   Short Term goals: 4 weeks  1. Patient will demonstrate 100% understanding of lymphedema risk reduction practices to include self monitoring for lymphedema. (progressing, not met)  2. Patient will demonstrate independence with Home Exercise program established.  (progressing, not met)  3. Patient will report compliance with walking program 5x week for 10 min each day to improve overall cardiovascular function and decrease cancer  related fatigue.  (progressing, not met)  4. Pt will increase ROM R Shoulder flexion to 120 degrees PROM to improve functional reach, carry, push, pull pain free. (progressing, not met)    Long Term Goals: 8 weeks   1. Pt will increase R shoulder Flexion ROM to 150 degrees PROM  to improve functional reach, lift, carry, push, pull pain free.  (progressing, not met)  2. Pt will demonstrate full/maximized tissue mobility mobility to increase ROM and promote healthy tissue to be pain free at discharge.  (progressing, not met)       Plan   Outpatient physical therapy 1x week for 8 weeks to include the following:   - Patient education  - Therapeutic exercise  - Manual therapy  - Performance testing   - Neuromuscular Re-education  - Therapeutic activity   - Modalities      Therapist: Lisa Mota, AUGUSTINE  9/26/2017

## 2017-10-03 ENCOUNTER — CLINICAL SUPPORT (OUTPATIENT)
Dept: REHABILITATION | Facility: HOSPITAL | Age: 67
End: 2017-10-03
Attending: SURGERY
Payer: MEDICARE

## 2017-10-03 DIAGNOSIS — Z91.89 AT RISK FOR LYMPHEDEMA: ICD-10-CM

## 2017-10-03 DIAGNOSIS — M25.611 DECREASED ROM OF RIGHT SHOULDER: ICD-10-CM

## 2017-10-03 DIAGNOSIS — M25.511 ACUTE PAIN OF RIGHT SHOULDER: ICD-10-CM

## 2017-10-03 PROCEDURE — 97140 MANUAL THERAPY 1/> REGIONS: CPT

## 2017-10-03 PROCEDURE — 97110 THERAPEUTIC EXERCISES: CPT

## 2017-10-03 NOTE — PROGRESS NOTES
Physical Therapy Daily Treatment Note     Name: Cecilia Valenzuela  Clinic Number: [id number]  Diagnosis:   Encounter Diagnoses   Name Primary?    Acute pain of right shoulder     Decreased ROM of right shoulder     At risk for lymphedema      Physician: Andreas Barreto MD    Precautions: none   Visit #: 3 of 20  Time In: 11:10am  Time Out: 12:10pm   Total Treatment Time 1:1: 60 min     Evaluation Date: 9/8/17  Visit # authorized: 20  Authorization period: 12/31/17  Plan of care Expiration: November 20, 2017 - 8 weeks  Insurance: Medicare part B    Subjective   Pt reports: that she has been compliant with her exercises, on Friday 9/29/17 she states that she had pain in her R arm/chest of 6/10 and felt she may have stretched too much. She started more slowly on Saturday and she is feeling better. She is taking tylenol and advil for pain  Functional changes: more use of arm overall, decreased discomfort in R axilla, no pain with walking (in her chest), overall less discomfort   Pain Scale: Cecilia rates pain on a scale of 0-10 to be 0 currently.    Dx: R breast cancer - invasive lobular carcinoma of the R breast.     Surgery date: 9/14/17   R side - R mastectomy, no reconstruction with R SLNB on 9/14/17  Drains out: September 20, 2017  ADLs: independent with all   Exercises: has been compliant with all exercises in HEP    Objective     Cecilia received individual therapeutic exercises to improve postural correction and alignment, stretching and soft tissue mobility, and strengthening for 40 minutes including the following:   R shoulder abduction prop for 5 min  Shoulder flexion with cane - 3 x 15 reps with 20 second hold  Wall slides 3 x 10 reps BL  Butterfly stretch - for increased ER and shoulder retraction 3 x 30 sec hold  [place] and arrow - BL sides with 2 sec hold  pec stretch - in 90 degrees shoulder position     ACTIVE ROM LEFT - in degrees Right - in degrees    Flexion 150 - passive  120 at end of session - passive   Abduction 95 80 passive      Cecilia received the following manual therapy techniques were performed to increased myofascial/soft tissue length, mobility and pliability, increase PROM, AROM and function as well as to decrease pain for 20 minutes  - axillary distraction BL  - lateral chest wall stretch  - IR/ER stretch   - posterior glide GH joint grade 1 on L  - soft tissue work to R upper arm (pectoralis major/minor)  - flexion and abduction stretching     Home Exercise Program and Patient Education   Education provided re: HEP and goals for therapy  - see EMR under patient instructions from last session   Added shoulder abduction prop on R this session   -Pt has no cultural, educational or language barriers to learning provided.    Assessment   Pt is slowly making progress with physical therapy. She has made slight improvements in shoulder flexion ROM however, continues to present with anterior chest wall tightness, pec tightness on the R. Pt is very tense and has a hard time relaxing during PT however, is demonstrating more function in her R shoulder and decreased discomfort compared to last session. Continue with HEP from last session and added one additional stretch. No goals met this session and will continue to progress as tolerated.  Will continue to see 1x week. Pt has follow up with MD this Friday.     This is a 67 y.o. female referred to outpatient physical therapy and presents with a medical diagnosis of at risk for lymphedema, decreased ROM of R shoulder and pain in arm secondary to a dx of invasive lobular carcinoma of the R breast. Pt is s/p R total mastectomy, no reconstruction with SLNB on 9/14/2017. Pt demonstrates ROM impairments of BL shoulders, decreased tissue extensibility in anterior and lateral chest wall as well as some generalized shoulder/postural weakness.  Pt will continue to benefit from physical therapy to address the concerns  stated in the problem list, provide patient education and maximize pts level of independence in the home and community environment.     Anticipated barriers to physical therapy: pt lives 1.5 hours away from clinic      Pt's spiritual, cultural and educational needs considered and pt agreeable to plan of care and goals as stated below:   Short Term goals: 4 weeks  1. Patient will demonstrate 100% understanding of lymphedema risk reduction practices to include self monitoring for lymphedema. (progressing, not met)  2. Patient will demonstrate independence with Home Exercise program established.  (progressing, not met)  3. Patient will report compliance with walking program 5x week for 10 min each day to improve overall cardiovascular function and decrease cancer related fatigue.  (progressing, not met)  4. Pt will increase ROM R Shoulder flexion to 120 degrees PROM to improve functional reach, carry, push, pull pain free. (progressing, not met)     Long Term Goals: 8 weeks   1. Pt will increase R shoulder Flexion ROM to 150 degrees PROM  to improve functional reach, lift, carry, push, pull pain free.  (progressing, not met)  2. Pt will demonstrate full/maximized tissue mobility mobility to increase ROM and promote healthy tissue to be pain free at discharge.  (progressing, not met)       Plan     Continue with established Plan of Care towards PT goals.    Therapist: Lisa Mota, PT  10/3/2017

## 2017-10-06 ENCOUNTER — OFFICE VISIT (OUTPATIENT)
Dept: SURGERY | Facility: CLINIC | Age: 67
End: 2017-10-06
Payer: MEDICARE

## 2017-10-06 VITALS
HEART RATE: 69 BPM | DIASTOLIC BLOOD PRESSURE: 76 MMHG | TEMPERATURE: 98 F | SYSTOLIC BLOOD PRESSURE: 162 MMHG | BODY MASS INDEX: 20.28 KG/M2 | HEIGHT: 62 IN | WEIGHT: 110.19 LBS

## 2017-10-06 DIAGNOSIS — C50.111 MALIGNANT NEOPLASM OF CENTRAL PORTION OF RIGHT BREAST IN FEMALE, ESTROGEN RECEPTOR POSITIVE: Primary | ICD-10-CM

## 2017-10-06 DIAGNOSIS — Z17.0 MALIGNANT NEOPLASM OF CENTRAL PORTION OF RIGHT BREAST IN FEMALE, ESTROGEN RECEPTOR POSITIVE: Primary | ICD-10-CM

## 2017-10-06 PROCEDURE — 99999 PR PBB SHADOW E&M-EST. PATIENT-LVL III: CPT | Mod: PBBFAC,,, | Performed by: SURGERY

## 2017-10-06 PROCEDURE — 99024 POSTOP FOLLOW-UP VISIT: CPT | Mod: ,,, | Performed by: SURGERY

## 2017-10-06 PROCEDURE — 99213 OFFICE O/P EST LOW 20 MIN: CPT | Mod: PBBFAC,PO | Performed by: SURGERY

## 2017-10-06 NOTE — PROGRESS NOTES
Returns for wound check  Incision has healed well  She is working with PT  Slowly improving  Encouraged the patient to take more risks in her exercise regimen  I will order an Oncotype in anticipation of Dr Vale's visit tp discuss adjuvant therapy  Will provide an RX for prosthesis once discharged from therapy

## 2017-10-10 ENCOUNTER — CLINICAL SUPPORT (OUTPATIENT)
Dept: REHABILITATION | Facility: HOSPITAL | Age: 67
End: 2017-10-10
Attending: SURGERY
Payer: MEDICARE

## 2017-10-10 DIAGNOSIS — Z91.89 AT RISK FOR LYMPHEDEMA: ICD-10-CM

## 2017-10-10 DIAGNOSIS — M25.611 DECREASED ROM OF RIGHT SHOULDER: ICD-10-CM

## 2017-10-10 DIAGNOSIS — M25.511 ACUTE PAIN OF RIGHT SHOULDER: ICD-10-CM

## 2017-10-10 PROCEDURE — 97140 MANUAL THERAPY 1/> REGIONS: CPT

## 2017-10-10 PROCEDURE — 97110 THERAPEUTIC EXERCISES: CPT

## 2017-10-10 NOTE — PROGRESS NOTES
Physical Therapy Daily Treatment Note     Name: Cecilia Valenzuela  Clinic Number: [id number]  Diagnosis:   Encounter Diagnoses   Name Primary?    Acute pain of right shoulder     Decreased ROM of right shoulder     At risk for lymphedema      Physician: Andreas Barreto MD    Precautions: none   Visit #: 4 of 20  Time In: 10:50am  Time Out: 11:50 am   Total Treatment Time 1:1:  60 min     Evaluation Date: 9/8/17  Visit # authorized: 20  Authorization period: 12/31/17  Plan of care Expiration: November 20, 2017 - 8 weeks  Insurance: Medicare part B    Subjective   Pt reports:she reports falling on a curb Sunday getting out of the car - she braced herself with her R arm on the grass when she fell. She reports she was a little sore after the fall. She started back with her stretches and exercises on Monday this week.     Functional changes: more use of arm overall, decreased discomfort in R axilla   Pain Scale: Cecilia rates pain on a scale of 0-10 to be 0 currently. Worst pain in R arm/axilla/chest wall - 5/10.     Dx: R breast cancer - invasive lobular carcinoma of the R breast.     Surgery date: 9/14/17   R side - R mastectomy, no reconstruction with R SLNB on 9/14/17  Drains out: September 20, 2017  ADLs: independent with all   Exercises: has been compliant with all exercises in HEP    Objective     Cecilia received individual therapeutic exercises to improve postural correction and alignment, stretching and soft tissue mobility, and strengthening for 40 minutes including the following:   R shoulder abduction prop for 5 min  Upright rows with orange TB - 3 x 15 reps   Bicep curls - 2lbs  3 x 10 reps  tricep extension - supine 2lb 3 x 10  Shoulder flexion using 2lb dumbbell   Wall slides 3 x 10 reps BL  Butterfly stretch - for increased ER and shoulder retraction 3 x 30 sec hold  pec stretch - in 90 degrees shoulder position   protactions - using 2lb weight  BL    ACTIVE ROM LEFT - in degrees Right - in degrees   Flexion 150 - passive  130 at the start of session   150 at end of session   External rotation 90 90   Internal roation 90 90   Abduction 95 110 passive      Cecilia received the following manual therapy techniques were performed to increased myofascial/soft tissue length, mobility and pliability, increase PROM, AROM and function as well as to decrease pain for 20 minutes  - scar massage to R anterior chest wall/along incision   - axillary distraction BL  - lateral chest wall stretch  - IR/ER stretch   - posterior and inferior glide GH joint grade 2 on L  - soft tissue work to R upper arm (pectoralis major/minor)  - flexion and abduction stretching     Home Exercise Program and Patient Education   Education provided re: HEP and goals for therapy  - see EMR under patient instructions from last session   -Pt has no cultural, educational or language barriers to learning provided.    Assessment   Pt made a lot a progress since last session. Pt met all but 1 goal for PT. She will be going out of town for a few weeks and will return for follow up on the 24th. She has reached pre-surgical ROM - that was not full prior to surgery. Going into surgery flexion was limited to 150 degrees. Pt has been compliant with exercises and is doing well. Will continue to progress as tolerated. Pt added many new exercises to her HEP this session and will continue to push with her stretching and strengthening exercises.     This is a 67 y.o. female referred to outpatient physical therapy and presents with a medical diagnosis of at risk for lymphedema, decreased ROM of R shoulder and pain in arm secondary to a dx of invasive lobular carcinoma of the R breast. Pt is s/p R total mastectomy, no reconstruction with SLNB on 9/14/2017. Pt demonstrates ROM impairments of BL shoulders, decreased tissue extensibility in anterior and lateral chest wall as well as some generalized shoulder/postural  weakness.  Pt will continue to benefit from physical therapy to address the concerns stated in the problem list, provide patient education and maximize pts level of independence in the home and community environment.     Anticipated barriers to physical therapy: pt lives 1.5 hours away from clinic      Pt's spiritual, cultural and educational needs considered and pt agreeable to plan of care and goals as stated below:   Short Term goals: 4 weeks  1. Patient will demonstrate 100% understanding of lymphedema risk reduction practices to include self monitoring for lymphedema. Met 10/10/2017  2. Patient will demonstrate independence with Home Exercise program established.  Met 10/10/2017  3. Patient will report compliance with walking program 5x week for 10 min each day to improve overall cardiovascular function and decrease cancer related fatigue.  Met 10/10/2017  4. Pt will increase ROM R Shoulder flexion to 120 degrees PROM to improve functional reach, carry, push, pull pain free. Met 10/10/2017     Long Term Goals: 8 weeks   1. Pt will increase R shoulder Flexion ROM to 150 degrees PROM  to improve functional reach, lift, carry, push, pull pain free.  Met 10/10/2017  2. Pt will demonstrate full/maximized tissue mobility mobility to increase ROM and promote healthy tissue to be pain free at discharge.  (progressing, not met)       Plan     Continue with established Plan of Care towards PT goals.    Therapist: Lisa Mota, PT  10/10/2017

## 2017-10-10 NOTE — PATIENT INSTRUCTIONS
Start with strengthening exercises first  - scap retractions with theraband  - bicep curls  - tricep extensions   - standing dumbbell press - in chair or against wall    End with stretches   - butterfly or T pec stretch - add lumbar rotation  - arm prop  - external rotation stretch - no pillow   - end with wall slides         Bilateral Arm Curl    Sit holding 2-4 lb dumbbell in each hand. Bend elbows.   Repeat 15 times. Do 3 sessions 1-2x per day.      Tricep Strength    Lie comfortably on back with a weight in one palm. Bend arm so elbow points up and weight gently hangs. Keep shoulder flat on floor. Raise hand to straighten arm. Repeat with other arm. Use 2-4 pounds.  Repeat 15 times. Do 3 sessions 1-2x per day.        Press: Standing (Dumbbell)    Knees slightly bent, palms in, press to straight arms, rotating to palms forward at end of movement. Use 2-4 pounds.  Repeat 15 times. Do 3 sessions 1-2x per day.       External Rotation    Place right arm, elbow bent, beside head on pillow. Use 1-3 pillows to be comfortable.  REST AND RELAX. Hold 30 seconds. Repeat 5 x.  Do 1-2 times per day.       Scapular: Retraction in External Rotation    With hands clasped behind head, elbows up, pull elbows back, pinching shoulder blades together. Hold 30 seconds.  Repeat 5 times. Do 1-2 sessions per day.      Lumbar Rotation    Put your arms out to the side - 90 degrees - your arms should be in a T formation.   Feet on floor, slowly rock knees from side to side in small, pain-free range of motion. Allow lower back to rotate slightly, but keep shoulders flat on ground. Hold 15 seconds. Repeat each side. Repeat 5 times per side. Do 1-2 sessions per day.

## 2017-10-24 ENCOUNTER — TUMOR BOARD CONFERENCE (OUTPATIENT)
Dept: SURGERY | Facility: CLINIC | Age: 67
End: 2017-10-24

## 2017-10-24 NOTE — PROGRESS NOTES
Interdisciplinary Breast Cancer Conference    Cecilia Valenzuela    Female    Date Presented to Tumor Board: 10/24/17    HOSPTIAL/CLINIC PRESENTING: [facility] [address]    TUMOR SITE: RIGHT    TUMOR SITE: UOQ    Presenter: Timothy Berkowitz, Timothy Bradford, Lora Conte, Star Joe (on behalf of Andreas Barreto MD)    Reason For Consultation: Initial Presentation    Specialties Present: Medical Oncology;Surgical Oncology;Navigation;Pathology;Research;Radiology;Radiation Oncology    Patient Status: a current patient    Treatment to Date: Surgical Intervention(s) (right mastectomy, genetics negative, Oncotype returned at 19)    Clinical Trial Eligibility: None available    Estrogen Receptor Status: Positive    Progesterone Status: Negative    Her2/MIMI Status: Negative (FISH)    T2N0(i-), 2 negative sentinel lymph nodes    RECOMMENDED PLAN: Endocrine Therapy   No role of radiation for mastectomy patient    Will be due to have left breast imaged in March    UNSTAGEABLE: No         PRESENTATION AT CANCER CONFERENCE: [facility]

## 2017-10-26 ENCOUNTER — OFFICE VISIT (OUTPATIENT)
Dept: SURGERY | Facility: CLINIC | Age: 67
End: 2017-10-26
Payer: MEDICARE

## 2017-10-26 VITALS
SYSTOLIC BLOOD PRESSURE: 129 MMHG | DIASTOLIC BLOOD PRESSURE: 85 MMHG | BODY MASS INDEX: 20.77 KG/M2 | WEIGHT: 112.88 LBS | HEART RATE: 68 BPM | RESPIRATION RATE: 16 BRPM | HEIGHT: 62 IN | TEMPERATURE: 98 F

## 2017-10-26 DIAGNOSIS — C50.411 MALIGNANT NEOPLASM OF UPPER-OUTER QUADRANT OF RIGHT BREAST IN FEMALE, ESTROGEN RECEPTOR POSITIVE: Primary | ICD-10-CM

## 2017-10-26 DIAGNOSIS — Z17.0 MALIGNANT NEOPLASM OF UPPER-OUTER QUADRANT OF RIGHT BREAST IN FEMALE, ESTROGEN RECEPTOR POSITIVE: Primary | ICD-10-CM

## 2017-10-26 PROCEDURE — 99999 PR PBB SHADOW E&M-EST. PATIENT-LVL III: CPT | Mod: PBBFAC,,, | Performed by: INTERNAL MEDICINE

## 2017-10-26 PROCEDURE — 99204 OFFICE O/P NEW MOD 45 MIN: CPT | Mod: S$PBB,,, | Performed by: INTERNAL MEDICINE

## 2017-10-26 PROCEDURE — 99213 OFFICE O/P EST LOW 20 MIN: CPT | Mod: PBBFAC,PO | Performed by: INTERNAL MEDICINE

## 2017-10-26 RX ORDER — LETROZOLE 2.5 MG/1
2.5 TABLET, FILM COATED ORAL DAILY
Qty: 30 TABLET | Refills: 11 | Status: SHIPPED | OUTPATIENT
Start: 2017-10-26 | End: 2018-09-10 | Stop reason: SDUPTHER

## 2017-10-26 NOTE — PROGRESS NOTES
Subjective:       Patient ID: Cecilia Valenzuela is a 67 y.o. female.    Chief Complaint: No chief complaint on file.    HPI Mrs. Valenzuela is a 67-year-old female referred by Dr. Barreto for recent diagnosis of right breast cancer.        Screening mammography on March 22 showed questionable asymmetry in the upper outer portion of the right breast.  There was no palpable abnormality.     Follow-up diagnostic mammogram on August 2 showed a high density mass in the upper-outer quadrant of the right breast.  By ultrasound this measured 1.2 x 0.7 x 1.2 cm.    Needle biopsy on August 7 showed infiltrating lobular carcinoma which was 99% ER positive, 84% AL positive.  HER-2 was 2+ but negative by FISH.  Ki-67 was 10%.    Right mastectomy and sentinel lymph node biopsy was performed on September.  That showed a 2.7 cm infiltrating lobular carcinoma.  2 sentinel lymph nodes were negative for malignancy.  Final pathological stage T2 N0 - stage IIA.    Oncotype score was 19 - just above low risk cutoff of 18.      Menstrual History:    Menarche -  12  G - 5  P - 5  First birth age - 23 ,BCP - no    Menopause - Hysterectomy at 35       HRT - 1-2 Y    Family History -                                 Breast - mother, 2 sisters, maternal aunt    Patient has had genetic testing which was negative.                                Ovarian -     no                                 Other -no    Social History :    Smoking -  never        ETOH - no  Homemaker      PMH:Hypertension, hyperlipidemia, polyarthritis/DJD, osteopenia(last BMD 10/16) , GERD, Carotid A. dz.  Review of Systems   Constitutional: Negative.    HENT: Negative.    Respiratory: Negative.    Cardiovascular: Negative.    Gastrointestinal: Negative.    Genitourinary: Negative.    Musculoskeletal: Positive for arthralgias and neck pain.   Skin: Negative.    Neurological: Negative.    Psychiatric/Behavioral: Negative.        Objective:      Physical Exam   Constitutional:  She is oriented to person, place, and time. She appears well-developed and well-nourished. No distress.   HENT:   Head: Normocephalic and atraumatic.   Mouth/Throat: Oropharynx is clear and moist. No oropharyngeal exudate.   Eyes: Pupils are equal, round, and reactive to light. No scleral icterus.   Cardiovascular: Normal rate and regular rhythm.    Pulmonary/Chest: Effort normal and breath sounds normal. She has no wheezes. She has no rales. Left breast exhibits no mass, no nipple discharge and no skin change.       Abdominal: Soft. She exhibits no mass. There is no tenderness.   Lymphadenopathy:     She has no cervical adenopathy.   Neurological: She is alert and oriented to person, place, and time.   Psychiatric: She has a normal mood and affect. Her behavior is normal. Thought content normal.   Vitals reviewed.      Assessment:       1. Malignant neoplasm of upper-outer quadrant of right breast in female, estrogen receptor positive        Plan:     I discussed the role of adjuvant systemic therapy breast cancer.  Given her ER+, HER2 negative , node negative tumor with a borderline low Oncotype, I recommended adjuvant endocrine therapy with letrozole.  Side effects were discussed and written information provided.       Distress Screening Results: Psychosocial Distress screening score of Distress Score: 4 noted and reviewed. No intervention indicated.

## 2017-10-30 ENCOUNTER — PATIENT MESSAGE (OUTPATIENT)
Dept: CARDIOLOGY | Facility: CLINIC | Age: 67
End: 2017-10-30

## 2017-11-02 ENCOUNTER — LAB VISIT (OUTPATIENT)
Dept: LAB | Facility: HOSPITAL | Age: 67
End: 2017-11-02
Attending: INTERNAL MEDICINE
Payer: MEDICARE

## 2017-11-02 DIAGNOSIS — I10 ESSENTIAL HYPERTENSION: ICD-10-CM

## 2017-11-02 DIAGNOSIS — E78.00 HYPERCHOLESTEROLEMIA: ICD-10-CM

## 2017-11-02 DIAGNOSIS — I49.49 PREMATURE BEATS: ICD-10-CM

## 2017-11-02 LAB
ALBUMIN SERPL BCP-MCNC: 3.8 G/DL
ALP SERPL-CCNC: 83 U/L
ALT SERPL W/O P-5'-P-CCNC: 24 U/L
ANION GAP SERPL CALC-SCNC: 11 MMOL/L
AST SERPL-CCNC: 27 U/L
BILIRUB SERPL-MCNC: 0.4 MG/DL
BUN SERPL-MCNC: 18 MG/DL
CALCIUM SERPL-MCNC: 10.3 MG/DL
CHLORIDE SERPL-SCNC: 105 MMOL/L
CHOLEST SERPL-MCNC: 218 MG/DL
CHOLEST/HDLC SERPL: 3.8 {RATIO}
CO2 SERPL-SCNC: 25 MMOL/L
CREAT SERPL-MCNC: 0.8 MG/DL
EST. GFR  (AFRICAN AMERICAN): >60 ML/MIN/1.73 M^2
EST. GFR  (NON AFRICAN AMERICAN): >60 ML/MIN/1.73 M^2
GLUCOSE SERPL-MCNC: 72 MG/DL
HDLC SERPL-MCNC: 57 MG/DL
HDLC SERPL: 26.1 %
LDLC SERPL CALC-MCNC: 138.4 MG/DL
NONHDLC SERPL-MCNC: 161 MG/DL
POTASSIUM SERPL-SCNC: 3.9 MMOL/L
PROT SERPL-MCNC: 8.2 G/DL
SODIUM SERPL-SCNC: 141 MMOL/L
TRIGL SERPL-MCNC: 113 MG/DL

## 2017-11-02 PROCEDURE — 80053 COMPREHEN METABOLIC PANEL: CPT

## 2017-11-02 PROCEDURE — 36415 COLL VENOUS BLD VENIPUNCTURE: CPT

## 2017-11-02 PROCEDURE — 80061 LIPID PANEL: CPT

## 2017-11-08 RX ORDER — METOPROLOL SUCCINATE 25 MG/1
TABLET, EXTENDED RELEASE ORAL
Qty: 45 TABLET | Refills: 5 | Status: SHIPPED | OUTPATIENT
Start: 2017-11-08 | End: 2017-12-14 | Stop reason: SDUPTHER

## 2017-11-09 ENCOUNTER — PATIENT MESSAGE (OUTPATIENT)
Dept: CARDIOLOGY | Facility: CLINIC | Age: 67
End: 2017-11-09

## 2017-11-28 ENCOUNTER — TELEPHONE (OUTPATIENT)
Dept: SURGERY | Facility: CLINIC | Age: 67
End: 2017-11-28

## 2017-11-28 DIAGNOSIS — C50.611 MALIGNANT NEOPLASM OF AXILLARY TAIL OF RIGHT FEMALE BREAST, UNSPECIFIED ESTROGEN RECEPTOR STATUS: ICD-10-CM

## 2017-11-28 DIAGNOSIS — Z90.11 STATUS POST RIGHT MASTECTOMY: Primary | ICD-10-CM

## 2017-11-28 DIAGNOSIS — Z90.11 ACQUIRED ABSENCE OF RIGHT BREAST AND NIPPLE: ICD-10-CM

## 2017-11-28 NOTE — TELEPHONE ENCOUNTER
Called patient after receiving email requesting prescription for prosthesis. Explained to patient that I have the prescription as requested, gave her the option of having it faxed to a specific place, or leaving it at the  for . Patient requests I mail it to her, since she lives far away and does not know where she wants to go yet. Will also put a brochure for [facility] in the mail with the Rx so she has an example of a facility that can do the fitting.

## 2017-12-14 ENCOUNTER — OFFICE VISIT (OUTPATIENT)
Dept: CARDIOLOGY | Facility: CLINIC | Age: 67
End: 2017-12-14
Payer: MEDICARE

## 2017-12-14 VITALS
SYSTOLIC BLOOD PRESSURE: 125 MMHG | WEIGHT: 113.13 LBS | DIASTOLIC BLOOD PRESSURE: 65 MMHG | HEIGHT: 62 IN | HEART RATE: 63 BPM | BODY MASS INDEX: 20.82 KG/M2

## 2017-12-14 DIAGNOSIS — I49.49 PREMATURE BEATS: ICD-10-CM

## 2017-12-14 DIAGNOSIS — I10 ESSENTIAL HYPERTENSION: Primary | ICD-10-CM

## 2017-12-14 PROCEDURE — 99214 OFFICE O/P EST MOD 30 MIN: CPT | Mod: PBBFAC,PO | Performed by: INTERNAL MEDICINE

## 2017-12-14 PROCEDURE — 99999 PR PBB SHADOW E&M-EST. PATIENT-LVL IV: CPT | Mod: PBBFAC,,, | Performed by: INTERNAL MEDICINE

## 2017-12-14 PROCEDURE — 99213 OFFICE O/P EST LOW 20 MIN: CPT | Mod: S$PBB,,, | Performed by: INTERNAL MEDICINE

## 2017-12-14 RX ORDER — METOPROLOL SUCCINATE 25 MG/1
37.5 TABLET, EXTENDED RELEASE ORAL NIGHTLY
Qty: 45 TABLET | Refills: 5 | Status: SHIPPED | OUTPATIENT
Start: 2017-12-14 | End: 2018-05-17 | Stop reason: SDUPTHER

## 2017-12-14 NOTE — PATIENT INSTRUCTIONS
About Arrhythmias    Electrical impulses cause the normal heart to beat 60 to 100 times a minute while at rest. These impulses come from a natural pacemaker deep inside the heart muscle. Each impulse causes the heart muscle to contract. This causes the blood to flow through the heart and out to the tissues and organs of your body.  An arrhythmia is a change from the normal speed or pattern of these electrical impulses. This can cause the heart to beat too fast (tachycardia); or too slow (bradycardia); or in an unsteady pattern (irregular rhythm).  Symptoms of arrhythmias  Different people experience arrhythmias differently. Sometimes they may not have symptoms, but just notice a change in their pulse. Symptoms can include:  · Fluttering feeling in the chest  · Shortness of breath  · Chest pain or pressure  · Neck fullness  · Lightheadedness or dizziness  · Fainting or almost fainting  · Palpitations (the sense that your heart is fluttering or beating fast or hard or irregularly)  · Tiredness, fatigue, or weakness  · Cardiac arrest  Causes of arrhythmias  Arrhythmias are most often due to heart disease such as:  · Coronary artery disease  · Heart valve disease  · Enlarged heart  · High blood pressure  · Heart failure  Other causes of  arrhythmia include:  · Certain medicines (such as asthma inhalers and decongestants)  · Some herbal supplements  · Cardiac stimulant drugs (such as cocaine, amphetamine, diet pills, certain decongestant cold medicines, caffeine, and nicotine)  · Excessive alcohol use  · Anxiety and panic disorder  · Thyroid disease  · Anemia  · Diabetes  · Sleep apnea  · Obesity  · Congenital heart disease  · Cardiac genetic diseases  Arrhythmias can often be prevented. The cause and type of arrhythmia determines the best treatment. Sometimes your doctor may want to monitor your heart rate over a 24-hour period or longer. This can help identify the cause of your arrhythmia and find the best treatment.  This can be done with a Holter monitor, a portable EKG recording device attached by wires to your chest. Or you may get an event monitor, which you can place over the skin in front of your heart to record heart rhythms. You can carry this with you as you go about your routine activities during the monitoring period. Implantable loop recorders may also be used to monitor the heart rhythm for up to 2 years. This miniature device is placed underneath the skin overlying the heart.  Home care  The following guidelines will help you care for yourself at home:  · Avoid cardiac stimulants (such as cocaine, amphetamine, diet pills, certain decongestant cold medicines, caffeine, and nicotine).  · If you smoke, stop smoking. Contact your doctor or a local stop-smoking program for help.  · Tell your doctor about any prescription, over-the-counter, or herbal medicines you take. These may be affecting your heart rhythm.  Follow-up care  Follow up with your healthcare provider, or as advised. If a Holter monitor has been recommended, contact the cardiologist you have been referred to as soon as you can  the device. Other outpatient tests may also be arranged for you at that time.  Call 911  This is the fastest and safest way to get to the emergency department. The paramedics can also start treatment on the way to the hospital, if needed.  Don't wait until your symptoms are severe to call 911. Other reasons to call 911 besides chest pain include:  · Chest, shoulder, arm, neck, or back pain  · Shortness of breath  · Feeling lightheaded, faint, or dizzy  · Unexplained fainting  · Rapid heart beat  · Slower than usual heart rate compared to your normal  · Very irregular heartbeat  · Chest pain (angina) with weakness, dizziness, heavy sweating, nausea, or vomiting  · Extreme drowsiness, or confusion  · Weakness of an arm or leg or one side of the face  · Difficulty with speech or vision  When to seek medical advice  Remember,  "things are not always like they are on TV. Sometimes it is not so obvious. You may only feel weak or just "not right." If it is not clear or if you have any doubt, call for advice.  · Seek help for chest pain, or it feels different from usual, even if your symptoms are mild.  · Don't drive yourself. Have someone else drive. If no one can drive you, call 911.  · If your doctor has given you medicines to take when you have symptoms, take them, but do not delay getting help while trying to find them.  Date Last Reviewed: 4/25/2016 © 2000-2017 [employer]. [address] [place] [address], [place] [address]. All rights reserved. This information is not intended as a substitute for professional medical care. Always follow your healthcare professional's instructions.        Eating Heart-Healthy Foods  Eating has a big impact on your heart health. In fact, eating healthier can improve several of your heart risks at once. For instance, it helps you manage weight, cholesterol, and blood pressure. Here are ideas to help you make heart-healthy changes without giving up all the foods and flavors you love.  Getting started  · Talk with your health care provider about eating plans, such as the DASH or Mediterranean diet. You may also be referred to a dietitian.  · Change a few things at a time. Give yourself time to get used to a few eating changes before adding more.  · Work to create a tasty, healthy eating plan that you can stick to for the rest of your life.    Goals for healthy eating  Below are some tips to improve your eating habits:  · Limit saturated fats and trans fats. Saturated fats raise your levels of cholesterol, so keep these fats to a minimum. They are found in foods such as fatty meats, whole milk, cheese, and palm and coconut oils. Avoid trans fats because they lower good cholesterol as well as raise bad cholesterol. Trans fats are most often found in processed foods.  · Reduce sodium (salt) intake. Eating " too much salt may increase your blood pressure. Limit your sodium intake to 2,300 milligrams (mg) per day, or less if your health care provider recommends it. Dining out less often and eating fewer processed foods are two great ways to decrease the amount of salt you consume.  · Managing calories. A calorie is a unit of energy. Your body burns calories for fuel, but if you eat more calories than your body burns, the extras are stored as fat. Your health care provider can help you create a diet plan to manage your calories. This will likely include eating healthier foods as well as exercising regularly. To help you track your progress, keep a diary to record what you eat and how often you exercise.  Choose the right foods  Aim to make these foods staples of your diet. If you have diabetes, you may have different recommendations than what is listed here:  · Fruits and vegetable provide plenty of nutrients without a lot of calories. At meals, fill half your plate with these foods. Split the other half of your plate between whole grains and lean protein.  · Whole grains are high in fiber and rich in vitamins and nutrients. Good choices include whole-wheat bread, pasta, and brown rice.  · Lean proteins give you nutrition with less fat. Good choices include fish, skinless chicken, and beans.  · Low-fat or nonfat dairy provides nutrients without a lot of fat. Try low-fat or nonfat milk, cheese, or yogurt.  · Healthy fats can be good for you in small amounts. These are unsaturated fats, such as olive oil, nuts, and fish. Try to have at least 2 servings per week of fatty fish such as salmon, sardines, mackerel, rainbow trout, and albacore tuna. These contain omega-3 fatty acids, which are good for your heart. Flaxseed is another source of a heart-healthy fat.  More on heart healthy eating    Read food labels  Healthy eating starts at the grocery store. Be sure to pay attention to food labels on packaged foods. Look for  products that are high in fiber and protein, and low in saturated fat, cholesterol, and sodium. Avoid products that contain trans fat. And pay close attention to serving size. For instance, if you plan to eat two servings, double all the numbers on the label.  Prepare food right  A key part of healthy cooking is cutting down on added fat and salt. Look on the internet for lower-fat, lower-sodium recipes. Also, try these tips:  · Remove fat from meat and skin from poultry before cooking.  · Skim fat from the surface of soups and sauces.  · Broil, boil, bake, steam, grill, and microwave food without added fats.  · Choose ingredients that spice up your food without adding calories, fat, or sodium. Try these items: horseradish, hot sauce, lemon, mustard, nonfat salad dressings, and vinegar. For salt-free herbs and spices, try basil, cilantro, cinnamon, pepper, and rosemary.  Date Last Reviewed: 6/25/2015  © 0457-8994 [employer]. [address] [place] [address]. All rights reserved. This information is not intended as a substitute for professional medical care. Always follow your healthcare professional's instructions.        Controlling High Blood Pressure  High blood pressure (hypertension) is often called the silent killer. This is because many people who have it dont know it. High blood pressure is defined as 140/90 mm Hg or higher. Know your blood pressure and remember to check it regularly. Doing so can save your life. Here are some things you can do to help control your blood pressure.    Choose heart-healthy foods  · Select low-salt, low-fat foods. Limit sodium intake to 2,400 mg per day or the amount suggested by your healthcare provider.  · Limit canned, dried, cured, packaged, and fast foods. These can contain a lot of salt.  · Eat 8 to 10 servings of fruits and vegetables every day.  · Choose lean meats, fish, or chicken.  · Eat whole-grain pasta, brown rice, and beans.  · Eat 2 to 3  servings of low-fat or fat-free dairy products.  · Ask your doctor about the DASH eating plan. This plan helps reduce blood pressure.  · When you go to a restaurant, ask that your meal be prepared with no added salt.  Maintain a healthy weight  · Ask your healthcare provider how many calories to eat a day. Then stick to that number.  · Ask your healthcare provider what weight range is healthiest for you. If you are overweight, a weight loss of only 3% to 5% of your body weight can help lower blood pressure. Generally, a good weight loss goal is to lose 10% of your body weight in a year.  · Limit snacks and sweets.  · Get regular exercise.  Get up and get active  · Choose activities you enjoy. Find ones you can do with friends or family. This includes bicycling, dancing, walking, and jogging.  · Park farther away from building entrances.  · Use stairs instead of the elevator.  · When you can, walk or bike instead of driving.  · [place] leaves, garden, or do household repairs.  · Be active at a moderate to vigorous level of physical activity for at least 40 minutes for a minimum of 3 to 4 days a week.   Manage stress  · Make time to relax and enjoy life. Find time to laugh.  · Communicate your concerns with your loved ones and your healthcare provider.  · Visit with family and friends, and keep up with hobbies.  Limit alcohol and quit smoking  · Men should have no more than 2 drinks per day.  · Women should have no more than 1 drink per day.  · Talk with your healthcare provider about quitting smoking. Smoking significantly increases your risk for heart disease and stroke. Ask your healthcare provider about community smoking cessation programs and other options.  Medicines  If lifestyle changes arent enough, your healthcare provider may prescribe high blood pressure medicine. Take all medicines as prescribed. If you have any questions about your medicines, ask your healthcare provider before stopping or changing them.    Date Last Reviewed: 4/27/2016  © 6318-9472 [employer]. [address] [place] [address], [place] [address]. All rights reserved. This information is not intended as a substitute for professional medical care. Always follow your healthcare professional's instructions.

## 2017-12-14 NOTE — PROGRESS NOTES
Subjective:    Patient ID:  Cecilia Valenzuela is a 67 y.o. female who presents for Hypertension and Irregular Heart Beat        HPI  PT HAD MASTECTOMY DID OK, BP LOG OK, SBP 'S, SEE ROS    Past Medical History:   Diagnosis Date    Arthritis     Atherosclerosis     in left carotid artery    GERD (gastroesophageal reflux disease)     Hyperlipidemia     Hypertension     Malignant neoplasm of upper-outer quadrant of right female breast 2017    Mitral valve prolapse     Neurofibromatosis     Osteopenia     Premature beats     Raynaud's disease     Seborrheic keratosis     Thyroid nodule     Tinnitus      Past Surgical History:   Procedure Laterality Date    BACK SURGERY      CATARACT EXTRACTION Bilateral      SECTION      x5    HYSTERECTOMY       Family History   Problem Relation Age of Onset    Rheum arthritis Mother     Breast cancer Mother      over 85    Hypertension Mother     Heart disease Mother     Parkinsonism Father     Cancer Father     Breast cancer Sister      DCIS    Breast cancer Maternal Aunt      60's    Breast cancer Sister      DCIS     Social History     Social History    Marital status:      Spouse name: N/A    Number of children: N/A    Years of education: N/A     Social History Main Topics    Smoking status: Never Smoker    Smokeless tobacco: Never Used    Alcohol use No    Drug use: No    Sexual activity: Not Asked     Other Topics Concern    None     Social History Narrative    None       Review of patient's allergies indicates:   Allergen Reactions    Zofran [ondansetron hcl (pf)] Nausea Only    Augmentin [amoxicillin-pot clavulanate]     Corticosteroids (glucocorticoids) Other (See Comments)     Heart palpitations    Dilaudid [hydromorphone] Nausea And Vomiting     Does not help with pain    Neosporin [hydrocortisone]     Norvasc [amlodipine]     Tetracyclines        Current Outpatient Prescriptions:     acetaminophen  (TYLENOL) 325 MG tablet, Take 325 mg by mouth once daily. , Disp: , Rfl:     aspirin (ECOTRIN) 81 MG EC tablet, Take 81 mg by mouth once daily., Disp: , Rfl:     azelastine (ASTELIN) 137 mcg (0.1 %) nasal spray, 1 spray by Nasal route as needed. , Disp: , Rfl:     calcium-vitamin D3 (CALCIUM 500 + D) 500 mg(1,250mg) -200 unit per tablet, Take 1 tablet by mouth 2 (two) times daily with meals., Disp: , Rfl:     co-enzyme Q-10 30 mg capsule, Take 200 mg by mouth 2 (two) times daily. 200mg daily , Disp: , Rfl:     FLUAD [id number], 65 YR UP,,PF, 45 mcg (15 mcg x 3)/0.5 mL Syrg, inject 0.5 milliliter intramuscularly, Disp: , Rfl: 0    GLUCOSAMINE/CHONDR OCHOA A SOD (GLUCOSAMINE-CHONDROITIN) 1,500-1,200 mg/30 mL Liqd, Take 1 tablet by mouth once daily., Disp: , Rfl:     IBUPROFEN (ADVIL ORAL), Take by mouth every 4 (four) hours as needed. , Disp: , Rfl:     letrozole (FEMARA) 2.5 mg Tab, Take 1 tablet (2.5 mg total) by mouth once daily., Disp: 30 tablet, Rfl: 11    lorazepam (ATIVAN) 0.5 MG tablet, Take 0.25 mg by mouth every 6 (six) hours as needed for Anxiety., Disp: , Rfl:     magnesium 250 mg Tab, Take 250 mg by mouth once daily., Disp: , Rfl:     multivitamin (THERAGRAN) per tablet, Take 1 tablet by mouth once daily., Disp: , Rfl:     omeprazole (PRILOSEC) 20 MG capsule, Take 40 mg by mouth once daily., Disp: , Rfl:     PATADAY 0.2 % Drop, instill 1 drop into both eyes once daily, Disp: , Rfl: 0    TOPROL XL 25 mg 24 hr tablet, Take 1.5 tablets (37.5 mg total) by mouth nightly., Disp: 45 tablet, Rfl: 5    oxycodone-acetaminophen (PERCOCET) 5-325 mg per tablet, Take 1 tablet by mouth every 4 (four) hours as needed., Disp: 61 tablet, Rfl: 0    Review of Systems   Constitution: Negative for chills, diaphoresis, fever, weakness, malaise/fatigue and night sweats.   HENT: Negative for congestion and nosebleeds.    Eyes: Negative for blurred vision, discharge, double vision and visual disturbance.  "  Cardiovascular: Negative for chest pain, claudication, cyanosis, dyspnea on exertion, irregular heartbeat, leg swelling, near-syncope, orthopnea, palpitations, paroxysmal nocturnal dyspnea and syncope.   Respiratory: Negative for cough, hemoptysis, shortness of breath, sleep disturbances due to breathing, sputum production and wheezing.    Endocrine: Negative for cold intolerance, heat intolerance and polyuria.   Hematologic/Lymphatic: Negative for adenopathy. Does not bruise/bleed easily.   Skin: Negative for itching, nail changes and rash.   Musculoskeletal: Negative for back pain, falls, joint pain and muscle cramps (OCC).   Gastrointestinal: Negative for abdominal pain, change in bowel habit, dysphagia, heartburn, hematemesis, jaundice, melena and vomiting.   Genitourinary: Negative for dysuria, flank pain and frequency.   Neurological: Negative for brief paralysis, difficulty with concentration, disturbances in coordination, excessive daytime sleepiness, dizziness, focal weakness, light-headedness, loss of balance, numbness, paresthesias, seizures, sensory change and tremors.   Psychiatric/Behavioral: Negative for altered mental status, depression, memory loss and substance abuse. The patient is not nervous/anxious.         Objective:      Vitals:    12/14/17 1326 12/14/17 1337   BP: (!) 158/82 125/65   Pulse: 63    Weight: 51.3 kg (113 lb 1.5 oz)    Height: 5' 2" (1.575 m)    PainSc: 0-No pain      Body mass index is 20.69 kg/m².    Physical Exam   Constitutional: She is oriented to person, place, and time. She appears well-developed and well-nourished. She is active.   HENT:   Head: Normocephalic and atraumatic.   Mouth/Throat: Oropharynx is clear and moist and mucous membranes are normal.   Eyes: EOM are normal. Pupils are equal, round, and reactive to light.   Neck: Trachea normal and normal range of motion. Neck supple. Normal carotid pulses, no hepatojugular reflux and no JVD present. Carotid bruit is " not present. No tracheal deviation, no edema and no erythema present. No thyromegaly present.   Cardiovascular: Normal rate, regular rhythm and normal heart sounds.   No extrasystoles are present. PMI is not displaced.  Exam reveals no gallop and no friction rub.    No murmur heard.  Pulses:       Carotid pulses are 2+ on the right side, and 2+ on the left side.       Radial pulses are 2+ on the right side, and 2+ on the left side.        Posterior tibial pulses are 2+ on the right side, and 2+ on the left side.   Pulmonary/Chest: Effort normal and breath sounds normal. No tachypnea and no bradypnea. She has no wheezes. She has no rales. She exhibits no tenderness.   Abdominal: Soft. Bowel sounds are normal. She exhibits no distension and no mass. There is no hepatosplenomegaly. There is no tenderness. There is no guarding and no CVA tenderness.   Musculoskeletal: Normal range of motion. She exhibits no edema or tenderness.   Lymphadenopathy:     She has no cervical adenopathy.   Neurological: She is alert and oriented to person, place, and time. She has normal strength. She displays no tremor. No cranial nerve deficit. She exhibits normal muscle tone. Coordination normal.   Skin: Skin is warm and dry. No rash noted. No cyanosis or erythema. No pallor.   Psychiatric: She has a normal mood and affect. Her speech is normal and behavior is normal. Thought content normal.               ..    Chemistry        Component Value Date/Time     11/02/2017 0835    K 3.9 11/02/2017 0835     11/02/2017 0835    CO2 25 11/02/2017 0835    BUN 18 11/02/2017 0835    CREATININE 0.8 11/02/2017 0835    GLU 72 11/02/2017 0835        Component Value Date/Time    CALCIUM 10.3 11/02/2017 0835    ALKPHOS 83 11/02/2017 0835    AST 27 11/02/2017 0835    ALT 24 11/02/2017 0835    BILITOT 0.4 11/02/2017 0835    ESTGFRAFRICA >60 11/02/2017 0835    EGFRNONAA >60 11/02/2017 0835            ..  Lab Results   Component Value Date    CHOL  218 (H) 11/02/2017     Lab Results   Component Value Date    HDL 57 11/02/2017     Lab Results   Component Value Date    LDLCALC 138.4 11/02/2017     Lab Results   Component Value Date    TRIG 113 11/02/2017     Lab Results   Component Value Date    CHOLHDL 26.1 11/02/2017     ..No results found for: WBC, HGB, HCT, MCV, PLT    Test(s) Reviewed  I have reviewed the following in detail:  [] Stress test   [] Angiography   [] Echocardiogram   [] Labs   [] Other:       Assessment:         ICD-10-CM ICD-9-CM   1. Essential hypertension I10 401.9   2. Premature beats I49.49 427.60     Problem List Items Addressed This Visit        Cardiac/Vascular    Essential hypertension - Primary    Premature beats           Plan:         ALL CV CLINICALLY STABLE, NO ANGINA, NO HF, NO TIA, NO SIGNIFICANT CLINICAL ARRHYTHMIA,CONTINUE CURRENT MEDS, EDUCATION, DIET, EXERCISE, WATCH BP, BRING HOME MACHINE, RTC IN 9 MO WITH BMP  Essential hypertension    Premature beats    Other orders  -     TOPROL XL 25 mg 24 hr tablet; Take 1.5 tablets (37.5 mg total) by mouth nightly.  Dispense: 45 tablet; Refill: 5    RTC Low level/low impact aerobic exercise 5x's/wk. Heart healthy diet and risk factor modification.    See labs and med orders.    Aerobic exercise 5x's/wk. Heart healthy diet and risk factor modification.    See labs and med orders.

## 2018-01-31 ENCOUNTER — OFFICE VISIT (OUTPATIENT)
Dept: HEMATOLOGY/ONCOLOGY | Facility: CLINIC | Age: 68
End: 2018-01-31
Payer: MEDICARE

## 2018-01-31 VITALS
HEIGHT: 62 IN | WEIGHT: 113.75 LBS | RESPIRATION RATE: 16 BRPM | TEMPERATURE: 96 F | HEART RATE: 70 BPM | SYSTOLIC BLOOD PRESSURE: 148 MMHG | DIASTOLIC BLOOD PRESSURE: 69 MMHG | OXYGEN SATURATION: 100 % | BODY MASS INDEX: 20.93 KG/M2

## 2018-01-31 DIAGNOSIS — Z17.0 MALIGNANT NEOPLASM OF UPPER-OUTER QUADRANT OF RIGHT BREAST IN FEMALE, ESTROGEN RECEPTOR POSITIVE: Primary | ICD-10-CM

## 2018-01-31 DIAGNOSIS — C50.411 MALIGNANT NEOPLASM OF UPPER-OUTER QUADRANT OF RIGHT BREAST IN FEMALE, ESTROGEN RECEPTOR POSITIVE: Primary | ICD-10-CM

## 2018-01-31 PROCEDURE — 1159F MED LIST DOCD IN RCRD: CPT | Mod: ,,, | Performed by: PHYSICIAN ASSISTANT

## 2018-01-31 PROCEDURE — 1126F AMNT PAIN NOTED NONE PRSNT: CPT | Mod: ,,, | Performed by: PHYSICIAN ASSISTANT

## 2018-01-31 PROCEDURE — 99999 PR PBB SHADOW E&M-EST. PATIENT-LVL IV: CPT | Mod: PBBFAC,,, | Performed by: PHYSICIAN ASSISTANT

## 2018-01-31 PROCEDURE — 99215 OFFICE O/P EST HI 40 MIN: CPT | Mod: S$PBB,,, | Performed by: PHYSICIAN ASSISTANT

## 2018-01-31 PROCEDURE — 99214 OFFICE O/P EST MOD 30 MIN: CPT | Mod: PBBFAC | Performed by: PHYSICIAN ASSISTANT

## 2018-01-31 NOTE — Clinical Note
Would you mind taking a peak at her bone density from 10/2016- now that she is on letrozole should I put her on prolia or an oral bisphos?  She is on vitamin d and calcium. thanks

## 2018-01-31 NOTE — PROGRESS NOTES
Subjective:       Patient ID: Cecilia Valenzuela is a 67 y.o. female.    Chief Complaint: Follow-up    HPI Mrs. Valenzuela is a 67-year-old female with a recent diagnosis of right breast cancer.  She started adjuvant letrozole in 11/2017.  She is here today for surveillance and her completion of treatment summary.     Patient feeling well.   She notes some arthritis in knees and hips she she is sedentary or has been resting, resolves with movement. She takes 1 tylenol in the morning and one at night for relief.  Mild hot flashes.  No fever, chills, nausea, vomiting or shortness of breath.  She does note some vaginal dryness.   No pain.  Emotionally doing well without depression.   She remains on Vitamin D and Calcium.         Onc History:  Screening mammography on March 22 showed questionable asymmetry in the upper outer portion of the right breast.  There was no palpable abnormality.      Follow-up diagnostic mammogram on August 2 showed a high density mass in the upper-outer quadrant of the right breast.  By ultrasound this measured 1.2 x 0.7 x 1.2 cm.     Needle biopsy on August 7 showed infiltrating lobular carcinoma which was 99% ER positive, 84% IN positive.  HER-2 was 2+ but negative by FISH.  Ki-67 was 10%.     Right mastectomy and sentinel lymph node biopsy was performed on September.  That showed a 2.7 cm infiltrating lobular carcinoma.  2 sentinel lymph nodes were negative for malignancy.  Final pathological stage T2 N0 - stage IIA.     Oncotype score was 19 - just above low risk cutoff of 18.        Menstrual History:    Menarche -  12  G - 5  P - 5  First birth age - 23 ,BCP - no    Menopause - Hysterectomy at 35       HRT - 1-2 Y     Family History -                                 Breast - mother, 2 sisters, maternal aunt     Patient has had genetic testing which was negative.                                 Ovarian -     no                                 Other -no     Social History :    Smoking -   never        ETOH - no  Homemaker        PMH:Hypertension, hyperlipidemia, polyarthritis/DJD, osteopenia(last BMD 10/16) , GERD, Carotid A. dz.      Review of Systems   Constitutional: Negative for appetite change and unexpected weight change.   HENT: Negative for congestion, mouth sores, sinus pain and trouble swallowing.    Eyes: Negative for visual disturbance.   Respiratory: Negative for cough, choking, chest tightness and shortness of breath.    Cardiovascular: Negative for chest pain, palpitations and leg swelling.   Gastrointestinal: Negative for abdominal pain, constipation, diarrhea, nausea and vomiting.   Genitourinary: Negative for dysuria and frequency.   Musculoskeletal: Positive for arthralgias. Negative for back pain, gait problem and joint swelling.   Skin: Negative for pallor and rash.   Neurological: Negative for dizziness, weakness and headaches.   Hematological: Negative for adenopathy. Does not bruise/bleed easily.   Psychiatric/Behavioral: The patient is not nervous/anxious.        Objective:      Physical Exam   Constitutional: She is oriented to person, place, and time. She appears well-developed and well-nourished. No distress.   Presents alone  ECOG 0   HENT:   Head: Normocephalic.   Mouth/Throat: Oropharynx is clear and moist. No oropharyngeal exudate.   Eyes: Conjunctivae are normal. Pupils are equal, round, and reactive to light. No scleral icterus.   Neck: Normal range of motion. Neck supple. No thyromegaly present.   Cardiovascular: Normal rate and regular rhythm.    Pulmonary/Chest: Effort normal and breath sounds normal. No respiratory distress.   S/p right mastectomy without chest wall mass, nodule or skin changes. Left breast without mass, nodule or skin changes. No axillary or supraclavicular adenopathy.    Abdominal: Soft. Bowel sounds are normal. She exhibits no distension and no mass. There is no tenderness.   Musculoskeletal: Normal range of motion. She exhibits no edema or  tenderness.   No spinal or paraspinal tenderness to palpation     Lymphadenopathy:     She has no cervical adenopathy.   Neurological: She is alert and oriented to person, place, and time. No cranial nerve deficit.   Skin: Skin is warm and dry.   Psychiatric: She has a normal mood and affect. Her behavior is normal. Thought content normal.   Vitals reviewed.      Assessment:       1. Malignant neoplasm of upper-outer quadrant of right breast in female, estrogen receptor positive        Plan:       Continue letrozole and return to clinic in 3 months.    Referral to gynecology (Tyler).    Continue Vitamin D/Calcium     We discussed the role of the survivorship clinic in her care.  Today we reviewed all aspects of treatment and the potential long term side effects of treatment, namely cardiotoxicity and secondary malignancy.  We also discussed the emotional/psychological impact of diagnosis of treatment and I let her know out our psychosocial services (dedicated  and Psychologist).  She declines referral to these services at this time.       Written material on eating well, exercise, fear of recurrence, living with uncertainty and sadness depression provided to patient.     60 mins spent with patient, over 50% of which was in counseling and completion of treatment summary.     Distress Screening Results: Psychosocial Distress screening score of Distress Score: 2 noted and reviewed. No intervention indicated.

## 2018-02-01 DIAGNOSIS — M85.80 OSTEOPENIA, UNSPECIFIED LOCATION: Primary | ICD-10-CM

## 2018-02-01 RX ORDER — IBANDRONATE SODIUM 150 MG/1
150 TABLET, FILM COATED ORAL
Qty: 1 TABLET | Refills: 11 | Status: SHIPPED | OUTPATIENT
Start: 2018-02-01 | End: 2018-03-15

## 2018-02-01 NOTE — PROGRESS NOTES
Review of bone density from 10/2016 shows osteopenia; will start patient on Boniva.  She currently does not have any dental issues or any recent invasive dental procedures.  RX sent to her pharmacy.

## 2018-02-02 ENCOUNTER — TELEPHONE (OUTPATIENT)
Dept: HEMATOLOGY/ONCOLOGY | Facility: CLINIC | Age: 68
End: 2018-02-02

## 2018-02-02 NOTE — TELEPHONE ENCOUNTER
Spoke with pt about her concerns and reached out to Dr. Vale and was told that it is okay for the pt to take the medication. Spoke with pt to let her know what Dr. Vale said and she was fine with the answer. Told pt that if she has any problems to please give us a call back.

## 2018-02-15 ENCOUNTER — TELEPHONE (OUTPATIENT)
Dept: OBSTETRICS AND GYNECOLOGY | Facility: CLINIC | Age: 68
End: 2018-02-15

## 2018-02-15 NOTE — TELEPHONE ENCOUNTER
Patient in [facility] on Vacation.  She asked me to call her back next week to schedule appointment.

## 2018-02-15 NOTE — TELEPHONE ENCOUNTER
----- Message from Kendy Burgos MA sent at 2/15/2018 12:00 PM CST -----      ----- Message -----  From: Ashlie Resendez  Sent: 1/31/2018   2:10 PM  To: Tyler Arceo    Good afternoon, Naila DUMONT saw this patient in our department today and is wanting her to follow up with Dr. Scott. Please assist with scheduling.   Thank you

## 2018-02-20 ENCOUNTER — TELEPHONE (OUTPATIENT)
Dept: OBSTETRICS AND GYNECOLOGY | Facility: CLINIC | Age: 68
End: 2018-02-20

## 2018-02-20 NOTE — TELEPHONE ENCOUNTER
Called Cecilia Valenzuela  to schedule an initial survivorship consult / evaluation with Dr. Scott, as suggested by JOSE E Ramirez at her recent appointment.     Explained to Cecilia that Dr. Scott now specializes in the delicate issues that often accompany cancer treatment & survivorship.  This does not take the place of her current physician /treatment, but in fact compliments it.      Patient is agreeable to an appointment on March 8th at 11:15am.   Verbal directions to the office given, reminder letter printed, and sent via mail.   Pt verbalized understanding.

## 2018-03-15 ENCOUNTER — OFFICE VISIT (OUTPATIENT)
Dept: OBSTETRICS AND GYNECOLOGY | Facility: CLINIC | Age: 68
End: 2018-03-15
Attending: OBSTETRICS & GYNECOLOGY
Payer: MEDICARE

## 2018-03-15 ENCOUNTER — PATIENT MESSAGE (OUTPATIENT)
Dept: HEMATOLOGY/ONCOLOGY | Facility: CLINIC | Age: 68
End: 2018-03-15

## 2018-03-15 VITALS
HEIGHT: 62 IN | DIASTOLIC BLOOD PRESSURE: 80 MMHG | WEIGHT: 114.44 LBS | SYSTOLIC BLOOD PRESSURE: 124 MMHG | BODY MASS INDEX: 21.06 KG/M2

## 2018-03-15 DIAGNOSIS — Z01.419 ENCOUNTER FOR GYNECOLOGICAL EXAMINATION WITHOUT ABNORMAL FINDING: Primary | ICD-10-CM

## 2018-03-15 DIAGNOSIS — C50.919 MALIGNANT NEOPLASM OF FEMALE BREAST, UNSPECIFIED ESTROGEN RECEPTOR STATUS, UNSPECIFIED LATERALITY, UNSPECIFIED SITE OF BREAST: ICD-10-CM

## 2018-03-15 PROCEDURE — G0101 CA SCREEN;PELVIC/BREAST EXAM: HCPCS | Mod: S$GLB,,, | Performed by: OBSTETRICS & GYNECOLOGY

## 2018-03-15 NOTE — LETTER
March 17, 2018      Naila Ramirez PA-C  [address]  [facility] [address]           [facility]  [address], [address]  [facility] [address]  Phone: [phone]  Fax: [phone]          Patient: Cecilia Valenzuela   MR Number: [id number]   YOB: 1950   Date of Visit: 3/15/2018       Dear Naila Ramirez:    Thank you for referring Cecilia Valenzuela to me for evaluation. Attached you will find relevant portions of my assessment and plan of care.    If you have questions, please do not hesitate to call me. I look forward to following Cecilia Valenzuela along with you.    Sincerely,    Leah Scott MD    Enclosure  CC:  No Recipients    If you would like to receive this communication electronically, please contact [email or web address] or [phone] to request more information on [employer] access.    For providers and/or their staff who would like to refer a patient to [facility], please contact us through our one-stop-shop provider referral line, [facility], at [phone].    If you feel you have received this communication in error or would no longer like to receive these types of communications, please e-mail [email or web address]

## 2018-03-18 NOTE — PROGRESS NOTES
SUBJECTIVE:   68 y.o. female   for annual routine checkup. No LMP recorded. Patient has had a hysterectomy..  She was diagnosed with breast cancer in 2017:  Screening mammography on  showed questionable asymmetry in the upper outer portion of the right breast.  There was no palpable abnormality.      Follow-up diagnostic mammogram on  showed a high density mass in the upper-outer quadrant of the right breast.  By ultrasound this measured 1.2 x 0.7 x 1.2 cm.     Needle biopsy on  showed infiltrating lobular carcinoma which was 99% ER positive, 84% [place] positive.  HER-2 was 2+ but negative by FISH.  Ki-67 was 10%.     Right mastectomy and sentinel lymph node biopsy was performed on September.  That showed a 2.7 cm infiltrating lobular carcinoma.  2 sentinel lymph nodes were negative for malignancy.  Final pathological stage T2 N0 - stage IIA.     Oncotype score was 19 - just above low risk cutoff of 18.   She has not had a GYN exam recently.  She reports occasional hot flashes. She denies difficulty sleeping.         Past Medical History:   Diagnosis Date    Arthritis     Atherosclerosis     in left carotid artery    GERD (gastroesophageal reflux disease)     Hyperlipidemia     Hypertension     Malignant neoplasm of upper-outer quadrant of right female breast 2017    Mitral valve prolapse     Neurofibromatosis     Osteopenia     Premature beats     Raynaud's disease     Seborrheic keratosis     Thyroid nodule     Tinnitus      Past Surgical History:   Procedure Laterality Date    BACK SURGERY      CATARACT EXTRACTION Bilateral      SECTION      x5    DILATION AND CURETTAGE OF UTERUS      miss Ab    HYSTERECTOMY       Social History     Social History    Marital status:      Spouse name: N/A    Number of children: N/A    Years of education: N/A     Occupational History    Not on file.     Social History Main Topics    Smoking status: Never  Smoker    Smokeless tobacco: Never Used    Alcohol use No    Drug use: No    Sexual activity: No     Other Topics Concern    Not on file     Social History Narrative    No narrative on file     Family History   Problem Relation Age of Onset    Rheum arthritis Mother     Breast cancer Mother      over 85    Hypertension Mother     Heart disease Mother     Parkinsonism Father     Cancer Father     Breast cancer Sister 40     DCIS    Breast cancer Maternal Aunt      60's    Breast cancer Sister 50     DCIS    Ovarian cancer Neg Hx     Colon cancer Neg Hx      OB History    Para Term  AB Living   6 5     1 4   SAB TAB Ectopic Multiple Live Births   1              # Outcome Date GA Lbr Ignacio/2nd Weight Sex Delivery Anes PTL Lv   6 Para            5 Para            4 Para            3 Para            2 Para            1 SAB                       Current Outpatient Prescriptions   Medication Sig Dispense Refill    acetaminophen (TYLENOL) 325 MG tablet Take 325 mg by mouth once daily.       aspirin (ECOTRIN) 81 MG EC tablet Take 81 mg by mouth once daily.      azelastine (ASTELIN) 137 mcg (0.1 %) nasal spray 1 spray by Nasal route as needed.       calcium-vitamin D3 (CALCIUM 500 + D) 500 mg(1,250mg) -200 unit per tablet Take 1 tablet by mouth 2 (two) times daily with meals.      co-enzyme Q-10 30 mg capsule Take 200 mg by mouth 2 (two) times daily. 200mg daily       GLUCOSAMINE/CHONDR OCHOA A SOD (GLUCOSAMINE-CHONDROITIN) 1,500-1,200 mg/30 mL Liqd Take 1 tablet by mouth once daily.      IBUPROFEN (ADVIL ORAL) Take by mouth every 4 (four) hours as needed.       letrozole (FEMARA) 2.5 mg Tab Take 1 tablet (2.5 mg total) by mouth once daily. 30 tablet 11    magnesium 250 mg Tab Take 250 mg by mouth once daily.      multivitamin (THERAGRAN) per tablet Take 1 tablet by mouth once daily.      omeprazole (PRILOSEC) 20 MG capsule Take 40 mg by mouth once daily.      PATADAY 0.2 % Drop instill 1  drop into both eyes once daily  0    TOPROL XL 25 mg 24 hr tablet Take 1.5 tablets (37.5 mg total) by mouth nightly. 45 tablet 5     No current facility-administered medications for this visit.      Allergies: Zofran [ondansetron hcl (pf)]; Augmentin [amoxicillin-pot clavulanate]; Corticosteroids (glucocorticoids); Dilaudid [hydromorphone]; Neosporin [hydrocortisone]; Norvasc [amlodipine]; and Tetracyclines     The 10-year ASCVD risk score (Jonnathanoswaldo ABRAHAM Jr., et al., 2013) is: 10.9%    Values used to calculate the score:      Age: 68 years      Sex: Female      Is Non- : No      Diabetic: No      Tobacco smoker: No      Systolic Blood Pressure: 124 mmHg      Is BP treated: Yes      HDL Cholesterol: 57 mg/dL      Total Cholesterol: 218 mg/dL    She has been seen by cardiology  ROS:  Constitutional: no weight loss, weight gain, fever, fatigue  Eyes:  No vision changes, glasses/contacts  ENT/Mouth: No ulcers, sinus problems, ears ringing, headache  Cardiovascular: No inability to lie flat, chest pain, exercise intolerance, swelling, heart palpitations  Respiratory: No wheezing, coughing blood, shortness of breath, or cough  Gastrointestinal: No diarrhea, bloody stool, nausea/vomiting, constipation, gas, hemorrhoids  Genitourinary: No blood in urine, painful urination, urgency of urination, frequency of urination, incomplete emptying, incontinence, abnormal bleeding, painful periods, heavy periods, vaginal discharge, vaginal odor, painful intercourse, sexual problems, bleeding after intercourse.  Musculoskeletal: No muscle weakness  Skin/Breast:breast cancer  Neurological: No passing out, seizures, numbness, headache  Endocrine: No diabetes, hypothyroid, hyperthyroid, +hot flashes, hair loss, abnormal hair growth, acne  Psychiatric: No depression, crying  Hematologic: No bruises, bleeding, swollen lymph nodes, anemia.      Physical Exam:   Constitutional: She is oriented to person, place, and time.  She appears well-developed and well-nourished.      Neck: Normal range of motion. No tracheal deviation present. No thyromegaly present.    Cardiovascular: Exam reveals no edema.     Pulmonary/Chest: Effort normal. She exhibits no mass, no tenderness, no deformity and no retraction. Right breast exhibits no inverted nipple, no mass, no nipple discharge, no skin change, no tenderness, presence, no bleeding and no swelling. Left breast exhibits no inverted nipple, no mass, no nipple discharge, no skin change, no tenderness, presence, no bleeding and no swelling. Breasts are symmetrical.        Abdominal: Soft. She exhibits no distension and no mass. There is no tenderness. There is no rebound and no guarding. No hernia. Hernia confirmed negative in the left inguinal area.     Genitourinary: Rectal exam shows no external hemorrhoid. There is no rash, tenderness or lesion on the right labia. There is no rash, tenderness or lesion on the left labia. Uterus is absent. No no adexnal prolapse. Right adnexum displays no mass, no tenderness and no fullness. Left adnexum displays no mass, no tenderness and no fullness. No tenderness, bleeding, rectocele, cystocele or unspecified prolapse of vaginal walls in the vagina. No vaginal discharge found. Vaginal cuff normal.Cervix exhibits absence.           Musculoskeletal: Normal range of motion and moves all extremeties. She exhibits no edema.      Lymphadenopathy:        Right: No inguinal adenopathy present.        Left: No inguinal adenopathy present.    Neurological: She is alert and oriented to person, place, and time.    Skin: No rash noted. No erythema. No pallor.    Psychiatric: She has a normal mood and affect. Her behavior is normal. Judgment and thought content normal.         ASSESSMENT:   well woman  Breast cancer  PLAN:   Mammogram- will message Naila Ramirez about scheduling patient's mammogram  Counseled patient on calcium and Vitamin D  Counseled patient that  since she has never had HPV and has had a hysterectomy, she does not need pap smears. I am happy to see her for GYN and any issues related to her breast cancer survivorship  return annually or prn

## 2018-04-17 ENCOUNTER — OFFICE VISIT (OUTPATIENT)
Dept: HEMATOLOGY/ONCOLOGY | Facility: CLINIC | Age: 68
End: 2018-04-17
Payer: MEDICARE

## 2018-04-17 VITALS
RESPIRATION RATE: 16 BRPM | DIASTOLIC BLOOD PRESSURE: 74 MMHG | WEIGHT: 114.44 LBS | TEMPERATURE: 98 F | OXYGEN SATURATION: 98 % | HEART RATE: 63 BPM | BODY MASS INDEX: 21.06 KG/M2 | SYSTOLIC BLOOD PRESSURE: 169 MMHG | HEIGHT: 62 IN

## 2018-04-17 DIAGNOSIS — C50.411 MALIGNANT NEOPLASM OF UPPER-OUTER QUADRANT OF RIGHT BREAST IN FEMALE, ESTROGEN RECEPTOR POSITIVE: Primary | ICD-10-CM

## 2018-04-17 DIAGNOSIS — Z17.0 MALIGNANT NEOPLASM OF UPPER-OUTER QUADRANT OF RIGHT BREAST IN FEMALE, ESTROGEN RECEPTOR POSITIVE: Primary | ICD-10-CM

## 2018-04-17 DIAGNOSIS — M85.80 OSTEOPENIA, UNSPECIFIED LOCATION: ICD-10-CM

## 2018-04-17 PROCEDURE — 99214 OFFICE O/P EST MOD 30 MIN: CPT | Mod: S$PBB,,, | Performed by: PHYSICIAN ASSISTANT

## 2018-04-17 PROCEDURE — 99214 OFFICE O/P EST MOD 30 MIN: CPT | Mod: PBBFAC | Performed by: PHYSICIAN ASSISTANT

## 2018-04-17 PROCEDURE — 99999 PR PBB SHADOW E&M-EST. PATIENT-LVL IV: CPT | Mod: PBBFAC,,, | Performed by: PHYSICIAN ASSISTANT

## 2018-04-17 NOTE — PROGRESS NOTES
Subjective:       Patient ID: Cecilia Valenzuela is a 68 y.o. female.    Chief Complaint: Follow-up    HPI Mrs. Valenzuela is a 67-year-old female with a recent diagnosis of right breast cancer.  She started adjuvant letrozole in 11/2017.  She is here today for surveillance.    Patient feeling well.   Mild hot flashes.  No fever, chills, nausea, vomiting or shortness of breath.  No pain.   She remains on Vitamin D and Calcium. Notes that she took Boniva once and had significant GI discomfort for 1 week following. She does not want to take Boniva again.        Onc History:  Screening mammography on March 22 showed questionable asymmetry in the upper outer portion of the right breast.  There was no palpable abnormality.      Follow-up diagnostic mammogram on August 2 showed a high density mass in the upper-outer quadrant of the right breast.  By ultrasound this measured 1.2 x 0.7 x 1.2 cm.     Needle biopsy on August 7 showed infiltrating lobular carcinoma which was 99% ER positive, 84% [place] positive.  HER-2 was 2+ but negative by FISH.  Ki-67 was 10%.     Right mastectomy and sentinel lymph node biopsy was performed on September.  That showed a 2.7 cm infiltrating lobular carcinoma.  2 sentinel lymph nodes were negative for malignancy.  Final pathological stage T2 N0 - stage IIA.     Oncotype score was 19 - just above low risk cutoff of 18.           PMH:Hypertension, hyperlipidemia, polyarthritis/DJD, osteopenia(last BMD 10/16) , GERD, Carotid A. dz.      Review of Systems   Constitutional: Negative for appetite change and unexpected weight change.   HENT: Negative for congestion, mouth sores, sinus pain and trouble swallowing.    Eyes: Negative for visual disturbance.   Respiratory: Negative for cough, choking, chest tightness and shortness of breath.    Cardiovascular: Negative for chest pain, palpitations and leg swelling.   Gastrointestinal: Negative for abdominal pain, constipation, diarrhea, nausea and vomiting.    Genitourinary: Negative for dysuria and frequency.   Musculoskeletal: Positive for arthralgias (mild). Negative for back pain, gait problem and joint swelling.   Skin: Negative for pallor and rash.   Neurological: Negative for dizziness, weakness and headaches.   Hematological: Negative for adenopathy. Does not bruise/bleed easily.   Psychiatric/Behavioral: The patient is not nervous/anxious.        Objective:      Physical Exam   Constitutional: She is oriented to person, place, and time. She appears well-developed and well-nourished. No distress.   Presents alone  ECOG 0   HENT:   Head: Normocephalic.   Mouth/Throat: Oropharynx is clear and moist. No oropharyngeal exudate.   Eyes: Conjunctivae are normal. Pupils are equal, round, and reactive to light. No scleral icterus.   Neck: Normal range of motion. Neck supple. No thyromegaly present.   Cardiovascular: Normal rate and regular rhythm.    Pulmonary/Chest: Effort normal and breath sounds normal. No respiratory distress.   S/p right mastectomy without chest wall mass, nodule or skin changes. Left breast without mass, nodule or skin changes. No axillary or supraclavicular adenopathy.    Abdominal: Soft. Bowel sounds are normal. She exhibits no distension and no mass. There is no tenderness.   Musculoskeletal: Normal range of motion. She exhibits no edema or tenderness.   No spinal or paraspinal tenderness to palpation     Lymphadenopathy:     She has no cervical adenopathy.   Neurological: She is alert and oriented to person, place, and time. No cranial nerve deficit.   Skin: Skin is warm and dry.   Psychiatric: She has a normal mood and affect. Her behavior is normal. Thought content normal.   Vitals reviewed.      Assessment:       1. Malignant neoplasm of upper-outer quadrant of right breast in female, estrogen receptor positive    2. Osteopenia, unspecified location        Plan:       Continue letrozole and return to clinic in 3 months.  She is due for left  mammogram, order has been placed.  Patient with intolerance to Boniva, she prefers to try Prolia.  Orders placed and will plan to start next week.  Continue Vitamin D and Calcium.  All questions answered to satisfaction of patient.           Distress Screening Results: Psychosocial Distress screening score of Distress Score: 3 noted and reviewed. No intervention indicated.

## 2018-04-17 NOTE — Clinical Note
Please schedule left mammogram and prolia sometime next week (on same day- she comes from [place]) Orders for prolia are in- just will need to get auth prior to scheduling. Thanks Kavin in 3 months

## 2018-04-25 ENCOUNTER — TELEPHONE (OUTPATIENT)
Dept: HEMATOLOGY/ONCOLOGY | Facility: CLINIC | Age: 68
End: 2018-04-25

## 2018-05-01 ENCOUNTER — INFUSION (OUTPATIENT)
Dept: INFUSION THERAPY | Facility: HOSPITAL | Age: 68
End: 2018-05-01
Attending: INTERNAL MEDICINE
Payer: MEDICARE

## 2018-05-01 ENCOUNTER — HOSPITAL ENCOUNTER (OUTPATIENT)
Dept: RADIOLOGY | Facility: HOSPITAL | Age: 68
Discharge: HOME | End: 2018-05-01
Attending: PHYSICIAN ASSISTANT
Payer: MEDICARE

## 2018-05-01 VITALS — WEIGHT: 114 LBS | HEIGHT: 62 IN | BODY MASS INDEX: 20.98 KG/M2

## 2018-05-01 DIAGNOSIS — C50.411 MALIGNANT NEOPLASM OF UPPER-OUTER QUADRANT OF RIGHT BREAST IN FEMALE, ESTROGEN RECEPTOR POSITIVE: ICD-10-CM

## 2018-05-01 DIAGNOSIS — Z17.0 MALIGNANT NEOPLASM OF UPPER-OUTER QUADRANT OF RIGHT BREAST IN FEMALE, ESTROGEN RECEPTOR POSITIVE: ICD-10-CM

## 2018-05-01 DIAGNOSIS — M85.80 OSTEOPENIA, UNSPECIFIED LOCATION: Primary | ICD-10-CM

## 2018-05-01 PROCEDURE — 96372 THER/PROPH/DIAG INJ SC/IM: CPT

## 2018-05-01 PROCEDURE — 77061 BREAST TOMOSYNTHESIS UNI: CPT | Mod: TC,PO,LT

## 2018-05-01 PROCEDURE — 77065 DX MAMMO INCL CAD UNI: CPT | Mod: TC,PO

## 2018-05-01 PROCEDURE — 77061 BREAST TOMOSYNTHESIS UNI: CPT | Mod: 26,LT,, | Performed by: RADIOLOGY

## 2018-05-01 PROCEDURE — 63600175 PHARM REV CODE 636 W HCPCS: Mod: JG | Performed by: PHYSICIAN ASSISTANT

## 2018-05-01 PROCEDURE — 77065 DX MAMMO INCL CAD UNI: CPT | Mod: 26,LT,, | Performed by: RADIOLOGY

## 2018-05-01 RX ADMIN — DENOSUMAB 60 MG: 60 INJECTION SUBCUTANEOUS at 02:05

## 2018-05-17 RX ORDER — METOPROLOL SUCCINATE 25 MG/1
37.5 TABLET, EXTENDED RELEASE ORAL NIGHTLY
Qty: 45 TABLET | Refills: 2 | Status: SHIPPED | OUTPATIENT
Start: 2018-05-17 | End: 2018-06-09 | Stop reason: SDUPTHER

## 2018-05-17 NOTE — TELEPHONE ENCOUNTER
----- Message from Cecilia Trejo sent at 5/17/2018  9:38 AM CDT -----  [facility] / [phone] / ref [id number] / Breana / request authorization TOPROL XL / states urgent

## 2018-06-09 RX ORDER — METOPROLOL SUCCINATE 25 MG/1
TABLET, EXTENDED RELEASE ORAL
Qty: 45 TABLET | Refills: 0 | Status: SHIPPED | OUTPATIENT
Start: 2018-06-09 | End: 2018-07-16 | Stop reason: SDUPTHER

## 2018-07-16 RX ORDER — METOPROLOL SUCCINATE 25 MG/1
TABLET, EXTENDED RELEASE ORAL
Qty: 45 TABLET | Refills: 1 | Status: SHIPPED | OUTPATIENT
Start: 2018-07-16 | End: 2018-08-13 | Stop reason: SDUPTHER

## 2018-07-23 NOTE — PROGRESS NOTES
Subjective:       Patient ID: Cecilia Valenzuela is a 68 y.o. female.    Chief Complaint: No chief complaint on file.    HPI Mrs. Valenzuela is a 68-year-old female with right breast cancer.  She started adjuvant letrozole in 11/2017.     Overall, she has been feeling well.  Energy levels been good and she has been walking for exercise.  She has no shortness of breath.  Appetite and bowel function has been good.  She has some minor arthralgias which are manageable.      Left mammogram in May was unremarkable.      Onc History:  Screening mammography on March 22 showed questionable asymmetry in the upper outer portion of the right breast.  There was no palpable abnormality.      Follow-up diagnostic mammogram on August 2 showed a high density mass in the upper-outer quadrant of the right breast.  By ultrasound this measured 1.2 x 0.7 x 1.2 cm.     Needle biopsy on August 7 showed infiltrating lobular carcinoma which was 99% ER positive, 84% OK positive.  HER-2 was 2+ but negative by FISH.  Ki-67 was 10%.     Right mastectomy and sentinel lymph node biopsy was performed on September.  That showed a 2.7 cm infiltrating lobular carcinoma.  2 sentinel lymph nodes were negative for malignancy.  Final pathological stage T2 N0 - stage IIA.     Oncotype score was 19 - just above low risk cutoff of 18.  Review of Systems   Constitutional: Negative for appetite change and unexpected weight change.   Eyes: Negative for visual disturbance.   Respiratory: Negative for cough and shortness of breath.    Cardiovascular: Negative for chest pain.   Gastrointestinal: Negative for abdominal pain and diarrhea.   Genitourinary: Negative for frequency.   Musculoskeletal: Positive for arthralgias (Mild). Negative for back pain.   Skin: Negative for rash.   Neurological: Negative for headaches.   Hematological: Negative for adenopathy.   Psychiatric/Behavioral: The patient is not nervous/anxious.        Objective:      Physical Exam    Constitutional: She is oriented to person, place, and time. She appears well-developed and well-nourished. No distress.   HENT:   Mouth/Throat: No oropharyngeal exudate.   Eyes: No scleral icterus.   Cardiovascular: Normal rate and regular rhythm.    Pulmonary/Chest: Effort normal and breath sounds normal. Left breast exhibits no mass, no nipple discharge and no skin change.       Abdominal: Soft. She exhibits no mass. There is no tenderness.   Lymphadenopathy:     She has no cervical adenopathy.   Neurological: She is alert and oriented to person, place, and time.   Psychiatric: She has a normal mood and affect. Her behavior is normal. Thought content normal.   Vitals reviewed.      Assessment:       1. Malignant neoplasm of upper-outer quadrant of right breast in female, estrogen receptor positive        Plan:       Continue letrozole and return to clinic in 3 months.

## 2018-07-24 ENCOUNTER — OFFICE VISIT (OUTPATIENT)
Dept: HEMATOLOGY/ONCOLOGY | Facility: CLINIC | Age: 68
End: 2018-07-24
Payer: MEDICARE

## 2018-07-24 VITALS
BODY MASS INDEX: 20.65 KG/M2 | OXYGEN SATURATION: 100 % | DIASTOLIC BLOOD PRESSURE: 79 MMHG | RESPIRATION RATE: 18 BRPM | TEMPERATURE: 98 F | HEART RATE: 64 BPM | HEIGHT: 62 IN | SYSTOLIC BLOOD PRESSURE: 179 MMHG | WEIGHT: 112.19 LBS

## 2018-07-24 DIAGNOSIS — Z17.0 MALIGNANT NEOPLASM OF UPPER-OUTER QUADRANT OF RIGHT BREAST IN FEMALE, ESTROGEN RECEPTOR POSITIVE: Primary | ICD-10-CM

## 2018-07-24 DIAGNOSIS — C50.411 MALIGNANT NEOPLASM OF UPPER-OUTER QUADRANT OF RIGHT BREAST IN FEMALE, ESTROGEN RECEPTOR POSITIVE: Primary | ICD-10-CM

## 2018-07-24 PROCEDURE — 99213 OFFICE O/P EST LOW 20 MIN: CPT | Mod: PBBFAC | Performed by: INTERNAL MEDICINE

## 2018-07-24 PROCEDURE — 99999 PR PBB SHADOW E&M-EST. PATIENT-LVL III: CPT | Mod: PBBFAC,,, | Performed by: INTERNAL MEDICINE

## 2018-07-24 PROCEDURE — 99213 OFFICE O/P EST LOW 20 MIN: CPT | Mod: S$PBB,,, | Performed by: INTERNAL MEDICINE

## 2018-08-13 RX ORDER — METOPROLOL SUCCINATE 25 MG/1
TABLET, EXTENDED RELEASE ORAL
Qty: 45 TABLET | Refills: 1 | Status: SHIPPED | OUTPATIENT
Start: 2018-08-13 | End: 2018-09-10 | Stop reason: SDUPTHER

## 2018-09-10 DIAGNOSIS — Z17.0 MALIGNANT NEOPLASM OF UPPER-OUTER QUADRANT OF RIGHT BREAST IN FEMALE, ESTROGEN RECEPTOR POSITIVE: ICD-10-CM

## 2018-09-10 DIAGNOSIS — C50.411 MALIGNANT NEOPLASM OF UPPER-OUTER QUADRANT OF RIGHT BREAST IN FEMALE, ESTROGEN RECEPTOR POSITIVE: ICD-10-CM

## 2018-09-10 RX ORDER — METOPROLOL SUCCINATE 25 MG/1
TABLET, EXTENDED RELEASE ORAL
Qty: 45 TABLET | Refills: 0 | Status: SHIPPED | OUTPATIENT
Start: 2018-09-10 | End: 2018-10-11 | Stop reason: SDUPTHER

## 2018-09-10 RX ORDER — LETROZOLE 2.5 MG/1
TABLET, FILM COATED ORAL
Qty: 30 TABLET | Refills: 11 | Status: SHIPPED | OUTPATIENT
Start: 2018-09-10 | End: 2019-08-15 | Stop reason: SDUPTHER

## 2018-10-03 ENCOUNTER — LAB VISIT (OUTPATIENT)
Dept: LAB | Facility: HOSPITAL | Age: 68
End: 2018-10-03
Attending: INTERNAL MEDICINE
Payer: MEDICARE

## 2018-10-03 DIAGNOSIS — I49.49 PREMATURE BEATS: ICD-10-CM

## 2018-10-03 DIAGNOSIS — I10 ESSENTIAL HYPERTENSION: ICD-10-CM

## 2018-10-03 LAB
ALBUMIN SERPL BCP-MCNC: 4.3 G/DL
ALP SERPL-CCNC: 51 U/L
ALT SERPL W/O P-5'-P-CCNC: 45 U/L
ANION GAP SERPL CALC-SCNC: 11 MMOL/L
AST SERPL-CCNC: 34 U/L
BILIRUB SERPL-MCNC: 0.4 MG/DL
BUN SERPL-MCNC: 18 MG/DL
CALCIUM SERPL-MCNC: 10.1 MG/DL
CHLORIDE SERPL-SCNC: 106 MMOL/L
CO2 SERPL-SCNC: 21 MMOL/L
CREAT SERPL-MCNC: 0.8 MG/DL
EST. GFR  (AFRICAN AMERICAN): >60 ML/MIN/1.73 M^2
EST. GFR  (NON AFRICAN AMERICAN): >60 ML/MIN/1.73 M^2
GLUCOSE SERPL-MCNC: 88 MG/DL
POTASSIUM SERPL-SCNC: 4.3 MMOL/L
PROT SERPL-MCNC: 8.2 G/DL
SODIUM SERPL-SCNC: 138 MMOL/L

## 2018-10-03 PROCEDURE — 80053 COMPREHEN METABOLIC PANEL: CPT

## 2018-10-03 PROCEDURE — 36415 COLL VENOUS BLD VENIPUNCTURE: CPT

## 2018-10-08 ENCOUNTER — PATIENT MESSAGE (OUTPATIENT)
Dept: CARDIOLOGY | Facility: CLINIC | Age: 68
End: 2018-10-08

## 2018-10-11 ENCOUNTER — IMMUNIZATION (OUTPATIENT)
Dept: FAMILY MEDICINE | Facility: CLINIC | Age: 68
End: 2018-10-11
Payer: MEDICARE

## 2018-10-11 ENCOUNTER — OFFICE VISIT (OUTPATIENT)
Dept: CARDIOLOGY | Facility: CLINIC | Age: 68
End: 2018-10-11
Payer: MEDICARE

## 2018-10-11 VITALS
WEIGHT: 115.5 LBS | SYSTOLIC BLOOD PRESSURE: 139 MMHG | BODY MASS INDEX: 21.25 KG/M2 | DIASTOLIC BLOOD PRESSURE: 75 MMHG | HEIGHT: 62 IN

## 2018-10-11 DIAGNOSIS — E78.00 HYPERCHOLESTEROLEMIA: ICD-10-CM

## 2018-10-11 DIAGNOSIS — I10 ESSENTIAL HYPERTENSION: Primary | ICD-10-CM

## 2018-10-11 DIAGNOSIS — I49.49 PREMATURE BEATS: ICD-10-CM

## 2018-10-11 PROCEDURE — 90662 IIV NO PRSV INCREASED AG IM: CPT | Mod: PBBFAC,PO

## 2018-10-11 PROCEDURE — 99999 PR PBB SHADOW E&M-EST. PATIENT-LVL III: CPT | Mod: PBBFAC,,, | Performed by: INTERNAL MEDICINE

## 2018-10-11 PROCEDURE — 99213 OFFICE O/P EST LOW 20 MIN: CPT | Mod: PBBFAC,PO,25 | Performed by: INTERNAL MEDICINE

## 2018-10-11 PROCEDURE — 99213 OFFICE O/P EST LOW 20 MIN: CPT | Mod: S$PBB,,, | Performed by: INTERNAL MEDICINE

## 2018-10-11 RX ORDER — METOPROLOL SUCCINATE 25 MG/1
37.5 TABLET, EXTENDED RELEASE ORAL NIGHTLY
Qty: 45 TABLET | Refills: 9 | Status: SHIPPED | OUTPATIENT
Start: 2018-10-11 | End: 2019-05-01

## 2018-10-11 NOTE — PROGRESS NOTES
Subjective:    Patient ID:  Cecilia Valenzuela is a 68 y.o. female who presents for Hypertension (Labs) and Irregular Heart Beat        HPI  DISCUSSED LABS AND GOALS, CMP OK, ALT 45, DOING WELL, BP LOG OK,RECENT MAMMOGRAM OK,  SEE ROS    Past Medical History:   Diagnosis Date    Arthritis     Atherosclerosis     in left carotid artery    Fibrocystic breast     GERD (gastroesophageal reflux disease)     Hyperlipidemia     Hypertension     Malignant neoplasm of upper-outer quadrant of right female breast 2017    right    Mitral valve prolapse     Neurofibromatosis     Osteopenia     Premature beats     Raynaud's disease     Seborrheic keratosis     Thyroid nodule     Tinnitus      Past Surgical History:   Procedure Laterality Date    BACK SURGERY      BIOPSY-LYMPH NODE-SENTINEL Right 2017    Performed by [facility] at [facility] OR 2ND FLR    BREAST BIOPSY Right 2017    CATARACT EXTRACTION Bilateral      SECTION      x5    DILATION AND CURETTAGE OF UTERUS      miss Ab    HYSTERECTOMY      INJECTION-NODE-SENTINEL Right 2017    Performed [facility] at [facility] OR 2ND FLR    MASTECTOMY Right 2017    MASTECTOMY 23 hr stay Right 2017    Performed by [facility] at [facility] OR 2ND FLR     Family History   Problem Relation Age of Onset    Rheum arthritis Mother     Breast cancer Mother         over 85    Hypertension Mother     Heart disease Mother     Parkinsonism Father     Cancer Father     Breast cancer Sister 40        DCIS    Breast cancer Maternal Aunt         60's    Breast cancer Sister 50        DCIS    Ovarian cancer Neg Hx     Colon cancer Neg Hx      Social History     Socioeconomic History    Marital status:      Spouse name: Not on file    Number of children: Not on file    Years of education: Not on file    Highest education level: Not on file   Social Needs    Financial resource strain: Not on file    Food  insecurity - worry: Not on file    Food insecurity - inability: Not on file    Transportation needs - medical: Not on file    Transportation needs - non-medical: Not on file   Occupational History    Not on file   Tobacco Use    Smoking status: Never Smoker    Smokeless tobacco: Never Used   Substance and Sexual Activity    Alcohol use: No    Drug use: No    Sexual activity: No     Partners: Male   Other Topics Concern    Not on file   Social History Narrative    Not on file       Review of patient's allergies indicates:   Allergen Reactions    Zofran [ondansetron hcl (pf)] Nausea Only    Augmentin [amoxicillin-pot clavulanate]     Corticosteroids (glucocorticoids) Other (See Comments)     Heart palpitations    Dilaudid [hydromorphone] Nausea And Vomiting     Does not help with pain    Neosporin [hydrocortisone]     Norvasc [amlodipine]     Tetracyclines        Current Outpatient Medications:     acetaminophen (TYLENOL) 325 MG tablet, Take 325 mg by mouth once daily. , Disp: , Rfl:     aspirin (ECOTRIN) 81 MG EC tablet, Take 81 mg by mouth once daily., Disp: , Rfl:     azelastine (ASTELIN) 137 mcg (0.1 %) nasal spray, 1 spray by Nasal route as needed. , Disp: , Rfl:     calcium-vitamin D3 (CALCIUM 500 + D) 500 mg(1,250mg) -200 unit per tablet, Take 1 tablet by mouth 2 (two) times daily with meals., Disp: , Rfl:     co-enzyme Q-10 30 mg capsule, Take 200 mg by mouth 2 (two) times daily. 200mg daily , Disp: , Rfl:     denosumab (PROLIA) 60 mg/mL Syrg, Inject 60 mg into the skin., Disp: , Rfl:     glucosamine HCl/chondroitin springer (GLUCOSAMINE-CHONDROITIN ORAL), Take by mouth once daily. 1000mg /800mg         takes 500mg/400 capsule twice a day, Disp: , Rfl:     IBUPROFEN (ADVIL ORAL), Take by mouth Daily. , Disp: , Rfl:     influenza (FLUZONE HIGH-DOSE 2018-19, PF,) 180 mcg/0.5 mL vaccine, Inject 0.5 mLs into the muscle., Disp: 0.5 mL, Rfl: 0    letrozole (FEMARA) 2.5 mg Tab, TAKE 1 TABLET BY  "MOUTH DAILY, Disp: 30 tablet, Rfl: 11    magnesium 250 mg Tab, Take 250 mg by mouth once daily., Disp: , Rfl:     multivitamin (THERAGRAN) per tablet, Take 1 tablet by mouth once daily., Disp: , Rfl:     omeprazole (PRILOSEC) 20 MG capsule, Take 40 mg by mouth once daily., Disp: , Rfl:     PATADAY 0.2 % Drop, instill 1 drop into both eyes once daily, Disp: , Rfl: 0    TOPROL XL 25 mg 24 hr tablet, TAKE 1 & 1/2 TABLETS BY MOUTH AT BEDTIME, Disp: 45 tablet, Rfl: 0    Review of Systems   Constitution: Negative for chills, diaphoresis, fever, weakness, malaise/fatigue and night sweats.   HENT: Negative for congestion and nosebleeds.    Eyes: Negative for blurred vision and visual disturbance.   Cardiovascular: Negative for chest pain, claudication, cyanosis, dyspnea on exertion, irregular heartbeat, leg swelling, near-syncope, orthopnea, palpitations, paroxysmal nocturnal dyspnea and syncope.   Respiratory: Negative for cough, hemoptysis, shortness of breath and wheezing.    Endocrine: Negative for cold intolerance, heat intolerance and polyuria.   Hematologic/Lymphatic: Negative for adenopathy. Does not bruise/bleed easily.   Skin: Negative for nail changes and rash.   Musculoskeletal: Negative for back pain, falls, joint pain and muscle cramps (OCC).   Gastrointestinal: Negative for abdominal pain, change in bowel habit, dysphagia, heartburn, hematemesis, jaundice, melena and vomiting.   Genitourinary: Negative for dysuria, flank pain and frequency.   Neurological: Negative for brief paralysis, dizziness, focal weakness, light-headedness, loss of balance, numbness, paresthesias, seizures, sensory change and tremors.   Psychiatric/Behavioral: Negative for altered mental status, depression and memory loss. The patient is not nervous/anxious.    Allergic/Immunologic: Negative for hives.        Objective:      Vitals:    10/11/18 1308   BP: 139/75   Weight: 52.4 kg (115 lb 8.3 oz)   Height: 5' 2" (1.575 m)   PainSc: " 0-No pain     Body mass index is 21.13 kg/m².    Physical Exam   Constitutional: She is oriented to person, place, and time. She appears well-developed and well-nourished. She is active.   HENT:   Head: Normocephalic and atraumatic.   Mouth/Throat: Oropharynx is clear and moist and mucous membranes are normal.   Eyes: EOM are normal. Pupils are equal, round, and reactive to light.   Neck: Trachea normal and normal range of motion. Neck supple. Normal carotid pulses, no hepatojugular reflux and no JVD present. Carotid bruit is not present. No tracheal deviation, no edema and no erythema present. No thyromegaly present.   Cardiovascular: Normal rate, regular rhythm and normal heart sounds.  No extrasystoles are present. PMI is not displaced. Exam reveals no gallop and no friction rub.   No murmur heard.  Pulses:       Carotid pulses are 2+ on the right side, and 2+ on the left side.       Radial pulses are 2+ on the right side, and 2+ on the left side.        Posterior tibial pulses are 2+ on the right side, and 2+ on the left side.   Pulmonary/Chest: Effort normal and breath sounds normal. No tachypnea and no bradypnea. She has no wheezes. She has no rales. She exhibits no tenderness.   Abdominal: Soft. Bowel sounds are normal. She exhibits no distension and no mass. There is no hepatosplenomegaly. There is no tenderness. There is no guarding and no CVA tenderness.   Musculoskeletal: Normal range of motion. She exhibits no edema or tenderness.   Lymphadenopathy:     She has no cervical adenopathy.   Neurological: She is alert and oriented to person, place, and time. She has normal strength. She displays no tremor. No cranial nerve deficit.   Skin: Skin is warm and dry. No rash noted. No cyanosis or erythema. No pallor.   Psychiatric: She has a normal mood and affect. Her speech is normal and behavior is normal.               ..    Chemistry        Component Value Date/Time     10/03/2018 1033    K 4.3  10/03/2018 1033     10/03/2018 1033    CO2 21 (L) 10/03/2018 1033    BUN 18 10/03/2018 1033    CREATININE 0.8 10/03/2018 1033    GLU 88 10/03/2018 1033        Component Value Date/Time    CALCIUM 10.1 10/03/2018 1033    ALKPHOS 51 (L) 10/03/2018 1033    AST 34 10/03/2018 1033    ALT 45 (H) 10/03/2018 1033    BILITOT 0.4 10/03/2018 1033    ESTGFRAFRICA >60 10/03/2018 1033    EGFRNONAA >60 10/03/2018 1033            ..  Lab Results   Component Value Date    CHOL 218 (H) 11/02/2017     Lab Results   Component Value Date    HDL 57 11/02/2017     Lab Results   Component Value Date    LDLCALC 138.4 11/02/2017     Lab Results   Component Value Date    TRIG 113 11/02/2017     Lab Results   Component Value Date    CHOLHDL 26.1 11/02/2017     ..No results found for: WBC, HGB, HCT, MCV, PLT    Test(s) Reviewed  I have reviewed the following in detail:  [] Stress test   [] Angiography   [] Echocardiogram   [x] Labs   [] Other:       Assessment:         ICD-10-CM ICD-9-CM   1. Essential hypertension I10 401.9   2. Premature beats I49.49 427.60     Problem List Items Addressed This Visit        Cardiac/Vascular    Essential hypertension - Primary    Premature beats           Plan:     ALL CV CLINICALLY STABLE, NO ANGINA, NO HF, NO TIA, NO CLINICAL ARRHYTHMIA,CONTINUE CURRENT MEDS, EDUCATION, DIET, EXERCISE, RTC IN 9 MO      Essential hypertension    Premature beats    RTC Low level/low impact aerobic exercise 5x's/wk. Heart healthy diet and risk factor modification.    See labs and med orders.    Aerobic exercise 5x's/wk. Heart healthy diet and risk factor modification.    See labs and med orders.

## 2018-10-30 ENCOUNTER — OFFICE VISIT (OUTPATIENT)
Dept: HEMATOLOGY/ONCOLOGY | Facility: CLINIC | Age: 68
End: 2018-10-30
Payer: MEDICARE

## 2018-10-30 ENCOUNTER — INFUSION (OUTPATIENT)
Dept: INFUSION THERAPY | Facility: HOSPITAL | Age: 68
End: 2018-10-30
Payer: MEDICARE

## 2018-10-30 VITALS
HEIGHT: 62 IN | OXYGEN SATURATION: 100 % | DIASTOLIC BLOOD PRESSURE: 68 MMHG | SYSTOLIC BLOOD PRESSURE: 150 MMHG | BODY MASS INDEX: 21.06 KG/M2 | TEMPERATURE: 98 F | RESPIRATION RATE: 16 BRPM | HEART RATE: 62 BPM | WEIGHT: 114.44 LBS

## 2018-10-30 DIAGNOSIS — M85.80 OSTEOPENIA, UNSPECIFIED LOCATION: Primary | ICD-10-CM

## 2018-10-30 DIAGNOSIS — C50.411 MALIGNANT NEOPLASM OF UPPER-OUTER QUADRANT OF RIGHT BREAST IN FEMALE, ESTROGEN RECEPTOR POSITIVE: Primary | ICD-10-CM

## 2018-10-30 DIAGNOSIS — M85.80 OSTEOPENIA, UNSPECIFIED LOCATION: ICD-10-CM

## 2018-10-30 DIAGNOSIS — Z17.0 MALIGNANT NEOPLASM OF UPPER-OUTER QUADRANT OF RIGHT BREAST IN FEMALE, ESTROGEN RECEPTOR POSITIVE: Primary | ICD-10-CM

## 2018-10-30 PROCEDURE — 99999 PR PBB SHADOW E&M-EST. PATIENT-LVL IV: CPT | Mod: PBBFAC,,, | Performed by: PHYSICIAN ASSISTANT

## 2018-10-30 PROCEDURE — 63600175 PHARM REV CODE 636 W HCPCS: Mod: JG | Performed by: PHYSICIAN ASSISTANT

## 2018-10-30 PROCEDURE — 99214 OFFICE O/P EST MOD 30 MIN: CPT | Mod: PBBFAC,25 | Performed by: PHYSICIAN ASSISTANT

## 2018-10-30 PROCEDURE — 96372 THER/PROPH/DIAG INJ SC/IM: CPT

## 2018-10-30 PROCEDURE — 99214 OFFICE O/P EST MOD 30 MIN: CPT | Mod: S$PBB,,, | Performed by: PHYSICIAN ASSISTANT

## 2018-10-30 RX ADMIN — DENOSUMAB 60 MG: 60 INJECTION SUBCUTANEOUS at 11:10

## 2018-10-30 NOTE — PROGRESS NOTES
Subjective:       Patient ID: Cecilia Valenzuela is a 68 y.o. female.    Chief Complaint: Malignant neoplasm of upper-outer quadrant of right breast i    Mrs. Valenzuela is a 68-year-old female with right breast cancer.  She started adjuvant letrozole in 11/2017.      Overall, she has been feeling well.  Energy levels been good and she has been walking for exercise.  She has no shortness of breath.  Appetite and bowel function has been good.  She has some minor arthralgias which are manageable,  Managed with tylenol in am, aleve in pm and glucosamine.  She remains on vitamin D and Calcium.         Left mammogram in May was unremarkable.      Onc History:  Screening mammography on March 22 showed questionable asymmetry in the upper outer portion of the right breast.  There was no palpable abnormality.      Follow-up diagnostic mammogram on August 2 showed a high density mass in the upper-outer quadrant of the right breast.  By ultrasound this measured 1.2 x 0.7 x 1.2 cm.     Needle biopsy on August 7 showed infiltrating lobular carcinoma which was 99% ER positive, 84% AK positive.  HER-2 was 2+ but negative by FISH.  Ki-67 was 10%.     Right mastectomy and sentinel lymph node biopsy was performed on September.  That showed a 2.7 cm infiltrating lobular carcinoma.  2 sentinel lymph nodes were negative for malignancy.  Final pathological stage T2 N0 - stage IIA.     Oncotype score was 19 - just above low risk cutoff of 18.          Review of Systems   Constitutional: Negative for appetite change and unexpected weight change.   HENT: Negative for congestion.    Eyes: Negative for visual disturbance.   Respiratory: Negative for cough and shortness of breath.    Cardiovascular: Negative for chest pain.   Gastrointestinal: Negative for abdominal pain and diarrhea.   Genitourinary: Negative for frequency.   Musculoskeletal: Negative for back pain.   Skin: Negative for rash.   Neurological: Negative for headaches.    Hematological: Negative for adenopathy.   Psychiatric/Behavioral: The patient is not nervous/anxious.        Objective:      Physical Exam   Constitutional: She is oriented to person, place, and time. She appears well-developed and well-nourished. No distress.   Presents alone  ECOG 0   HENT:   Head: Normocephalic.   Mouth/Throat: Oropharynx is clear and moist. No oropharyngeal exudate.   Eyes: Conjunctivae are normal. Pupils are equal, round, and reactive to light. No scleral icterus.   Neck: Normal range of motion. Neck supple. No thyromegaly present.   Cardiovascular: Normal rate and regular rhythm.   BP taken manually 138/78   Pulmonary/Chest: Effort normal and breath sounds normal. No respiratory distress.   S/p right mastectomy without chest wall mass, nodule or skin changes. Left breast without mass, nodule or skin changes. No axillary or supraclavicular adenopathy.    Abdominal: Soft. Bowel sounds are normal. She exhibits no distension and no mass. There is no tenderness.   Musculoskeletal: Normal range of motion. She exhibits no edema or tenderness.   No spinal or paraspinal tenderness to palpation     Lymphadenopathy:     She has no cervical adenopathy.   Neurological: She is alert and oriented to person, place, and time. No cranial nerve deficit.   Skin: Skin is warm and dry.   Psychiatric: She has a normal mood and affect. Her behavior is normal. Thought content normal.   Vitals reviewed.      Assessment:       1. Malignant neoplasm of upper-outer quadrant of right breast in female, estrogen receptor positive    2. Osteopenia, unspecified location        Plan:       1)Continue Letrozole and return to clinic in 3 months. Mammogram due in 4/2019.  2)Patient due for Prolia today.   Distress Screening Results: Psychosocial Distress screening score of Distress Score: 2 noted and reviewed. No intervention indicated.

## 2018-10-31 ENCOUNTER — PATIENT MESSAGE (OUTPATIENT)
Dept: HEMATOLOGY/ONCOLOGY | Facility: CLINIC | Age: 68
End: 2018-10-31

## 2018-11-05 DIAGNOSIS — Z79.811 LONG TERM (CURRENT) USE OF AROMATASE INHIBITORS: Primary | ICD-10-CM

## 2018-11-08 ENCOUNTER — PATIENT MESSAGE (OUTPATIENT)
Dept: HEMATOLOGY/ONCOLOGY | Facility: CLINIC | Age: 68
End: 2018-11-08

## 2018-11-26 ENCOUNTER — PATIENT MESSAGE (OUTPATIENT)
Dept: HEMATOLOGY/ONCOLOGY | Facility: CLINIC | Age: 68
End: 2018-11-26

## 2019-01-04 ENCOUNTER — HOSPITAL ENCOUNTER (OUTPATIENT)
Dept: RADIOLOGY | Facility: HOSPITAL | Age: 69
Discharge: HOME | End: 2019-01-04
Attending: PHYSICIAN ASSISTANT
Payer: MEDICARE

## 2019-01-04 DIAGNOSIS — Z79.811 LONG TERM (CURRENT) USE OF AROMATASE INHIBITORS: ICD-10-CM

## 2019-01-04 PROCEDURE — 77080 DEXA BONE DENSITY SPINE HIP: ICD-10-PCS | Mod: 26,,, | Performed by: RADIOLOGY

## 2019-01-04 PROCEDURE — 77080 DXA BONE DENSITY AXIAL: CPT | Mod: 26,,, | Performed by: RADIOLOGY

## 2019-01-04 PROCEDURE — 77080 DXA BONE DENSITY AXIAL: CPT | Mod: TC

## 2019-01-08 ENCOUNTER — PATIENT MESSAGE (OUTPATIENT)
Dept: HEMATOLOGY/ONCOLOGY | Facility: CLINIC | Age: 69
End: 2019-01-08

## 2019-01-28 ENCOUNTER — OFFICE VISIT (OUTPATIENT)
Dept: HEMATOLOGY/ONCOLOGY | Facility: CLINIC | Age: 69
End: 2019-01-28
Payer: MEDICARE

## 2019-01-28 VITALS
HEART RATE: 67 BPM | TEMPERATURE: 98 F | SYSTOLIC BLOOD PRESSURE: 156 MMHG | BODY MASS INDEX: 20.85 KG/M2 | RESPIRATION RATE: 18 BRPM | WEIGHT: 114 LBS | OXYGEN SATURATION: 100 % | DIASTOLIC BLOOD PRESSURE: 72 MMHG

## 2019-01-28 DIAGNOSIS — R21 RASH: ICD-10-CM

## 2019-01-28 DIAGNOSIS — Z17.0 MALIGNANT NEOPLASM OF UPPER-OUTER QUADRANT OF RIGHT BREAST IN FEMALE, ESTROGEN RECEPTOR POSITIVE: Primary | ICD-10-CM

## 2019-01-28 DIAGNOSIS — M85.80 OSTEOPENIA, UNSPECIFIED LOCATION: ICD-10-CM

## 2019-01-28 DIAGNOSIS — C50.411 MALIGNANT NEOPLASM OF UPPER-OUTER QUADRANT OF RIGHT BREAST IN FEMALE, ESTROGEN RECEPTOR POSITIVE: Primary | ICD-10-CM

## 2019-01-28 PROCEDURE — 99215 OFFICE O/P EST HI 40 MIN: CPT | Mod: PBBFAC | Performed by: PHYSICIAN ASSISTANT

## 2019-01-28 PROCEDURE — 99214 PR OFFICE/OUTPT VISIT, EST, LEVL IV, 30-39 MIN: ICD-10-PCS | Mod: S$PBB,,, | Performed by: PHYSICIAN ASSISTANT

## 2019-01-28 PROCEDURE — 99999 PR PBB SHADOW E&M-EST. PATIENT-LVL V: ICD-10-PCS | Mod: PBBFAC,,, | Performed by: PHYSICIAN ASSISTANT

## 2019-01-28 PROCEDURE — 99999 PR PBB SHADOW E&M-EST. PATIENT-LVL V: CPT | Mod: PBBFAC,,, | Performed by: PHYSICIAN ASSISTANT

## 2019-01-28 PROCEDURE — 99214 OFFICE O/P EST MOD 30 MIN: CPT | Mod: S$PBB,,, | Performed by: PHYSICIAN ASSISTANT

## 2019-01-28 NOTE — Clinical Note
Kavin, mammogram and prolia in early may. cmp the same day.Referral to dermatology in [place].Do you happen to know which PCPs in [place] are taking new patients? Her old PCP has retired. Thanks.

## 2019-01-28 NOTE — PROGRESS NOTES
Subjective:       Patient ID: Cecilia Valenzuela is a 68 y.o. female.    Chief Complaint: Malignant neoplasm of upper-outer quadrant of right breast i    Mrs. Valenzuela is a 68-year-old female with right breast cancer.  She started adjuvant letrozole in 11/2017.      Overall, she has been feeling well.  Energy levels been good and she has been walking for exercise.  She has no shortness of breath.  Appetite and bowel function has been good.  She has some minor arthralgias which are manageable, She remains on vitamin D and Calcium.   Patient's  undergoing treatment for recurrent prostate cancer.         Left mammogram in May was unremarkable.      Onc History:  Screening mammography on March 22 showed questionable asymmetry in the upper outer portion of the right breast.  There was no palpable abnormality.      Follow-up diagnostic mammogram on August 2 showed a high density mass in the upper-outer quadrant of the right breast.  By ultrasound this measured 1.2 x 0.7 x 1.2 cm.     Needle biopsy on August 7 showed infiltrating lobular carcinoma which was 99% ER positive, 84% [place] positive.  HER-2 was 2+ but negative by FISH.  Ki-67 was 10%.     Right mastectomy and sentinel lymph node biopsy was performed on September.  That showed a 2.7 cm infiltrating lobular carcinoma.  2 sentinel lymph nodes were negative for malignancy.  Final pathological stage T2 N0 - stage IIA.     Oncotype score was 19 - just above low risk cutoff of 18.          Review of Systems   Constitutional: Negative for appetite change and unexpected weight change.   HENT: Negative for congestion.    Eyes: Negative for visual disturbance.   Respiratory: Negative for cough and shortness of breath.    Cardiovascular: Negative for chest pain.   Gastrointestinal: Negative for abdominal pain and diarrhea.   Genitourinary: Negative for frequency.   Musculoskeletal: Negative for back pain.   Skin: Negative for rash.   Neurological: Negative for  headaches.   Hematological: Negative for adenopathy.   Psychiatric/Behavioral: The patient is not nervous/anxious.        Objective:      Physical Exam   Constitutional: She is oriented to person, place, and time. She appears well-developed and well-nourished. No distress.   Presents alone  ECOG 0   HENT:   Head: Normocephalic.   Mouth/Throat: Oropharynx is clear and moist. No oropharyngeal exudate.   Eyes: Conjunctivae are normal. Pupils are equal, round, and reactive to light. No scleral icterus.   Neck: Normal range of motion. Neck supple. No thyromegaly present.   Cardiovascular: Normal rate and regular rhythm.   BP taken manually 138/78   Pulmonary/Chest: Effort normal and breath sounds normal. No respiratory distress.   S/p right mastectomy without chest wall mass, nodule or skin changes. Left breast without mass, nodule or skin changes. No axillary or supraclavicular adenopathy.    Abdominal: Soft. Bowel sounds are normal. She exhibits no distension and no mass. There is no tenderness.   Musculoskeletal: Normal range of motion. She exhibits no edema or tenderness.   No spinal or paraspinal tenderness to palpation     Lymphadenopathy:     She has no cervical adenopathy.   Neurological: She is alert and oriented to person, place, and time. No cranial nerve deficit.   Skin: Skin is warm and dry.   Psychiatric: She has a normal mood and affect. Her behavior is normal. Thought content normal.   Vitals reviewed.      Assessment:       1. Malignant neoplasm of upper-outer quadrant of right breast in female, estrogen receptor positive    2. Osteopenia, unspecified location        Plan:       1)Continue letrozole, return to clinic in May at time of annual mammograms.  2)Due for Prolia in May, continues on vitamin D and Calcium    Per patient request referral to PCP and dermatology.      Distress Screening Results: Psychosocial Distress screening score of Distress Score: 0 noted and reviewed. No intervention  indicated.

## 2019-01-29 ENCOUNTER — PATIENT MESSAGE (OUTPATIENT)
Dept: HEMATOLOGY/ONCOLOGY | Facility: CLINIC | Age: 69
End: 2019-01-29

## 2019-01-29 ENCOUNTER — TELEPHONE (OUTPATIENT)
Dept: HEMATOLOGY/ONCOLOGY | Facility: CLINIC | Age: 69
End: 2019-01-29

## 2019-01-29 NOTE — TELEPHONE ENCOUNTER
Called patient scheduled derm appt in [place] for Thursday 2/7. Mailed appt slip.Gave her the number to call the appt desk to see who is taking new patients [phone].      ----- Message from Naila Ramirez PA-C sent at 1/28/2019  2:53 PM CST -----  Kavin, mammogram and prolia in early may. cmp the same day.  Referral to dermatology in [place].  Do you happen to know which PCPs in [place] are taking new patients? Her old PCP has retired. Thanks.

## 2019-03-06 ENCOUNTER — PATIENT MESSAGE (OUTPATIENT)
Dept: HEMATOLOGY/ONCOLOGY | Facility: CLINIC | Age: 69
End: 2019-03-06

## 2019-03-07 DIAGNOSIS — Z17.0 MALIGNANT NEOPLASM OF UPPER-OUTER QUADRANT OF RIGHT BREAST IN FEMALE, ESTROGEN RECEPTOR POSITIVE: Primary | ICD-10-CM

## 2019-03-07 DIAGNOSIS — C50.411 MALIGNANT NEOPLASM OF UPPER-OUTER QUADRANT OF RIGHT BREAST IN FEMALE, ESTROGEN RECEPTOR POSITIVE: Primary | ICD-10-CM

## 2019-04-01 DIAGNOSIS — Z17.0 MALIGNANT NEOPLASM OF UPPER-OUTER QUADRANT OF RIGHT BREAST IN FEMALE, ESTROGEN RECEPTOR POSITIVE: Primary | ICD-10-CM

## 2019-04-01 DIAGNOSIS — C50.411 MALIGNANT NEOPLASM OF UPPER-OUTER QUADRANT OF RIGHT BREAST IN FEMALE, ESTROGEN RECEPTOR POSITIVE: Primary | ICD-10-CM

## 2019-04-30 NOTE — PROGRESS NOTES
Subjective:       Patient ID: Cecilia Valenzuela is a 69 y.o. female.    Chief Complaint: No chief complaint on file.    HPI Mrs. Valenzuela is a 69-year-old female with right breast cancer.  She started adjuvant letrozole in 11/2017.      Today she reports that she has been doing well overall.  She has had some recent right-sided sciatica which she relates to starting her treadmill activities.  She has no shortness of breath.  Appetite and bowel function has been good.           Onc History:  Screening mammography on March 22 showed questionable asymmetry in the upper outer portion of the right breast.  There was no palpable abnormality.      Follow-up diagnostic mammogram on August 2 showed a high density mass in the upper-outer quadrant of the right breast.  By ultrasound this measured 1.2 x 0.7 x 1.2 cm.     Needle biopsy on August 7 showed infiltrating lobular carcinoma which was 99% ER positive, 84% ME positive.  HER-2 was 2+ but negative by FISH.  Ki-67 was 10%.     Right mastectomy and sentinel lymph node biopsy was performed on September.  That showed a 2.7 cm infiltrating lobular carcinoma.  2 sentinel lymph nodes were negative for malignancy.  Final pathological stage T2 N0 - stage IIA.     Oncotype score was 19.  Review of Systems   Constitutional: Negative for appetite change and unexpected weight change.   Eyes: Negative for visual disturbance.   Respiratory: Negative for cough and shortness of breath.    Cardiovascular: Negative for chest pain.   Gastrointestinal: Negative for abdominal pain and diarrhea.   Genitourinary: Negative for frequency.   Musculoskeletal: Negative for back pain.        Occasional right-sided sciatica   Skin: Negative for rash.   Neurological: Negative for headaches.   Hematological: Negative for adenopathy.   Psychiatric/Behavioral: The patient is not nervous/anxious.        Objective:      Physical Exam   Constitutional: She is oriented to person, place, and time. She  appears well-developed and well-nourished. No distress.   Eyes: No scleral icterus.   Cardiovascular: Normal rate and regular rhythm.   Pulmonary/Chest: Effort normal and breath sounds normal. Left breast exhibits no mass, no nipple discharge and no skin change.       Abdominal: Soft. Bowel sounds are normal.   Lymphadenopathy:     She has no cervical adenopathy.   Neurological: She is alert and oriented to person, place, and time.   Psychiatric: She has a normal mood and affect. Her behavior is normal. Thought content normal.   Vitals reviewed.      Assessment:     mammogram is negative.  1. Malignant neoplasm of upper-outer quadrant of right breast in female, estrogen receptor positive        Plan:       Continue letrozole and return to clinic in 3 months time.      Prolia today and repeat in 6 months.

## 2019-05-01 ENCOUNTER — OFFICE VISIT (OUTPATIENT)
Dept: HEMATOLOGY/ONCOLOGY | Facility: CLINIC | Age: 69
End: 2019-05-01
Payer: MEDICARE

## 2019-05-01 ENCOUNTER — INFUSION (OUTPATIENT)
Dept: INFUSION THERAPY | Facility: HOSPITAL | Age: 69
End: 2019-05-01
Attending: INTERNAL MEDICINE
Payer: MEDICARE

## 2019-05-01 ENCOUNTER — HOSPITAL ENCOUNTER (OUTPATIENT)
Dept: RADIOLOGY | Facility: HOSPITAL | Age: 69
Discharge: HOME | End: 2019-05-01
Attending: PHYSICIAN ASSISTANT
Payer: MEDICARE

## 2019-05-01 VITALS
BODY MASS INDEX: 21.02 KG/M2 | TEMPERATURE: 99 F | SYSTOLIC BLOOD PRESSURE: 177 MMHG | DIASTOLIC BLOOD PRESSURE: 80 MMHG | OXYGEN SATURATION: 100 % | RESPIRATION RATE: 18 BRPM | HEART RATE: 70 BPM | WEIGHT: 114.19 LBS | HEIGHT: 62 IN

## 2019-05-01 VITALS — HEIGHT: 62 IN | BODY MASS INDEX: 20.8 KG/M2 | WEIGHT: 113 LBS

## 2019-05-01 DIAGNOSIS — Z17.0 MALIGNANT NEOPLASM OF UPPER-OUTER QUADRANT OF RIGHT BREAST IN FEMALE, ESTROGEN RECEPTOR POSITIVE: ICD-10-CM

## 2019-05-01 DIAGNOSIS — C50.411 MALIGNANT NEOPLASM OF UPPER-OUTER QUADRANT OF RIGHT BREAST IN FEMALE, ESTROGEN RECEPTOR POSITIVE: Primary | ICD-10-CM

## 2019-05-01 DIAGNOSIS — M85.80 OSTEOPENIA, UNSPECIFIED LOCATION: Primary | ICD-10-CM

## 2019-05-01 DIAGNOSIS — C50.411 MALIGNANT NEOPLASM OF UPPER-OUTER QUADRANT OF RIGHT BREAST IN FEMALE, ESTROGEN RECEPTOR POSITIVE: ICD-10-CM

## 2019-05-01 DIAGNOSIS — Z17.0 MALIGNANT NEOPLASM OF UPPER-OUTER QUADRANT OF RIGHT BREAST IN FEMALE, ESTROGEN RECEPTOR POSITIVE: Primary | ICD-10-CM

## 2019-05-01 PROCEDURE — 99999 PR PBB SHADOW E&M-EST. PATIENT-LVL V: ICD-10-PCS | Mod: PBBFAC,,, | Performed by: INTERNAL MEDICINE

## 2019-05-01 PROCEDURE — 77061 MAMMO DIGITAL DIAGNOSTIC LEFT WITH TOMOSYNTHESIS_CAD: ICD-10-PCS | Mod: 26,LT,, | Performed by: RADIOLOGY

## 2019-05-01 PROCEDURE — 77065 MAMMO DIGITAL DIAGNOSTIC LEFT WITH TOMOSYNTHESIS_CAD: ICD-10-PCS | Mod: 26,LT,, | Performed by: RADIOLOGY

## 2019-05-01 PROCEDURE — 99215 OFFICE O/P EST HI 40 MIN: CPT | Mod: PBBFAC,25 | Performed by: INTERNAL MEDICINE

## 2019-05-01 PROCEDURE — 63600175 PHARM REV CODE 636 W HCPCS: Mod: JG | Performed by: INTERNAL MEDICINE

## 2019-05-01 PROCEDURE — 99999 PR PBB SHADOW E&M-EST. PATIENT-LVL V: CPT | Mod: PBBFAC,,, | Performed by: INTERNAL MEDICINE

## 2019-05-01 PROCEDURE — 77061 BREAST TOMOSYNTHESIS UNI: CPT | Mod: 26,LT,, | Performed by: RADIOLOGY

## 2019-05-01 PROCEDURE — 77065 DX MAMMO INCL CAD UNI: CPT | Mod: TC,PO,LT

## 2019-05-01 PROCEDURE — 99213 PR OFFICE/OUTPT VISIT, EST, LEVL III, 20-29 MIN: ICD-10-PCS | Mod: S$PBB,,, | Performed by: INTERNAL MEDICINE

## 2019-05-01 PROCEDURE — 77065 DX MAMMO INCL CAD UNI: CPT | Mod: 26,LT,, | Performed by: RADIOLOGY

## 2019-05-01 PROCEDURE — 99213 OFFICE O/P EST LOW 20 MIN: CPT | Mod: S$PBB,,, | Performed by: INTERNAL MEDICINE

## 2019-05-01 PROCEDURE — 96372 THER/PROPH/DIAG INJ SC/IM: CPT

## 2019-05-01 PROCEDURE — 77061 BREAST TOMOSYNTHESIS UNI: CPT | Mod: TC,PO,LT

## 2019-05-01 RX ORDER — HYDROGEN PEROXIDE 3 %
20 SOLUTION, NON-ORAL MISCELLANEOUS DAILY
COMMUNITY
End: 2019-12-18

## 2019-05-01 RX ORDER — PROPRANOLOL HYDROCHLORIDE 10 MG/1
TABLET ORAL
COMMUNITY
Start: 2019-04-01 | End: 2019-12-18

## 2019-05-01 RX ORDER — METOPROLOL SUCCINATE 25 MG/1
TABLET, EXTENDED RELEASE ORAL
COMMUNITY
End: 2019-07-19 | Stop reason: DRUGHIGH

## 2019-05-01 RX ADMIN — DENOSUMAB 60 MG: 60 INJECTION SUBCUTANEOUS at 01:05

## 2019-05-01 NOTE — NURSING
Pt arrrived for Prolia. Labs reviewed with pt.  Pt tolerated injection SQ to left arm.  Pt discharged to home.

## 2019-07-15 ENCOUNTER — TELEPHONE (OUTPATIENT)
Dept: CARDIOLOGY | Facility: CLINIC | Age: 69
End: 2019-07-15

## 2019-07-15 RX ORDER — LISINOPRIL 10 MG/1
10 TABLET ORAL DAILY
Qty: 30 TABLET | Refills: 1 | Status: SHIPPED | OUTPATIENT
Start: 2019-07-15 | End: 2019-07-19 | Stop reason: DRUGHIGH

## 2019-07-15 NOTE — TELEPHONE ENCOUNTER
----- Message from Tawanna Spivey sent at 7/15/2019  8:21 AM CDT -----    Type:  Same Day Appointment Request    Caller is requesting a same day appointment.  Caller declined first available appointment listed below.      Name of Caller:  pt  When is the first available appointment?  sept  Symptoms:     Change in  Blood  Pressure  meds  Best Call Back Number:  [phone]  Additional Information:     Pt  Needs to  Be  Seen  Today // was  In  Er for high  Blood  Pressure // pressure  Not  High  As  We  Make  This request

## 2019-07-15 NOTE — TELEPHONE ENCOUNTER
Spoke to patient and advised that Dr. Lisa would like her to start lisinopril 10mg and we'll send it to [facility] pharmacy per her request. Patient verbalized understanding.

## 2019-07-15 NOTE — TELEPHONE ENCOUNTER
Please advise: patient stated she went to ED yesterday after checking BP was 180/100. She is only taking toprol 25mg & stated she has taken amlodipine in the past but would break out in hives

## 2019-07-16 ENCOUNTER — TELEPHONE (OUTPATIENT)
Dept: CARDIOLOGY | Facility: CLINIC | Age: 69
End: 2019-07-16

## 2019-07-16 NOTE — TELEPHONE ENCOUNTER
----- Message from Erna Somers sent at 7/16/2019  8:07 AM CDT -----  Type: Needs Medical Advice    Who Called:  Patient   Best Call Back Number: [phone]  Additional Information: patient is requesting a return call concerning her blood pressure, she states she spoke to a nurse yesterday and was told she may need to come in today, the patient is requesting to be seen but doctor is not in today and has not available appointment until 09/30/19 please contact to advise.

## 2019-07-19 ENCOUNTER — PATIENT MESSAGE (OUTPATIENT)
Dept: CARDIOLOGY | Facility: CLINIC | Age: 69
End: 2019-07-19

## 2019-07-19 ENCOUNTER — OFFICE VISIT (OUTPATIENT)
Dept: CARDIOLOGY | Facility: CLINIC | Age: 69
End: 2019-07-19
Payer: MEDICARE

## 2019-07-19 VITALS
WEIGHT: 114.19 LBS | HEART RATE: 80 BPM | DIASTOLIC BLOOD PRESSURE: 86 MMHG | HEIGHT: 62 IN | SYSTOLIC BLOOD PRESSURE: 175 MMHG | BODY MASS INDEX: 21.02 KG/M2

## 2019-07-19 DIAGNOSIS — R00.2 PALPITATIONS: ICD-10-CM

## 2019-07-19 DIAGNOSIS — R01.1 UNDIAGNOSED CARDIAC MURMURS: ICD-10-CM

## 2019-07-19 DIAGNOSIS — R07.2 PRECORDIAL PAIN: Primary | ICD-10-CM

## 2019-07-19 DIAGNOSIS — I10 UNCONTROLLED HYPERTENSION: ICD-10-CM

## 2019-07-19 PROCEDURE — 99214 PR OFFICE/OUTPT VISIT, EST, LEVL IV, 30-39 MIN: ICD-10-PCS | Mod: S$PBB,,, | Performed by: INTERNAL MEDICINE

## 2019-07-19 PROCEDURE — 99999 PR PBB SHADOW E&M-EST. PATIENT-LVL IV: CPT | Mod: PBBFAC,,, | Performed by: INTERNAL MEDICINE

## 2019-07-19 PROCEDURE — 99214 OFFICE O/P EST MOD 30 MIN: CPT | Mod: PBBFAC,PO | Performed by: INTERNAL MEDICINE

## 2019-07-19 PROCEDURE — 99999 PR PBB SHADOW E&M-EST. PATIENT-LVL IV: ICD-10-PCS | Mod: PBBFAC,,, | Performed by: INTERNAL MEDICINE

## 2019-07-19 PROCEDURE — 99214 OFFICE O/P EST MOD 30 MIN: CPT | Mod: S$PBB,,, | Performed by: INTERNAL MEDICINE

## 2019-07-19 RX ORDER — LISINOPRIL 10 MG/1
10 TABLET ORAL 2 TIMES DAILY
Qty: 60 TABLET | Refills: 2 | Status: SHIPPED | OUTPATIENT
Start: 2019-07-19 | End: 2019-11-01 | Stop reason: SDUPTHER

## 2019-07-19 RX ORDER — METOPROLOL SUCCINATE 50 MG/1
50 TABLET, EXTENDED RELEASE ORAL DAILY
Qty: 30 TABLET | Refills: 2 | Status: SHIPPED | OUTPATIENT
Start: 2019-07-19 | End: 2019-08-15

## 2019-07-19 RX ORDER — NITROGLYCERIN 0.4 MG/1
0.4 TABLET SUBLINGUAL EVERY 5 MIN PRN
Qty: 25 TABLET | Refills: 0 | Status: SHIPPED | OUTPATIENT
Start: 2019-07-19 | End: 2020-02-28

## 2019-07-19 NOTE — PROGRESS NOTES
Subjective:    Patient ID:  Cecilia Valenzuela is a 69 y.o. female who presents for Palpitations (ER) and Hypertension        Chest Pain    This is a new problem. The current episode started in the past 7 days. The pain is present in the substernal region. The pain is at a severity of 3/10. The quality of the pain is described as dull. The pain radiates to the left arm. Associated symptoms include palpitations. Pertinent negatives include no abdominal pain, back pain, claudication, cough, diaphoresis, dizziness, fever, hemoptysis, irregular heartbeat, malaise/fatigue, near-syncope, numbness, orthopnea, PND, shortness of breath, syncope, vomiting or weakness.   Pertinent negatives for past medical history include no seizures.       STATUS POST ER VISIT FOR HYPERTENSION THEN SOME CHEST DISCOMFORT, REQUESTED OV, BP UP, UNDER STRESS, BP LOG NOTED, BLOOD PRESSURE HAS BEEN FLUCTUATING, NO SOB,  PALP WHEN BP UP,AND SQUEEZING P[AIN, WENT TO ER, EKG SR NS T CHANGES,JUST STARTED LISINOPRIL FEW DAYS AGO,  SEE ROS  Past Medical History:   Diagnosis Date    Arthritis     Atherosclerosis     in left carotid artery    Fibrocystic breast     GERD (gastroesophageal reflux disease)     Hyperlipidemia     Hypertension     Malignant neoplasm of upper-outer quadrant of right female breast 2017    right    Mitral valve prolapse     Neurofibromatosis     Osteopenia     Premature beats     Raynaud's disease     Seborrheic keratosis     Thyroid nodule     Tinnitus     Undiagnosed cardiac murmurs 2019     Past Surgical History:   Procedure Laterality Date    BACK SURGERY      BIOPSY-LYMPH NODE-SENTINEL Right 2017    [facility] 2ND FLR    BREAST BIOPSY Right 2017    CATARACT EXTRACTION Bilateral      SECTION      x5    DILATION AND CURETTAGE OF UTERUS      miss Ab    HYSTERECTOMY      INJECTION-NODE-SENTINEL Right 2017    [facility] OR 2ND FLR    MASTECTOMY Right 2017    MASTECTOMY 23 hr stay Right 9/14/2017    [facility]formed [facility] at [facility] OR 2ND FLR     Family History   Problem Relation Age of Onset    Rheum arthritis Mother     Breast cancer Mother         over 85    Hypertension Mother     Heart disease Mother     Parkinsonism Father     Cancer Father     Breast cancer Sister 40        DCIS    Breast cancer Maternal Aunt         60's    Breast cancer Sister 50        DCIS    Ovarian cancer Neg Hx     Colon cancer Neg Hx      Social History     Socioeconomic History    Marital status:      Spouse name: Not on file    Number of children: Not on file    Years of education: Not on file    Highest education level: Not on file   Occupational History    Not on file   Social Needs    Financial resource strain: Not on file    Food insecurity:     Worry: Not on file     Inability: Not on file    Transportation needs:     Medical: Not on file     Non-medical: Not on file   Tobacco Use    Smoking status: Never Smoker    Smokeless tobacco: Never Used   Substance and Sexual Activity    Alcohol use: No    Drug use: No    Sexual activity: Never     Partners: Male   Lifestyle    Physical activity:     Days per week: Not on file     Minutes per session: Not on file    Stress: Not on file   Relationships    Social connections:     Talks on phone: Not on file     Gets together: Not on file     Attends [employer] service: Not on file     Active member of club or organization: Not on file     Attends meetings of clubs or organizations: Not on file     Relationship status: Not on file   Other Topics Concern    Not on file   Social History Narrative    Not on file       Review of patient's allergies indicates:   Allergen Reactions    Zofran [ondansetron hcl (pf)] Nausea Only    Augmentin [amoxicillin-pot clavulanate]     Corticosteroids (glucocorticoids) Other (See Comments)     Heart palpitations     Dilaudid [hydromorphone] Nausea And Vomiting     Does not help with pain    Fentanyl     Neosporin (neomycin-polymyx)     Norvasc [amlodipine]     Tetracyclines        Current Outpatient Medications:     acetaminophen (TYLENOL) 325 MG tablet, Take 325 mg by mouth once daily. , Disp: , Rfl:     aspirin (ECOTRIN) 81 MG EC tablet, Take 81 mg by mouth once daily., Disp: , Rfl:     azelastine (ASTELIN) 137 mcg (0.1 %) nasal spray, 1 spray by Nasal route as needed. , Disp: , Rfl:     calcium-vitamin D3 (CALCIUM 500 + D) 500 mg(1,250mg) -200 unit per tablet, Take 1 tablet by mouth 2 (two) times daily with meals., Disp: , Rfl:     co-enzyme Q-10 30 mg capsule, Take 200 mg by mouth 2 (two) times daily. 200mg daily , Disp: , Rfl:     denosumab (PROLIA) 60 mg/mL Syrg, Inject 60 mg into the skin., Disp: , Rfl:     esomeprazole (NEXIUM 24HR) 20 MG capsule, , Disp: , Rfl:     esomeprazole (NEXIUM) 20 MG capsule, Take 20 mg by mouth once daily., Disp: , Rfl:     glucosamine HCl/chondroitin springer (GLUCOSAMINE-CHONDROITIN ORAL), Take by mouth once daily. 1000mg /800mg         takes 500mg/400 capsule twice a day, Disp: , Rfl:     IBUPROFEN (ADVIL ORAL), Take by mouth Daily. , Disp: , Rfl:     influenza (FLUZONE HIGH-DOSE 2018-19, PF,) 180 mcg/0.5 mL vaccine, Inject 0.5 mLs into the muscle., Disp: 0.5 mL, Rfl: 0    letrozole (FEMARA) 2.5 mg Tab, TAKE 1 TABLET BY MOUTH DAILY, Disp: 30 tablet, Rfl: 11    LORazepam (ATIVAN) 0.5 MG tablet, Take 1 tablet (0.5 mg total) by mouth every 6 (six) hours as needed for Anxiety., Disp: 12 tablet, Rfl: 0    magnesium 250 mg Tab, Take 250 mg by mouth once daily., Disp: , Rfl:     multivitamin (THERAGRAN) per tablet, Take 1 tablet by mouth once daily., Disp: , Rfl:     PATADAY 0.2 % Drop, instill 1 drop into both eyes once daily, Disp: , Rfl: 0    lisinopril 10 MG tablet, Take 1 tablet (10 mg total) by mouth 2 (two) times daily., Disp: 60 tablet, Rfl: 2    metoprolol succinate  "(TOPROL-XL) 50 MG 24 hr tablet, Take 1 tablet (50 mg total) by mouth once daily., Disp: 30 tablet, Rfl: 2    nitroGLYCERIN (NITROSTAT) 0.4 MG SL tablet, Place 1 tablet (0.4 mg total) under the tongue every 5 (five) minutes as needed., Disp: 25 tablet, Rfl: 0    Review of Systems   Constitution: Negative for chills, diaphoresis, fever, malaise/fatigue and night sweats.   HENT: Negative for congestion and nosebleeds.    Eyes: Negative for blurred vision and visual disturbance.   Cardiovascular: Positive for chest pain and palpitations. Negative for claudication, cyanosis, dyspnea on exertion, irregular heartbeat, leg swelling, near-syncope, orthopnea, paroxysmal nocturnal dyspnea and syncope.   Respiratory: Negative for cough, hemoptysis, shortness of breath and wheezing.    Endocrine: Negative for cold intolerance, heat intolerance and polyuria.   Hematologic/Lymphatic: Negative for adenopathy. Does not bruise/bleed easily.   Skin: Negative for nail changes and rash.   Musculoskeletal: Negative for back pain, falls, joint pain and muscle cramps (OCC).   Gastrointestinal: Negative for abdominal pain, change in bowel habit, dysphagia, heartburn, hematemesis, jaundice, melena and vomiting.   Genitourinary: Negative for dysuria, flank pain and frequency.   Neurological: Negative for brief paralysis, dizziness, focal weakness, light-headedness, loss of balance, numbness, paresthesias, seizures, sensory change, tremors and weakness.   Psychiatric/Behavioral: Negative for altered mental status, depression and memory loss. The patient is not nervous/anxious.    Allergic/Immunologic: Negative for hives.        Objective:      Vitals:    07/19/19 0951   BP: (!) 175/86   Pulse: 80   Weight: 51.8 kg (114 lb 3.2 oz)   Height: 5' 2" (1.575 m)   PainSc:   3     Body mass index is 20.89 kg/m².    Physical Exam   Constitutional: She is oriented to person, place, and time. She appears well-developed and well-nourished. She is active. "   HENT:   Head: Normocephalic and atraumatic.   Mouth/Throat: Oropharynx is clear and moist and mucous membranes are normal.   Eyes: Pupils are equal, round, and reactive to light. EOM are normal.   Neck: Trachea normal and normal range of motion. Neck supple. Normal carotid pulses, no hepatojugular reflux and no JVD present. Carotid bruit is not present. No tracheal deviation, no edema and no erythema present. No thyromegaly present.   Cardiovascular: Normal rate and regular rhythm.  No extrasystoles are present. PMI is not displaced. Exam reveals no gallop and no friction rub.   Murmur heard.   Systolic murmur is present with a grade of 1/6 at the lower left sternal border.  Pulses:       Carotid pulses are 2+ on the right side, and 2+ on the left side.       Radial pulses are 2+ on the right side, and 2+ on the left side.        Posterior tibial pulses are 2+ on the right side, and 2+ on the left side.   Pulmonary/Chest: Effort normal and breath sounds normal. No tachypnea and no bradypnea. She has no wheezes. She has no rales. She exhibits no tenderness.   Abdominal: Soft. Bowel sounds are normal. She exhibits no distension and no mass. There is no hepatosplenomegaly. There is no tenderness. There is no guarding and no CVA tenderness.   Musculoskeletal: Normal range of motion. She exhibits no edema or tenderness.   Lymphadenopathy:     She has no cervical adenopathy.   Neurological: She is alert and oriented to person, place, and time. She has normal strength. She displays no tremor. No cranial nerve deficit.   Skin: Skin is warm and dry. No rash noted. No cyanosis or erythema. No pallor.   Psychiatric: She has a normal mood and affect. Her speech is normal and behavior is normal.               ..    Chemistry        Component Value Date/Time     07/14/2019 0607    K 3.8 07/14/2019 0607     07/14/2019 0607    CO2 23 07/14/2019 0607    BUN 12 07/14/2019 0607    CREATININE 0.56 07/14/2019 0607    GLU  100 07/14/2019 0607        Component Value Date/Time    CALCIUM 10.2 07/14/2019 0607    ALKPHOS 57 07/14/2019 0607    AST 28 07/14/2019 0607    ALT 20 07/14/2019 0607    BILITOT 0.7 07/14/2019 0607    ESTGFRAFRICA >60 07/14/2019 0607    EGFRNONAA >60 07/14/2019 0607            ..  Lab Results   Component Value Date    CHOL 218 (H) 11/02/2017     Lab Results   Component Value Date    HDL 57 11/02/2017     Lab Results   Component Value Date    LDLCALC 138.4 11/02/2017     Lab Results   Component Value Date    TRIG 113 11/02/2017     Lab Results   Component Value Date    CHOLHDL 26.1 11/02/2017     ..  Lab Results   Component Value Date    WBC 8.30 07/14/2019    HGB 14.5 07/14/2019    HCT 42.9 07/14/2019    MCV 88 07/14/2019     07/14/2019       Test(s) Reviewed  I have reviewed the following in detail:  [] Stress test   [] Angiography   [] Echocardiogram   [x] Labs   [x] Other:       Assessment:         ICD-10-CM ICD-9-CM   1. Precordial pain R07.2 786.51   2. Uncontrolled hypertension I10 401.9   3. Palpitations R00.2 785.1   4. Undiagnosed cardiac murmurs R01.1 785.2     Problem List Items Addressed This Visit        Cardiac/Vascular    Uncontrolled hypertension    Relevant Orders    Transthoracic echo (TTE) 2D with Color Flow    Stress test with myocardial perfusion    Palpitations    Relevant Orders    Transthoracic echo (TTE) 2D with Color Flow    Stress test with myocardial perfusion    Undiagnosed cardiac murmurs    Relevant Orders    Transthoracic echo (TTE) 2D with Color Flow       Other    Precordial pain - Primary    Relevant Orders    Transthoracic echo (TTE) 2D with Color Flow    Stress test with myocardial perfusion           Plan:     INCREASE LISINOPRIL TO 10 BID HOLD FOR SBP< 130, INCREASE TOPROL TO 50 MG DAILY, WILL NEED FURTHER EVALUATION, WILL CHECK, ECHO, AND NUCLEAR  STRESS TEST, P.R.N. SUBLINGUAL NITROGLYCERIN FOR SYMPTOMS AND ALSO FOR BLOOD PRESSURE MORE THAN 160,ALL OTHER CV CLINICALLY  STABLE,  NO HF, NO TIA, NO CLINICAL ARRHYTHMIA,CONTINUE CURRENT MEDS, EDUCATION, DIET, EXERCISE, A DEFINITE ELEMENT OF ANXIETY, RETURN CLINIC IN FEW MONTHS WITH BLOOD PRESSURE LOG      Precordial pain  -     Transthoracic echo (TTE) 2D with Color Flow; Future  -     Stress test with myocardial perfusion; Future    Uncontrolled hypertension  -     Transthoracic echo (TTE) 2D with Color Flow; Future  -     Stress test with myocardial perfusion; Future    Palpitations  -     Transthoracic echo (TTE) 2D with Color Flow; Future  -     Stress test with myocardial perfusion; Future    Undiagnosed cardiac murmurs  -     Transthoracic echo (TTE) 2D with Color Flow; Future    Other orders  -     metoprolol succinate (TOPROL-XL) 50 MG 24 hr tablet; Take 1 tablet (50 mg total) by mouth once daily.  Dispense: 30 tablet; Refill: 2  -     lisinopril 10 MG tablet; Take 1 tablet (10 mg total) by mouth 2 (two) times daily.  Dispense: 60 tablet; Refill: 2  -     nitroGLYCERIN (NITROSTAT) 0.4 MG SL tablet; Place 1 tablet (0.4 mg total) under the tongue every 5 (five) minutes as needed.  Dispense: 25 tablet; Refill: 0    RTC Low level/low impact aerobic exercise 5x's/wk. Heart healthy diet and risk factor modification.    See labs and med orders.    Aerobic exercise 5x's/wk. Heart healthy diet and risk factor modification.    See labs and med orders.

## 2019-08-07 ENCOUNTER — PATIENT MESSAGE (OUTPATIENT)
Dept: CARDIOLOGY | Facility: CLINIC | Age: 69
End: 2019-08-07

## 2019-08-13 ENCOUNTER — CLINICAL SUPPORT (OUTPATIENT)
Dept: CARDIOLOGY | Facility: CLINIC | Age: 69
End: 2019-08-13
Attending: INTERNAL MEDICINE
Payer: MEDICARE

## 2019-08-13 ENCOUNTER — HOSPITAL ENCOUNTER (OUTPATIENT)
Dept: RADIOLOGY | Facility: HOSPITAL | Age: 69
Discharge: HOME | End: 2019-08-13
Attending: INTERNAL MEDICINE
Payer: MEDICARE

## 2019-08-13 VITALS
BODY MASS INDEX: 20.98 KG/M2 | SYSTOLIC BLOOD PRESSURE: 181 MMHG | DIASTOLIC BLOOD PRESSURE: 87 MMHG | WEIGHT: 114 LBS | HEART RATE: 70 BPM | HEIGHT: 62 IN

## 2019-08-13 VITALS — BODY MASS INDEX: 20.98 KG/M2 | WEIGHT: 114 LBS | HEIGHT: 62 IN

## 2019-08-13 DIAGNOSIS — R00.2 PALPITATIONS: ICD-10-CM

## 2019-08-13 DIAGNOSIS — R07.2 PRECORDIAL PAIN: ICD-10-CM

## 2019-08-13 DIAGNOSIS — I10 UNCONTROLLED HYPERTENSION: ICD-10-CM

## 2019-08-13 DIAGNOSIS — R01.1 UNDIAGNOSED CARDIAC MURMURS: ICD-10-CM

## 2019-08-13 PROCEDURE — 93018 PR CARDIAC STRESS TST,INTERP/REPT ONLY: ICD-10-PCS | Mod: ICN,,, | Performed by: INTERNAL MEDICINE

## 2019-08-13 PROCEDURE — 99212 OFFICE O/P EST SF 10 MIN: CPT | Mod: PBBFAC,25,27,PO

## 2019-08-13 PROCEDURE — 99999 PR PBB SHADOW E&M-EST. PATIENT-LVL II: CPT | Mod: PBBFAC,,,

## 2019-08-13 PROCEDURE — 99999 PR PBB SHADOW E&M-EST. PATIENT-LVL I: CPT | Mod: PBBFAC,,,

## 2019-08-13 PROCEDURE — 93016 STRESS TEST WITH MYOCARDIAL PERFUSION (CUPID ONLY): ICD-10-PCS | Mod: ICN,,, | Performed by: INTERNAL MEDICINE

## 2019-08-13 PROCEDURE — 93306 TTE W/DOPPLER COMPLETE: CPT | Mod: PBBFAC,PO | Performed by: INTERNAL MEDICINE

## 2019-08-13 PROCEDURE — 99999 PR PBB SHADOW E&M-EST. PATIENT-LVL I: ICD-10-PCS | Mod: PBBFAC,,,

## 2019-08-13 PROCEDURE — 99999 PR PBB SHADOW E&M-EST. PATIENT-LVL II: ICD-10-PCS | Mod: PBBFAC,,,

## 2019-08-13 PROCEDURE — 93018 CV STRESS TEST I&R ONLY: CPT | Mod: ICN,,, | Performed by: INTERNAL MEDICINE

## 2019-08-13 PROCEDURE — 93306 TRANSTHORACIC ECHO (TTE) COMPLETE (CUPID ONLY): ICD-10-PCS | Mod: 26,S$PBB,, | Performed by: INTERNAL MEDICINE

## 2019-08-13 PROCEDURE — 78452 HT MUSCLE IMAGE SPECT MULT: CPT | Mod: 26,ICN,, | Performed by: INTERNAL MEDICINE

## 2019-08-13 PROCEDURE — 93016 CV STRESS TEST SUPVJ ONLY: CPT | Mod: ICN,,, | Performed by: INTERNAL MEDICINE

## 2019-08-13 PROCEDURE — 99211 OFF/OP EST MAY X REQ PHY/QHP: CPT | Mod: PBBFAC,25,PO

## 2019-08-13 PROCEDURE — 78452 STRESS TEST WITH MYOCARDIAL PERFUSION (CUPID ONLY): ICD-10-PCS | Mod: 26,ICN,, | Performed by: INTERNAL MEDICINE

## 2019-08-14 NOTE — PROGRESS NOTES
Subjective:       Patient ID: Cecilia Valenzuela is a 69 y.o. female.    Chief Complaint: No chief complaint on file.    HPI Mrs. Valenzuela is a 69-year-old female with right breast cancer.  She started adjuvant letrozole in 11/2017.   Mammogram in May was negative.       Today she reports she has some generalized arthralgias which have been fairly well controlled with Tylenol and as needed Advil.  She has been walking for exercise on and or trach.  Appetite and bowel function has been good.  She had some recent palpitations and chest tightness and had a stress test performed this week which was negative.        Onc History:  Screening mammography on March 22, 2017 showed questionable asymmetry in the upper outer portion of the right breast.  There was no palpable abnormality.      Follow-up diagnostic mammogram on August 2, 2017 showed a high density mass in the upper-outer quadrant of the right breast.  By ultrasound this measured 1.2 x 0.7 x 1.2 cm.     Needle biopsy on August 7, 2017 showed infiltrating lobular carcinoma which was 99% ER positive, 84% RI positive.  HER-2 was 2+ but negative by FISH.  Ki-67 was 10%.     Right mastectomy and sentinel lymph node biopsy was performed on September 14th, 2017.  That showed a 2.7 cm infiltrating lobular carcinoma.  2 sentinel lymph nodes were negative for malignancy.  Final pathological stage T2 N0 - stage IIA.     Oncotype score was 19.  Review of Systems   Constitutional: Negative for appetite change and unexpected weight change.   Eyes: Negative for visual disturbance.   Respiratory: Negative for cough and shortness of breath.    Cardiovascular: Negative for chest pain.   Gastrointestinal: Negative for abdominal pain and diarrhea.   Genitourinary: Negative for frequency.   Musculoskeletal: Positive for arthralgias. Negative for back pain.   Skin: Negative for rash.   Neurological: Negative for headaches.   Hematological: Negative for adenopathy.    Psychiatric/Behavioral: The patient is nervous/anxious.        Objective:      Physical Exam   Constitutional: She is oriented to person, place, and time. She appears well-developed and well-nourished. No distress.   Cardiovascular: Normal rate and regular rhythm.   Pulmonary/Chest: Effort normal and breath sounds normal. Left breast exhibits no mass, no nipple discharge and no skin change.       Abdominal: Soft. She exhibits no mass. There is no tenderness.   Lymphadenopathy:     She has no cervical adenopathy.   Neurological: She is alert and oriented to person, place, and time.   Psychiatric: She has a normal mood and affect. Her behavior is normal. Thought content normal.   Vitals reviewed.      Assessment:       1. Malignant neoplasm of upper-outer quadrant of right breast in female, estrogen receptor positive        Plan:       She will continue her letrozole and return to clinic 4 months.      She will let me know if her arthritic symptoms worsen and we will consider changing to exemestane.

## 2019-08-15 ENCOUNTER — OFFICE VISIT (OUTPATIENT)
Dept: HEMATOLOGY/ONCOLOGY | Facility: CLINIC | Age: 69
End: 2019-08-15
Payer: MEDICARE

## 2019-08-15 ENCOUNTER — TELEPHONE (OUTPATIENT)
Dept: CARDIOLOGY | Facility: CLINIC | Age: 69
End: 2019-08-15

## 2019-08-15 VITALS
HEIGHT: 62 IN | HEART RATE: 66 BPM | OXYGEN SATURATION: 99 % | RESPIRATION RATE: 18 BRPM | DIASTOLIC BLOOD PRESSURE: 61 MMHG | TEMPERATURE: 98 F | BODY MASS INDEX: 20.93 KG/M2 | WEIGHT: 113.75 LBS | SYSTOLIC BLOOD PRESSURE: 130 MMHG

## 2019-08-15 DIAGNOSIS — Z17.0 MALIGNANT NEOPLASM OF UPPER-OUTER QUADRANT OF RIGHT BREAST IN FEMALE, ESTROGEN RECEPTOR POSITIVE: Primary | ICD-10-CM

## 2019-08-15 DIAGNOSIS — C50.411 MALIGNANT NEOPLASM OF UPPER-OUTER QUADRANT OF RIGHT BREAST IN FEMALE, ESTROGEN RECEPTOR POSITIVE: Primary | ICD-10-CM

## 2019-08-15 LAB
ASCENDING AORTA: 2.8 CM
AV INDEX (PROSTH): 0.88
AV MEAN GRADIENT: 6 MMHG
AV PEAK GRADIENT: 12 MMHG
AV VALVE AREA: 3.13 CM2
AV VELOCITY RATIO: 0.92
BSA FOR ECHO PROCEDURE: 1.5 M2
CV ECHO LV RWT: 0.55 CM
CV STRESS BASE HR: 76 BPM
DIASTOLIC BLOOD PRESSURE: 87 MMHG
DOP CALC AO PEAK VEL: 1.71 M/S
DOP CALC AO VTI: 33.59 CM
DOP CALC LVOT AREA: 3.6 CM2
DOP CALC LVOT DIAMETER: 2.13 CM
DOP CALC LVOT PEAK VEL: 1.57 M/S
DOP CALC LVOT STROKE VOLUME: 105.03 CM3
DOP CALCLVOT PEAK VEL VTI: 29.49 CM
E WAVE DECELERATION TIME: 194.31 MSEC
E/A RATIO: 1.3
E/E' RATIO: 8.76 M/S
ECHO LV POSTERIOR WALL: 1.05 CM (ref 0.6–1.1)
FRACTIONAL SHORTENING: 50 % (ref 28–44)
INTERVENTRICULAR SEPTUM: 0.99 CM (ref 0.6–1.1)
IVRT: 0.1 MSEC
LA MAJOR: 3.67 CM
LA MINOR: 4.3 CM
LA WIDTH: 2.38 CM
LEFT ATRIUM SIZE: 2.82 CM
LEFT ATRIUM VOLUME INDEX: 15 ML/M2
LEFT ATRIUM VOLUME: 22.59 CM3
LEFT INTERNAL DIMENSION IN SYSTOLE: 1.93 CM (ref 2.1–4)
LEFT VENTRICLE DIASTOLIC VOLUME INDEX: 41.94 ML/M2
LEFT VENTRICLE DIASTOLIC VOLUME: 63.13 ML
LEFT VENTRICLE MASS INDEX: 81 G/M2
LEFT VENTRICLE SYSTOLIC VOLUME INDEX: 7.7 ML/M2
LEFT VENTRICLE SYSTOLIC VOLUME: 11.65 ML
LEFT VENTRICULAR INTERNAL DIMENSION IN DIASTOLE: 3.83 CM (ref 3.5–6)
LEFT VENTRICULAR MASS: 122.14 G
LV LATERAL E/E' RATIO: 8.36 M/S
LV SEPTAL E/E' RATIO: 9.2 M/S
MV PEAK A VEL: 0.71 M/S
MV PEAK E VEL: 0.92 M/S
NUC REST EJECTION FRACTION: 93
NUC STRESS EJECTION FRACTION: 88 %
OHS CV CPX 1 MINUTE RECOVERY HEART RATE: 117 BPM
OHS CV CPX 85 PERCENT MAX PREDICTED HEART RATE MALE: 123
OHS CV CPX MAX PREDICTED HEART RATE: 144
OHS CV CPX PATIENT IS FEMALE: 1
OHS CV CPX PATIENT IS MALE: 0
OHS CV CPX PEAK DIASTOLIC BLOOD PRESSURE: 87 MMHG
OHS CV CPX PEAK HEAR RATE: 117 BPM
OHS CV CPX PEAK RATE PRESSURE PRODUCT: NORMAL
OHS CV CPX PEAK SYSTOLIC BLOOD PRESSURE: 181 MMHG
OHS CV CPX PERCENT MAX PREDICTED HEART RATE ACHIEVED: 81
OHS CV CPX RATE PRESSURE PRODUCT PRESENTING: NORMAL
PISA TR MAX VEL: 2.54 M/S
PULM VEIN S/D RATIO: 1.32
PV PEAK D VEL: 0.72 M/S
PV PEAK S VEL: 0.95 M/S
RA MAJOR: 3.39 CM
RA PRESSURE: 3 MMHG
RA WIDTH: 2.24 CM
RIGHT VENTRICULAR END-DIASTOLIC DIMENSION: 3.5 CM
SINUS: 2.12 CM
STJ: 2.26 CM
STRESS ECHO TARGET HR: 127.5 BPM
SYSTOLIC BLOOD PRESSURE: 181 MMHG
TDI LATERAL: 0.11 M/S
TDI SEPTAL: 0.1 M/S
TDI: 0.11 M/S
TR MAX PG: 26 MMHG
TRICUSPID ANNULAR PLANE SYSTOLIC EXCURSION: 2.35 CM
TV REST PULMONARY ARTERY PRESSURE: 29 MMHG

## 2019-08-15 PROCEDURE — 99999 PR PBB SHADOW E&M-EST. PATIENT-LVL III: CPT | Mod: PBBFAC,,, | Performed by: INTERNAL MEDICINE

## 2019-08-15 PROCEDURE — 99213 PR OFFICE/OUTPT VISIT, EST, LEVL III, 20-29 MIN: ICD-10-PCS | Mod: S$PBB,,, | Performed by: INTERNAL MEDICINE

## 2019-08-15 PROCEDURE — 99999 PR PBB SHADOW E&M-EST. PATIENT-LVL III: ICD-10-PCS | Mod: PBBFAC,,, | Performed by: INTERNAL MEDICINE

## 2019-08-15 PROCEDURE — 99213 OFFICE O/P EST LOW 20 MIN: CPT | Mod: PBBFAC | Performed by: INTERNAL MEDICINE

## 2019-08-15 PROCEDURE — 99213 OFFICE O/P EST LOW 20 MIN: CPT | Mod: S$PBB,,, | Performed by: INTERNAL MEDICINE

## 2019-08-15 RX ORDER — LETROZOLE 2.5 MG/1
2.5 TABLET, FILM COATED ORAL DAILY
Qty: 30 TABLET | Refills: 11 | Status: SHIPPED | OUTPATIENT
Start: 2019-08-15 | End: 2020-03-04 | Stop reason: ALTCHOICE

## 2019-08-16 ENCOUNTER — PATIENT MESSAGE (OUTPATIENT)
Dept: HEMATOLOGY/ONCOLOGY | Facility: CLINIC | Age: 69
End: 2019-08-16

## 2019-08-16 ENCOUNTER — PATIENT MESSAGE (OUTPATIENT)
Dept: CARDIOLOGY | Facility: CLINIC | Age: 69
End: 2019-08-16

## 2019-08-16 RX ORDER — LORAZEPAM 0.5 MG/1
0.5 TABLET ORAL EVERY 6 HOURS PRN
Qty: 10 TABLET | Refills: 0 | Status: SHIPPED | OUTPATIENT
Start: 2019-08-16 | End: 2020-01-21

## 2019-08-16 NOTE — TELEPHONE ENCOUNTER
----- Message from Aida Lisa MD sent at 8/15/2019  7:47 PM CDT -----  Discuss test results with patient she is doing okay at okay for Ativan 0.5 mg half tablets q.h.s. p.r.n. deacon:  Just 10 tablets thank

## 2019-09-28 DIAGNOSIS — Z17.0 MALIGNANT NEOPLASM OF UPPER-OUTER QUADRANT OF RIGHT BREAST IN FEMALE, ESTROGEN RECEPTOR POSITIVE: ICD-10-CM

## 2019-09-28 DIAGNOSIS — C50.411 MALIGNANT NEOPLASM OF UPPER-OUTER QUADRANT OF RIGHT BREAST IN FEMALE, ESTROGEN RECEPTOR POSITIVE: ICD-10-CM

## 2019-09-30 RX ORDER — LETROZOLE 2.5 MG/1
TABLET, FILM COATED ORAL
Qty: 30 TABLET | Refills: 11 | Status: SHIPPED | OUTPATIENT
Start: 2019-09-30 | End: 2019-12-18

## 2019-10-22 ENCOUNTER — LAB VISIT (OUTPATIENT)
Dept: LAB | Facility: HOSPITAL | Age: 69
End: 2019-10-22
Attending: INTERNAL MEDICINE
Payer: MEDICARE

## 2019-10-22 DIAGNOSIS — I49.49 PREMATURE BEATS: ICD-10-CM

## 2019-10-22 DIAGNOSIS — I10 ESSENTIAL HYPERTENSION: ICD-10-CM

## 2019-10-22 DIAGNOSIS — E78.00 HYPERCHOLESTEROLEMIA: ICD-10-CM

## 2019-10-22 LAB
ALBUMIN SERPL BCP-MCNC: 4.5 G/DL (ref 3.5–5.2)
ALP SERPL-CCNC: 48 U/L (ref 55–135)
ALT SERPL W/O P-5'-P-CCNC: 27 U/L (ref 10–44)
ANION GAP SERPL CALC-SCNC: 12 MMOL/L (ref 8–16)
AST SERPL-CCNC: 33 U/L (ref 10–40)
BILIRUB SERPL-MCNC: 0.5 MG/DL (ref 0.1–1)
BUN SERPL-MCNC: 16 MG/DL (ref 8–23)
CALCIUM SERPL-MCNC: 10.2 MG/DL (ref 8.7–10.5)
CHLORIDE SERPL-SCNC: 107 MMOL/L (ref 95–110)
CHOLEST SERPL-MCNC: 240 MG/DL (ref 120–199)
CHOLEST/HDLC SERPL: 4.6 {RATIO} (ref 2–5)
CO2 SERPL-SCNC: 17 MMOL/L (ref 23–29)
CREAT SERPL-MCNC: 0.8 MG/DL (ref 0.5–1.4)
EST. GFR  (AFRICAN AMERICAN): >60 ML/MIN/1.73 M^2
EST. GFR  (NON AFRICAN AMERICAN): >60 ML/MIN/1.73 M^2
GLUCOSE SERPL-MCNC: 87 MG/DL (ref 70–110)
HDLC SERPL-MCNC: 52 MG/DL (ref 40–75)
HDLC SERPL: 21.7 % (ref 20–50)
LDLC SERPL CALC-MCNC: 156.2 MG/DL (ref 63–159)
NONHDLC SERPL-MCNC: 188 MG/DL
POTASSIUM SERPL-SCNC: 5 MMOL/L (ref 3.5–5.1)
PROT SERPL-MCNC: 8.4 G/DL (ref 6–8.4)
SODIUM SERPL-SCNC: 136 MMOL/L (ref 136–145)
TRIGL SERPL-MCNC: 159 MG/DL (ref 30–150)

## 2019-10-22 PROCEDURE — 80053 COMPREHEN METABOLIC PANEL: CPT

## 2019-10-22 PROCEDURE — 36415 COLL VENOUS BLD VENIPUNCTURE: CPT

## 2019-10-22 PROCEDURE — 80061 LIPID PANEL: CPT

## 2019-10-23 ENCOUNTER — TELEPHONE (OUTPATIENT)
Dept: CARDIOLOGY | Facility: CLINIC | Age: 69
End: 2019-10-23

## 2019-10-25 ENCOUNTER — TELEPHONE (OUTPATIENT)
Dept: CARDIOLOGY | Facility: CLINIC | Age: 69
End: 2019-10-25

## 2019-10-25 NOTE — TELEPHONE ENCOUNTER
----- Message from Nanda Bernard sent at 10/25/2019  7:44 AM CDT -----  Contact: Patient  Type:  Sooner Apoointment Request    Caller is requesting a sooner appointment.  Caller declined first available appointment listed below.  Caller will not accept being placed on the waitlist and is requesting a message be sent to doctor.    Name of Caller:  Patient  When is the first available appointment? No avail appt on her preferred days  Symptoms:  10 mo f/u  Best Call Back Number:    Additional Information:  Calling to reschedule her appt today at 10 due to the weather.

## 2019-10-29 ENCOUNTER — TELEPHONE (OUTPATIENT)
Dept: DERMATOLOGY | Facility: CLINIC | Age: 69
End: 2019-10-29

## 2019-10-29 NOTE — TELEPHONE ENCOUNTER
Spoke to pt, she now has an appointment.  ----- Message from Charles Oglesby sent at 10/29/2019  8:44 AM CDT -----  Contact: JESSICA RUIZ [[id number]]  Name of Who is Calling: JESSICA RUIZ [[id number]]      What is the request in detail: Would like to speak with staff in regards to a new patient appointment for a cyst on the back of her head. Next available is in January and patient only wants to see Dr. Rios. Please advise for a sooner appointment.       Can the clinic reply by [facility]: no      [facility] [phone]

## 2019-11-01 ENCOUNTER — IMMUNIZATION (OUTPATIENT)
Dept: FAMILY MEDICINE | Facility: CLINIC | Age: 69
End: 2019-11-01
Payer: MEDICARE

## 2019-11-01 ENCOUNTER — OFFICE VISIT (OUTPATIENT)
Dept: CARDIOLOGY | Facility: CLINIC | Age: 69
End: 2019-11-01
Payer: MEDICARE

## 2019-11-01 VITALS
SYSTOLIC BLOOD PRESSURE: 134 MMHG | HEART RATE: 75 BPM | WEIGHT: 113.75 LBS | BODY MASS INDEX: 20.93 KG/M2 | DIASTOLIC BLOOD PRESSURE: 78 MMHG | HEIGHT: 62 IN

## 2019-11-01 DIAGNOSIS — I49.49 PREMATURE BEATS: ICD-10-CM

## 2019-11-01 DIAGNOSIS — E78.00 HYPERCHOLESTEROLEMIA: ICD-10-CM

## 2019-11-01 DIAGNOSIS — I35.8 AORTIC VALVE SCLEROSIS: ICD-10-CM

## 2019-11-01 DIAGNOSIS — I10 ESSENTIAL HYPERTENSION: Primary | ICD-10-CM

## 2019-11-01 PROBLEM — R01.1 UNDIAGNOSED CARDIAC MURMURS: Status: RESOLVED | Noted: 2019-07-19 | Resolved: 2019-11-01

## 2019-11-01 PROCEDURE — 99214 OFFICE O/P EST MOD 30 MIN: CPT | Mod: PBBFAC,PO | Performed by: INTERNAL MEDICINE

## 2019-11-01 PROCEDURE — 90662 IIV NO PRSV INCREASED AG IM: CPT | Mod: PBBFAC,PO

## 2019-11-01 PROCEDURE — 99213 OFFICE O/P EST LOW 20 MIN: CPT | Mod: S$PBB,,, | Performed by: INTERNAL MEDICINE

## 2019-11-01 PROCEDURE — G0008 ADMIN INFLUENZA VIRUS VAC: HCPCS | Mod: PBBFAC,PO

## 2019-11-01 PROCEDURE — 99999 PR PBB SHADOW E&M-EST. PATIENT-LVL IV: CPT | Mod: PBBFAC,,, | Performed by: INTERNAL MEDICINE

## 2019-11-01 PROCEDURE — 99999 PR PBB SHADOW E&M-EST. PATIENT-LVL IV: ICD-10-PCS | Mod: PBBFAC,,, | Performed by: INTERNAL MEDICINE

## 2019-11-01 PROCEDURE — 99213 PR OFFICE/OUTPT VISIT, EST, LEVL III, 20-29 MIN: ICD-10-PCS | Mod: S$PBB,,, | Performed by: INTERNAL MEDICINE

## 2019-11-01 RX ORDER — MECLIZINE HYDROCHLORIDE 25 MG/1
12.5 TABLET ORAL NIGHTLY
COMMUNITY
Start: 2019-09-23 | End: 2020-04-28 | Stop reason: SDUPTHER

## 2019-11-01 RX ORDER — METOPROLOL SUCCINATE 50 MG/1
50 TABLET, EXTENDED RELEASE ORAL DAILY
COMMUNITY
End: 2019-11-01 | Stop reason: SDUPTHER

## 2019-11-01 RX ORDER — LISINOPRIL 10 MG/1
10 TABLET ORAL
Qty: 90 TABLET | Refills: 1 | Status: SHIPPED | OUTPATIENT
Start: 2019-11-01 | End: 2021-05-18

## 2019-11-01 RX ORDER — OMEPRAZOLE 20 MG/1
20 TABLET, DELAYED RELEASE ORAL
COMMUNITY

## 2019-11-01 RX ORDER — METOPROLOL SUCCINATE 50 MG/1
50 TABLET, EXTENDED RELEASE ORAL DAILY
Qty: 90 TABLET | Refills: 1 | Status: SHIPPED | OUTPATIENT
Start: 2019-11-01 | End: 2020-05-21 | Stop reason: SDUPTHER

## 2019-11-01 NOTE — PROGRESS NOTES
Subjective:    Patient ID:  Cecilia Valenzuela is a 69 y.o. female who presents for Hypertension (Labs) and Chest Pain        HPI  DISCUSSED TEST, ECHO AV/ MV SCLEROSIS, NORMAL STRESS, BP LOG OK, HAD [place] VISIT IN [place], FOR VERTIGO, NEGATIVE MRA, DX WITH POSITIONAL VERTIGO, BETTER WITH MEDS, AND EXERCISES, , CMP OK, INTOLERANT OF STATINS, SEE ROS    Past Medical History:   Diagnosis Date    Arthritis     Atherosclerosis     in left carotid artery    Fibrocystic breast     GERD (gastroesophageal reflux disease)     Hyperlipidemia     Hypertension     Malignant neoplasm of upper-outer quadrant of right female breast 2017    right    Mitral valve prolapse     Neurofibromatosis     Osteopenia     Premature beats     Raynaud's disease     Seborrheic keratosis     Thyroid nodule     Tinnitus     Undiagnosed cardiac murmurs 2019     Past Surgical History:   Procedure Laterality Date    BACK SURGERY      BREAST BIOPSY Right     CATARACT EXTRACTION Bilateral      SECTION      x5    DILATION AND CURETTAGE OF UTERUS      miss Ab    HYSTERECTOMY      MASTECTOMY Right      Family History   Problem Relation Age of Onset    Rheum arthritis Mother     Breast cancer Mother         over 85    Hypertension Mother     Heart disease Mother     Parkinsonism Father     Cancer Father     Breast cancer Sister 40        DCIS    Breast cancer Maternal Aunt         60's    Breast cancer Sister 50        DCIS    Ovarian cancer Neg Hx     Colon cancer Neg Hx      Social History     Socioeconomic History    Marital status:      Spouse name: Not on file    Number of children: Not on file    Years of education: Not on file    Highest education level: Not on file   Occupational History    Not on file   Social Needs    Financial resource strain: Not on file    Food insecurity:     Worry: Not on file     Inability: Not on file    Transportation needs:     Medical: Not on  file     Non-medical: Not on file   Tobacco Use    Smoking status: Never Smoker    Smokeless tobacco: Never Used   Substance and Sexual Activity    Alcohol use: No    Drug use: No    Sexual activity: Never     Partners: Male   Lifestyle    Physical activity:     Days per week: Not on file     Minutes per session: Not on file    Stress: Not on file   Relationships    Social connections:     Talks on phone: Not on file     Gets together: Not on file     Attends Caodaism service: Not on file     Active member of club or organization: Not on file     Attends meetings of clubs or organizations: Not on file     Relationship status: Not on file   Other Topics Concern    Not on file   Social History Narrative    Not on file       Review of patient's allergies indicates:   Allergen Reactions    Zofran [ondansetron hcl (pf)] Nausea Only    Augmentin [amoxicillin-pot clavulanate]     Corticosteroids (glucocorticoids) Other (See Comments)     Heart palpitations    Dilaudid [hydromorphone] Nausea And Vomiting     Does not help with pain    Fentanyl     Neosporin (neomycin-polymyx)     Norvasc [amlodipine]     Tetracyclines        Current Outpatient Medications:     acetaminophen (TYLENOL) 325 MG tablet, Take 325 mg by mouth once daily. , Disp: , Rfl:     aspirin (ECOTRIN) 81 MG EC tablet, Take 81 mg by mouth once daily., Disp: , Rfl:     azelastine (ASTELIN) 137 mcg (0.1 %) nasal spray, 1 spray by Nasal route as needed. , Disp: , Rfl:     calcium carbonate-vitamin D3 (CALCIUM 500 + D) 500 mg(1,250mg) -400 unit Tab, Take 1 tablet by mouth 2 (two) times daily with meals. , Disp: , Rfl:     co-enzyme Q-10 30 mg capsule, Take 200 mg by mouth 2 (two) times daily. 200mg daily , Disp: , Rfl:     denosumab (PROLIA) 60 mg/mL Syrg, Inject 60 mg into the skin., Disp: , Rfl:     glucosamine HCl/chondroitin springer (GLUCOSAMINE-CHONDROITIN ORAL), Take by mouth once daily. 1000mg /800mg         takes 500mg/400 capsule  twice a day, Disp: , Rfl:     IBUPROFEN (ADVIL ORAL), Take by mouth Daily. , Disp: , Rfl:     influenza (FLUZONE HIGH-DOSE 2018-19, PF,) 180 mcg/0.5 mL vaccine, Inject 0.5 mLs into the muscle., Disp: 0.5 mL, Rfl: 0    letrozole (FEMARA) 2.5 mg Tab, Take 1 tablet (2.5 mg total) by mouth once daily., Disp: 30 tablet, Rfl: 11    lisinopril 10 MG tablet, Take 1 tablet (10 mg total) by mouth as needed. FOR SBP > 150, Disp: 90 tablet, Rfl: 1    magnesium 250 mg Tab, Take 250 mg by mouth once daily., Disp: , Rfl:     meclizine (ANTIVERT) 25 mg tablet, Take 12.5 mg by mouth nightly. , Disp: , Rfl:     metoprolol succinate (TOPROL-XL) 50 MG 24 hr tablet, Take 1 tablet (50 mg total) by mouth once daily., Disp: 90 tablet, Rfl: 1    multivitamin (THERAGRAN) per tablet, Take 1 tablet by mouth once daily., Disp: , Rfl:     nitroGLYCERIN (NITROSTAT) 0.4 MG SL tablet, Place 1 tablet (0.4 mg total) under the tongue every 5 (five) minutes as needed., Disp: 25 tablet, Rfl: 0    omeprazole (PRILOSEC OTC) 20 MG tablet, Take 20 mg by mouth 2 (two) times daily before meals. , Disp: , Rfl:     PATADAY 0.2 % Drop, instill 1 drop into both eyes once daily, Disp: , Rfl: 0    esomeprazole (NEXIUM 24HR) 20 MG capsule, , Disp: , Rfl:     esomeprazole (NEXIUM) 20 MG capsule, Take 20 mg by mouth once daily., Disp: , Rfl:     letrozole (FEMARA) 2.5 mg Tab, TAKE 1 TABLET BY MOUTH DAILY, Disp: 30 tablet, Rfl: 11    LORazepam (ATIVAN) 0.5 MG tablet, Take 1 tablet (0.5 mg total) by mouth every 6 (six) hours as needed for Anxiety., Disp: 10 tablet, Rfl: 0    Review of Systems   Constitution: Negative for chills, diaphoresis, fever, malaise/fatigue and night sweats.   HENT: Negative for congestion and nosebleeds.    Eyes: Negative for blurred vision and visual disturbance.   Cardiovascular: Negative for chest pain, claudication, cyanosis, dyspnea on exertion, irregular heartbeat, leg swelling, near-syncope, orthopnea, palpitations (BETTER  "WITH MEDS), paroxysmal nocturnal dyspnea and syncope.   Respiratory: Negative for cough, hemoptysis, shortness of breath and wheezing.    Endocrine: Negative for cold intolerance, heat intolerance and polyuria.   Hematologic/Lymphatic: Negative for adenopathy. Does not bruise/bleed easily.   Skin: Negative for nail changes and rash.   Musculoskeletal: Negative for back pain, falls, joint pain and muscle cramps (OCC).   Gastrointestinal: Negative for abdominal pain, change in bowel habit, dysphagia, heartburn, hematemesis, jaundice, melena and vomiting.   Genitourinary: Negative for dysuria, flank pain and frequency.   Neurological: Positive for vertigo. Negative for brief paralysis, dizziness, focal weakness, light-headedness, loss of balance, numbness, tremors and weakness.   Psychiatric/Behavioral: Negative for altered mental status, depression and memory loss. The patient is not nervous/anxious.    Allergic/Immunologic: Negative for hives.        Objective:      Vitals:    11/01/19 0949   BP: 134/78   Pulse: 75   Weight: 51.6 kg (113 lb 12.1 oz)   Height: 5' 2" (1.575 m)   PainSc:   3     Body mass index is 20.81 kg/m².    Physical Exam   Constitutional: She is oriented to person, place, and time. She appears well-developed and well-nourished. She is active.   HENT:   Head: Normocephalic and atraumatic.   Mouth/Throat: Oropharynx is clear and moist and mucous membranes are normal.   Eyes: Pupils are equal, round, and reactive to light. EOM are normal.   Neck: Trachea normal and normal range of motion. Neck supple. Normal carotid pulses, no hepatojugular reflux and no JVD present. Carotid bruit is not present. No tracheal deviation, no edema and no erythema present. No thyromegaly present.   Cardiovascular: Normal rate and regular rhythm.  No extrasystoles are present. PMI is not displaced. Exam reveals no gallop and no friction rub.   Murmur heard.   Systolic murmur is present with a grade of 1/6 at the lower " left sternal border.  Pulses:       Carotid pulses are 2+ on the right side, and 2+ on the left side.       Radial pulses are 2+ on the right side, and 2+ on the left side.        Posterior tibial pulses are 2+ on the right side, and 2+ on the left side.   Pulmonary/Chest: Effort normal and breath sounds normal. No tachypnea and no bradypnea. She has no wheezes. She has no rales. She exhibits no tenderness.   Abdominal: Soft. Bowel sounds are normal. She exhibits no distension and no mass. There is no hepatosplenomegaly. There is no tenderness. There is no guarding and no CVA tenderness.   Musculoskeletal: Normal range of motion. She exhibits no edema or tenderness.   Lymphadenopathy:     She has no cervical adenopathy.   Neurological: She is alert and oriented to person, place, and time. She has normal strength. She displays no tremor. No cranial nerve deficit.   Skin: Skin is warm and dry. No rash noted. No cyanosis or erythema. No pallor.   Psychiatric: She has a normal mood and affect. Her speech is normal and behavior is normal.               ..    Chemistry        Component Value Date/Time     10/22/2019 0845    K 5.0 10/22/2019 0845     10/22/2019 0845    CO2 17 (L) 10/22/2019 0845    BUN 16 10/22/2019 0845    CREATININE 0.8 10/22/2019 0845    GLU 87 10/22/2019 0845        Component Value Date/Time    CALCIUM 10.2 10/22/2019 0845    ALKPHOS 48 (L) 10/22/2019 0845    AST 33 10/22/2019 0845    ALT 27 10/22/2019 0845    BILITOT 0.5 10/22/2019 0845    ESTGFRAFRICA >60 10/22/2019 0845    EGFRNONAA >60 10/22/2019 0845            ..  Lab Results   Component Value Date    CHOL 240 (H) 10/22/2019    CHOL 218 (H) 11/02/2017     Lab Results   Component Value Date    HDL 52 10/22/2019    HDL 57 11/02/2017     Lab Results   Component Value Date    LDLCALC 156.2 10/22/2019    LDLCALC 138.4 11/02/2017     Lab Results   Component Value Date    TRIG 159 (H) 10/22/2019    TRIG 113 11/02/2017     Lab Results    Component Value Date    CHOLHDL 21.7 10/22/2019    CHOLHDL 26.1 11/02/2017     ..  Lab Results   Component Value Date    WBC 8.30 07/14/2019    HGB 14.5 07/14/2019    HCT 42.9 07/14/2019    MCV 88 07/14/2019     07/14/2019       Test(s) Reviewed  I have reviewed the following in detail:  [x] Stress test   [] Angiography   [x] Echocardiogram   [x] Labs   [x] Other:       Assessment:         ICD-10-CM ICD-9-CM   1. Essential hypertension I10 401.9   2. Premature beats I49.49 427.60   3. Aortic valve sclerosis I35.8 424.1   4. Hypercholesterolemia E78.00 272.0     Problem List Items Addressed This Visit        Cardiac/Vascular    Essential hypertension - Primary    Relevant Orders    Basic metabolic panel    Premature beats    Relevant Orders    Basic metabolic panel    Hypercholesterolemia    Relevant Orders    Basic metabolic panel    Aortic valve sclerosis    Relevant Orders    Basic metabolic panel           Plan:     ALL CV CLINICALLY STABLE, NO ANGINA, NO HF, NO TIA, NO SIGNIFICANT CLINICAL ARRHYTHMIA,CONTINUE CURRENT MEDS, EDUCATION, DIET, EXERCISE, RETURN TO CLINIC IN 6 MO WITH LABS      Essential hypertension  -     Basic metabolic panel; Future; Expected date: 05/01/2020    Premature beats  -     Basic metabolic panel; Future; Expected date: 05/01/2020    Aortic valve sclerosis  -     Basic metabolic panel; Future; Expected date: 05/01/2020    Hypercholesterolemia  -     Basic metabolic panel; Future; Expected date: 05/01/2020    Other orders  -     metoprolol succinate (TOPROL-XL) 50 MG 24 hr tablet; Take 1 tablet (50 mg total) by mouth once daily.  Dispense: 90 tablet; Refill: 1  -     lisinopril 10 MG tablet; Take 1 tablet (10 mg total) by mouth as needed. FOR SBP > 150  Dispense: 90 tablet; Refill: 1    RTC Low level/low impact aerobic exercise 5x's/wk. Heart healthy diet and risk factor modification.    See labs and med orders.    Aerobic exercise 5x's/wk. Heart healthy diet and risk factor  modification.    See labs and med orders.

## 2019-11-14 ENCOUNTER — PATIENT MESSAGE (OUTPATIENT)
Dept: CARDIOLOGY | Facility: CLINIC | Age: 69
End: 2019-11-14

## 2019-11-14 NOTE — TELEPHONE ENCOUNTER
Please advise: patient got an Rx for 10 tablets of lorazepam in August. Was she supposed to receive more after her appt on 11/1? Also she has questions about her aortic valve stenosis

## 2019-11-18 ENCOUNTER — OFFICE VISIT (OUTPATIENT)
Dept: DERMATOLOGY | Facility: CLINIC | Age: 69
End: 2019-11-18
Payer: MEDICARE

## 2019-11-18 DIAGNOSIS — D48.0 NEOPLASM OF UNCERTAIN BEHAVIOR OF BONE OF SKULL: Primary | ICD-10-CM

## 2019-11-18 PROCEDURE — 99202 PR OFFICE/OUTPT VISIT, NEW, LEVL II, 15-29 MIN: ICD-10-PCS | Mod: S$GLB,,, | Performed by: DERMATOLOGY

## 2019-11-18 PROCEDURE — 99202 OFFICE O/P NEW SF 15 MIN: CPT | Mod: S$GLB,,, | Performed by: DERMATOLOGY

## 2019-11-18 NOTE — PROGRESS NOTES
Subjective:       Patient ID:  Cecilia Valenzuela is a 69 y.o. female who presents for   Chief Complaint   Patient presents with    Cyst     L scalp     Cyst  - Initial  Affected locations: scalp  Duration: 30 years  Signs / symptoms: tender (has become symptomatic over the last 2 months after a fall)  Severity: mild to moderate  Timing: constant  Aggravated by: pressure  Relieving factors/Treatments tried: nothing (MRI taken after her fall was normal)      Review of Systems   Skin: Negative for itching, rash and recent sunburn.   Hematologic/Lymphatic: Bruises/bleeds easily (due to ASA).        Objective:    Physical Exam   Constitutional: She appears well-developed and well-nourished. No distress.   HENT:   Mouth/Throat: Lips normal.    Eyes: Lids are normal.  No conjunctival no injection.   Neurological: She is alert and oriented to person, place, and time. She is not disoriented.   Psychiatric: She has a normal mood and affect.   Skin:   Areas Examined (abnormalities noted in diagram):   Scalp / Hair Palpated and Inspected  Head / Face Inspection Performed  Neck Inspection Performed              Diagram Legend     Erythematous scaling macule/papule c/w actinic keratosis       Vascular papule c/w angioma      Pigmented verrucoid papule/plaque c/w seborrheic keratosis      Yellow umbilicated papule c/w sebaceous hyperplasia      Irregularly shaped tan macule c/w lentigo     1-2 mm smooth white papules consistent with Milia      Movable subcutaneous cyst with punctum c/w epidermal inclusion cyst      Subcutaneous movable cyst c/w pilar cyst      Firm pink to brown papule c/w dermatofibroma      Pedunculated fleshy papule(s) c/w skin tag(s)      Evenly pigmented macule c/w junctional nevus     Mildly variegated pigmented, slightly irregular-bordered macule c/w mildly atypical nevus      Flesh colored to evenly pigmented papule c/w intradermal nevus       Pink pearly papule/plaque c/w basal cell carcinoma       Erythematous hyperkeratotic cursted plaque c/w SCC      Surgical scar with no sign of skin cancer recurrence      Open and closed comedones      Inflammatory papules and pustules      Verrucoid papule consistent consistent with wart     Erythematous eczematous patches and plaques     Dystrophic onycholytic nail with subungual debris c/w onychomycosis     Umbilicated papule    Erythematous-base heme-crusted tan verrucoid plaque consistent with inflamed seborrheic keratosis     Erythematous Silvery Scaling Plaque c/w Psoriasis     See annotation      Assessment / Plan:        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 Appears to be fixed and bony.  Patient states that recent MRI was done in [place], but her ENT has a copy. Recommended that she return to ENT to see if lesion can be seen on MRI. If not, further imaging may be necessary.    Follow up if symptoms worsen or fail to improve.

## 2019-12-17 NOTE — PROGRESS NOTES
Subjective:       Patient ID: Cecilia Valenzuela is a 69 y.o. female.    Chief Complaint: No chief complaint on file.    HPI Mrs. Valenzuela is a 69-year-old female with right breast cancer.  She started adjuvant letrozole in 11/2017.   At the time of her last visit we discussed possibly changing to exemestane due to musculoskeletal symptoms.    She reports that her arthralgias are improved.  Her major recent issue is vertigo which started September 22nd.  She had had a fall and struck her head the week before that.  She is currently taking Antivert with benefit.  Only other complaint is that she has been feeling a bit more emotional and having some crying.  She relates that to the upcoming holiday.        Onc History:  Screening mammography on March 22, 2017 showed questionable asymmetry in the upper outer portion of the right breast.  There was no palpable abnormality.      Follow-up diagnostic mammogram on August 2, 2017 showed a high density mass in the upper-outer quadrant of the right breast.  By ultrasound this measured 1.2 x 0.7 x 1.2 cm.     Needle biopsy on August 7, 2017 showed infiltrating lobular carcinoma which was 99% ER positive, 84% NM positive.  HER-2 was 2+ but negative by FISH.  Ki-67 was 10%.     Right mastectomy and sentinel lymph node biopsy was performed on September 14th, 2017.  That showed a 2.7 cm infiltrating lobular carcinoma.  2 sentinel lymph nodes were negative for malignancy.  Final pathological stage T2 N0 - stage IIA.     Oncotype score was 19.    Mammogram in May 2019 was unremarkable.  Review of Systems   Constitutional: Negative for appetite change and unexpected weight change.   Eyes: Negative for visual disturbance.   Respiratory: Negative for cough and shortness of breath.    Cardiovascular: Negative for chest pain.   Gastrointestinal: Negative for abdominal pain and diarrhea.   Genitourinary: Negative for frequency.   Musculoskeletal: Positive for arthralgias (Improved).  Negative for back pain.   Skin: Negative for rash.   Neurological: Negative for headaches.        Vertigo   Hematological: Negative for adenopathy.   Psychiatric/Behavioral: Positive for dysphoric mood. The patient is nervous/anxious.        Objective:      Physical Exam   Constitutional: She is oriented to person, place, and time. She appears well-developed and well-nourished. No distress.   Cardiovascular: Normal rate and regular rhythm.   Pulmonary/Chest: Effort normal and breath sounds normal. She has no wheezes. She has no rales. Left breast exhibits no mass, no nipple discharge and no skin change.       Abdominal: Soft. She exhibits no mass. There is no tenderness.   Lymphadenopathy:     She has no cervical adenopathy.   Neurological: She is alert and oriented to person, place, and time.   Psychiatric: She has a normal mood and affect. Her behavior is normal. Thought content normal.   Vitals reviewed.      Assessment:       1. Malignant neoplasm of upper-outer quadrant of right breast in female, estrogen receptor positive        Plan:       She will continue her letrozole and return to clinic 5 months with left mammogram.      She will take a brief break for letrozole and see if that helps her emotional status.

## 2019-12-18 ENCOUNTER — OFFICE VISIT (OUTPATIENT)
Dept: HEMATOLOGY/ONCOLOGY | Facility: CLINIC | Age: 69
End: 2019-12-18
Payer: MEDICARE

## 2019-12-18 VITALS
OXYGEN SATURATION: 100 % | HEIGHT: 62 IN | DIASTOLIC BLOOD PRESSURE: 71 MMHG | HEART RATE: 61 BPM | RESPIRATION RATE: 16 BRPM | WEIGHT: 109.81 LBS | BODY MASS INDEX: 20.21 KG/M2 | SYSTOLIC BLOOD PRESSURE: 145 MMHG | TEMPERATURE: 98 F

## 2019-12-18 DIAGNOSIS — C50.411 MALIGNANT NEOPLASM OF UPPER-OUTER QUADRANT OF RIGHT BREAST IN FEMALE, ESTROGEN RECEPTOR POSITIVE: Primary | ICD-10-CM

## 2019-12-18 DIAGNOSIS — Z17.0 MALIGNANT NEOPLASM OF UPPER-OUTER QUADRANT OF RIGHT BREAST IN FEMALE, ESTROGEN RECEPTOR POSITIVE: Primary | ICD-10-CM

## 2019-12-18 DIAGNOSIS — Z12.31 ENCOUNTER FOR SCREENING MAMMOGRAM FOR MALIGNANT NEOPLASM OF BREAST: ICD-10-CM

## 2019-12-18 PROCEDURE — 1159F PR MEDICATION LIST DOCUMENTED IN MEDICAL RECORD: ICD-10-PCS | Mod: ,,, | Performed by: INTERNAL MEDICINE

## 2019-12-18 PROCEDURE — 99215 OFFICE O/P EST HI 40 MIN: CPT | Mod: PBBFAC | Performed by: INTERNAL MEDICINE

## 2019-12-18 PROCEDURE — 99999 PR PBB SHADOW E&M-EST. PATIENT-LVL V: ICD-10-PCS | Mod: PBBFAC,,, | Performed by: INTERNAL MEDICINE

## 2019-12-18 PROCEDURE — 1159F MED LIST DOCD IN RCRD: CPT | Mod: ,,, | Performed by: INTERNAL MEDICINE

## 2019-12-18 PROCEDURE — 99999 PR PBB SHADOW E&M-EST. PATIENT-LVL V: CPT | Mod: PBBFAC,,, | Performed by: INTERNAL MEDICINE

## 2019-12-18 PROCEDURE — 1125F PR PAIN SEVERITY QUANTIFIED, PAIN PRESENT: ICD-10-PCS | Mod: ,,, | Performed by: INTERNAL MEDICINE

## 2019-12-18 PROCEDURE — 99213 PR OFFICE/OUTPT VISIT, EST, LEVL III, 20-29 MIN: ICD-10-PCS | Mod: S$PBB,,, | Performed by: INTERNAL MEDICINE

## 2019-12-18 PROCEDURE — 99213 OFFICE O/P EST LOW 20 MIN: CPT | Mod: S$PBB,,, | Performed by: INTERNAL MEDICINE

## 2019-12-18 PROCEDURE — 1125F AMNT PAIN NOTED PAIN PRSNT: CPT | Mod: ,,, | Performed by: INTERNAL MEDICINE

## 2019-12-18 RX ORDER — VITAMIN E (DL,TOCOPHERYL ACET) 90 MG
400 CAPSULE ORAL 2 TIMES DAILY
COMMUNITY
End: 2021-05-18

## 2019-12-18 RX ORDER — ACETAMINOPHEN 500 MG
500 TABLET ORAL DAILY
COMMUNITY
End: 2020-04-23

## 2019-12-26 ENCOUNTER — PATIENT MESSAGE (OUTPATIENT)
Dept: HEMATOLOGY/ONCOLOGY | Facility: CLINIC | Age: 69
End: 2019-12-26

## 2020-01-13 ENCOUNTER — TELEPHONE (OUTPATIENT)
Dept: CARDIOLOGY | Facility: CLINIC | Age: 70
End: 2020-01-13

## 2020-01-13 NOTE — TELEPHONE ENCOUNTER
Spoke to Kelly and informed to send over clearance form and will have Dr. Lisa review & send back. Kelly verbalized understanding

## 2020-01-13 NOTE — TELEPHONE ENCOUNTER
----- Message from Marielos Diaz sent at 1/13/2020  9:18 AM CST -----  Contact: Ms Mendoza /  [facility] [phone]  Type:  Medical Clearance for surgery    Name of Caller   Ms Mendoza states patient need medical clearance for surgery  When is the first available appointment?  Symptoms:  Best Call Back Number:[phone]  Additional Information:

## 2020-01-21 ENCOUNTER — OFFICE VISIT (OUTPATIENT)
Dept: FAMILY MEDICINE | Facility: CLINIC | Age: 70
End: 2020-01-21
Payer: MEDICARE

## 2020-01-21 VITALS
HEART RATE: 72 BPM | HEIGHT: 61 IN | DIASTOLIC BLOOD PRESSURE: 90 MMHG | SYSTOLIC BLOOD PRESSURE: 168 MMHG | BODY MASS INDEX: 20.58 KG/M2 | WEIGHT: 109 LBS

## 2020-01-21 DIAGNOSIS — E78.5 DYSLIPIDEMIA: ICD-10-CM

## 2020-01-21 DIAGNOSIS — K21.9 GASTROESOPHAGEAL REFLUX DISEASE WITHOUT ESOPHAGITIS: ICD-10-CM

## 2020-01-21 DIAGNOSIS — Z23 VACCINE FOR STREPTOCOCCUS PNEUMONIAE AND INFLUENZA: ICD-10-CM

## 2020-01-21 DIAGNOSIS — I34.1 MVP (MITRAL VALVE PROLAPSE): ICD-10-CM

## 2020-01-21 DIAGNOSIS — E04.1 THYROID NODULE: Primary | ICD-10-CM

## 2020-01-21 DIAGNOSIS — F41.9 ANXIETY: ICD-10-CM

## 2020-01-21 DIAGNOSIS — I10 ESSENTIAL HYPERTENSION: ICD-10-CM

## 2020-01-21 DIAGNOSIS — Z12.11 COLON CANCER SCREENING: ICD-10-CM

## 2020-01-21 PROCEDURE — 99204 PR OFFICE/OUTPT VISIT, NEW, LEVL IV, 45-59 MIN: ICD-10-PCS | Mod: 25,S$GLB,, | Performed by: NURSE PRACTITIONER

## 2020-01-21 PROCEDURE — 1159F PR MEDICATION LIST DOCUMENTED IN MEDICAL RECORD: ICD-10-PCS | Mod: S$GLB,,, | Performed by: NURSE PRACTITIONER

## 2020-01-21 PROCEDURE — 99204 OFFICE O/P NEW MOD 45 MIN: CPT | Mod: 25,S$GLB,, | Performed by: NURSE PRACTITIONER

## 2020-01-21 PROCEDURE — G0009 ADMIN PNEUMOCOCCAL VACCINE: HCPCS | Mod: S$GLB,,, | Performed by: NURSE PRACTITIONER

## 2020-01-21 PROCEDURE — 1159F MED LIST DOCD IN RCRD: CPT | Mod: S$GLB,,, | Performed by: NURSE PRACTITIONER

## 2020-01-21 PROCEDURE — 1126F AMNT PAIN NOTED NONE PRSNT: CPT | Mod: S$GLB,,, | Performed by: NURSE PRACTITIONER

## 2020-01-21 PROCEDURE — 90670 PNEUMOCOCCAL CONJUGATE VACCINE 13-VALENT LESS THAN 5YO & GREATER THAN: ICD-10-PCS | Mod: S$GLB,,, | Performed by: NURSE PRACTITIONER

## 2020-01-21 PROCEDURE — 1126F PR PAIN SEVERITY QUANTIFIED, NO PAIN PRESENT: ICD-10-PCS | Mod: S$GLB,,, | Performed by: NURSE PRACTITIONER

## 2020-01-21 PROCEDURE — G0009 PNEUMOCOCCAL CONJUGATE VACCINE 13-VALENT LESS THAN 5YO & GREATER THAN: ICD-10-PCS | Mod: S$GLB,,, | Performed by: NURSE PRACTITIONER

## 2020-01-21 PROCEDURE — 90670 PCV13 VACCINE IM: CPT | Mod: S$GLB,,, | Performed by: NURSE PRACTITIONER

## 2020-01-21 RX ORDER — ALPRAZOLAM 0.25 MG/1
0.25 TABLET ORAL DAILY PRN
Qty: 20 TABLET | Refills: 0 | Status: SHIPPED | OUTPATIENT
Start: 2020-01-21 | End: 2020-02-20 | Stop reason: ALTCHOICE
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
## 2020-01-22 ENCOUNTER — PATIENT MESSAGE (OUTPATIENT)
Dept: FAMILY MEDICINE | Facility: CLINIC | Age: 70
End: 2020-01-22

## 2020-01-22 ENCOUNTER — PATIENT MESSAGE (OUTPATIENT)
Dept: HEMATOLOGY/ONCOLOGY | Facility: CLINIC | Age: 70
End: 2020-01-22

## 2020-01-23 ENCOUNTER — HOSPITAL ENCOUNTER (OUTPATIENT)
Dept: RADIOLOGY | Facility: HOSPITAL | Age: 70
Discharge: HOME | End: 2020-01-23
Attending: NURSE PRACTITIONER
Payer: MEDICARE

## 2020-01-23 DIAGNOSIS — E04.1 THYROID NODULE: ICD-10-CM

## 2020-01-23 PROCEDURE — 76536 US EXAM OF HEAD AND NECK: CPT | Mod: TC,PO

## 2020-01-27 ENCOUNTER — PATIENT MESSAGE (OUTPATIENT)
Dept: FAMILY MEDICINE | Facility: CLINIC | Age: 70
End: 2020-01-27

## 2020-01-27 NOTE — TELEPHONE ENCOUNTER
Per /Cecilia if the kit says that its contraindicated then dont do the kit. You will need to do a colonoscopy.

## 2020-02-09 ENCOUNTER — TELEPHONE (OUTPATIENT)
Dept: FAMILY MEDICINE | Facility: CLINIC | Age: 70
End: 2020-02-09

## 2020-02-09 DIAGNOSIS — R19.5 POSITIVE COLORECTAL CANCER SCREENING USING COLOGUARD TEST: Primary | ICD-10-CM

## 2020-02-09 NOTE — TELEPHONE ENCOUNTER
Please call pt and let her know cologuard test was positive so recommend we refer her to GI for colonoscopy. Referral entered for Dr. Cifuentes

## 2020-02-09 NOTE — TELEPHONE ENCOUNTER
----- Message from Lesvia Lowery MA sent at 2/7/2020 11:55 AM CST -----      ----- Message -----  From: Elvia Ribera  Sent: 2/7/2020  11:05 AM CST  To: [facility] Mathew Staff    [facility]

## 2020-02-10 NOTE — TELEPHONE ENCOUNTER
Spoke to patient who understood the message.  She does not wish to see Dr Cifuentes.    She sees Dr Santiago Jason on [address] in [place].  Phone # [phone].    Please send referral and results to this doctor/ba

## 2020-02-18 ENCOUNTER — TELEPHONE (OUTPATIENT)
Dept: CARDIOLOGY | Facility: CLINIC | Age: 70
End: 2020-02-18

## 2020-02-18 ENCOUNTER — NURSE TRIAGE (OUTPATIENT)
Dept: ADMINISTRATIVE | Facility: CLINIC | Age: 70
End: 2020-02-18

## 2020-02-18 ENCOUNTER — PATIENT MESSAGE (OUTPATIENT)
Dept: CARDIOLOGY | Facility: CLINIC | Age: 70
End: 2020-02-18

## 2020-02-18 NOTE — TELEPHONE ENCOUNTER
"Cecilia states she has mitral valve prolapse (aortic valve sclerosis noted in chart ), and has had palpitations when under stress before.  States under a great deal of stress lately, and her BP has been going up for over 3 weeks.  This morning is 156/87 (usually 120-130's / 70's).  She is also reporting palpitations that began a week ago, intermittent at first, but now constant.  She states she feels her heart "pounding". Per [facility] triage protocol, recommend ED now for evaluation.  She is in [place], and states she would like to drive to [place] to be seen in clinic, as "they only give me IV ativan in ED for this".  Encouraged ED closest for initial evaluation / treatment, and explained that [facility] is [facility] facility.  She wants a call from Dr Lisa or his nurse in [place] clinic. [phone].  Recommend call to Cecilia as soon as possible with MD advice.     Reason for Disposition   Dizziness, lightheadedness, or weakness    Additional Information   Negative: Passed out (i.e., fainted, collapsed and was not responding)   Negative: Shock suspected (e.g., cold/pale/clammy skin, too weak to stand, low BP, rapid pulse)   Negative: Difficult to awaken or acting confused (e.g., disoriented, slurred speech)   Negative: Visible sweat on face or sweat dripping down face   Negative: Unable to walk, or can only walk with assistance (e.g., requires support)   Negative: Received SHOCK from implantable cardiac defibrillator and has persisting symptoms (i.e., palpitations, lightheadedness)   Negative: Sounds like a life-threatening emergency to the triager   Negative: Chest pain   Negative: Difficulty breathing    Protocols used: HEART RATE AND HEARTBEAT [id number]      "

## 2020-02-18 NOTE — TELEPHONE ENCOUNTER
High BP for two to three weeks and started to have palpitations. Takes lisinopril 10mg bid for BP >150. Wants to know if she can take another lisinopril. Advised her that only a physician could make that determination. Asked to speak with an on-call physician. Advised the patient that they would not make that determination since she was already advised to go to the ER. Verbalized understanding.     Reason for Disposition   Health Information question, no triage required and triager able to answer question    Protocols used: ST INFORMATION ONLY CALL-A-AH

## 2020-02-18 NOTE — TELEPHONE ENCOUNTER
----- Message from Erna Faust sent at 2/18/2020  1:42 PM CST -----  Type: Needs Medical Advice    Who Called:  Patient  Best Call Back Number: [phone]  Additional Information: Patient spoke with triage nurse earlier/requesting to speak with nurse/please call patient back.

## 2020-02-18 NOTE — TELEPHONE ENCOUNTER
----- Message from Alexandre Christianson sent at 2/18/2020 12:27 PM CST -----  Contact: pt  Type: Needs Medical Advice    Who Called:  pt    Best Call Back Number: [phone]  Additional Information: Pt needs to discuss her BP. Pt states she took her BP at 8:30 this morning and it was 135/82 pulse 64. Pt took her BP and pulse again at 12pm and her BP was 156/87 pulse 72. Pt stated she is starting to have palpations. Pt transferred to nurse on call per protocol.

## 2020-02-20 ENCOUNTER — OFFICE VISIT (OUTPATIENT)
Dept: FAMILY MEDICINE | Facility: CLINIC | Age: 70
End: 2020-02-20
Payer: MEDICARE

## 2020-02-20 DIAGNOSIS — I10 ESSENTIAL HYPERTENSION: ICD-10-CM

## 2020-02-20 DIAGNOSIS — R19.5 POSITIVE COLORECTAL CANCER SCREENING USING COLOGUARD TEST: ICD-10-CM

## 2020-02-20 DIAGNOSIS — F41.9 ANXIETY: Primary | ICD-10-CM

## 2020-02-20 PROCEDURE — 99213 PR OFFICE/OUTPT VISIT, EST, LEVL III, 20-29 MIN: ICD-10-PCS | Mod: S$GLB,,, | Performed by: NURSE PRACTITIONER

## 2020-02-20 PROCEDURE — 99213 OFFICE O/P EST LOW 20 MIN: CPT | Mod: S$GLB,,, | Performed by: NURSE PRACTITIONER

## 2020-02-20 RX ORDER — SERTRALINE HYDROCHLORIDE 50 MG/1
50 TABLET, FILM COATED ORAL DAILY
Qty: 30 TABLET | Refills: 5 | Status: SHIPPED | OUTPATIENT
Start: 2020-02-20 | End: 2020-02-28 | Stop reason: SINTOL

## 2020-02-20 RX ORDER — LORAZEPAM 0.5 MG/1
0.5 TABLET ORAL EVERY 12 HOURS PRN
Qty: 20 TABLET | Refills: 0 | Status: SHIPPED | OUTPATIENT
Start: 2020-02-20 | End: 2020-03-04 | Stop reason: SDUPTHER

## 2020-02-20 NOTE — PROGRESS NOTES
"  SUBJECTIVE:    Patient ID: Cecilia Valenzuela is a 69 y.o. female.    Chief Complaint: Hypertension (for the last 3 weeks//palpitations for the last 3 days//no bottles tb// pt states taking break from med (letrozole) took last on tuesday(2/18/2020) will start back taking  on next thrusday (2/27/2020))    Pt here for sick visit- reports "I'm having a stress attack" Reports chronic stress since Sept but recently it has been worse. Past few weeks has been having more palpitations and BP has been elevated. Called 's office and he instructed her to double up on lisinopril. Was given rx for xanax at her new pt appt here last month though she reports this isn't helping at all. Pt has had multiple ER visits in the past for palpitations and was given ativan prn which seemed to help. Brings in BP log and BP running higher- 150-170s/80-90s  Patient reports her daughter has encouraged her to get on Zoloft as her daughter is taking it and it is helping with her anxiety and depression  Patient reports she went saw GI in [place] after her colon card was positive.  He is scheduled her for colonoscopy though she is concerned that she is not going to be able to complete the prep due to her anxiety and palpitations.  He gave her the GoLYTELY to drink and she would prefer a different possibly more gentle prep      Lab Visit on 10/22/2019   Component Date Value Ref Range Status    Sodium 10/22/2019 136  136 - 145 mmol/L Final    Potassium 10/22/2019 5.0  3.5 - 5.1 mmol/L Final    Chloride 10/22/2019 107  95 - 110 mmol/L Final    CO2 10/22/2019 17* 23 - 29 mmol/L Final    Glucose 10/22/2019 87  70 - 110 mg/dL Final    BUN, Bld 10/22/2019 16  8 - 23 mg/dL Final    Creatinine 10/22/2019 0.8  0.5 - 1.4 mg/dL Final    Calcium 10/22/2019 10.2  8.7 - 10.5 mg/dL Final    Total Protein 10/22/2019 8.4  6.0 - 8.4 g/dL Final    Albumin 10/22/2019 4.5  3.5 - 5.2 g/dL Final    Total Bilirubin 10/22/2019 0.5  0.1 - 1.0 " mg/dL Final    Alkaline Phosphatase 10/22/2019 48* 55 - 135 U/L Final    AST 10/22/2019 33  10 - 40 U/L Final    ALT 10/22/2019 27  10 - 44 U/L Final    Anion Gap 10/22/2019 12  8 - 16 mmol/L Final    eGFR if African American 10/22/2019 >60  >60 mL/min/1.73 m^2 Final    eGFR if non African American 10/22/2019 >60  >60 mL/min/1.73 m^2 Final    Cholesterol 10/22/2019 240* 120 - 199 mg/dL Final    Triglycerides 10/22/2019 159* 30 - 150 mg/dL Final    HDL 10/22/2019 52  40 - 75 mg/dL Final    LDL Cholesterol 10/22/2019 156.2  63.0 - 159.0 mg/dL Final    Hdl/Cholesterol Ratio 10/22/2019 21.7  20.0 - 50.0 % Final    Total Cholesterol/HDL Ratio 10/22/2019 4.6  2.0 - 5.0 Final    Non-HDL Cholesterol 10/22/2019 188  mg/dL Final       Past Medical History:   Diagnosis Date    Arthritis     Atherosclerosis     in left carotid artery    Fibrocystic breast     GERD (gastroesophageal reflux disease)     Hyperlipidemia     Hypertension     Malignant neoplasm of upper-outer quadrant of right female breast 2017    right    Mitral valve prolapse     Neurofibromatosis     Osteopenia     Premature beats     Raynaud's disease     Seborrheic keratosis     Thyroid nodule     Tinnitus     Undiagnosed cardiac murmurs 2019     Past Surgical History:   Procedure Laterality Date    BACK SURGERY      BREAST BIOPSY Right     CATARACT EXTRACTION Bilateral      SECTION      x5    DILATION AND CURETTAGE OF UTERUS      miss Ab    HYSTERECTOMY      MASTECTOMY Right 2017     Family History   Problem Relation Age of Onset    Rheum arthritis Mother     Breast cancer Mother         over 85    Hypertension Mother     Heart disease Mother     Parkinsonism Father     Cancer Father     Breast cancer Sister 40        DCIS    Breast cancer Maternal Aunt         60's    Breast cancer Sister 50        DCIS    Ovarian cancer Neg Hx     Colon cancer Neg Hx     Melanoma Neg Hx        Marital  Status:   Alcohol History:  reports that she does not drink alcohol.  Tobacco History:  reports that she has never smoked. She has never used smokeless tobacco.  Drug History:  reports that she does not use drugs.    Health Maintenance Topics with due status: Not Due       Topic Last Completion Date    DEXA SCAN 01/04/2019    Lipid Panel 10/22/2019     Immunization History   Administered Date(s) Administered    Influenza - High Dose - PF (65 years and older) 10/06/2014, 11/12/2015, 10/11/2018, 11/01/2019    Pneumococcal Conjugate - 13 Valent 01/21/2020    Pneumococcal Polysaccharide - 23 Valent 10/06/2014       Review of patient's allergies indicates:   Allergen Reactions    Zofran [ondansetron hcl (pf)] Nausea Only    Augmentin [amoxicillin-pot clavulanate]     Corticosteroids (glucocorticoids) Other (See Comments)     Heart palpitations    Dilaudid [hydromorphone] Nausea And Vomiting     Does not help with pain    Fentanyl     Neosporin (neomycin-polymyx)     Norvasc [amlodipine]     Tetracyclines        Current Outpatient Medications:     acetaminophen (TYLENOL) 500 MG tablet, Take 500 mg by mouth., Disp: , Rfl:     aspirin (ECOTRIN) 81 MG EC tablet, Take 81 mg by mouth once daily., Disp: , Rfl:     azelastine (ASTELIN) 137 mcg (0.1 %) nasal spray, 1 spray by Nasal route as needed. , Disp: , Rfl:     calcium carbonate-vitamin D3 (CALCIUM 500 + D) 500 mg(1,250mg) -400 unit Tab, Take 1 tablet by mouth 2 (two) times daily with meals. , Disp: , Rfl:     coenzyme Q10 200 mg capsule, Take 200 mg by mouth., Disp: , Rfl:     denosumab (PROLIA) 60 mg/mL Syrg, Inject 60 mg into the skin., Disp: , Rfl:     glucosamine HCl/chondroitin springer (GLUCOSAMINE-CHONDROITIN ORAL), Take by mouth once daily. 1000mg /800mg         takes 500mg/400 capsule twice a day, Disp: , Rfl:     ibuprofen (ADVIL) 200 MG tablet, Take 200 mg by mouth Daily. , Disp: , Rfl:     influenza (FLUZONE HIGH-DOSE 2018-19, PF,) 180  "mcg/0.5 mL vaccine, Inject 0.5 mLs into the muscle., Disp: 0.5 mL, Rfl: 0    letrozole (FEMARA) 2.5 mg Tab, Take 1 tablet (2.5 mg total) by mouth once daily., Disp: 30 tablet, Rfl: 11    lisinopril 10 MG tablet, Take 1 tablet (10 mg total) by mouth as needed. FOR SBP > 150, Disp: 90 tablet, Rfl: 1    LORazepam (ATIVAN) 0.5 MG tablet, Take 1 tablet (0.5 mg total) by mouth every 12 (twelve) hours as needed for Anxiety., Disp: 20 tablet, Rfl: 0    magnesium 250 mg Tab, Take 250 mg by mouth once daily., Disp: , Rfl:     meclizine (ANTIVERT) 25 mg tablet, Take 12.5 mg by mouth nightly. , Disp: , Rfl:     metoprolol succinate (TOPROL-XL) 50 MG 24 hr tablet, Take 1 tablet (50 mg total) by mouth once daily., Disp: 90 tablet, Rfl: 1    multivitamin (THERAGRAN) per tablet, Take 1 tablet by mouth once daily., Disp: , Rfl:     nitroGLYCERIN (NITROSTAT) 0.4 MG SL tablet, Place 1 tablet (0.4 mg total) under the tongue every 5 (five) minutes as needed. (Patient not taking: Reported on 1/21/2020), Disp: 25 tablet, Rfl: 0    omeprazole (PRILOSEC OTC) 20 MG tablet, Take 60 mg by mouth every morning. , Disp: , Rfl:     PATADAY 0.2 % Drop, instill 1 drop into both eyes once daily, Disp: , Rfl: 0    sertraline (ZOLOFT) 50 MG tablet, Take 1 tablet (50 mg total) by mouth once daily., Disp: 30 tablet, Rfl: 5    Review of Systems   Constitutional: Negative for chills and fever.   Respiratory: Negative for cough and shortness of breath.    Cardiovascular: Positive for palpitations. Negative for chest pain and leg swelling.   Gastrointestinal: Negative for nausea and vomiting.   Genitourinary: Negative for dysuria.   Neurological: Negative for dizziness, syncope and numbness.   Psychiatric/Behavioral: Negative for dysphoric mood. The patient is nervous/anxious.           Objective:      Vitals:    02/20/20 1118 02/21/20 0905   BP: (!) 160/90 (!) 142/72   Pulse: 84    Weight: 48.1 kg (106 lb)    Height: 5' 1" (1.549 m)      Physical " Exam   Constitutional: She is oriented to person, place, and time. She appears well-developed and well-nourished.   Cardiovascular: Normal rate and regular rhythm. Exam reveals no friction rub.   No murmur heard.  Pulmonary/Chest: Effort normal and breath sounds normal. No respiratory distress. She has no wheezes. She has no rales.   Abdominal: Soft.   Musculoskeletal: She exhibits no edema.   Neurological: She is alert and oriented to person, place, and time.   Skin: Skin is warm and dry.   Psychiatric: Her mood appears anxious.         Assessment:       1. Anxiety    2. Essential hypertension    3. Positive colorectal cancer screening using Cologuard test           Plan:       Anxiety  -     sertraline (ZOLOFT) 50 MG tablet; Take 1 tablet (50 mg total) by mouth once daily.  Dispense: 30 tablet; Refill: 5  -     LORazepam (ATIVAN) 0.5 MG tablet; Take 1 tablet (0.5 mg total) by mouth every 12 (twelve) hours as needed for Anxiety.  Dispense: 20 tablet; Refill: 0  -will add sertraline daily and for now patient advised she can use Ativan p.r.n. though cautioned on over use and side effects including over-sedation, falls and dependence issues.  Cautioned to call me for any worsening in symptoms after she begins the sertraline and will follow up in 2 months at her regular appointment    Essential hypertension  -patient advised increase lisinopril 20 mg daily as recommended by ,  continue to monitor blood pressure though cautioned she does not need to check her blood pressure repetitively throughout the day as this is likely causing more anxiety and blood pressure elevation    Positive colorectal cancer screening using Cologuard test  -     Ambulatory referral/consult to Gastroenterology; Future; Expected date: 02/27/2020  -reviewed +heena and advised cscope is recommended.  Offered referral to Dr. Cifuentes locally as I believe he uses a different prep than the GoLYTELY and she is agreeable    Follow up for  as scheduled, HTN/anxiety.        2/21/2020 Cecilia Shaikh NP

## 2020-02-21 VITALS
HEIGHT: 61 IN | BODY MASS INDEX: 20.01 KG/M2 | HEART RATE: 84 BPM | DIASTOLIC BLOOD PRESSURE: 72 MMHG | WEIGHT: 106 LBS | SYSTOLIC BLOOD PRESSURE: 142 MMHG

## 2020-02-28 ENCOUNTER — OFFICE VISIT (OUTPATIENT)
Dept: FAMILY MEDICINE | Facility: CLINIC | Age: 70
End: 2020-02-28
Payer: MEDICARE

## 2020-02-28 VITALS
HEART RATE: 80 BPM | HEIGHT: 61 IN | BODY MASS INDEX: 20.01 KG/M2 | WEIGHT: 106 LBS | SYSTOLIC BLOOD PRESSURE: 110 MMHG | DIASTOLIC BLOOD PRESSURE: 72 MMHG

## 2020-02-28 DIAGNOSIS — I10 ESSENTIAL HYPERTENSION: ICD-10-CM

## 2020-02-28 DIAGNOSIS — F41.9 ANXIETY DISORDER, UNSPECIFIED TYPE: Primary | ICD-10-CM

## 2020-02-28 PROCEDURE — 99213 OFFICE O/P EST LOW 20 MIN: CPT | Mod: S$GLB,,, | Performed by: NURSE PRACTITIONER

## 2020-02-28 PROCEDURE — 99213 PR OFFICE/OUTPT VISIT, EST, LEVL III, 20-29 MIN: ICD-10-PCS | Mod: S$GLB,,, | Performed by: NURSE PRACTITIONER

## 2020-02-28 RX ORDER — BUSPIRONE HYDROCHLORIDE 5 MG/1
5 TABLET ORAL 2 TIMES DAILY
Qty: 60 TABLET | Refills: 11 | Status: SHIPPED | OUTPATIENT
Start: 2020-02-28 | End: 2020-04-23

## 2020-02-28 NOTE — PROGRESS NOTES
SUBJECTIVE:    Patient ID: Cecilia Valenzuela is a 69 y.o. female.    Chief Complaint: Palpitations (waking up with tingling in arms and chest tb//no bottles )    Pt here for sick visit- seen last week and prescribed sertraline for anxiety. Reports took her first dose Thursday evening and woke up on Friday 4am with bilat arms tingling. Tingling lasted for about 1 hour. Took an ativan which helped. Went to [facility] ER and EKG was normal, BP was elevated.  She states the ER physician told her they do not treat anxiety or blood pressure less than 215 so discharged home without any changes.  She went several days without taking the sertraline and decided to take a 2nd dose last night.  She again around 11:00 p.m. started feeling a tingling sensation in her chest and arms.  She took Ativan and went to sleep but woke up at 2:00 a.m. and 4:00 a.m. with similar symptoms.  Brings in blood pressure log and blood pressures been 130s to 150s mainly.  Continues to report a lot of anxiety and states in the past she feels she has been able to handle her anxiety better but recently she has had several things in a row that seems to be triggering her anxiety.  Missed appt with Dr. Cifuentes for positive cologuard test d/t ER visit last week      Admission on 02/21/2020, Discharged on 02/21/2020   Component Date Value Ref Range Status    WBC 02/21/2020 8.04  3.90 - 12.70 K/uL Final    RBC 02/21/2020 4.86  4.00 - 5.40 M/uL Final    Hemoglobin 02/21/2020 14.2  12.0 - 16.0 g/dL Final    Hematocrit 02/21/2020 44.0  37.0 - 48.5 % Final    Mean Corpuscular Volume 02/21/2020 91  82 - 98 fL Final    Mean Corpuscular Hemoglobin 02/21/2020 29.2  27.0 - 31.0 pg Final    Mean Corpuscular Hemoglobin Conc 02/21/2020 32.3  32.0 - 36.0 g/dL Final    RDW 02/21/2020 12.5  11.5 - 14.5 % Final    Platelets 02/21/2020 216  150 - 350 K/uL Final    MPV 02/21/2020 11.7  9.2 - 12.9 fL Final    Immature Granulocytes 02/21/2020 0.2  0.0 - 0.5 % Final     Gran # (ANC) 02/21/2020 6.3  1.8 - 7.7 K/uL Final    Immature Grans (Abs) 02/21/2020 0.02  0.00 - 0.04 K/uL Final    Lymph # 02/21/2020 1.3  1.0 - 4.8 K/uL Final    Mono # 02/21/2020 0.4  0.3 - 1.0 K/uL Final    Eos # 02/21/2020 0.0  0.0 - 0.5 K/uL Final    Baso # 02/21/2020 0.03  0.00 - 0.20 K/uL Final    nRBC 02/21/2020 0  0 /100 WBC Final    Gran% 02/21/2020 78.3* 38.0 - 73.0 % Final    Lymph% 02/21/2020 15.7* 18.0 - 48.0 % Final    Mono% 02/21/2020 5.2  4.0 - 15.0 % Final    Eosinophil% 02/21/2020 0.2  0.0 - 8.0 % Final    Basophil% 02/21/2020 0.4  0.0 - 1.9 % Final    Differential Method 02/21/2020 Automated   Final    Sodium 02/21/2020 137  136 - 145 mmol/L Final    Potassium 02/21/2020 4.3  3.5 - 5.1 mmol/L Final    Chloride 02/21/2020 103  95 - 110 mmol/L Final    CO2 02/21/2020 24  22 - 31 mmol/L Final    Glucose 02/21/2020 83  70 - 110 mg/dL Final    BUN, Bld 02/21/2020 15  7 - 18 mg/dL Final    Creatinine 02/21/2020 0.62  0.50 - 1.40 mg/dL Final    Calcium 02/21/2020 10.3* 8.4 - 10.2 mg/dL Final    Total Protein 02/21/2020 8.8* 6.0 - 8.4 g/dL Final    Albumin 02/21/2020 4.9  3.5 - 5.2 g/dL Final    Total Bilirubin 02/21/2020 0.6  0.2 - 1.3 mg/dL Final    Alkaline Phosphatase 02/21/2020 59  38 - 145 U/L Final    AST 02/21/2020 37* 14 - 36 U/L Final    ALT 02/21/2020 18  0 - 35 U/L Final    Anion Gap 02/21/2020 10  8 - 16 mmol/L Final    eGFR if African American 02/21/2020 >60  >60 mL/min/1.73 m^2 Final    eGFR if non African American 02/21/2020 >60  >60 mL/min/1.73 m^2 Final    Troponin I 02/21/2020 <0.012  0.012 - 0.034 ng/mL Final   Lab Visit on 10/22/2019   Component Date Value Ref Range Status    Sodium 10/22/2019 136  136 - 145 mmol/L Final    Potassium 10/22/2019 5.0  3.5 - 5.1 mmol/L Final    Chloride 10/22/2019 107  95 - 110 mmol/L Final    CO2 10/22/2019 17* 23 - 29 mmol/L Final    Glucose 10/22/2019 87  70 - 110 mg/dL Final    BUN, Bld 10/22/2019 16  8 - 23  mg/dL Final    Creatinine 10/22/2019 0.8  0.5 - 1.4 mg/dL Final    Calcium 10/22/2019 10.2  8.7 - 10.5 mg/dL Final    Total Protein 10/22/2019 8.4  6.0 - 8.4 g/dL Final    Albumin 10/22/2019 4.5  3.5 - 5.2 g/dL Final    Total Bilirubin 10/22/2019 0.5  0.1 - 1.0 mg/dL Final    Alkaline Phosphatase 10/22/2019 48* 55 - 135 U/L Final    AST 10/22/2019 33  10 - 40 U/L Final    ALT 10/22/2019 27  10 - 44 U/L Final    Anion Gap 10/22/2019 12  8 - 16 mmol/L Final    eGFR if African American 10/22/2019 >60  >60 mL/min/1.73 m^2 Final    eGFR if non African American 10/22/2019 >60  >60 mL/min/1.73 m^2 Final    Cholesterol 10/22/2019 240* 120 - 199 mg/dL Final    Triglycerides 10/22/2019 159* 30 - 150 mg/dL Final    HDL 10/22/2019 52  40 - 75 mg/dL Final    LDL Cholesterol 10/22/2019 156.2  63.0 - 159.0 mg/dL Final    Hdl/Cholesterol Ratio 10/22/2019 21.7  20.0 - 50.0 % Final    Total Cholesterol/HDL Ratio 10/22/2019 4.6  2.0 - 5.0 Final    Non-HDL Cholesterol 10/22/2019 188  mg/dL Final       Past Medical History:   Diagnosis Date    Arthritis     Atherosclerosis     in left carotid artery    Fibrocystic breast     GERD (gastroesophageal reflux disease)     Hyperlipidemia     Hypertension     Malignant neoplasm of upper-outer quadrant of right female breast 2017    right    Mitral valve prolapse     Neurofibromatosis     Osteopenia     Premature beats     Raynaud's disease     Seborrheic keratosis     Thyroid nodule     Tinnitus     Undiagnosed cardiac murmurs 2019     Past Surgical History:   Procedure Laterality Date    BACK SURGERY      BREAST BIOPSY Right 2017    CATARACT EXTRACTION Bilateral      SECTION      x5    DILATION AND CURETTAGE OF UTERUS      miss Ab    HYSTERECTOMY      MASTECTOMY Right 2017     Family History   Problem Relation Age of Onset    Rheum arthritis Mother     Breast cancer Mother         over 85    Hypertension Mother     Heart disease  Mother     Parkinsonism Father     Cancer Father     Breast cancer Sister 40        DCIS    Breast cancer Maternal Aunt         60's    Breast cancer Sister 50        DCIS    Ovarian cancer Neg Hx     Colon cancer Neg Hx     Melanoma Neg Hx        Marital Status:   Alcohol History:  reports that she does not drink alcohol.  Tobacco History:  reports that she has never smoked. She has never used smokeless tobacco.  Drug History:  reports that she does not use drugs.    Health Maintenance Topics with due status: Not Due       Topic Last Completion Date    DEXA SCAN 01/04/2019    Lipid Panel 10/22/2019     Immunization History   Administered Date(s) Administered    Influenza - High Dose - PF (65 years and older) 10/06/2014, 11/12/2015, 10/11/2018, 11/01/2019    Pneumococcal Conjugate - 13 Valent 01/21/2020    Pneumococcal Polysaccharide - 23 Valent 10/06/2014       Review of patient's allergies indicates:   Allergen Reactions    Zofran [ondansetron hcl (pf)] Nausea Only    Augmentin [amoxicillin-pot clavulanate]     Corticosteroids (glucocorticoids) Other (See Comments)     Heart palpitations    Dilaudid [hydromorphone] Nausea And Vomiting     Does not help with pain    Fentanyl     Neosporin (neomycin-polymyx)     Norvasc [amlodipine]     Tetracyclines        Current Outpatient Medications:     acetaminophen (TYLENOL) 500 MG tablet, Take 500 mg by mouth once daily. , Disp: , Rfl:     ascorbic acid, vitamin C, (VITAMIN C) 500 MG tablet, Take 500 mg by mouth once daily., Disp: , Rfl:     aspirin (ECOTRIN) 81 MG EC tablet, Take 81 mg by mouth once daily., Disp: , Rfl:     azelastine (ASTELIN) 137 mcg (0.1 %) nasal spray, 1 spray by Nasal route as needed for Rhinitis. , Disp: , Rfl:     busPIRone (BUSPAR) 5 MG Tab, Take 1 tablet (5 mg total) by mouth 2 (two) times daily., Disp: 60 tablet, Rfl: 11    calcium carbonate-vitamin D3 (CALCIUM 500 + D) 500 mg(1,250mg) -400 unit Tab, Take 1 tablet  by mouth 2 (two) times daily with meals. , Disp: , Rfl:     coenzyme Q10 400 mg Cap, Take 400 mg by mouth once daily. , Disp: , Rfl:     denosumab (PROLIA) 60 mg/mL Syrg, Inject 60 mg into the skin every 6 (six) months. , Disp: , Rfl:     glucosamine HCl/chondroitin springer (GLUCOSAMINE-CHONDROITIN ORAL), Take by mouth once daily. 1000mg /800mg         takes 500mg/400 capsule twice a day, Disp: , Rfl:     ibuprofen (ADVIL) 200 MG tablet, Take 200 mg by mouth every evening. , Disp: , Rfl:     letrozole (FEMARA) 2.5 mg Tab, Take 1 tablet (2.5 mg total) by mouth once daily., Disp: 30 tablet, Rfl: 11    lisinopril 10 MG tablet, Take 1 tablet (10 mg total) by mouth as needed. FOR SBP > 150, Disp: 90 tablet, Rfl: 1    LORazepam (ATIVAN) 0.5 MG tablet, Take 1 tablet (0.5 mg total) by mouth every 12 (twelve) hours as needed for Anxiety., Disp: 20 tablet, Rfl: 0    magnesium 250 mg Tab, Take 250 mg by mouth once daily., Disp: , Rfl:     meclizine (ANTIVERT) 25 mg tablet, Take 12.5 mg by mouth nightly. , Disp: , Rfl:     metoprolol succinate (TOPROL-XL) 50 MG 24 hr tablet, Take 1 tablet (50 mg total) by mouth once daily. (Patient taking differently: Take 50 mg by mouth every evening. ), Disp: 90 tablet, Rfl: 1    multivitamin (THERAGRAN) per tablet, Take 1 tablet by mouth once daily., Disp: , Rfl:     omeprazole (PRILOSEC OTC) 20 MG tablet, Take 20 mg by mouth every morning. , Disp: , Rfl:     omeprazole (PRILOSEC OTC) 20 MG tablet, Take 40 mg by mouth every evening., Disp: , Rfl:     PATADAY 0.2 % Drop, Place 1 drop into both eyes daily as needed (allergies). , Disp: , Rfl: 0    promethazine (PHENERGAN) 25 MG tablet, Take 12.5 mg by mouth every 6 (six) hours as needed for Nausea., Disp: , Rfl:     zinc gluconate 50 mg tablet, Take 50 mg by mouth once daily., Disp: , Rfl:     Review of Systems   Constitutional: Negative for activity change, appetite change, chills and fever.   Respiratory: Negative for shortness of  "breath.    Cardiovascular: Positive for palpitations. Negative for chest pain.   Gastrointestinal: Negative for abdominal pain, nausea and vomiting.   Neurological: Positive for numbness (Numbness and tingling to arms and chest, see HPI). Negative for dizziness ( reports has been taking meclizine regularly though plans to stop this) and headaches.   Psychiatric/Behavioral: Positive for sleep disturbance. Negative for dysphoric mood, self-injury and suicidal ideas. The patient is nervous/anxious.           Objective:      Vitals:    02/28/20 1046   BP: 110/72   Pulse: 80   Weight: 48.1 kg (106 lb)   Height: 5' 1" (1.549 m)     Physical Exam   Constitutional: She is oriented to person, place, and time. She appears well-developed and well-nourished.   Cardiovascular: Normal rate and regular rhythm. Exam reveals no friction rub.   No murmur heard.  Pulmonary/Chest: Effort normal and breath sounds normal. No respiratory distress. She has no wheezes. She has no rales.   Abdominal: Soft.   Neurological: She is alert and oriented to person, place, and time. She has normal strength.   Skin: Skin is warm and dry.   Psychiatric: Her speech is normal and behavior is normal. Judgment and thought content normal. Her mood appears anxious. Cognition and memory are normal.         Assessment:       1. Anxiety disorder, unspecified type    2. Essential hypertension           Plan:       Anxiety disorder, unspecified type  -     busPIRone (BUSPAR) 5 MG Tab; Take 1 tablet (5 mg total) by mouth 2 (two) times daily.  Dispense: 60 tablet; Refill: 11  -     Ambulatory referral/consult to Psychology; Future; Expected date: 03/06/2020  -long discussion with patient regarding treatment for anxiety.  Given the symptoms she has had with sertraline advised to stop this and discussed trying either Effexor or BuSpar.  She would like to try BuSpar 5 mg b.i.d. for now and advised can continue the Ativan as needed.  Also recommended counseling which " she agrees to and given Emerita Oliveira's phone number to call and schedule appointment    Essential hypertension  -BP well controlled today    Follow up if symptoms worsen or fail to improve, for as scheduled.        2/28/2020 Cecilia Shaikh NP

## 2020-03-02 NOTE — PROGRESS NOTES
Subjective:       Patient ID: Cecilia Valenzuela is a 69 y.o. female.    Chief Complaint: No chief complaint on file.    HPI Mrs. Valenzuela is a 69-year-old female with right breast cancer.  She started adjuvant letrozole in 11/2017. She took a  brief break from that therapy in December.  That resulted in improvement in her symptoms.  She then restarted and had some recurrence of her arthritic symptoms.  She recently took another 1 week break with improvement.    She has no shortness of breath.  Appetite and bowel function has been good.       Onc History:  Screening mammography on March 22, 2017 showed questionable asymmetry in the upper outer portion of the right breast.  There was no palpable abnormality.      Follow-up diagnostic mammogram on August 2, 2017 showed a high density mass in the upper-outer quadrant of the right breast.  By ultrasound this measured 1.2 x 0.7 x 1.2 cm.     Needle biopsy on August 7, 2017 showed infiltrating lobular carcinoma which was 99% ER positive, 84% MS positive.  HER-2 was 2+ but negative by FISH.  Ki-67 was 10%.     Right mastectomy and sentinel lymph node biopsy was performed on September 14th, 2017.  That showed a 2.7 cm infiltrating lobular carcinoma.  2 sentinel lymph nodes were negative for malignancy.  Final pathological stage T2 N0 - stage IIA.     Oncotype score was 19.    Mammogram in May 2019 was unremarkable.  Review of Systems   Constitutional: Negative for appetite change and unexpected weight change.   Eyes: Negative for visual disturbance.   Respiratory: Negative for cough and shortness of breath.    Cardiovascular: Negative for chest pain.   Gastrointestinal: Negative for abdominal pain and diarrhea.   Genitourinary: Negative for frequency.   Musculoskeletal: Positive for arthralgias (Improved). Negative for back pain.   Skin: Negative for rash.   Neurological: Negative for headaches.        Vertigo   Hematological: Negative for adenopathy.    Psychiatric/Behavioral: Negative for dysphoric mood. The patient is not nervous/anxious.        Objective:      Physical Exam   Constitutional: She is oriented to person, place, and time. She appears well-developed and well-nourished. No distress.   Eyes: No scleral icterus.   Cardiovascular: Normal rate and regular rhythm.   Pulmonary/Chest: Effort normal and breath sounds normal. She has no wheezes. She has no rales. Left breast exhibits no mass, no nipple discharge and no skin change.       Abdominal: Soft. She exhibits no mass. There is no tenderness.   Lymphadenopathy:     She has no cervical adenopathy.   Neurological: She is alert and oriented to person, place, and time.   Psychiatric: She has a normal mood and affect. Her behavior is normal. Thought content normal.   Vitals reviewed.      Assessment:       1. Malignant neoplasm of upper-outer quadrant of right breast in female, estrogen receptor positive        Plan:       Will discontinue letrozole and try course of exemestane.  She is due for mammogram in May.  Return to clinic at that time.

## 2020-03-04 ENCOUNTER — TELEPHONE (OUTPATIENT)
Dept: FAMILY MEDICINE | Facility: CLINIC | Age: 70
End: 2020-03-04

## 2020-03-04 ENCOUNTER — OFFICE VISIT (OUTPATIENT)
Dept: HEMATOLOGY/ONCOLOGY | Facility: CLINIC | Age: 70
End: 2020-03-04
Payer: MEDICARE

## 2020-03-04 VITALS
WEIGHT: 106.25 LBS | BODY MASS INDEX: 20.06 KG/M2 | OXYGEN SATURATION: 100 % | RESPIRATION RATE: 18 BRPM | HEART RATE: 70 BPM | HEIGHT: 61 IN | DIASTOLIC BLOOD PRESSURE: 61 MMHG | TEMPERATURE: 98 F | SYSTOLIC BLOOD PRESSURE: 141 MMHG

## 2020-03-04 DIAGNOSIS — F41.9 ANXIETY: ICD-10-CM

## 2020-03-04 DIAGNOSIS — Z17.0 MALIGNANT NEOPLASM OF UPPER-OUTER QUADRANT OF RIGHT BREAST IN FEMALE, ESTROGEN RECEPTOR POSITIVE: Primary | ICD-10-CM

## 2020-03-04 DIAGNOSIS — C50.411 MALIGNANT NEOPLASM OF UPPER-OUTER QUADRANT OF RIGHT BREAST IN FEMALE, ESTROGEN RECEPTOR POSITIVE: Primary | ICD-10-CM

## 2020-03-04 PROCEDURE — 99999 PR PBB SHADOW E&M-EST. PATIENT-LVL V: CPT | Mod: PBBFAC,,, | Performed by: INTERNAL MEDICINE

## 2020-03-04 PROCEDURE — 99213 PR OFFICE/OUTPT VISIT, EST, LEVL III, 20-29 MIN: ICD-10-PCS | Mod: S$PBB,,, | Performed by: INTERNAL MEDICINE

## 2020-03-04 PROCEDURE — 99215 OFFICE O/P EST HI 40 MIN: CPT | Mod: PBBFAC | Performed by: INTERNAL MEDICINE

## 2020-03-04 PROCEDURE — 99999 PR PBB SHADOW E&M-EST. PATIENT-LVL V: ICD-10-PCS | Mod: PBBFAC,,, | Performed by: INTERNAL MEDICINE

## 2020-03-04 PROCEDURE — 99213 OFFICE O/P EST LOW 20 MIN: CPT | Mod: S$PBB,,, | Performed by: INTERNAL MEDICINE

## 2020-03-04 RX ORDER — LORAZEPAM 0.5 MG/1
0.5 TABLET ORAL EVERY 12 HOURS PRN
Qty: 30 TABLET | Refills: 1 | Status: SHIPPED | OUTPATIENT
Start: 2020-03-04 | End: 2021-05-18 | Stop reason: SDUPTHER

## 2020-03-04 RX ORDER — LORAZEPAM 0.5 MG/1
0.5 TABLET ORAL EVERY 12 HOURS PRN
Qty: 20 TABLET | Refills: 0 | Status: CANCELLED | OUTPATIENT
Start: 2020-03-04 | End: 2020-04-03

## 2020-03-04 RX ORDER — EXEMESTANE 25 MG/1
25 TABLET ORAL DAILY
Qty: 30 TABLET | Refills: 11 | Status: SHIPPED | OUTPATIENT
Start: 2020-03-04 | End: 2020-04-03

## 2020-03-09 ENCOUNTER — TELEPHONE (OUTPATIENT)
Dept: HEMATOLOGY/ONCOLOGY | Facility: CLINIC | Age: 70
End: 2020-03-09

## 2020-03-09 NOTE — TELEPHONE ENCOUNTER
"----- Message from Jennifer Reed sent at 3/9/2020  3:57 PM CDT -----  Contact: Cecilia   Name of caller:  Cecilia   Provider name: Kavin BHATT MD  Contact Preference:  [phone]  Is this regarding current patient or new patient?: current   What is the nature of the call?    - 5/7 appt is too early for her, if able to change the time   to something closer to noon    Additional Notes:   "Thank you for all that you do for our patients'"     "

## 2020-03-11 ENCOUNTER — PATIENT MESSAGE (OUTPATIENT)
Dept: FAMILY MEDICINE | Facility: CLINIC | Age: 70
End: 2020-03-11

## 2020-03-23 ENCOUNTER — PATIENT MESSAGE (OUTPATIENT)
Dept: FAMILY MEDICINE | Facility: CLINIC | Age: 70
End: 2020-03-23

## 2020-04-14 ENCOUNTER — PATIENT MESSAGE (OUTPATIENT)
Dept: FAMILY MEDICINE | Facility: CLINIC | Age: 70
End: 2020-04-14

## 2020-04-21 ENCOUNTER — TELEPHONE (OUTPATIENT)
Dept: FAMILY MEDICINE | Facility: CLINIC | Age: 70
End: 2020-04-21

## 2020-04-21 ENCOUNTER — PATIENT MESSAGE (OUTPATIENT)
Dept: FAMILY MEDICINE | Facility: CLINIC | Age: 70
End: 2020-04-21

## 2020-04-27 ENCOUNTER — PATIENT MESSAGE (OUTPATIENT)
Dept: HEMATOLOGY/ONCOLOGY | Facility: CLINIC | Age: 70
End: 2020-04-27

## 2020-04-28 ENCOUNTER — OFFICE VISIT (OUTPATIENT)
Dept: FAMILY MEDICINE | Facility: CLINIC | Age: 70
End: 2020-04-28
Payer: MEDICARE

## 2020-04-28 DIAGNOSIS — R00.2 PALPITATIONS: ICD-10-CM

## 2020-04-28 DIAGNOSIS — F41.9 ANXIETY: ICD-10-CM

## 2020-04-28 DIAGNOSIS — Z17.0 MALIGNANT NEOPLASM OF UPPER-OUTER QUADRANT OF RIGHT BREAST IN FEMALE, ESTROGEN RECEPTOR POSITIVE: Primary | ICD-10-CM

## 2020-04-28 DIAGNOSIS — M85.851 OSTEOPENIA OF NECKS OF BOTH FEMURS: ICD-10-CM

## 2020-04-28 DIAGNOSIS — M85.852 OSTEOPENIA OF NECKS OF BOTH FEMURS: ICD-10-CM

## 2020-04-28 DIAGNOSIS — C50.411 MALIGNANT NEOPLASM OF UPPER-OUTER QUADRANT OF RIGHT BREAST IN FEMALE, ESTROGEN RECEPTOR POSITIVE: Primary | ICD-10-CM

## 2020-04-28 DIAGNOSIS — I35.8 AORTIC VALVE SCLEROSIS: ICD-10-CM

## 2020-04-28 DIAGNOSIS — E78.00 HYPERCHOLESTEROLEMIA: ICD-10-CM

## 2020-04-28 DIAGNOSIS — R42 VERTIGO: ICD-10-CM

## 2020-04-28 DIAGNOSIS — I10 ESSENTIAL HYPERTENSION: ICD-10-CM

## 2020-04-28 PROCEDURE — 99213 PR OFFICE/OUTPT VISIT, EST, LEVL III, 20-29 MIN: ICD-10-PCS | Mod: 95,,, | Performed by: FAMILY MEDICINE

## 2020-04-28 PROCEDURE — 99213 OFFICE O/P EST LOW 20 MIN: CPT | Mod: 95,,, | Performed by: FAMILY MEDICINE

## 2020-04-28 RX ORDER — MECLIZINE HYDROCHLORIDE 25 MG/1
12.5 TABLET ORAL NIGHTLY
Qty: 90 TABLET | Refills: 1 | Status: SHIPPED | OUTPATIENT
Start: 2020-04-28 | End: 2021-07-12 | Stop reason: SDUPTHER

## 2020-04-28 NOTE — PROGRESS NOTES
Subjective:        The chief complaint leading to consultation is:  Breast cancer follow-up  The patient location is:  Home  Visit type: Virtual visit with synchronous audio/video or audio only  This was a video visit in lieu of in-person visit due to the coronavirus emergency. Patient acknowledged and consented to the video visit encounter.     This is a telemedicine visit for 70-year-old female with a history of breast cancer.  She had right-sided breast cancer in 2017 is undergone mastectomy and follows up with Dr. Vale at [facility].  She now takesexemestamine 25 mg / day for hormone suppression.  This causes much less bone and joint aches and the prior medications.  She is now able to walk 30-40 minutes for exercise on a daily basis.  Due to her immunocompromised status she does not go to grocery stores and has her groceries delivered to home.    She tried pravastatin for 5 years and found the myalgias too uncomfortable.  She prefers not to take statin drugs for her hyperlipidemia.      Past Surgical History:   Procedure Laterality Date    BACK SURGERY      BREAST BIOPSY Right     CATARACT EXTRACTION Bilateral      SECTION      x5    DILATION AND CURETTAGE OF UTERUS      miss Ab    HYSTERECTOMY      MASTECTOMY Right 2017     Past Medical History:   Diagnosis Date    Arthritis     Atherosclerosis     in left carotid artery    Fibrocystic breast     GERD (gastroesophageal reflux disease)     Hyperlipidemia     Hypertension     Malignant neoplasm of upper-outer quadrant of right female breast 2017    right    Mitral valve prolapse     Neurofibromatosis     Osteopenia     Premature beats     Raynaud's disease     Seborrheic keratosis     Thyroid nodule     Tinnitus     Undiagnosed cardiac murmurs 2019     Family History   Problem Relation Age of Onset    Rheum arthritis Mother     Breast cancer Mother         over 85    Hypertension Mother     Heart disease Mother      Parkinsonism Father     Cancer Father     Breast cancer Sister 40        DCIS    Breast cancer Maternal Aunt         60's    Breast cancer Sister 50        DCIS    Ovarian cancer Neg Hx     Colon cancer Neg Hx     Melanoma Neg Hx         Social History:   Marital Status:   Alcohol History:  reports that she does not drink alcohol.  Tobacco History:  reports that she has never smoked. She has never used smokeless tobacco.  Drug History:  reports that she does not use drugs.    Review of patient's allergies indicates:   Allergen Reactions    Zofran [ondansetron hcl (pf)] Nausea Only    Augmentin [amoxicillin-pot clavulanate]     Corticosteroids (glucocorticoids) Other (See Comments)     Heart palpitations    Dilaudid [hydromorphone] Nausea And Vomiting     Does not help with pain    Fentanyl     Neosporin (neomycin-polymyx)     Norvasc [amlodipine]     Tetracyclines     Zoloft [sertraline]      Increased anxiety        Current Outpatient Medications   Medication Sig Dispense Refill    ascorbic acid, vitamin C, (VITAMIN C) 500 MG tablet Take 500 mg by mouth once daily.      aspirin (ECOTRIN) 81 MG EC tablet Take 81 mg by mouth once daily.      azelastine (ASTELIN) 137 mcg (0.1 %) nasal spray 1 spray by Nasal route as needed for Rhinitis.       calcium carbonate-vitamin D3 (CALCIUM 500 + D) 500 mg(1,250mg) -400 unit Tab Take 1 tablet by mouth 2 (two) times daily with meals.       coenzyme Q10 400 mg Cap Take 400 mg by mouth once daily.       glucosamine HCl/chondroitin springer (GLUCOSAMINE-CHONDROITIN ORAL) Take by mouth once daily. 1000mg /800mg         takes 500mg/400 capsule twice a day      lisinopril 10 MG tablet Take 1 tablet (10 mg total) by mouth as needed. FOR SBP > 150 90 tablet 1    LORazepam (ATIVAN) 0.5 MG tablet Take 1 tablet (0.5 mg total) by mouth every 12 (twelve) hours as needed for Anxiety. 30 tablet 1    magnesium 250 mg Tab Take 250 mg by mouth once daily.       meclizine (ANTIVERT) 25 mg tablet Take 0.5 tablets (12.5 mg total) by mouth nightly. 90 tablet 1    metoprolol succinate (TOPROL-XL) 50 MG 24 hr tablet Take 1 tablet (50 mg total) by mouth once daily. (Patient taking differently: Take 50 mg by mouth every evening. ) 90 tablet 1    multivitamin (THERAGRAN) per tablet Take 1 tablet by mouth once daily.      omeprazole (PRILOSEC OTC) 20 MG tablet Take 20 mg by mouth every morning.       PATADAY 0.2 % Drop Place 1 drop into both eyes daily as needed (allergies).   0    promethazine (PHENERGAN) 25 MG tablet Take 12.5 mg by mouth every 6 (six) hours as needed for Nausea.      zinc gluconate 50 mg tablet Take 50 mg by mouth once daily.      denosumab (PROLIA) 60 mg/mL Syrg Inject 60 mg into the skin every 6 (six) months.        No current facility-administered medications for this visit.        Review of Systems   Constitutional: Negative for appetite change, chills, fatigue, fever and unexpected weight change.   HENT: Negative for congestion, ear pain, sinus pain, sore throat and trouble swallowing.    Eyes: Negative for pain, discharge and visual disturbance.   Respiratory: Negative for apnea, cough, shortness of breath and wheezing.    Cardiovascular: Negative for chest pain, palpitations and leg swelling.   Gastrointestinal: Negative for abdominal pain, blood in stool, constipation, diarrhea, nausea and vomiting.        Omeprazole  Works well on gerd ,   Endocrine: Negative for heat intolerance, polydipsia and polyuria.   Genitourinary: Negative for difficulty urinating, dyspareunia, dysuria, frequency, hematuria and menstrual problem.   Musculoskeletal: Negative for arthralgias, back pain, gait problem, joint swelling and myalgias.        Letrazole caused joint pains , now on exemestamine, no joint pains   Allergic/Immunologic: Negative for environmental allergies, food allergies and immunocompromised state.   Neurological: Negative for dizziness, tremors,  seizures, numbness and headaches.   Psychiatric/Behavioral: Negative for behavioral problems, confusion, hallucinations and suicidal ideas. The patient is not nervous/anxious.          Objective:        Physical Exam:   Physical Exam         Assessment:       1. Malignant neoplasm of upper-outer quadrant of right breast in female, estrogen receptor positive    2. Vertigo    3. Anxiety    4. Essential hypertension    5. Hypercholesterolemia    6. Palpitations    7. Aortic valve sclerosis    8. Osteopenia of necks of both femurs      Plan:   Malignant neoplasm of upper-outer quadrant of right breast in female, estrogen receptor positive  Patient doing rather well.  She follows up with  on periodically basis.  Continue hormone blockers  Vertigo  -     meclizine (ANTIVERT) 25 mg tablet; Take 0.5 tablets (12.5 mg total) by mouth nightly.  Dispense: 90 tablet; Refill: 1  Use meclizine as needed  Anxiety  Controlling anxiety well with exercise  Essential hypertension  -     CBC auto differential; Future; Expected date: 04/28/2020  Blood pressure well controlled on current regimen  Hypercholesterolemia  -     Comprehensive metabolic panel; Future; Expected date: 04/28/2020  -     Lipid panel; Future; Expected date: 04/28/2020  Labs due in July for lipids  Palpitations    Aortic valve sclerosis    Osteopenia of necks of both femurs  Continue calcium plus D vitamins    Follow up in about 6 months (around 10/28/2020).    Total time spent with patient:  20 min    Each patient to whom he or she provides medical services by telemedicine is:  (1) informed of the relationship between the physician and patient and the respective role of any other health care provider with respect to management of the patient; and (2) notified that he or she may decline to receive medical services by telemedicine and may withdraw from such care at any time.    This note was created using [employer] voice recognition software that occasionally  misinterprets phrases or words.

## 2020-04-30 ENCOUNTER — TELEPHONE (OUTPATIENT)
Dept: FAMILY MEDICINE | Facility: CLINIC | Age: 70
End: 2020-04-30

## 2020-04-30 NOTE — TELEPHONE ENCOUNTER
----- Message from Lesvia Lowery MA sent at 4/29/2020 11:55 AM CDT -----      ----- Message -----  From: Buddy Mathew MD  Sent: 4/28/2020   8:29 PM CDT  To: Lesvia Lowery MA    Call patient.  Set up office visit in 6 months with labs in July.

## 2020-05-12 ENCOUNTER — PATIENT MESSAGE (OUTPATIENT)
Dept: CARDIOLOGY | Facility: CLINIC | Age: 70
End: 2020-05-12

## 2020-05-15 ENCOUNTER — PATIENT MESSAGE (OUTPATIENT)
Dept: CARDIOLOGY | Facility: CLINIC | Age: 70
End: 2020-05-15

## 2020-05-15 ENCOUNTER — PATIENT MESSAGE (OUTPATIENT)
Dept: HEMATOLOGY/ONCOLOGY | Facility: CLINIC | Age: 70
End: 2020-05-15

## 2020-05-18 ENCOUNTER — PATIENT MESSAGE (OUTPATIENT)
Dept: FAMILY MEDICINE | Facility: CLINIC | Age: 70
End: 2020-05-18

## 2020-05-21 ENCOUNTER — TELEPHONE (OUTPATIENT)
Dept: CARDIOLOGY | Facility: CLINIC | Age: 70
End: 2020-05-21

## 2020-05-21 ENCOUNTER — OFFICE VISIT (OUTPATIENT)
Dept: CARDIOLOGY | Facility: CLINIC | Age: 70
End: 2020-05-21
Payer: MEDICARE

## 2020-05-21 VITALS
HEIGHT: 61 IN | DIASTOLIC BLOOD PRESSURE: 70 MMHG | HEART RATE: 64 BPM | BODY MASS INDEX: 20.58 KG/M2 | WEIGHT: 109 LBS | SYSTOLIC BLOOD PRESSURE: 120 MMHG

## 2020-05-21 DIAGNOSIS — I10 ESSENTIAL HYPERTENSION: Primary | ICD-10-CM

## 2020-05-21 DIAGNOSIS — I35.8 AORTIC VALVE SCLEROSIS: ICD-10-CM

## 2020-05-21 DIAGNOSIS — I49.49 PREMATURE BEATS: ICD-10-CM

## 2020-05-21 DIAGNOSIS — K59.1 FUNCTIONAL DIARRHEA: Primary | ICD-10-CM

## 2020-05-21 PROCEDURE — 99211 OFF/OP EST MAY X REQ PHY/QHP: CPT | Mod: PO

## 2020-05-21 PROCEDURE — 99213 PR OFFICE/OUTPT VISIT, EST, LEVL III, 20-29 MIN: ICD-10-PCS | Mod: 95,,, | Performed by: INTERNAL MEDICINE

## 2020-05-21 PROCEDURE — G0463 HOSPITAL OUTPT CLINIC VISIT: HCPCS | Mod: PO

## 2020-05-21 PROCEDURE — 99213 OFFICE O/P EST LOW 20 MIN: CPT | Mod: 95,,, | Performed by: INTERNAL MEDICINE

## 2020-05-21 RX ORDER — DIPHENOXYLATE HYDROCHLORIDE AND ATROPINE SULFATE 2.5; .025 MG/1; MG/1
1 TABLET ORAL 4 TIMES DAILY PRN
Qty: 30 TABLET | Refills: 1 | Status: SHIPPED | OUTPATIENT
Start: 2020-05-21 | End: 2020-11-04

## 2020-05-21 RX ORDER — METOPROLOL SUCCINATE 50 MG/1
50 TABLET, EXTENDED RELEASE ORAL NIGHTLY
Qty: 90 TABLET | Refills: 1 | Status: SHIPPED | OUTPATIENT
Start: 2020-05-21 | End: 2020-10-05

## 2020-05-21 NOTE — PROGRESS NOTES
Subjective:    Patient ID:  Cecilia Valenzuela is a 70 y.o. female who presents for Hypertension and Irregular Heart Beat        HPI   The patient location is:  HOME  The chief complaint leading to consultation is:  HYPERTENSION IRREGULAR HEARTBEAT    Visit type:  Audio and video virtual visit, COVID-19    Face to Face time with patient:   Thirty minutes of total time spent on the encounter, which includes face to face time and non-face to face time preparing to see the patient (eg, review of tests), Obtaining and/or reviewing separately obtained history, Documenting clinical information in the electronic or other health record, Independently interpreting results (not separately reported) and communicating results to the patient/family/caregiver, or Care coordination (not separately reported).         Each patient to whom he or she provides medical services by telemedicine is:  (1) informed of the relationship between the physician and patient and the respective role of any other health care provider with respect to management of the patient; and (2) notified that he or she may decline to receive medical services by telemedicine and may withdraw from such care at any time.    Notes:   HAS BEEN WALKING , BP OK, CHANGED HORMONAL TX, CMP FROM FEB OKAY, NO CHEST PAIN NO TIA TYPE SYMPTOMS NO NEAR-SYNCOPE NO SIGNIFICANT PALPITATION, SEE REVIEW OF SYSTEMS    Past Medical History:   Diagnosis Date    Arthritis     Atherosclerosis     in left carotid artery    Fibrocystic breast     GERD (gastroesophageal reflux disease)     Hyperlipidemia     Hypertension     Malignant neoplasm of upper-outer quadrant of right female breast 9/14/2017    right    Mitral valve prolapse     Neurofibromatosis     Osteopenia     Premature beats     Raynaud's disease     Seborrheic keratosis     Thyroid nodule     Tinnitus     Undiagnosed cardiac murmurs 7/19/2019     Past Surgical History:   Procedure Laterality Date    BACK  SURGERY      BREAST BIOPSY Right 2017    CATARACT EXTRACTION Bilateral      SECTION      x5    DILATION AND CURETTAGE OF UTERUS      miss Ab    HYSTERECTOMY      MASTECTOMY Right 2017     Family History   Problem Relation Age of Onset    Rheum arthritis Mother     Breast cancer Mother         over 85    Hypertension Mother     Heart disease Mother     Parkinsonism Father     Cancer Father     Breast cancer Sister 40        DCIS    Breast cancer Maternal Aunt         60's    Breast cancer Sister 50        DCIS    Ovarian cancer Neg Hx     Colon cancer Neg Hx     Melanoma Neg Hx      Social History     Socioeconomic History    Marital status:      Spouse name: Not on file    Number of children: Not on file    Years of education: Not on file    Highest education level: Not on file   Occupational History    Not on file   Social Needs    Financial resource strain: Not on file    Food insecurity:     Worry: Not on file     Inability: Not on file    Transportation needs:     Medical: Not on file     Non-medical: Not on file   Tobacco Use    Smoking status: Never Smoker    Smokeless tobacco: Never Used   Substance and Sexual Activity    Alcohol use: No    Drug use: No    Sexual activity: Never     Partners: Male   Lifestyle    Physical activity:     Days per week: Not on file     Minutes per session: Not on file    Stress: Not on file   Relationships    Social connections:     Talks on phone: Not on file     Gets together: Not on file     Attends Baptism service: Not on file     Active member of club or organization: Not on file     Attends meetings of clubs or organizations: Not on file     Relationship status: Not on file   Other Topics Concern    Are you pregnant or think you may be? Not Asked    Breast-feeding Not Asked   Social History Narrative    Not on file       Review of patient's allergies indicates:   Allergen Reactions    Zofran [ondansetron hcl (pf)]  Nausea Only    Augmentin [amoxicillin-pot clavulanate]     Corticosteroids (glucocorticoids) Other (See Comments)     Heart palpitations    Dilaudid [hydromorphone] Nausea And Vomiting     Does not help with pain    Fentanyl     Neosporin (neomycin-polymyx)     Norvasc [amlodipine]     Tetracyclines     Zoloft [sertraline]      Increased anxiety        Current Outpatient Medications:     aspirin (ECOTRIN) 81 MG EC tablet, Take 81 mg by mouth once daily., Disp: , Rfl:     denosumab (PROLIA) 60 mg/mL Syrg, Inject 60 mg into the skin every 6 (six) months. , Disp: , Rfl:     glucosamine HCl/chondroitin springer (GLUCOSAMINE-CHONDROITIN ORAL), Take by mouth once daily. 1000mg /800mg         takes 500mg/400 capsule twice a day, Disp: , Rfl:     lisinopril 10 MG tablet, Take 1 tablet (10 mg total) by mouth as needed. FOR SBP > 150, Disp: 90 tablet, Rfl: 1    LORazepam (ATIVAN) 0.5 MG tablet, Take 1 tablet (0.5 mg total) by mouth every 12 (twelve) hours as needed for Anxiety., Disp: 30 tablet, Rfl: 1    metoprolol succinate (TOPROL-XL) 50 MG 24 hr tablet, Take 1 tablet (50 mg total) by mouth every evening., Disp: 90 tablet, Rfl: 1    omeprazole (PRILOSEC OTC) 20 MG tablet, Take 20 mg by mouth every morning. , Disp: , Rfl:     zinc gluconate 50 mg tablet, Take 50 mg by mouth once daily., Disp: , Rfl:     ascorbic acid, vitamin C, (VITAMIN C) 500 MG tablet, Take 500 mg by mouth once daily., Disp: , Rfl:     azelastine (ASTELIN) 137 mcg (0.1 %) nasal spray, 1 spray by Nasal route as needed for Rhinitis. , Disp: , Rfl:     calcium carbonate-vitamin D3 (CALCIUM 500 + D) 500 mg(1,250mg) -400 unit Tab, Take 1 tablet by mouth 2 (two) times daily with meals. , Disp: , Rfl:     coenzyme Q10 400 mg Cap, Take 400 mg by mouth once daily. , Disp: , Rfl:     diphenoxylate-atropine 2.5-0.025 mg (LOMOTIL) 2.5-0.025 mg per tablet, Take 1 tablet by mouth 4 (four) times daily as needed for Diarrhea., Disp: 30 tablet, Rfl: 1     magnesium 250 mg Tab, Take 250 mg by mouth once daily., Disp: , Rfl:     meclizine (ANTIVERT) 25 mg tablet, Take 0.5 tablets (12.5 mg total) by mouth nightly., Disp: 90 tablet, Rfl: 1    multivitamin (THERAGRAN) per tablet, Take 1 tablet by mouth once daily., Disp: , Rfl:     PATADAY 0.2 % Drop, Place 1 drop into both eyes daily as needed (allergies). , Disp: , Rfl: 0    promethazine (PHENERGAN) 25 MG tablet, Take 12.5 mg by mouth every 6 (six) hours as needed for Nausea., Disp: , Rfl:     Review of Systems   Constitution: Negative for chills, diaphoresis, fever, malaise/fatigue and night sweats.   HENT: Negative for congestion and nosebleeds.    Eyes: Negative for blurred vision and visual disturbance.   Cardiovascular: Negative for chest pain, claudication, cyanosis, dyspnea on exertion, irregular heartbeat, leg swelling, near-syncope, orthopnea, palpitations (BETTER WITH MEDS), paroxysmal nocturnal dyspnea and syncope.   Respiratory: Negative for cough, hemoptysis, shortness of breath and wheezing.    Endocrine: Negative for cold intolerance, heat intolerance and polyuria.   Hematologic/Lymphatic: Negative for adenopathy. Does not bruise/bleed easily.   Skin: Negative for nail changes and rash.   Musculoskeletal: Negative for back pain, falls, joint pain and muscle cramps (OCC).   Gastrointestinal: Negative for abdominal pain, change in bowel habit, dysphagia, heartburn, hematemesis, jaundice, melena and vomiting.   Genitourinary: Negative for dysuria, flank pain and frequency.   Neurological: Negative for brief paralysis, dizziness, focal weakness, light-headedness, loss of balance, numbness, tremors, vertigo (OCC, TO HAVE PT) and weakness.   Psychiatric/Behavioral: Negative for altered mental status, depression and memory loss. The patient is not nervous/anxious.    Allergic/Immunologic: Negative for hives.        Objective:      Vitals:    05/21/20 1329   BP: 120/70   Pulse: 64   Weight: 49.4 kg (109 lb)  "  Height: 5' 1" (1.549 m)     Body mass index is 20.6 kg/m².    Physical Exam   Constitutional: She is oriented to person, place, and time. She appears well-developed and well-nourished.   Limited physical exam due to virtual visit   HENT:   Head: Normocephalic and atraumatic.   Eyes: Pupils are equal, round, and reactive to light. Conjunctivae and EOM are normal.   Neck: Neck supple.   Neck movement appears to be normal   Pulmonary/Chest: Effort normal. No tachypnea.   Musculoskeletal:   No ankle edema   Neurological: She is alert and oriented to person, place, and time.   Skin: No rash noted. No pallor.   Psychiatric: She has a normal mood and affect. Her speech is normal and behavior is normal.               ..    Chemistry        Component Value Date/Time     02/21/2020 1019    K 4.3 02/21/2020 1019     02/21/2020 1019    CO2 24 02/21/2020 1019    BUN 15 02/21/2020 1019    CREATININE 0.62 02/21/2020 1019    GLU 83 02/21/2020 1019        Component Value Date/Time    CALCIUM 10.3 (H) 02/21/2020 1019    ALKPHOS 59 02/21/2020 1019    AST 37 (H) 02/21/2020 1019    ALT 18 02/21/2020 1019    BILITOT 0.6 02/21/2020 1019    ESTGFRAFRICA >60 02/21/2020 1019    EGFRNONAA >60 02/21/2020 1019            ..  Lab Results   Component Value Date    CHOL 240 (H) 10/22/2019    CHOL 218 (H) 11/02/2017     Lab Results   Component Value Date    HDL 52 10/22/2019    HDL 57 11/02/2017     Lab Results   Component Value Date    LDLCALC 156.2 10/22/2019    LDLCALC 138.4 11/02/2017     Lab Results   Component Value Date    TRIG 159 (H) 10/22/2019    TRIG 113 11/02/2017     Lab Results   Component Value Date    CHOLHDL 21.7 10/22/2019    CHOLHDL 26.1 11/02/2017     ..  Lab Results   Component Value Date    WBC 8.04 02/21/2020    HGB 14.2 02/21/2020    HCT 44.0 02/21/2020    MCV 91 02/21/2020     02/21/2020       Test(s) Reviewed  I have reviewed the following in detail:  [] Stress test   [] Angiography   [] Echocardiogram "   [x] Labs   [] Other:       Assessment:         ICD-10-CM ICD-9-CM   1. Essential hypertension I10 401.9   2. Premature beats I49.49 427.60   3. Aortic valve sclerosis I35.8 424.1     Problem List Items Addressed This Visit        Cardiac/Vascular    Essential hypertension - Primary    Premature beats    Aortic valve sclerosis           Plan:     ALL CV CLINICALLY STABLE, NO ANGINA, NO HF, NO TIA, NO significant CLINICAL ARRHYTHMIA,CONTINUE CURRENT MEDS, EDUCATION, DIET, EXERCISE, return to clinic in 6 months      Essential hypertension    Premature beats    Aortic valve sclerosis    Other orders  -     metoprolol succinate (TOPROL-XL) 50 MG 24 hr tablet; Take 1 tablet (50 mg total) by mouth every evening.  Dispense: 90 tablet; Refill: 1    RTC Low level/low impact aerobic exercise 5x's/wk. Heart healthy diet and risk factor modification.    See labs and med orders.    Aerobic exercise 5x's/wk. Heart healthy diet and risk factor modification.    See labs and med orders.

## 2020-06-01 DIAGNOSIS — Z17.0 MALIGNANT NEOPLASM OF UPPER-OUTER QUADRANT OF RIGHT BREAST IN FEMALE, ESTROGEN RECEPTOR POSITIVE: Primary | ICD-10-CM

## 2020-06-01 DIAGNOSIS — C50.411 MALIGNANT NEOPLASM OF UPPER-OUTER QUADRANT OF RIGHT BREAST IN FEMALE, ESTROGEN RECEPTOR POSITIVE: Primary | ICD-10-CM

## 2020-06-01 RX ORDER — EXEMESTANE 25 MG/1
25 TABLET ORAL DAILY
Qty: 30 TABLET | Refills: 11 | Status: SHIPPED | OUTPATIENT
Start: 2020-06-01 | End: 2020-06-01 | Stop reason: CLARIF

## 2020-06-01 RX ORDER — ANASTROZOLE 1 MG/1
1 TABLET ORAL DAILY
Qty: 30 TABLET | Refills: 11 | Status: SHIPPED | OUTPATIENT
Start: 2020-06-01 | End: 2021-05-27

## 2020-07-13 ENCOUNTER — TELEPHONE (OUTPATIENT)
Dept: FAMILY MEDICINE | Facility: CLINIC | Age: 70
End: 2020-07-13

## 2020-07-13 NOTE — PROGRESS NOTES
Subjective:       Patient ID: Cecilia Valenzuela is a 70 y.o. female.    Chief Complaint: No chief complaint on file.    HPI Mrs. Valenzuela is a 70-year-old female with right breast cancer.      The patient location is:  home.  The chief complaint leading to consultation is:  Breast cancer.    Visit type: audiovisual    Face to Face time with patient:  10 min.  15 min minutes of total time spent on the encounter, which includes face to face time and non-face to face time preparing to see the patient (eg, review of tests), Obtaining and/or reviewing separately obtained history, Documenting clinical information in the electronic or other health record, Independently interpreting results (not separately reported) and communicating results to the patient/family/caregiver, or Care coordination (not separately reported).         Each patient to whom he or she provides medical services by telemedicine is:  (1) informed of the relationship between the physician and patient and the respective role of any other health care provider with respect to management of the patient; and (2) notified that he or she may decline to receive medical services by telemedicine and may withdraw from such care at any time.    Notes:     She started adjuvant letrozole in 11/2017. She took a  brief break from that therapy in December.  That resulted in improvement in her symptoms.  She then restarted and had some recurrence of her arthritic symptoms.  I March she was changed to exemestane.  She then changed to anastrozole .    Today she reports that she has been doing well overall.  She has been staying home most of the time due to the [place].  She has some leg cramps at night which she relates the night resolved.  She has been doing some exercises for vestibular dizziness which has created some neck pain.    She is due for her mammogram and for Prolia.  She would like those done in [place].           Onc History:  Screening mammography on  March 22, 2017 showed questionable asymmetry in the upper outer portion of the right breast.  There was no palpable abnormality.      Follow-up diagnostic mammogram on August 2, 2017 showed a high density mass in the upper-outer quadrant of the right breast.  By ultrasound this measured 1.2 x 0.7 x 1.2 cm.     Needle biopsy on August 7, 2017 showed infiltrating lobular carcinoma which was 99% ER positive, 84% RI positive.  HER-2 was 2+ but negative by FISH.  Ki-67 was 10%.     Right mastectomy and sentinel lymph node biopsy was performed on September 14th, 2017.  That showed a 2.7 cm infiltrating lobular carcinoma.  2 sentinel lymph nodes were negative for malignancy.  Final pathological stage T2 N0 - stage IIA.     Oncotype score was 19.    Mammogram in May 2019 was unremarkable.  Review of Systems   Constitutional: Negative for activity change, appetite change, fever and unexpected weight change.   Eyes: Negative for visual disturbance.   Respiratory: Negative for cough and shortness of breath.    Cardiovascular: Negative for chest pain.   Gastrointestinal: Negative for abdominal pain and diarrhea.   Genitourinary: Negative for frequency.   Musculoskeletal: Positive for arthralgias (Improved) and neck pain. Negative for back pain.        Nocturnal leg cramps   Skin: Negative for rash.   Neurological: Negative for headaches.        Vertigo   Hematological: Negative for adenopathy.   Psychiatric/Behavioral: Negative for dysphoric mood. The patient is not nervous/anxious.        Objective:      Physical Exam  Constitutional:       General: She is not in acute distress.     Appearance: Normal appearance. She is well-developed.   Pulmonary:      Breath sounds: No rales.   Abdominal:      Palpations: There is no mass.   Neurological:      Mental Status: She is alert and oriented to person, place, and time.   Psychiatric:         Mood and Affect: Mood normal.         Behavior: Behavior normal.         Thought Content:  Thought content normal.         Judgment: Judgment normal.         Assessment:      1. Malignant neoplasm of upper-outer quadrant of right breast in female, estrogen receptor positive        Plan:         Will arrange for her to get her mammogram and to restart her  Prolia.  Return to clinic in 4 months.

## 2020-07-13 NOTE — TELEPHONE ENCOUNTER
----- Message from Lesvia Lowery MA sent at 4/30/2020 11:53 AM CDT -----  4/30 needs lab in July per Dr Mathew

## 2020-07-13 NOTE — TELEPHONE ENCOUNTER
Spoke to patient that fasting lab is due. She will come to our office and wear a mask. Updated remind me.

## 2020-07-14 ENCOUNTER — OFFICE VISIT (OUTPATIENT)
Dept: HEMATOLOGY/ONCOLOGY | Facility: CLINIC | Age: 70
End: 2020-07-14
Payer: MEDICARE

## 2020-07-14 DIAGNOSIS — C50.411 MALIGNANT NEOPLASM OF UPPER-OUTER QUADRANT OF RIGHT BREAST IN FEMALE, ESTROGEN RECEPTOR POSITIVE: Primary | ICD-10-CM

## 2020-07-14 DIAGNOSIS — Z12.31 ENCOUNTER FOR SCREENING MAMMOGRAM FOR MALIGNANT NEOPLASM OF BREAST: ICD-10-CM

## 2020-07-14 DIAGNOSIS — Z17.0 MALIGNANT NEOPLASM OF UPPER-OUTER QUADRANT OF RIGHT BREAST IN FEMALE, ESTROGEN RECEPTOR POSITIVE: Primary | ICD-10-CM

## 2020-07-14 PROCEDURE — 99213 OFFICE O/P EST LOW 20 MIN: CPT | Mod: 95,,, | Performed by: INTERNAL MEDICINE

## 2020-07-14 PROCEDURE — 99213 PR OFFICE/OUTPT VISIT, EST, LEVL III, 20-29 MIN: ICD-10-PCS | Mod: 95,,, | Performed by: INTERNAL MEDICINE

## 2020-07-21 ENCOUNTER — PATIENT MESSAGE (OUTPATIENT)
Dept: CARDIOLOGY | Facility: CLINIC | Age: 70
End: 2020-07-21

## 2020-07-25 ENCOUNTER — TELEPHONE ENCOUNTER (OUTPATIENT)
Dept: URBAN - METROPOLITAN AREA CLINIC 13 | Facility: CLINIC | Age: 70
End: 2020-07-25

## 2020-07-25 RX ORDER — NAPROXEN SODIUM 220 MG
TAKE 1 TABLET AS NEEDED TABLET ORAL
Refills: 0 | OUTPATIENT
End: 2015-02-09

## 2020-07-25 RX ORDER — METAXALONE 800 MG
TAKE 1 TABLET  PRN TABLET ORAL
Refills: 0 | OUTPATIENT
Start: 2007-09-28 | End: 2008-11-21

## 2020-07-25 RX ORDER — ALBUTEROL SULFATE 90 UG/1
INHALE 1 PUFF EVERY 4 HOURS AS NEEDED AEROSOL, METERED RESPIRATORY (INHALATION)
Qty: 18 | Refills: 0 | OUTPATIENT
Start: 2012-01-02 | End: 2015-07-15

## 2020-07-25 RX ORDER — ESOMEPRAZOLE MAGNESIUM 40 MG/1
TAKE 1 CAPSULE TWICE DAILY CAPSULE, DELAYED RELEASE PELLETS ORAL
Qty: 60 | Refills: 3 | OUTPATIENT
Start: 2015-07-06 | End: 2019-06-19

## 2020-07-25 RX ORDER — GABAPENTIN 100 MG/1
TAKE 1 CAPSULE TWICE DAILY CAPSULE ORAL
Refills: 0 | OUTPATIENT
End: 2015-02-09

## 2020-07-25 RX ORDER — NAPROXEN SODIUM 220 MG
TAKE 2 TABLET  PRN TABLET ORAL
Refills: 0 | OUTPATIENT
Start: 2007-05-16 | End: 2012-09-06

## 2020-07-25 RX ORDER — FERROUS GLUCONATE 256(28)MG
TAKE 1 TABLET DAILY AS DIRECTED TABLET ORAL
Refills: 0 | OUTPATIENT
Start: 2006-08-15 | End: 2006-09-18

## 2020-07-25 RX ORDER — TIZANIDINE HYDROCHLORIDE 4 MG/1
USE AS DIRECTED TABLET ORAL
Refills: 0 | OUTPATIENT
Start: 2008-11-21 | End: 2011-10-11

## 2020-07-25 RX ORDER — PROMETHAZINE HYDROCHLORIDE 25 MG/1
TAKE 1 TABLET EVERY 8 HOURS AS NEEDED FOR NAUSEA AND VOMITING TABLET ORAL
Qty: 30 | Refills: 1 | OUTPATIENT
Start: 2016-06-16 | End: 2019-06-19

## 2020-07-25 RX ORDER — RALOXIFENE HCL 60 MG
TABLET ORAL
Qty: 90 | Refills: 0 | OUTPATIENT
Start: 2013-09-25 | End: 2015-02-09

## 2020-07-25 RX ORDER — MONTELUKAST SODIUM 10 MG/1
TAKE 1 TABLET DAILY AS DIRECTED TABLET, FILM COATED ORAL
Refills: 0 | OUTPATIENT
Start: 2018-10-05 | End: 2019-06-19

## 2020-07-26 ENCOUNTER — TELEPHONE ENCOUNTER (OUTPATIENT)
Dept: URBAN - METROPOLITAN AREA CLINIC 13 | Facility: CLINIC | Age: 70
End: 2020-07-26

## 2020-07-26 RX ORDER — HYDROCODONE BITARTRATE AND ACETAMINOPHEN 5; 325 MG/1; MG/1
TABLET ORAL
Qty: 20 | Refills: 0 | Status: ACTIVE | COMMUNITY
Start: 2012-07-22

## 2020-07-26 RX ORDER — SOFOSBUVIR 400 MG/1
TABLET, FILM COATED ORAL
Qty: 28 | Refills: 0 | Status: ACTIVE | COMMUNITY
Start: 2014-11-24

## 2020-07-26 RX ORDER — ZOLPIDEM TARTRATE 5 MG/1
TABLET, FILM COATED ORAL
Qty: 30 | Refills: 0 | Status: ACTIVE | COMMUNITY
Start: 2012-09-04

## 2020-07-26 RX ORDER — TELMISARTAN 20 MG/1
TABLET ORAL
Qty: 90 | Refills: 0 | Status: ACTIVE | COMMUNITY
Start: 2019-11-04

## 2020-07-26 RX ORDER — DICLOFENAC SODIUM 10 MG/G
GEL TOPICAL
Qty: 300 | Refills: 0 | Status: ACTIVE | COMMUNITY
Start: 2019-09-18

## 2020-07-26 RX ORDER — CEFUROXIME AXETIL 500 MG/1
TABLET, FILM COATED ORAL
Qty: 20 | Refills: 0 | Status: ACTIVE | COMMUNITY
Start: 2019-09-24

## 2020-07-26 RX ORDER — METHYLPREDNISOLONE 4 MG/1
TABLET ORAL
Qty: 21 | Refills: 0 | Status: ACTIVE | COMMUNITY
Start: 2018-11-24

## 2020-07-26 RX ORDER — MONTELUKAST 5 MG/1
TABLET, CHEWABLE ORAL
Qty: 60 | Refills: 0 | Status: ACTIVE | COMMUNITY
Start: 2012-11-26

## 2020-07-26 RX ORDER — MORPHINE SULFATE 15 MG/1
TABLET, FILM COATED, EXTENDED RELEASE ORAL
Qty: 56 | Refills: 0 | Status: ACTIVE | COMMUNITY
Start: 2014-02-06

## 2020-07-26 RX ORDER — CEFUROXIME AXETIL 500 MG/1
TABLET, FILM COATED ORAL
Qty: 14 | Refills: 0 | Status: ACTIVE | COMMUNITY
Start: 2013-06-24

## 2020-07-26 RX ORDER — OXYCODONE AND ACETAMINOPHEN 10; 325 MG/1; MG/1
TABLET ORAL
Qty: 84 | Refills: 0 | Status: ACTIVE | COMMUNITY
Start: 2014-07-21

## 2020-07-26 RX ORDER — IPRATROPIUM BROMIDE 21 UG/1
USE 2 SPRAYS IN EACH NOSTRIL TWICE DAILY SPRAY, METERED NASAL
Refills: 0 | Status: ACTIVE | COMMUNITY
Start: 2019-01-28

## 2020-07-26 RX ORDER — SUCRALFATE 1 G/1
TABLET ORAL
Qty: 20 | Refills: 0 | Status: ACTIVE | COMMUNITY
Start: 2011-11-06

## 2020-07-26 RX ORDER — DICLOFENAC SODIUM 1 %
GEL (GRAM) TOPICAL
Qty: 255 | Refills: 0 | Status: ACTIVE | COMMUNITY
Start: 2015-01-13

## 2020-07-26 RX ORDER — CHLORHEXIDINE GLUCONATE 0.12% ORAL RINSE 1.2 MG/ML
SOLUTION ORAL
Qty: 255 | Refills: 0 | Status: ACTIVE | COMMUNITY
Start: 2012-06-05

## 2020-07-26 RX ORDER — PROMETHAZINE HYDROCHLORIDE 25 MG/1
TABLET ORAL
Qty: 28 | Refills: 0 | Status: ACTIVE | COMMUNITY
Start: 2014-04-18

## 2020-07-26 RX ORDER — AZITHROMYCIN DIHYDRATE 250 MG/1
TABLET, FILM COATED ORAL
Qty: 6 | Refills: 0 | Status: ACTIVE | COMMUNITY
Start: 2013-11-22

## 2020-07-26 RX ORDER — BENZONATATE 100 MG/1
CAPSULE ORAL
Qty: 30 | Refills: 0 | Status: ACTIVE | COMMUNITY
Start: 2011-11-25

## 2020-07-26 RX ORDER — CODEINE PHOSPHATE AND GUAIFENESIN 10; 100 MG/5ML; MG/5ML
SOLUTION ORAL
Qty: 210 | Refills: 0 | Status: ACTIVE | COMMUNITY
Start: 2019-01-28

## 2020-07-26 RX ORDER — IPRATROPIUM BROMIDE 42 UG/1
SPRAY, METERED NASAL
Qty: 45 | Refills: 0 | Status: ACTIVE | COMMUNITY
Start: 2020-01-07

## 2020-07-26 RX ORDER — FLUCONAZOLE 150 MG/1
TABLET ORAL
Qty: 2 | Refills: 0 | Status: ACTIVE | COMMUNITY
Start: 2012-11-23

## 2020-07-26 RX ORDER — DEXLANSOPRAZOLE 60 MG/1
TAKE ONE CAPSULE BY MOUTH EVERY DAY CAPSULE, DELAYED RELEASE ORAL
Qty: 90 | Refills: 3 | Status: ACTIVE | COMMUNITY
Start: 2017-01-30

## 2020-07-26 RX ORDER — TRIAMTERENE AND HYDROCHLOROTHIAZIDE 37.5; 25 MG/1; MG/1
CAPSULE ORAL
Qty: 90 | Refills: 0 | Status: ACTIVE | COMMUNITY
Start: 2011-12-07

## 2020-07-26 RX ORDER — BIMATOPROST 0.1 MG/ML
SOLUTION/ DROPS OPHTHALMIC
Qty: 2 | Refills: 0 | Status: ACTIVE | COMMUNITY
Start: 2019-07-19

## 2020-07-26 RX ORDER — LATANOPROST/PF 0.005 %
INSTILL 1 DROP IN BOTH EYES AT BEDTIME DROPS OPHTHALMIC (EYE)
Refills: 0 | Status: ACTIVE | COMMUNITY
Start: 2014-05-10

## 2020-07-26 RX ORDER — CITALOPRAM 20 MG/1
TABLET ORAL
Qty: 30 | Refills: 0 | Status: ACTIVE | COMMUNITY
Start: 2015-06-18

## 2020-07-26 RX ORDER — CEFUROXIME AXETIL 500 MG/1
TABLET, FILM COATED ORAL
Qty: 20 | Refills: 0 | Status: ACTIVE | COMMUNITY
Start: 2020-01-07

## 2020-07-26 RX ORDER — PREDNISONE 20 MG/1
TABLET ORAL
Qty: 28 | Refills: 0 | Status: ACTIVE | COMMUNITY
Start: 2011-04-14

## 2020-07-26 RX ORDER — AZITHROMYCIN DIHYDRATE 250 MG/1
TABLET, FILM COATED ORAL
Qty: 6 | Refills: 0 | Status: ACTIVE | COMMUNITY
Start: 2014-09-19

## 2020-07-26 RX ORDER — AZITHROMYCIN DIHYDRATE 250 MG/1
TABLET, FILM COATED ORAL
Qty: 6 | Refills: 0 | Status: ACTIVE | COMMUNITY
Start: 2013-10-11

## 2020-07-26 RX ORDER — OXYCODONE AND ACETAMINOPHEN 5; 325 MG/1; MG/1
TABLET ORAL
Qty: 20 | Refills: 0 | Status: ACTIVE | COMMUNITY
Start: 2011-11-06

## 2020-07-26 RX ORDER — PREDNISONE 10 MG/1
TABLET ORAL
Qty: 21 | Refills: 0 | Status: ACTIVE | COMMUNITY
Start: 2019-04-03

## 2020-07-26 RX ORDER — SOFOSBUVIR 400 MG/1
TABLET, FILM COATED ORAL
Qty: 28 | Refills: 0 | Status: ACTIVE | COMMUNITY
Start: 2014-10-29

## 2020-07-26 RX ORDER — CEPHALEXIN 500 MG/1
CAPSULE ORAL
Qty: 28 | Refills: 0 | Status: ACTIVE | COMMUNITY
Start: 2014-10-01

## 2020-07-26 RX ORDER — LACTOBACILLUS RHAMNOSUS GG 10B CELL
TAKE 1 CAPSULE DAILY CAPSULE ORAL
Refills: 0 | Status: ACTIVE | COMMUNITY

## 2020-07-26 RX ORDER — METHOCARBAMOL 500 MG/1
TABLET, FILM COATED ORAL
Qty: 14 | Refills: 0 | Status: ACTIVE | COMMUNITY
Start: 2019-07-19

## 2020-07-26 RX ORDER — NEOMYCIN SULFATE, POLYMYXIN B SULFATE AND DEXAMETHASONE 3.5; 10000; 1 MG/ML; [USP'U]/ML; MG/ML
SUSPENSION/ DROPS OPHTHALMIC
Qty: 5 | Refills: 0 | Status: ACTIVE | COMMUNITY
Start: 2019-02-15

## 2020-07-26 RX ORDER — WARFARIN 5 MG/1
TABLET ORAL
Qty: 10 | Refills: 0 | Status: ACTIVE | COMMUNITY
Start: 2014-09-19

## 2020-07-26 RX ORDER — DICLOFENAC SODIUM 1 %
GEL (GRAM) TOPICAL
Qty: 255 | Refills: 0 | Status: ACTIVE | COMMUNITY
Start: 2014-12-12

## 2020-07-26 RX ORDER — ACETAMINOPHEN AND CODEINE PHOSPHATE 300; 30 MG/1; MG/1
TABLET ORAL
Qty: 18 | Refills: 0 | Status: ACTIVE | COMMUNITY
Start: 2014-06-02

## 2020-07-26 RX ORDER — AMMONIUM LACTATE 12 %
CREAM (GRAM) TOPICAL
Qty: 255 | Refills: 0 | Status: ACTIVE | COMMUNITY
Start: 2013-09-25

## 2020-07-26 RX ORDER — DIAZEPAM 5 MG/1
TABLET ORAL
Qty: 20 | Refills: 0 | Status: ACTIVE | COMMUNITY
Start: 2012-07-22

## 2020-07-26 RX ORDER — CEFUROXIME AXETIL 500 MG/1
TABLET, FILM COATED ORAL
Qty: 14 | Refills: 0 | Status: ACTIVE | COMMUNITY
Start: 2011-04-14

## 2020-07-26 RX ORDER — ALBUTEROL SULFATE 90 UG/1
AEROSOL, METERED RESPIRATORY (INHALATION)
Qty: 18 | Refills: 0 | Status: ACTIVE | COMMUNITY
Start: 2012-01-02

## 2020-07-26 RX ORDER — GLUCOSAMINE HCL/CHONDROITIN SU 500-400 MG
TAKE 1 CAPSULE DAILY CAPSULE ORAL
Refills: 0 | Status: ACTIVE | COMMUNITY

## 2020-07-26 RX ORDER — OSELTAMIVIR PHOSPHATE 75 MG/1
CAPSULE ORAL
Qty: 10 | Refills: 0 | Status: ACTIVE | COMMUNITY
Start: 2019-03-28

## 2020-07-26 RX ORDER — NAPROXEN 500 MG/1
TABLET ORAL
Qty: 15 | Refills: 0 | Status: ACTIVE | COMMUNITY
Start: 2011-11-06

## 2020-07-26 RX ORDER — AZELASTINE HCL 205.5 UG/1
SPRAY NASAL
Qty: 90 | Refills: 0 | Status: ACTIVE | COMMUNITY
Start: 2018-10-05

## 2020-07-26 RX ORDER — PREDNISONE 20 MG/1
TABLET ORAL
Qty: 15 | Refills: 0 | Status: ACTIVE | COMMUNITY
Start: 2011-11-06

## 2020-07-26 RX ORDER — INDOMETHACIN 50 MG/1
CAPSULE ORAL
Qty: 30 | Refills: 0 | Status: ACTIVE | COMMUNITY
Start: 2013-03-25

## 2020-07-26 RX ORDER — METHOCARBAMOL 750 MG/1
TABLET, FILM COATED ORAL
Qty: 84 | Refills: 0 | Status: ACTIVE | COMMUNITY
Start: 2018-10-05

## 2020-07-26 RX ORDER — PREDNISONE 10 MG/1
TABLET ORAL
Qty: 36 | Refills: 0 | Status: ACTIVE | COMMUNITY
Start: 2011-03-25

## 2020-07-26 RX ORDER — AZITHROMYCIN DIHYDRATE 250 MG/1
TABLET, FILM COATED ORAL
Qty: 3 | Refills: 0 | Status: ACTIVE | COMMUNITY
Start: 2019-04-03

## 2020-07-26 RX ORDER — GABAPENTIN 300 MG/1
CAPSULE ORAL
Qty: 180 | Refills: 0 | Status: ACTIVE | COMMUNITY
Start: 2012-08-30

## 2020-07-26 RX ORDER — OXYCODONE AND ACETAMINOPHEN 10; 325 MG/1; MG/1
TABLET ORAL
Qty: 84 | Refills: 0 | Status: ACTIVE | COMMUNITY
Start: 2014-05-01

## 2020-07-26 RX ORDER — ALBUTEROL SULFATE 2.5 MG/3ML
SOLUTION RESPIRATORY (INHALATION)
Qty: 255 | Refills: 0 | Status: ACTIVE | COMMUNITY
Start: 2012-03-14

## 2020-07-26 RX ORDER — MIRABEGRON 25 MG/1
TAKE 1 TABLET DAILY TABLET, FILM COATED, EXTENDED RELEASE ORAL
Refills: 0 | Status: ACTIVE | COMMUNITY
Start: 2019-06-12

## 2020-07-26 RX ORDER — TRIAMTERENE AND HYDROCHLOROTHIAZIDE 37.5; 25 MG/1; MG/1
CAPSULE ORAL
Qty: 30 | Refills: 0 | Status: ACTIVE | COMMUNITY
Start: 2011-02-23

## 2020-07-26 RX ORDER — AZITHROMYCIN DIHYDRATE 250 MG/1
TABLET, FILM COATED ORAL
Qty: 6 | Refills: 0 | Status: ACTIVE | COMMUNITY
Start: 2019-01-28

## 2020-07-26 RX ORDER — DICLOFENAC SODIUM 75 MG/1
TABLET, DELAYED RELEASE ORAL
Qty: 60 | Refills: 0 | Status: ACTIVE | COMMUNITY
Start: 2018-11-30

## 2020-07-26 RX ORDER — METHOCARBAMOL 500 MG/1
TABLET, FILM COATED ORAL
Qty: 28 | Refills: 0 | Status: ACTIVE | COMMUNITY
Start: 2020-01-13

## 2020-07-26 RX ORDER — IBUPROFEN 800 MG/1
TABLET ORAL
Qty: 20 | Refills: 0 | Status: ACTIVE | COMMUNITY
Start: 2012-07-22

## 2020-07-26 RX ORDER — BENZONATATE 200 MG/1
CAPSULE ORAL
Qty: 30 | Refills: 0 | Status: ACTIVE | COMMUNITY
Start: 2019-02-15

## 2020-07-26 RX ORDER — PENICILLIN V POTASSIUM 500 MG/1
TABLET, FILM COATED ORAL
Qty: 24 | Refills: 0 | Status: ACTIVE | COMMUNITY
Start: 2014-06-02

## 2020-07-26 RX ORDER — PREDNISONE 10 MG/1
TABLET ORAL
Qty: 100 | Refills: 0 | Status: ACTIVE | COMMUNITY
Start: 2012-03-30

## 2020-07-26 RX ORDER — BUDESONIDE 0.5 MG/2ML
USE 1 UNIT DOSE VIA NEBULIZER TWO TIMES A DAY INHALANT ORAL
Qty: 60 | Refills: 0 | Status: ACTIVE | COMMUNITY
Start: 2012-03-30

## 2020-07-26 RX ORDER — PREDNISONE 10 MG/1
TABLET ORAL
Qty: 90 | Refills: 0 | Status: ACTIVE | COMMUNITY
Start: 2012-03-14

## 2020-07-26 RX ORDER — OXYCODONE AND ACETAMINOPHEN 10; 325 MG/1; MG/1
TAKE 1 TABLET EVERY 6 HOURS PRN PAIN TABLET ORAL
Qty: 60 | Refills: 0 | Status: ACTIVE | COMMUNITY

## 2020-07-26 RX ORDER — OXYCODONE AND ACETAMINOPHEN 10; 325 MG/1; MG/1
TABLET ORAL
Qty: 84 | Refills: 0 | Status: ACTIVE | COMMUNITY
Start: 2014-03-06

## 2020-07-26 RX ORDER — ASCORBIC ACID 500 MG
TAKE 1 TABLET DAILY TABLET ORAL
Refills: 0 | Status: ACTIVE | COMMUNITY
Start: 2007-04-18

## 2020-07-26 RX ORDER — APIXABAN 2.5 MG/1
TABLET, FILM COATED ORAL
Qty: 28 | Refills: 0 | Status: ACTIVE | COMMUNITY
Start: 2014-09-08

## 2020-07-26 RX ORDER — FLUTICASONE PROPIONATE AND SALMETEROL 50; 500 UG/1; UG/1
POWDER RESPIRATORY (INHALATION)
Qty: 60 | Refills: 0 | Status: ACTIVE | COMMUNITY
Start: 2012-01-02

## 2020-07-26 RX ORDER — AZITHROMYCIN DIHYDRATE 250 MG/1
TABLET, FILM COATED ORAL
Qty: 6 | Refills: 0 | Status: ACTIVE | COMMUNITY
Start: 2018-10-05

## 2020-07-26 RX ORDER — RALOXIFENE HCL 60 MG
TABLET ORAL
Qty: 90 | Refills: 0 | Status: ACTIVE | COMMUNITY
Start: 2011-04-04

## 2020-07-26 RX ORDER — LEVOFLOXACIN 500 MG/1
TABLET, FILM COATED ORAL
Qty: 7 | Refills: 0 | Status: ACTIVE | COMMUNITY
Start: 2014-07-14

## 2020-07-26 RX ORDER — OXYCODONE AND ACETAMINOPHEN 7.5; 325 MG/1; MG/1
TABLET ORAL
Qty: 112 | Refills: 0 | Status: ACTIVE | COMMUNITY
Start: 2013-12-10

## 2020-08-03 ENCOUNTER — PATIENT MESSAGE (OUTPATIENT)
Dept: FAMILY MEDICINE | Facility: CLINIC | Age: 70
End: 2020-08-03

## 2020-08-04 ENCOUNTER — TELEPHONE (OUTPATIENT)
Dept: FAMILY MEDICINE | Facility: CLINIC | Age: 70
End: 2020-08-04

## 2020-08-06 ENCOUNTER — TELEPHONE (OUTPATIENT)
Dept: FAMILY MEDICINE | Facility: CLINIC | Age: 70
End: 2020-08-06

## 2020-08-06 LAB
ALBUMIN SERPL-MCNC: 4.5 G/DL (ref 3.6–5.1)
ALBUMIN/CREAT UR: 10 MCG/MG CREAT
ALBUMIN/GLOB SERPL: 1.3 (CALC) (ref 1–2.5)
ALP SERPL-CCNC: 99 U/L (ref 37–153)
ALT SERPL-CCNC: 15 U/L (ref 6–29)
APPEARANCE UR: CLEAR
AST SERPL-CCNC: 19 U/L (ref 10–35)
BACTERIA #/AREA URNS HPF: NORMAL /HPF
BACTERIA UR CULT: NORMAL
BASOPHILS # BLD AUTO: 51 CELLS/UL (ref 0–200)
BASOPHILS NFR BLD AUTO: 0.6 %
BILIRUB SERPL-MCNC: 0.6 MG/DL (ref 0.2–1.2)
BILIRUB UR QL STRIP: NEGATIVE
BUN SERPL-MCNC: 21 MG/DL (ref 7–25)
BUN/CREAT SERPL: NORMAL (CALC) (ref 6–22)
CALCIUM SERPL-MCNC: 10.3 MG/DL (ref 8.6–10.4)
CHLORIDE SERPL-SCNC: 102 MMOL/L (ref 98–110)
CHOLEST SERPL-MCNC: 204 MG/DL
CHOLEST/HDLC SERPL: 3.7 (CALC)
CO2 SERPL-SCNC: 27 MMOL/L (ref 20–32)
COLOR UR: YELLOW
CREAT SERPL-MCNC: 0.81 MG/DL (ref 0.6–0.93)
CREAT UR-MCNC: 21 MG/DL (ref 20–275)
EOSINOPHIL # BLD AUTO: 102 CELLS/UL (ref 15–500)
EOSINOPHIL NFR BLD AUTO: 1.2 %
ERYTHROCYTE [DISTWIDTH] IN BLOOD BY AUTOMATED COUNT: 13 % (ref 11–15)
GFRSERPLBLD MDRD-ARVRAT: 73 ML/MIN/1.73M2
GLOBULIN SER CALC-MCNC: 3.4 G/DL (CALC) (ref 1.9–3.7)
GLUCOSE SERPL-MCNC: 68 MG/DL (ref 65–99)
GLUCOSE UR QL STRIP: NEGATIVE
HCT VFR BLD AUTO: 48.2 % (ref 35–45)
HDLC SERPL-MCNC: 55 MG/DL
HGB BLD-MCNC: 15.6 G/DL (ref 11.7–15.5)
HGB UR QL STRIP: NEGATIVE
HYALINE CASTS #/AREA URNS LPF: NORMAL /LPF
KETONES UR QL STRIP: NEGATIVE
LDLC SERPL CALC-MCNC: 127 MG/DL (CALC)
LEUKOCYTE ESTERASE UR QL STRIP: NEGATIVE
LYMPHOCYTES # BLD AUTO: 1148 CELLS/UL (ref 850–3900)
LYMPHOCYTES NFR BLD AUTO: 13.5 %
MCH RBC QN AUTO: 28.9 PG (ref 27–33)
MCHC RBC AUTO-ENTMCNC: 32.4 G/DL (ref 32–36)
MCV RBC AUTO: 89.4 FL (ref 80–100)
MICROALBUMIN UR-MCNC: 0.2 MG/DL
MONOCYTES # BLD AUTO: 408 CELLS/UL (ref 200–950)
MONOCYTES NFR BLD AUTO: 4.8 %
NEUTROPHILS # BLD AUTO: 6792 CELLS/UL (ref 1500–7800)
NEUTROPHILS NFR BLD AUTO: 79.9 %
NITRITE UR QL STRIP: NEGATIVE
NONHDLC SERPL-MCNC: 149 MG/DL (CALC)
PH UR STRIP: 6.5 [PH] (ref 5–8)
PLATELET # BLD AUTO: 160 THOUSAND/UL (ref 140–400)
PMV BLD REES-ECKER: 13.2 FL (ref 7.5–12.5)
POTASSIUM SERPL-SCNC: 4 MMOL/L (ref 3.5–5.3)
PROT SERPL-MCNC: 7.9 G/DL (ref 6.1–8.1)
PROT UR QL STRIP: NEGATIVE
RBC # BLD AUTO: 5.39 MILLION/UL (ref 3.8–5.1)
RBC #/AREA URNS HPF: NORMAL /HPF
SODIUM SERPL-SCNC: 140 MMOL/L (ref 135–146)
SP GR UR STRIP: 1 (ref 1–1.03)
SQUAMOUS #/AREA URNS HPF: NORMAL /HPF
TRIGL SERPL-MCNC: 117 MG/DL
TSH SERPL-ACNC: 4.08 MIU/L (ref 0.4–4.5)
WBC # BLD AUTO: 8.5 THOUSAND/UL (ref 3.8–10.8)
WBC #/AREA URNS HPF: NORMAL /HPF

## 2020-08-06 NOTE — TELEPHONE ENCOUNTER
Patient called back and said that she had lab yesterday. In looking further I see where labs were done. They were resulted further down.

## 2020-08-06 NOTE — TELEPHONE ENCOUNTER
LMOR, no comm consent, to call me so that I can remind her fasting lab is due. Also sent portal message. This is 3rd/4th attempt to reach patient. Can I demetria reminder complete?

## 2020-09-04 ENCOUNTER — PATIENT MESSAGE (OUTPATIENT)
Dept: FAMILY MEDICINE | Facility: CLINIC | Age: 70
End: 2020-09-04

## 2020-09-07 ENCOUNTER — ERX REFILL RESPONSE (OUTPATIENT)
Age: 70
End: 2020-09-07

## 2020-09-07 RX ORDER — DEXLANSOPRAZOLE 60 MG/1
TAKE 1 CAPSULE BY MOUTH EVERY DAY CAPSULE, DELAYED RELEASE ORAL
Qty: 90 | Refills: 0

## 2020-09-09 ENCOUNTER — CLINICAL SUPPORT (OUTPATIENT)
Dept: FAMILY MEDICINE | Facility: CLINIC | Age: 70
End: 2020-09-09
Payer: MEDICARE

## 2020-09-09 VITALS — TEMPERATURE: 98 F

## 2020-09-09 DIAGNOSIS — Z23 NEED FOR INFLUENZA VACCINATION: Primary | ICD-10-CM

## 2020-09-09 PROCEDURE — G0008 FLU VACCINE - QUADRIVALENT - HIGH DOSE (65+) PRESERVATIVE FREE IM: ICD-10-PCS | Mod: S$GLB,,, | Performed by: FAMILY MEDICINE

## 2020-09-09 PROCEDURE — 90662 FLU VACCINE - QUADRIVALENT - HIGH DOSE (65+) PRESERVATIVE FREE IM: ICD-10-PCS | Mod: S$GLB,,, | Performed by: FAMILY MEDICINE

## 2020-09-09 PROCEDURE — 90662 IIV NO PRSV INCREASED AG IM: CPT | Mod: S$GLB,,, | Performed by: FAMILY MEDICINE

## 2020-09-09 PROCEDURE — G0008 ADMIN INFLUENZA VIRUS VAC: HCPCS | Mod: S$GLB,,, | Performed by: FAMILY MEDICINE

## 2020-09-17 ENCOUNTER — PATIENT MESSAGE (OUTPATIENT)
Dept: HEMATOLOGY/ONCOLOGY | Facility: CLINIC | Age: 70
End: 2020-09-17

## 2020-09-17 DIAGNOSIS — C50.411 MALIGNANT NEOPLASM OF UPPER-OUTER QUADRANT OF RIGHT BREAST IN FEMALE, ESTROGEN RECEPTOR POSITIVE: Primary | ICD-10-CM

## 2020-09-17 DIAGNOSIS — Z12.31 ENCOUNTER FOR SCREENING MAMMOGRAM FOR MALIGNANT NEOPLASM OF BREAST: ICD-10-CM

## 2020-09-17 DIAGNOSIS — Z17.0 MALIGNANT NEOPLASM OF UPPER-OUTER QUADRANT OF RIGHT BREAST IN FEMALE, ESTROGEN RECEPTOR POSITIVE: Primary | ICD-10-CM

## 2020-11-02 ENCOUNTER — PATIENT MESSAGE (OUTPATIENT)
Dept: FAMILY MEDICINE | Facility: CLINIC | Age: 70
End: 2020-11-02

## 2020-11-04 ENCOUNTER — OFFICE VISIT (OUTPATIENT)
Dept: FAMILY MEDICINE | Facility: CLINIC | Age: 70
End: 2020-11-04
Payer: MEDICARE

## 2020-11-04 VITALS — SYSTOLIC BLOOD PRESSURE: 107 MMHG | DIASTOLIC BLOOD PRESSURE: 60 MMHG

## 2020-11-04 DIAGNOSIS — C50.411 MALIGNANT NEOPLASM OF UPPER-OUTER QUADRANT OF RIGHT BREAST IN FEMALE, ESTROGEN RECEPTOR POSITIVE: ICD-10-CM

## 2020-11-04 DIAGNOSIS — Z17.0 MALIGNANT NEOPLASM OF UPPER-OUTER QUADRANT OF RIGHT BREAST IN FEMALE, ESTROGEN RECEPTOR POSITIVE: ICD-10-CM

## 2020-11-04 DIAGNOSIS — I10 ESSENTIAL HYPERTENSION: Primary | ICD-10-CM

## 2020-11-04 DIAGNOSIS — R00.2 PALPITATIONS: ICD-10-CM

## 2020-11-04 DIAGNOSIS — F41.9 ANXIETY: ICD-10-CM

## 2020-11-04 DIAGNOSIS — Z78.0 MENOPAUSE: ICD-10-CM

## 2020-11-04 DIAGNOSIS — M65.311 TRIGGER FINGER OF RIGHT THUMB: ICD-10-CM

## 2020-11-04 PROCEDURE — 99214 PR OFFICE/OUTPT VISIT, EST, LEVL IV, 30-39 MIN: ICD-10-PCS | Mod: 95,,, | Performed by: NURSE PRACTITIONER

## 2020-11-04 PROCEDURE — 99214 OFFICE O/P EST MOD 30 MIN: CPT | Mod: 95,,, | Performed by: NURSE PRACTITIONER

## 2020-11-04 NOTE — PROGRESS NOTES
Subjective:        The chief complaint leading to consultation is: anxiety f/u  The patient location is:  Home  Visit type: Virtual visit with synchronous audio/video or audio only  This was a video visit in lieu of in-person visit due to the coronavirus emergency. Patient acknowledged and consented to the video visit encounter.     Pt patient reports overall she has been doing well, I am great.  States anxiety has been well controlled and has not any palpitation episodes or needed to take Ativan since May.  Has been mainly staying at home due to COVID19.  Gets her groceries delivered and concerned about visiting family over the holidays as she feels her  is at increased risk  Follows with Dr. Vale for breast cancer on year 3 anastrozole.  Reports she missed her Prolia injection in July and asking if she really needs to continue this      No visits with results within 6 Month(s) from this visit.   Latest known visit with results is:   Admission on 02/21/2020, Discharged on 02/21/2020   Component Date Value Ref Range Status    WBC 02/21/2020 8.04  3.90 - 12.70 K/uL Final    RBC 02/21/2020 4.86  4.00 - 5.40 M/uL Final    Hemoglobin 02/21/2020 14.2  12.0 - 16.0 g/dL Final    Hematocrit 02/21/2020 44.0  37.0 - 48.5 % Final    MCV 02/21/2020 91  82 - 98 fL Final    MCH 02/21/2020 29.2  27.0 - 31.0 pg Final    MCHC 02/21/2020 32.3  32.0 - 36.0 g/dL Final    RDW 02/21/2020 12.5  11.5 - 14.5 % Final    Platelets 02/21/2020 216  150 - 350 K/uL Final    MPV 02/21/2020 11.7  9.2 - 12.9 fL Final    Immature Granulocytes 02/21/2020 0.2  0.0 - 0.5 % Final    Gran # (ANC) 02/21/2020 6.3  1.8 - 7.7 K/uL Final    Immature Grans (Abs) 02/21/2020 0.02  0.00 - 0.04 K/uL Final    Lymph # 02/21/2020 1.3  1.0 - 4.8 K/uL Final    Mono # 02/21/2020 0.4  0.3 - 1.0 K/uL Final    Eos # 02/21/2020 0.0  0.0 - 0.5 K/uL Final    Baso # 02/21/2020 0.03  0.00 - 0.20 K/uL Final    nRBC 02/21/2020 0  0 /100 WBC Final     Gran % 2020 78.3* 38.0 - 73.0 % Final    Lymph % 2020 15.7* 18.0 - 48.0 % Final    Mono % 2020 5.2  4.0 - 15.0 % Final    Eosinophil % 2020 0.2  0.0 - 8.0 % Final    Basophil % 2020 0.4  0.0 - 1.9 % Final    Differential Method 2020 Automated   Final    Sodium 2020 137  136 - 145 mmol/L Final    Potassium 2020 4.3  3.5 - 5.1 mmol/L Final    Chloride 2020 103  95 - 110 mmol/L Final    CO2 2020 24  22 - 31 mmol/L Final    Glucose 2020 83  70 - 110 mg/dL Final    BUN 2020 15  7 - 18 mg/dL Final    Creatinine 2020 0.62  0.50 - 1.40 mg/dL Final    Calcium 2020 10.3* 8.4 - 10.2 mg/dL Final    Total Protein 2020 8.8* 6.0 - 8.4 g/dL Final    Albumin 2020 4.9  3.5 - 5.2 g/dL Final    Total Bilirubin 2020 0.6  0.2 - 1.3 mg/dL Final    Alkaline Phosphatase 2020 59  38 - 145 U/L Final    AST 2020 37* 14 - 36 U/L Final    ALT 2020 18  0 - 35 U/L Final    Anion Gap 2020 10  8 - 16 mmol/L Final    eGFR if African American 2020 >60  >60 mL/min/1.73 m^2 Final    eGFR if non African American 2020 >60  >60 mL/min/1.73 m^2 Final    Troponin I 2020 <0.012  0.012 - 0.034 ng/mL Final       Past Surgical History:   Procedure Laterality Date    BACK SURGERY      BREAST BIOPSY Right 2017    CATARACT EXTRACTION Bilateral      SECTION      x5    DILATION AND CURETTAGE OF UTERUS      miss Ab    HYSTERECTOMY      MASTECTOMY Right 2017     Past Medical History:   Diagnosis Date    Arthritis     Atherosclerosis     in left carotid artery    Fibrocystic breast     GERD (gastroesophageal reflux disease)     Hyperlipidemia     Hypertension     Malignant neoplasm of upper-outer quadrant of right female breast 2017    right    Mitral valve prolapse     Neurofibromatosis     Osteopenia     Premature beats     Raynaud's disease     Seborrheic keratosis      Thyroid nodule     Tinnitus     Undiagnosed cardiac murmurs 7/19/2019     Family History   Problem Relation Age of Onset    Rheum arthritis Mother     Breast cancer Mother         over 85    Hypertension Mother     Heart disease Mother     Parkinsonism Father     Cancer Father     Breast cancer Sister 40        DCIS    Breast cancer Maternal Aunt         60's    Breast cancer Sister 50        DCIS    Ovarian cancer Neg Hx     Colon cancer Neg Hx     Melanoma Neg Hx         Social History:   Marital Status:   Alcohol History:  reports no history of alcohol use.  Tobacco History:  reports that she has never smoked. She has never used smokeless tobacco.  Drug History:  reports no history of drug use.    Review of patient's allergies indicates:   Allergen Reactions    Zofran [ondansetron hcl (pf)] Nausea Only    Augmentin [amoxicillin-pot clavulanate]     Corticosteroids (glucocorticoids) Other (See Comments)     Heart palpitations    Dilaudid [hydromorphone] Nausea And Vomiting     Does not help with pain    Fentanyl     Neosporin (neomycin-polymyx)     Norvasc [amlodipine]     Tetracyclines     Zoloft [sertraline]      Increased anxiety        Current Outpatient Medications   Medication Sig Dispense Refill    TOPROL XL 50 mg 24 hr tablet TAKE 1 TABLET (50 MG TOTAL) BY MOUTH EVERY EVENING.  90 tablet 1    anastrozole (ARIMIDEX) 1 mg Tab Take 1 tablet (1 mg total) by mouth once daily. 30 tablet 11    ascorbic acid, vitamin C, (VITAMIN C) 500 MG tablet Take 500 mg by mouth once daily.      aspirin (ECOTRIN) 81 MG EC tablet Take 81 mg by mouth once daily.      azelastine (ASTELIN) 137 mcg (0.1 %) nasal spray 1 spray by Nasal route as needed for Rhinitis.       calcium carbonate-vitamin D3 (CALCIUM 500 + D) 500 mg(1,250mg) -400 unit Tab Take 1 tablet by mouth 2 (two) times daily with meals.       coenzyme Q10 400 mg Cap Take 400 mg by mouth once daily.       denosumab (PROLIA) 60 mg/mL  Syrg Inject 60 mg into the skin every 6 (six) months.       glucosamine HCl/chondroitin springer (GLUCOSAMINE-CHONDROITIN ORAL) Take by mouth once daily. 1000mg /800mg         takes 500mg/400 capsule twice a day      lisinopril 10 MG tablet Take 1 tablet (10 mg total) by mouth as needed. FOR SBP > 150 90 tablet 1    LORazepam (ATIVAN) 0.5 MG tablet Take 1 tablet (0.5 mg total) by mouth every 12 (twelve) hours as needed for Anxiety. (Patient not taking: Reported on 11/4/2020) 30 tablet 1    magnesium 250 mg Tab Take 250 mg by mouth once daily.      meclizine (ANTIVERT) 25 mg tablet Take 0.5 tablets (12.5 mg total) by mouth nightly. 90 tablet 1    multivitamin (THERAGRAN) per tablet Take 1 tablet by mouth once daily.      omeprazole (PRILOSEC OTC) 20 MG tablet Take 20 mg by mouth every morning.       PATADAY 0.2 % Drop Place 1 drop into both eyes daily as needed (allergies).   0    zinc gluconate 50 mg tablet Take 50 mg by mouth once daily.       No current facility-administered medications for this visit.        Review of Systems   Constitutional: Negative for activity change and unexpected weight change.   HENT: Negative for hearing loss, rhinorrhea and trouble swallowing.    Eyes: Negative for discharge and visual disturbance.   Respiratory: Negative for chest tightness, shortness of breath and wheezing.    Cardiovascular: Negative for chest pain, palpitations and leg swelling.   Gastrointestinal: Negative for blood in stool, constipation, diarrhea and vomiting.   Endocrine: Negative for polydipsia and polyuria.   Genitourinary: Negative for difficulty urinating, dysuria and hematuria.   Musculoskeletal: Positive for arthralgias (Right thumb and middle finger triggering at times). Negative for joint swelling and neck pain.   Neurological: Negative for weakness and headaches.   Psychiatric/Behavioral: Negative for confusion and dysphoric mood. The patient is not nervous/anxious.          Objective:         Physical Exam:   Physical Exam  Constitutional:       General: She is not in acute distress.     Appearance: Normal appearance. She is normal weight.   Pulmonary:      Effort: Pulmonary effort is normal.   Neurological:      General: No focal deficit present.      Mental Status: She is alert and oriented to person, place, and time.   Psychiatric:         Mood and Affect: Mood normal.              Assessment:       1. Essential hypertension    2. Palpitations    3. Anxiety    4. Trigger finger of right thumb    5. Malignant neoplasm of upper-outer quadrant of right breast in female, estrogen receptor positive    6. Menopause      Plan:   Essential hypertension  Comments:  Patient reports blood pressure has been well controlled on home readings, has not required to take lisinopril    Palpitations  Comments:  Well controlled on metoprolol    Anxiety  Comments:  Seems to be doing very well    Trigger finger of right thumb  Comments:  Complaining of trigger finger right thumb and right middle finger- does not want hand injection but may be interested in hand therapy    Malignant neoplasm of upper-outer quadrant of right breast in female, estrogen receptor positive  Comments:  Stable on anastrozole, follow-up with Dr. Vale as scheduled.  Has mammogram scheduled coming up soon    Menopause  Comments:  Recommend she have updated DEXA and then once resulted discuss with Dr. Vale if she needs to continue with Prolia  Orders:  -     DXA Bone Density Spine And Hip; Future; Expected date: 11/11/2020      Follow up in about 6 months (around 5/4/2021).    Total time spent with patient: 20    Each patient to whom he or she provides medical services by telemedicine is:  (1) informed of the relationship between the physician and patient and the respective role of any other health care provider with respect to management of the patient; and (2) notified that he or she may decline to receive medical services by telemedicine and may  withdraw from such care at any time.    This note was created using [employer] voice recognition software that occasionally misinterprets phrases or words.

## 2020-11-05 ENCOUNTER — TELEPHONE (OUTPATIENT)
Dept: FAMILY MEDICINE | Facility: CLINIC | Age: 70
End: 2020-11-05

## 2020-11-05 NOTE — TELEPHONE ENCOUNTER
Please call patient and let her know after I ordered the DEXA scan yesterday I was sent a copy of her last DEXA which was performed in January 2019.  At that time showed osteopenia of the lumbar vertebrae and femur which was unchanged from 2016.  She does not need repeat DEXA now however I would still encourage her to discuss whether she should continue Prolia with Dr. Vale

## 2020-11-11 ENCOUNTER — PATIENT MESSAGE (OUTPATIENT)
Dept: HEMATOLOGY/ONCOLOGY | Facility: CLINIC | Age: 70
End: 2020-11-11

## 2020-11-16 ENCOUNTER — HOSPITAL ENCOUNTER (OUTPATIENT)
Dept: RADIOLOGY | Facility: CLINIC | Age: 70
Discharge: HOME | End: 2020-11-16
Attending: INTERNAL MEDICINE
Payer: MEDICARE

## 2020-11-16 DIAGNOSIS — Z12.31 ENCOUNTER FOR SCREENING MAMMOGRAM FOR MALIGNANT NEOPLASM OF BREAST: ICD-10-CM

## 2020-11-16 DIAGNOSIS — C50.411 MALIGNANT NEOPLASM OF UPPER-OUTER QUADRANT OF RIGHT BREAST IN FEMALE, ESTROGEN RECEPTOR POSITIVE: ICD-10-CM

## 2020-11-16 DIAGNOSIS — Z17.0 MALIGNANT NEOPLASM OF UPPER-OUTER QUADRANT OF RIGHT BREAST IN FEMALE, ESTROGEN RECEPTOR POSITIVE: ICD-10-CM

## 2020-11-16 PROCEDURE — 77067 SCR MAMMO BI INCL CAD: CPT | Mod: 26,,, | Performed by: RADIOLOGY

## 2020-11-16 PROCEDURE — 77067 SCR MAMMO BI INCL CAD: CPT | Mod: TC,PO

## 2020-11-16 PROCEDURE — 77063 BREAST TOMOSYNTHESIS BI: CPT | Mod: 26,,, | Performed by: RADIOLOGY

## 2020-11-16 PROCEDURE — 77063 MAMMO DIGITAL SCREENING LEFT WITH TOMO: ICD-10-PCS | Mod: 26,,, | Performed by: RADIOLOGY

## 2020-11-16 PROCEDURE — 77067 MAMMO DIGITAL SCREENING LEFT WITH TOMO: ICD-10-PCS | Mod: 26,,, | Performed by: RADIOLOGY

## 2020-11-16 NOTE — PROGRESS NOTES
Subjective:       Patient ID: Cecilia Valenzuela is a 70 y.o. female.    Chief Complaint: No chief complaint on file.    HPI Mrs. Valenzuela is a 70-year-old female with right breast cancer.   She is on anastrozole therapy.  She is also on Prolia which is good..    The patient location is: home.  The chief complaint leading to consultation is: breast cancer.    Visit type: audio only due to technical difficulties.     time with patient: 10.  15 minutes of total time spent on the encounter, which includes face to face time and non-face to face time preparing to see the patient (eg, review of tests), Obtaining and/or reviewing separately obtained history, Documenting clinical information in the electronic or other health record, Independently interpreting results (not separately reported) and communicating results to the patient/family/caregiver, or Care coordination (not separately reported).         Each patient to whom he or she provides medical services by telemedicine is:  (1) informed of the relationship between the physician and patient and the respective role of any other health care provider with respect to management of the patient; and (2) notified that he or she may decline to receive medical services by telemedicine and may withdraw from such care at any time.    Notes:  Today she reports that she has been feeling well with no new issues.  She has been keeping a very low profile due to COVID.  She reports no unusual pain other than some tendinitis in her right hand and wrist.  She is due for Prolia this month.         Onc History:  Screening mammography on March 22, 2017 showed questionable asymmetry in the upper outer portion of the right breast.  There was no palpable abnormality.      Follow-up diagnostic mammogram on August 2, 2017 showed a high density mass in the upper-outer quadrant of the right breast.  By ultrasound this measured 1.2 x 0.7 x 1.2 cm.     Needle biopsy on August 7, 2017 showed  infiltrating lobular carcinoma which was 99% ER positive, 84% MT positive.  HER-2 was 2+ but negative by FISH.  Ki-67 was 10%.     Right mastectomy and sentinel lymph node biopsy was performed on September 14th, 2017.  That showed a 2.7 cm infiltrating lobular carcinoma.  2 sentinel lymph nodes were negative for malignancy.  Final pathological stage T2 N0 - stage IIA.     Oncotype score was 19.    Mammogram in May 2019 was unremarkable.    She started adjuvant letrozole in 11/2017.  Because of musculoskeletal symptoms she was changed to exemestane and then to anastrozole.  Review of Systems   Constitutional: Negative for appetite change and unexpected weight change.   Eyes: Negative for visual disturbance.   Respiratory: Negative for cough and shortness of breath.    Cardiovascular: Negative for chest pain.   Gastrointestinal: Negative for abdominal pain and diarrhea.   Genitourinary: Negative for frequency.   Musculoskeletal: Positive for arthralgias (right wrist). Negative for back pain.   Skin: Negative for rash.   Neurological: Negative for headaches.   Hematological: Negative for adenopathy.   Psychiatric/Behavioral: Negative for dysphoric mood. The patient is not nervous/anxious.        Objective:      Physical Exam  Constitutional:       General: She is not in acute distress.     Appearance: She is well-developed.   Neurological:      Mental Status: She is oriented to person, place, and time.   Psychiatric:         Mood and Affect: Mood normal.         Thought Content: Thought content normal.         Judgment: Judgment normal.         Assessment:     mammogram is negative  1. Malignant neoplasm of upper-outer quadrant of right breast in female, estrogen receptor positive        Plan:       RTC 6 months.  Continue prolia - in [place]..  BMD in January.

## 2020-11-18 ENCOUNTER — TELEPHONE (OUTPATIENT)
Dept: HEMATOLOGY/ONCOLOGY | Facility: CLINIC | Age: 70
End: 2020-11-18

## 2020-11-18 ENCOUNTER — OFFICE VISIT (OUTPATIENT)
Dept: HEMATOLOGY/ONCOLOGY | Facility: CLINIC | Age: 70
End: 2020-11-18
Payer: MEDICARE

## 2020-11-18 DIAGNOSIS — C50.411 MALIGNANT NEOPLASM OF UPPER-OUTER QUADRANT OF RIGHT BREAST IN FEMALE, ESTROGEN RECEPTOR POSITIVE: Primary | ICD-10-CM

## 2020-11-18 DIAGNOSIS — Z17.0 MALIGNANT NEOPLASM OF UPPER-OUTER QUADRANT OF RIGHT BREAST IN FEMALE, ESTROGEN RECEPTOR POSITIVE: Primary | ICD-10-CM

## 2020-11-18 PROCEDURE — 99441 PR PHYSICIAN TELEPHONE EVALUATION 5-10 MIN: CPT | Mod: 95,,, | Performed by: INTERNAL MEDICINE

## 2020-11-18 PROCEDURE — 99441 PR PHYSICIAN TELEPHONE EVALUATION 5-10 MIN: ICD-10-PCS | Mod: 95,,, | Performed by: INTERNAL MEDICINE

## 2020-11-18 NOTE — TELEPHONE ENCOUNTER
Call returned to patient to let her know Dr Vale will call her in about 30 minutes. Patient agreeable to this.   ----- Message from Osei Tyler sent at 11/18/2020 10:43 AM CST -----  Contact: patient  Patient Advice/Staff Message     Caller name/title: Pt    Reason for call: Pt cannot log in for the VV, would like the doctor to call instead.     Do you feel you need to be seen today:: Yes        Communication Preference: [phone]    Additional Information:

## 2020-11-19 ENCOUNTER — PATIENT MESSAGE (OUTPATIENT)
Dept: HEMATOLOGY/ONCOLOGY | Facility: CLINIC | Age: 70
End: 2020-11-19

## 2020-11-19 ENCOUNTER — TELEPHONE (OUTPATIENT)
Dept: HEMATOLOGY/ONCOLOGY | Facility: CLINIC | Age: 70
End: 2020-11-19

## 2020-11-19 DIAGNOSIS — Z79.811 LONG TERM CURRENT USE OF AROMATASE INHIBITOR: ICD-10-CM

## 2020-11-19 NOTE — TELEPHONE ENCOUNTER
Call returned to patient. No answer. Will reach out to patient through patient portal about her CMP lab work.    ----- Message from Adriana Anthony sent at 11/19/2020  2:12 PM CST -----  Regarding: Lab work clarification  Contact: Pt @ [phone]  Pt stating she would like to speak with staff in regards to lab work and if we can use her previously labs or if she has to have lab work done on Tuesday for her Prolia shot.    Call back: [phone]

## 2020-11-19 NOTE — TELEPHONE ENCOUNTER
Left message to call the clinic to confirm lab appt for tomorrow in [place].      ----- Message from Trista Higuera RN sent at 11/19/2020  1:04 PM CST -----  Dr Vale added the Diagnosis  ----- Message -----  From: Ricardo Vale MD  Sent: 11/19/2020   1:02 PM CST  To: Trista Higuera RN    DX added  ----- Message -----  From: Trista Higuera RN  Sent: 11/19/2020  12:59 PM CST  To: Ricardo Vale MD      ----- Message -----  From: Renetta Gama  Sent: 11/19/2020  12:51 PM CST  To: Kavin Young Staff    Medicare requires the following to cover Prolia for a patient who has Osteopenia (M85.80)    - 2 additional diagnosis:   Use of Aromatase Inhibitors [Z79.811], and Breast Cancer [C50.411].  - Serum calcium labs within a month of receiving Prolia;  please order and schedule patient for labs.   - Signed order in the Therapy plan.      Thank you!  Renetta Gama  [facility]   [phone]

## 2020-11-20 ENCOUNTER — TELEPHONE (OUTPATIENT)
Dept: HEMATOLOGY/ONCOLOGY | Facility: CLINIC | Age: 70
End: 2020-11-20

## 2020-11-20 NOTE — TELEPHONE ENCOUNTER
Called patient to let her know about the lab appt on Tuesday in [place]. I also spoke to Bo[place]ie in [place] she will call patient to schedule the injection once the order is signed. Sent message to Dr Vale to sign order.    ----- Message from Trista Higuera RN sent at 11/19/2020  1:04 PM CST -----  Dr Vale added the Diagnosis  ----- Message -----  From: Ricardo Vale MD  Sent: 11/19/2020   1:02 PM CST  To: Trista Higuera RN    DX added  ----- Message -----  From: Trista Higuera RN  Sent: 11/19/2020  12:59 PM CST  To: Ricardo Vale MD      ----- Message -----  From: Renetta Gama  Sent: 11/19/2020  12:51 PM CST  To: Kavin Young Staff Medicare requires the following to cover Prolia for a patient who has Osteopenia (M85.80)    - 2 additional diagnosis:   Use of Aromatase Inhibitors [Z79.811], and Breast Cancer [C50.411].  - Serum calcium labs within a month of receiving Prolia;  please order and schedule patient for labs.   - Signed order in the Therapy plan.      Thank you!  Renetta Gama  [facility]   [phone]

## 2020-11-24 ENCOUNTER — PATIENT MESSAGE (OUTPATIENT)
Dept: HEMATOLOGY/ONCOLOGY | Facility: CLINIC | Age: 70
End: 2020-11-24

## 2020-12-04 ENCOUNTER — TELEPHONE (OUTPATIENT)
Dept: INFUSION THERAPY | Facility: HOSPITAL | Age: 70
End: 2020-12-04

## 2020-12-04 NOTE — TELEPHONE ENCOUNTER
Spoke w/ pt and she stated that she attempted to get labs drawn for her prolia injection and the phlebotomist stuck her 4 times and unable to get any blood.  She is requesting to do her dexa scan on or around jan 4th in in [place] , if they do them at the [facility] imaging center she would like this, otherwise, in [place] .  Then pending her dexa results she will see if she needs to schedule prolia

## 2020-12-20 ENCOUNTER — ERX REFILL RESPONSE (OUTPATIENT)
Age: 70
End: 2020-12-20

## 2020-12-20 RX ORDER — DEXLANSOPRAZOLE 60 MG/1
TAKE 1 CAPSULE BY MOUTH EVERY DAY CAPSULE, DELAYED RELEASE ORAL
Qty: 90 | Refills: 0

## 2021-01-07 ENCOUNTER — PATIENT MESSAGE (OUTPATIENT)
Dept: HEMATOLOGY/ONCOLOGY | Facility: CLINIC | Age: 71
End: 2021-01-07

## 2021-01-09 ENCOUNTER — IMMUNIZATION (OUTPATIENT)
Dept: INTERNAL MEDICINE | Facility: CLINIC | Age: 71
End: 2021-01-09
Payer: MEDICARE

## 2021-01-09 DIAGNOSIS — Z23 NEED FOR VACCINATION: ICD-10-CM

## 2021-01-09 PROCEDURE — 91300 COVID-19, MRNA, LNP-S, PF, 30 MCG/0.3 ML DOSE VACCINE: CPT | Mod: PBBFAC

## 2021-01-25 ENCOUNTER — TELEPHONE (OUTPATIENT)
Dept: HEMATOLOGY/ONCOLOGY | Facility: CLINIC | Age: 71
End: 2021-01-25

## 2021-01-28 ENCOUNTER — PATIENT MESSAGE (OUTPATIENT)
Dept: FAMILY MEDICINE | Facility: CLINIC | Age: 71
End: 2021-01-28

## 2021-01-30 ENCOUNTER — IMMUNIZATION (OUTPATIENT)
Dept: INTERNAL MEDICINE | Facility: CLINIC | Age: 71
End: 2021-01-30
Payer: MEDICARE

## 2021-01-30 DIAGNOSIS — Z23 NEED FOR VACCINATION: Primary | ICD-10-CM

## 2021-01-30 PROCEDURE — 91300 COVID-19, MRNA, LNP-S, PF, 30 MCG/0.3 ML DOSE VACCINE: CPT | Mod: PBBFAC | Performed by: INTERNAL MEDICINE

## 2021-01-30 PROCEDURE — 0002A COVID-19, MRNA, LNP-S, PF, 30 MCG/0.3 ML DOSE VACCINE: CPT | Mod: PBBFAC | Performed by: INTERNAL MEDICINE

## 2021-02-10 ENCOUNTER — PATIENT MESSAGE (OUTPATIENT)
Dept: HEMATOLOGY/ONCOLOGY | Facility: CLINIC | Age: 71
End: 2021-02-10

## 2021-02-11 ENCOUNTER — OFFICE VISIT (OUTPATIENT)
Dept: FAMILY MEDICINE | Facility: CLINIC | Age: 71
End: 2021-02-11
Payer: MEDICARE

## 2021-02-11 VITALS
BODY MASS INDEX: 20.58 KG/M2 | SYSTOLIC BLOOD PRESSURE: 124 MMHG | HEIGHT: 61 IN | DIASTOLIC BLOOD PRESSURE: 82 MMHG | WEIGHT: 109 LBS | HEART RATE: 70 BPM

## 2021-02-11 DIAGNOSIS — M85.852 OSTEOPENIA OF NECKS OF BOTH FEMURS: ICD-10-CM

## 2021-02-11 DIAGNOSIS — C50.411 MALIGNANT NEOPLASM OF UPPER-OUTER QUADRANT OF RIGHT BREAST IN FEMALE, ESTROGEN RECEPTOR POSITIVE: ICD-10-CM

## 2021-02-11 DIAGNOSIS — M85.851 OSTEOPENIA OF NECKS OF BOTH FEMURS: ICD-10-CM

## 2021-02-11 DIAGNOSIS — Z17.0 MALIGNANT NEOPLASM OF UPPER-OUTER QUADRANT OF RIGHT BREAST IN FEMALE, ESTROGEN RECEPTOR POSITIVE: ICD-10-CM

## 2021-02-11 DIAGNOSIS — M77.8 RIGHT WRIST TENDONITIS: ICD-10-CM

## 2021-02-11 DIAGNOSIS — M18.11 PRIMARY OSTEOARTHRITIS OF FIRST CARPOMETACARPAL JOINT OF RIGHT HAND: Primary | ICD-10-CM

## 2021-02-11 DIAGNOSIS — F41.9 ANXIETY: ICD-10-CM

## 2021-02-11 PROCEDURE — 99214 PR OFFICE/OUTPT VISIT, EST, LEVL IV, 30-39 MIN: ICD-10-PCS | Mod: S$GLB,,, | Performed by: NURSE PRACTITIONER

## 2021-02-11 PROCEDURE — 99214 OFFICE O/P EST MOD 30 MIN: CPT | Mod: S$GLB,,, | Performed by: NURSE PRACTITIONER

## 2021-02-11 RX ORDER — IBUPROFEN 200 MG
200 TABLET ORAL DAILY
COMMUNITY
End: 2021-05-18

## 2021-02-11 RX ORDER — ACETAMINOPHEN 325 MG/1
325 TABLET ORAL DAILY
COMMUNITY
End: 2021-05-18

## 2021-02-23 ENCOUNTER — TELEPHONE (OUTPATIENT)
Dept: HEMATOLOGY/ONCOLOGY | Facility: CLINIC | Age: 71
End: 2021-02-23

## 2021-02-23 ENCOUNTER — PATIENT MESSAGE (OUTPATIENT)
Dept: HEMATOLOGY/ONCOLOGY | Facility: CLINIC | Age: 71
End: 2021-02-23

## 2021-02-23 DIAGNOSIS — M85.88 OSTEOPENIA OF SPINE: Primary | ICD-10-CM

## 2021-02-24 ENCOUNTER — PATIENT MESSAGE (OUTPATIENT)
Dept: FAMILY MEDICINE | Facility: CLINIC | Age: 71
End: 2021-02-24

## 2021-03-04 ENCOUNTER — PATIENT MESSAGE (OUTPATIENT)
Dept: HEMATOLOGY/ONCOLOGY | Facility: CLINIC | Age: 71
End: 2021-03-04

## 2021-03-09 ENCOUNTER — INFUSION (OUTPATIENT)
Dept: INFUSION THERAPY | Facility: HOSPITAL | Age: 71
End: 2021-03-09
Attending: INTERNAL MEDICINE
Payer: MEDICARE

## 2021-03-09 DIAGNOSIS — Z17.0 MALIGNANT NEOPLASM OF UPPER-OUTER QUADRANT OF RIGHT BREAST IN FEMALE, ESTROGEN RECEPTOR POSITIVE: ICD-10-CM

## 2021-03-09 DIAGNOSIS — M85.80 OSTEOPENIA, UNSPECIFIED LOCATION: ICD-10-CM

## 2021-03-09 DIAGNOSIS — Z79.811 LONG TERM CURRENT USE OF AROMATASE INHIBITOR: Primary | ICD-10-CM

## 2021-03-09 DIAGNOSIS — C50.411 MALIGNANT NEOPLASM OF UPPER-OUTER QUADRANT OF RIGHT BREAST IN FEMALE, ESTROGEN RECEPTOR POSITIVE: ICD-10-CM

## 2021-03-09 PROCEDURE — 96372 THER/PROPH/DIAG INJ SC/IM: CPT

## 2021-03-09 PROCEDURE — 63600175 PHARM REV CODE 636 W HCPCS: Mod: JG | Performed by: INTERNAL MEDICINE

## 2021-03-09 RX ADMIN — DENOSUMAB 60 MG: 60 INJECTION SUBCUTANEOUS at 12:03

## 2021-03-24 ENCOUNTER — OFFICE VISIT (OUTPATIENT)
Dept: URBAN - METROPOLITAN AREA CLINIC 113 | Facility: CLINIC | Age: 71
End: 2021-03-24

## 2021-03-24 RX ORDER — METHOCARBAMOL 750 MG/1
TABLET, FILM COATED ORAL
Qty: 84 | Refills: 0 | Status: DISCONTINUED | COMMUNITY
Start: 2018-10-05

## 2021-03-24 RX ORDER — LACTOBACILLUS RHAMNOSUS GG 10B CELL
TAKE 1 CAPSULE DAILY CAPSULE ORAL
Refills: 0 | Status: ACTIVE | COMMUNITY

## 2021-03-24 RX ORDER — DIAZEPAM 5 MG/1
TABLET ORAL
Qty: 20 | Refills: 0 | Status: ACTIVE | COMMUNITY
Start: 2012-07-22

## 2021-03-24 RX ORDER — FLUTICASONE PROPIONATE AND SALMETEROL 50; 500 UG/1; UG/1
POWDER RESPIRATORY (INHALATION)
Qty: 60 | Refills: 0 | Status: ACTIVE | COMMUNITY
Start: 2012-01-02

## 2021-03-24 RX ORDER — AZELASTINE HCL 205.5 UG/1
SPRAY NASAL
Qty: 90 | Refills: 0 | Status: ACTIVE | COMMUNITY
Start: 2018-10-05

## 2021-03-24 RX ORDER — NEOMYCIN SULFATE, POLYMYXIN B SULFATE AND DEXAMETHASONE 3.5; 10000; 1 MG/ML; [USP'U]/ML; MG/ML
SUSPENSION/ DROPS OPHTHALMIC
Qty: 5 | Refills: 0 | Status: DISCONTINUED | COMMUNITY
Start: 2019-02-15

## 2021-03-24 RX ORDER — TELMISARTAN 20 MG/1
TABLET ORAL
Qty: 90 | Refills: 0 | Status: ACTIVE | COMMUNITY
Start: 2019-11-04

## 2021-03-24 RX ORDER — FLUCONAZOLE 150 MG/1
TABLET ORAL
Qty: 2 | Refills: 0 | Status: DISCONTINUED | COMMUNITY
Start: 2012-11-23

## 2021-03-24 RX ORDER — ALBUTEROL SULFATE 2.5 MG/3ML
SOLUTION RESPIRATORY (INHALATION)
Qty: 255 | Refills: 0 | Status: ACTIVE | COMMUNITY
Start: 2012-03-14

## 2021-03-24 RX ORDER — PROMETHAZINE HYDROCHLORIDE 25 MG/1
TABLET ORAL
Qty: 28 | Refills: 0 | Status: DISCONTINUED | COMMUNITY
Start: 2014-04-18

## 2021-03-24 RX ORDER — GLUCOSAMINE HCL/CHONDROITIN SU 500-400 MG
TAKE 1 CAPSULE DAILY CAPSULE ORAL
Refills: 0 | Status: ACTIVE | COMMUNITY

## 2021-03-24 RX ORDER — LATANOPROST/PF 0.005 %
INSTILL 1 DROP IN BOTH EYES AT BEDTIME DROPS OPHTHALMIC (EYE)
Refills: 0 | Status: ACTIVE | COMMUNITY
Start: 2014-05-10

## 2021-03-24 RX ORDER — IBUPROFEN 800 MG/1
TABLET ORAL
Qty: 20 | Refills: 0 | Status: DISCONTINUED | COMMUNITY
Start: 2012-07-22

## 2021-03-24 RX ORDER — METHYLPREDNISOLONE 4 MG/1
TABLET ORAL
Qty: 21 | Refills: 0 | Status: DISCONTINUED | COMMUNITY
Start: 2018-11-24

## 2021-03-24 RX ORDER — DEXLANSOPRAZOLE 60 MG/1
TAKE 1 CAPSULE BY MOUTH EVERY DAY CAPSULE, DELAYED RELEASE ORAL
Qty: 90 | Refills: 0 | Status: ACTIVE | COMMUNITY

## 2021-03-24 RX ORDER — WARFARIN 5 MG/1
TABLET ORAL
Qty: 10 | Refills: 0 | Status: ACTIVE | COMMUNITY
Start: 2014-09-19

## 2021-03-24 RX ORDER — CEFUROXIME AXETIL 500 MG/1
TABLET, FILM COATED ORAL
Qty: 14 | Refills: 0 | Status: DISCONTINUED | COMMUNITY
Start: 2011-04-14

## 2021-03-24 RX ORDER — PREDNISONE 10 MG/1
TABLET ORAL
Qty: 36 | Refills: 0 | Status: DISCONTINUED | COMMUNITY
Start: 2011-03-25

## 2021-03-24 RX ORDER — ALBUTEROL SULFATE 90 UG/1
AEROSOL, METERED RESPIRATORY (INHALATION)
Qty: 18 | Refills: 0 | Status: ACTIVE | COMMUNITY
Start: 2012-01-02

## 2021-03-24 RX ORDER — NAPROXEN 500 MG/1
TABLET ORAL
Qty: 15 | Refills: 0 | Status: DISCONTINUED | COMMUNITY
Start: 2011-11-06

## 2021-03-24 RX ORDER — BENZONATATE 100 MG/1
CAPSULE ORAL
Qty: 30 | Refills: 0 | Status: DISCONTINUED | COMMUNITY
Start: 2011-11-25

## 2021-03-24 RX ORDER — BENZONATATE 200 MG/1
CAPSULE ORAL
Qty: 30 | Refills: 0 | Status: DISCONTINUED | COMMUNITY
Start: 2019-02-15

## 2021-03-24 RX ORDER — IPRATROPIUM BROMIDE 42 UG/1
SPRAY, METERED NASAL
Qty: 45 | Refills: 0 | Status: ACTIVE | COMMUNITY
Start: 2020-01-07

## 2021-03-24 RX ORDER — MONTELUKAST 5 MG/1
TABLET, CHEWABLE ORAL
Qty: 60 | Refills: 0 | Status: ACTIVE | COMMUNITY
Start: 2012-11-26

## 2021-03-24 RX ORDER — ASCORBIC ACID 500 MG
TAKE 1 TABLET DAILY TABLET ORAL
Refills: 0 | Status: ACTIVE | COMMUNITY
Start: 2007-04-18

## 2021-03-24 RX ORDER — CEPHALEXIN 500 MG/1
CAPSULE ORAL
Qty: 28 | Refills: 0 | Status: DISCONTINUED | COMMUNITY
Start: 2014-10-01

## 2021-03-24 RX ORDER — AZITHROMYCIN DIHYDRATE 250 MG/1
TABLET, FILM COATED ORAL
Qty: 6 | Refills: 0 | Status: DISCONTINUED | COMMUNITY
Start: 2013-10-11

## 2021-03-24 RX ORDER — ZOLPIDEM TARTRATE 5 MG/1
TABLET, FILM COATED ORAL
Qty: 30 | Refills: 0 | Status: ACTIVE | COMMUNITY
Start: 2012-09-04

## 2021-03-24 RX ORDER — TRIAMTERENE AND HYDROCHLOROTHIAZIDE 37.5; 25 MG/1; MG/1
CAPSULE ORAL
Qty: 30 | Refills: 0 | Status: ACTIVE | COMMUNITY
Start: 2011-02-23

## 2021-03-24 RX ORDER — ACETAMINOPHEN AND CODEINE PHOSPHATE 300; 30 MG/1; MG/1
TABLET ORAL
Qty: 18 | Refills: 0 | Status: DISCONTINUED | COMMUNITY
Start: 2014-06-02

## 2021-03-24 RX ORDER — IPRATROPIUM BROMIDE 21 UG/1
USE 2 SPRAYS IN EACH NOSTRIL TWICE DAILY SPRAY, METERED NASAL
Refills: 0 | Status: DISCONTINUED | COMMUNITY
Start: 2019-01-28

## 2021-03-24 RX ORDER — LEVOFLOXACIN 500 MG/1
TABLET, FILM COATED ORAL
Qty: 7 | Refills: 0 | Status: DISCONTINUED | COMMUNITY
Start: 2014-07-14

## 2021-03-24 RX ORDER — SUCRALFATE 1 G/1
TABLET ORAL
Qty: 20 | Refills: 0 | Status: DISCONTINUED | COMMUNITY
Start: 2011-11-06

## 2021-03-24 RX ORDER — APIXABAN 2.5 MG/1
TABLET, FILM COATED ORAL
Qty: 28 | Refills: 0 | Status: ACTIVE | COMMUNITY
Start: 2014-09-08

## 2021-03-24 RX ORDER — BUDESONIDE 0.5 MG/2ML
USE 1 UNIT DOSE VIA NEBULIZER TWO TIMES A DAY INHALANT ORAL
Qty: 60 | Refills: 0 | Status: ACTIVE | COMMUNITY
Start: 2012-03-30

## 2021-03-24 RX ORDER — MORPHINE SULFATE 15 MG/1
TABLET, FILM COATED, EXTENDED RELEASE ORAL
Qty: 56 | Refills: 0 | Status: ACTIVE | COMMUNITY
Start: 2014-02-06

## 2021-03-24 RX ORDER — PENICILLIN V POTASSIUM 500 MG/1
TABLET, FILM COATED ORAL
Qty: 24 | Refills: 0 | Status: DISCONTINUED | COMMUNITY
Start: 2014-06-02

## 2021-03-24 RX ORDER — OXYCODONE AND ACETAMINOPHEN 10; 325 MG/1; MG/1
TAKE 1 TABLET EVERY 6 HOURS PRN PAIN TABLET ORAL
Qty: 60 | Refills: 0 | Status: DISCONTINUED | COMMUNITY

## 2021-03-24 RX ORDER — RALOXIFENE HCL 60 MG
TABLET ORAL
Qty: 90 | Refills: 0 | Status: ACTIVE | COMMUNITY
Start: 2011-04-04

## 2021-03-24 RX ORDER — OSELTAMIVIR PHOSPHATE 75 MG/1
CAPSULE ORAL
Qty: 10 | Refills: 0 | Status: DISCONTINUED | COMMUNITY
Start: 2019-03-28

## 2021-03-24 RX ORDER — OXYCODONE AND ACETAMINOPHEN 7.5; 325 MG/1; MG/1
TABLET ORAL
Qty: 112 | Refills: 0 | Status: DISCONTINUED | COMMUNITY
Start: 2013-12-10

## 2021-03-24 RX ORDER — CHLORHEXIDINE GLUCONATE 0.12% ORAL RINSE 1.2 MG/ML
SOLUTION ORAL
Qty: 255 | Refills: 0 | Status: ACTIVE | COMMUNITY
Start: 2012-06-05

## 2021-03-24 RX ORDER — DICLOFENAC SODIUM 10 MG/G
GEL TOPICAL
Qty: 300 | Refills: 0 | Status: ACTIVE | COMMUNITY
Start: 2019-09-18

## 2021-03-24 RX ORDER — DICLOFENAC SODIUM 1 %
GEL (GRAM) TOPICAL
Qty: 255 | Refills: 0 | Status: ACTIVE | COMMUNITY
Start: 2014-12-12

## 2021-03-24 RX ORDER — BIMATOPROST 0.1 MG/ML
SOLUTION/ DROPS OPHTHALMIC
Qty: 2 | Refills: 0 | Status: ACTIVE | COMMUNITY
Start: 2019-07-19

## 2021-03-24 RX ORDER — PREDNISONE 10 MG/1
TABLET ORAL
Qty: 21 | Refills: 0 | Status: DISCONTINUED | COMMUNITY
Start: 2019-04-03

## 2021-03-24 RX ORDER — HYDROCODONE BITARTRATE AND ACETAMINOPHEN 5; 325 MG/1; MG/1
TABLET ORAL
Qty: 20 | Refills: 0 | Status: DISCONTINUED | COMMUNITY
Start: 2012-07-22

## 2021-03-24 RX ORDER — SOFOSBUVIR 400 MG/1
TABLET, FILM COATED ORAL
Qty: 28 | Refills: 0 | Status: ACTIVE | COMMUNITY
Start: 2014-10-29

## 2021-03-24 RX ORDER — PREDNISONE 20 MG/1
TABLET ORAL
Qty: 28 | Refills: 0 | Status: DISCONTINUED | COMMUNITY
Start: 2011-04-14

## 2021-03-24 RX ORDER — AMMONIUM LACTATE 12 %
CREAM (GRAM) TOPICAL
Qty: 255 | Refills: 0 | Status: DISCONTINUED | COMMUNITY
Start: 2013-09-25

## 2021-03-24 RX ORDER — CODEINE PHOSPHATE AND GUAIFENESIN 10; 100 MG/5ML; MG/5ML
SOLUTION ORAL
Qty: 210 | Refills: 0 | Status: DISCONTINUED | COMMUNITY
Start: 2019-01-28

## 2021-03-24 RX ORDER — CITALOPRAM 20 MG/1
TABLET ORAL
Qty: 30 | Refills: 0 | Status: ACTIVE | COMMUNITY
Start: 2015-06-18

## 2021-03-24 RX ORDER — METHOCARBAMOL 500 MG/1
TABLET, FILM COATED ORAL
Qty: 14 | Refills: 0 | Status: DISCONTINUED | COMMUNITY
Start: 2019-07-19

## 2021-03-24 RX ORDER — OXYCODONE AND ACETAMINOPHEN 5; 325 MG/1; MG/1
TABLET ORAL
Qty: 20 | Refills: 0 | Status: DISCONTINUED | COMMUNITY
Start: 2011-11-06

## 2021-03-24 RX ORDER — DICLOFENAC SODIUM 75 MG/1
TABLET, DELAYED RELEASE ORAL
Qty: 60 | Refills: 0 | Status: ACTIVE | COMMUNITY
Start: 2018-11-30

## 2021-03-24 RX ORDER — INDOMETHACIN 50 MG/1
CAPSULE ORAL
Qty: 30 | Refills: 0 | Status: DISCONTINUED | COMMUNITY
Start: 2013-03-25

## 2021-03-24 RX ORDER — GABAPENTIN 300 MG/1
CAPSULE ORAL
Qty: 180 | Refills: 0 | Status: ACTIVE | COMMUNITY
Start: 2012-08-30

## 2021-03-24 RX ORDER — MIRABEGRON 25 MG/1
TAKE 1 TABLET DAILY TABLET, FILM COATED, EXTENDED RELEASE ORAL
Refills: 0 | Status: ACTIVE | COMMUNITY
Start: 2019-06-12

## 2021-03-24 NOTE — HPI-TODAY'S VISIT:
Ms. Tavera is a 69 year-old female with a history of HCV genotype 1B, Grade 2 Stage 2 disease on liver biopsy in 2006, who completed 12 weeks of Simeprevir and Sofosbuvir on 2/3/15 with SVR, GERD and fraternal history of colon cancer (60s) due screening in 2022 presenting for follow-up. She was last seen here on 1/28/2020.  She was last seen 6/19/19. She reported abdominal symptoms began a month prior to her visit. She was having right upper quadrant pain occurring postprandially and occasionally occurring when she had not eaten. She had associated nausea with occasional vomiting. She had experiencing associated change in bowel habits reporting loose stools. Typically, her bowels are moving 3 times a day. Stool frequency remained largely unchanged. Labs were ordered including stool studies. She was scheduled for an abdominal ultrasound. She was instructed to continue daily fiber and increase to 2 tablespoons daily. Dexilant was continued for GERD.   Labs 6/25/19 CBC: WBC 4.6, hemoglobin 12.8, MCV 86, platelet 123. BMP normal with the exception of glucose 129. LFTs normal TB 0.8, ALP 49, ALT 17, AST 23. Lipase 50. Amylase 82. Alpha-fetoprotein 4.2.   Abdominal ultrasound 6/25/19: No acute abnormality identified. The liver is normal in size and echotexture. No focal lesions were evident.  She reports resolution of loose stools and abdominal pain. She has occasional nocturnal reflux without heartburn. Otherwise, GERD is controlled. She denies any other abdominal symptoms. She admits to frequent ingestion of peppermints including having some in the night if she wakens.   The plan at the time of her last office visit was as follows:  Discussion/Summary GERD. She has occasional nocturnal reflux on Dexilant. Otherwise, she is asymptomatic. Discussed lifestyle modification for GERD including avoiding peppermint.  Abdominal pain and loose stools. Suspect this was related to gastroenteritis. Her symptoms have resolved.   History of hepatitis C status post successful treatment with Simeprevir and Sofosbuvir (2015). Recent ultrasound and alpha-fetoprotein were normal. Recommend repeat US and AFP in 1 year.     Plan  1. Continue Dexilant 60 mg daily 2. Alpha-fetoprotein and abdominal ultrasound in June 2020 3. Follow-up in one year

## 2021-05-03 ENCOUNTER — PATIENT MESSAGE (OUTPATIENT)
Dept: FAMILY MEDICINE | Facility: CLINIC | Age: 71
End: 2021-05-03

## 2021-05-03 ENCOUNTER — TELEPHONE (OUTPATIENT)
Dept: FAMILY MEDICINE | Facility: CLINIC | Age: 71
End: 2021-05-03

## 2021-05-10 ENCOUNTER — OFFICE VISIT (OUTPATIENT)
Dept: URBAN - METROPOLITAN AREA CLINIC 113 | Facility: CLINIC | Age: 71
End: 2021-05-10
Payer: MEDICARE

## 2021-05-10 ENCOUNTER — WEB ENCOUNTER (OUTPATIENT)
Dept: URBAN - METROPOLITAN AREA CLINIC 113 | Facility: CLINIC | Age: 71
End: 2021-05-10

## 2021-05-10 ENCOUNTER — LAB OUTSIDE AN ENCOUNTER (OUTPATIENT)
Dept: URBAN - METROPOLITAN AREA CLINIC 113 | Facility: CLINIC | Age: 71
End: 2021-05-10

## 2021-05-10 VITALS
TEMPERATURE: 97.3 F | WEIGHT: 216 LBS | DIASTOLIC BLOOD PRESSURE: 72 MMHG | BODY MASS INDEX: 34.72 KG/M2 | RESPIRATION RATE: 20 BRPM | HEART RATE: 64 BPM | SYSTOLIC BLOOD PRESSURE: 133 MMHG | HEIGHT: 66 IN

## 2021-05-10 DIAGNOSIS — Z86.19 HISTORY OF HEPATITIS C: ICD-10-CM

## 2021-05-10 DIAGNOSIS — K59.09 OTHER CONSTIPATION: ICD-10-CM

## 2021-05-10 DIAGNOSIS — Z80.0 FAMILY HISTORY OF COLON CANCER: ICD-10-CM

## 2021-05-10 DIAGNOSIS — K21.00 GASTROESOPHAGEAL REFLUX DISEASE WITH ESOPHAGITIS WITHOUT HEMORRHAGE: ICD-10-CM

## 2021-05-10 DIAGNOSIS — R13.10 ESOPHAGEAL DYSPHAGIA: ICD-10-CM

## 2021-05-10 PROCEDURE — 99214 OFFICE O/P EST MOD 30 MIN: CPT | Performed by: INTERNAL MEDICINE

## 2021-05-10 RX ORDER — DICLOFENAC SODIUM 75 MG/1
TABLET, DELAYED RELEASE ORAL
Qty: 60 | Refills: 0 | Status: DISCONTINUED | COMMUNITY
Start: 2018-11-30

## 2021-05-10 RX ORDER — DEXLANSOPRAZOLE 60 MG/1
TAKE 1 CAPSULE BY MOUTH EVERY DAY CAPSULE, DELAYED RELEASE ORAL ONCE A DAY
Qty: 90 | Refills: 4

## 2021-05-10 RX ORDER — TELMISARTAN 20 MG/1
1 TABLET TABLET ORAL ONCE A DAY
Refills: 0 | Status: ACTIVE | COMMUNITY
Start: 2019-11-04

## 2021-05-10 RX ORDER — LATANOPROST/PF 0.005 %
INSTILL 1 DROP IN BOTH EYES AT BEDTIME DROPS OPHTHALMIC (EYE)
Refills: 0 | Status: ACTIVE | COMMUNITY
Start: 2014-05-10

## 2021-05-10 RX ORDER — DIAZEPAM 5 MG/1
TABLET ORAL
Qty: 20 | Refills: 0 | Status: DISCONTINUED | COMMUNITY
Start: 2012-07-22

## 2021-05-10 RX ORDER — APIXABAN 2.5 MG/1
TABLET, FILM COATED ORAL
Qty: 28 | Refills: 0 | Status: DISCONTINUED | COMMUNITY
Start: 2014-09-08

## 2021-05-10 RX ORDER — MIRABEGRON 25 MG/1
TAKE 1 TABLET DAILY TABLET, FILM COATED, EXTENDED RELEASE ORAL
Refills: 0 | Status: DISCONTINUED | COMMUNITY
Start: 2019-06-12

## 2021-05-10 RX ORDER — WARFARIN 5 MG/1
TABLET ORAL
Qty: 10 | Refills: 0 | Status: DISCONTINUED | COMMUNITY
Start: 2014-09-19

## 2021-05-10 RX ORDER — SODIUM, POTASSIUM,MAG SULFATES 17.5-3.13G
354ML SOLUTION, RECONSTITUTED, ORAL ORAL
Qty: 354 MILLILITER | Refills: 0 | OUTPATIENT
Start: 2021-05-10 | End: 2021-05-11

## 2021-05-10 RX ORDER — MORPHINE SULFATE 15 MG/1
TABLET, FILM COATED, EXTENDED RELEASE ORAL
Qty: 56 | Refills: 0 | Status: DISCONTINUED | COMMUNITY
Start: 2014-02-06

## 2021-05-10 RX ORDER — ZOLPIDEM TARTRATE 5 MG/1
TABLET, FILM COATED ORAL
Qty: 30 | Refills: 0 | Status: DISCONTINUED | COMMUNITY
Start: 2012-09-04

## 2021-05-10 RX ORDER — GABAPENTIN 300 MG/1
CAPSULE ORAL
Qty: 180 | Refills: 0 | Status: DISCONTINUED | COMMUNITY
Start: 2012-08-30

## 2021-05-10 RX ORDER — CITALOPRAM 20 MG/1
TABLET ORAL
Qty: 30 | Refills: 0 | Status: DISCONTINUED | COMMUNITY
Start: 2015-06-18

## 2021-05-10 RX ORDER — METFORMIN HYDROCHLORIDE 500 MG/1
1 TABLET WITH A MEAL TABLET, FILM COATED ORAL TWICE DAILY
Status: ACTIVE | COMMUNITY

## 2021-05-10 RX ORDER — ALBUTEROL SULFATE 2.5 MG/3ML
SOLUTION RESPIRATORY (INHALATION)
Qty: 255 | Refills: 0 | Status: ACTIVE | COMMUNITY
Start: 2012-03-14

## 2021-05-10 RX ORDER — ASCORBIC ACID 500 MG
TAKE 1 TABLET DAILY TABLET ORAL
Refills: 0 | Status: ACTIVE | COMMUNITY
Start: 2007-04-18

## 2021-05-10 RX ORDER — DEXLANSOPRAZOLE 60 MG/1
TAKE 1 CAPSULE BY MOUTH EVERY DAY CAPSULE, DELAYED RELEASE ORAL
Qty: 90 | Refills: 0 | Status: ACTIVE | COMMUNITY

## 2021-05-10 RX ORDER — DICLOFENAC SODIUM 10 MG/G
GEL TOPICAL
Qty: 300 | Refills: 0 | Status: ACTIVE | COMMUNITY
Start: 2019-09-18

## 2021-05-10 RX ORDER — BUDESONIDE 0.5 MG/2ML
USE 1 UNIT DOSE VIA NEBULIZER TWO TIMES A DAY INHALANT ORAL
Qty: 60 | Refills: 0 | Status: ACTIVE | COMMUNITY
Start: 2012-03-30

## 2021-05-10 RX ORDER — ALBUTEROL SULFATE 90 UG/1
AEROSOL, METERED RESPIRATORY (INHALATION)
Qty: 18 | Refills: 0 | Status: ACTIVE | COMMUNITY
Start: 2012-01-02

## 2021-05-10 RX ORDER — MONTELUKAST 5 MG/1
TABLET, CHEWABLE ORAL
Qty: 60 | Refills: 0 | Status: DISCONTINUED | COMMUNITY
Start: 2012-11-26

## 2021-05-10 RX ORDER — BIMATOPROST 0.1 MG/ML
SOLUTION/ DROPS OPHTHALMIC
Qty: 2 | Refills: 0 | Status: DISCONTINUED | COMMUNITY
Start: 2019-07-19

## 2021-05-10 RX ORDER — IPRATROPIUM BROMIDE 42 UG/1
SPRAY, METERED NASAL
Qty: 45 | Refills: 0 | Status: ACTIVE | COMMUNITY
Start: 2020-01-07

## 2021-05-10 RX ORDER — TRIAMTERENE AND HYDROCHLOROTHIAZIDE 37.5; 25 MG/1; MG/1
CAPSULE ORAL
Qty: 30 | Refills: 0 | Status: DISCONTINUED | COMMUNITY
Start: 2011-02-23

## 2021-05-10 RX ORDER — CHLORHEXIDINE GLUCONATE 0.12% ORAL RINSE 1.2 MG/ML
SOLUTION ORAL
Qty: 255 | Refills: 0 | Status: DISCONTINUED | COMMUNITY
Start: 2012-06-05

## 2021-05-10 RX ORDER — LACTOBACILLUS RHAMNOSUS GG 10B CELL
TAKE 1 CAPSULE DAILY CAPSULE ORAL
Refills: 0 | Status: ACTIVE | COMMUNITY

## 2021-05-10 RX ORDER — SOFOSBUVIR 400 MG/1
TABLET, FILM COATED ORAL
Qty: 28 | Refills: 0 | Status: DISCONTINUED | COMMUNITY
Start: 2014-10-29

## 2021-05-10 RX ORDER — GLUCOSAMINE HCL/CHONDROITIN SU 500-400 MG
TAKE 1 CAPSULE DAILY CAPSULE ORAL
Refills: 0 | Status: ACTIVE | COMMUNITY

## 2021-05-10 RX ORDER — RALOXIFENE HCL 60 MG
TABLET ORAL
Qty: 90 | Refills: 0 | Status: DISCONTINUED | COMMUNITY
Start: 2011-04-04

## 2021-05-10 RX ORDER — AZELASTINE HCL 205.5 UG/1
1 PUFF IN EACH NOSTRIL SPRAY NASAL
Refills: 0 | Status: ACTIVE | COMMUNITY
Start: 2018-10-05

## 2021-05-10 RX ORDER — DICLOFENAC SODIUM 1 %
GEL (GRAM) TOPICAL
Qty: 255 | Refills: 0 | Status: DISCONTINUED | COMMUNITY
Start: 2014-12-12

## 2021-05-10 RX ORDER — FLUTICASONE PROPIONATE AND SALMETEROL 50; 500 UG/1; UG/1
POWDER RESPIRATORY (INHALATION)
Qty: 60 | Refills: 0 | Status: DISCONTINUED | COMMUNITY
Start: 2012-01-02

## 2021-05-10 NOTE — HPI-TODAY'S VISIT:
This is a 71-year-old female with a history of hepatitis C, genotype 1b status post treatment with SIMEPREVIR and sofosbuvir (2015) with SVR, grade 2 stage II liver disease on biopsy in 2006, GERD, and a fraternal history of colon cancer (60s) due screening in 2022 presenting for annual follow-up. She was last seen 1/28/2020.  GERD was well controlled.  She was instructed to continue Dexilant 60 mg daily.  Alpha-fetoprotein and abdominal ultrasound were recommended in June 2020. Labs 1/20/2021:BMP normal with exception of glucose 134.  LFTs TB 0.6, ALP 44, ALT 13, AST 15.:  Hemoglobin A1c 7.3. She reports difficulty swallowing occurring for the last 4 to 5 months.  She feels as though food or liquids are lodging in her chest.  When solid foods are lodged, she has to slowly drink liquids in order to relieve her symptoms.  Occasionally, she feels as though food is in her esophagus and she has not eaten.  She denies heartburn or a sensation of regurgitation.  She denies abdominal pain.  She reports intermittent nausea without vomiting.  She has also been experiencing constipation.  At baseline, she has a bowel movement after every meal.  She is having episodes of diarrhea described as watery bowel movements occurring 4 times a day followed by 4 or 5 days when she will not have a bowel movement or will have "small balls" of stool.  Afterward, diarrhea will recur.  She has intermittently tried magnesium citrate which is intermittently effective.  She denies any other abdominal symptoms.  She is requiring oxycodone 10 mg every other day to control pain.  She has been on a stable dose of Metformin.

## 2021-05-11 LAB — AFP, SERUM, TUMOR MARKER: 4

## 2021-05-14 ENCOUNTER — TELEPHONE (OUTPATIENT)
Dept: FAMILY MEDICINE | Facility: CLINIC | Age: 71
End: 2021-05-14

## 2021-05-14 ENCOUNTER — TELEPHONE ENCOUNTER (OUTPATIENT)
Dept: URBAN - METROPOLITAN AREA CLINIC 113 | Facility: CLINIC | Age: 71
End: 2021-05-14

## 2021-05-14 DIAGNOSIS — I10 ESSENTIAL HYPERTENSION: ICD-10-CM

## 2021-05-14 DIAGNOSIS — Z79.899 ENCOUNTER FOR LONG-TERM (CURRENT) USE OF OTHER MEDICATIONS: Primary | ICD-10-CM

## 2021-05-14 DIAGNOSIS — E78.00 HYPERCHOLESTEROLEMIA: ICD-10-CM

## 2021-05-14 RX ORDER — SODIUM PICOSULFATE, MAGNESIUM OXIDE, AND ANHYDROUS CITRIC ACID 10; 3.5; 12 MG/160ML; G/160ML; G/160ML
160 ML LIQUID ORAL
Qty: 320 MILLILITER | Refills: 0 | OUTPATIENT

## 2021-05-16 LAB
ALBUMIN SERPL-MCNC: 4.5 G/DL (ref 3.6–5.1)
ALBUMIN/CREAT UR: 10 MCG/MG CREAT
ALBUMIN/GLOB SERPL: 1.5 (CALC) (ref 1–2.5)
ALP SERPL-CCNC: 79 U/L (ref 37–153)
ALT SERPL-CCNC: 18 U/L (ref 6–29)
APPEARANCE UR: CLEAR
AST SERPL-CCNC: 19 U/L (ref 10–35)
BACTERIA #/AREA URNS HPF: NORMAL /HPF
BACTERIA UR CULT: NORMAL
BASOPHILS # BLD AUTO: 48 CELLS/UL (ref 0–200)
BASOPHILS NFR BLD AUTO: 0.7 %
BILIRUB SERPL-MCNC: 0.4 MG/DL (ref 0.2–1.2)
BILIRUB UR QL STRIP: NEGATIVE
BUN SERPL-MCNC: 15 MG/DL (ref 7–25)
BUN/CREAT SERPL: NORMAL (CALC) (ref 6–22)
CALCIUM SERPL-MCNC: 9.2 MG/DL (ref 8.6–10.4)
CHLORIDE SERPL-SCNC: 106 MMOL/L (ref 98–110)
CHOLEST SERPL-MCNC: 206 MG/DL
CHOLEST/HDLC SERPL: 3.7 (CALC)
CO2 SERPL-SCNC: 26 MMOL/L (ref 20–32)
COLOR UR: YELLOW
CREAT SERPL-MCNC: 0.61 MG/DL (ref 0.6–0.93)
CREAT UR-MCNC: 103 MG/DL (ref 20–275)
EOSINOPHIL # BLD AUTO: 129 CELLS/UL (ref 15–500)
EOSINOPHIL NFR BLD AUTO: 1.9 %
ERYTHROCYTE [DISTWIDTH] IN BLOOD BY AUTOMATED COUNT: 13.4 % (ref 11–15)
GLOBULIN SER CALC-MCNC: 3.1 G/DL (CALC) (ref 1.9–3.7)
GLUCOSE SERPL-MCNC: 78 MG/DL (ref 65–99)
GLUCOSE UR QL STRIP: NEGATIVE
HCT VFR BLD AUTO: 44.2 % (ref 35–45)
HDLC SERPL-MCNC: 55 MG/DL
HGB BLD-MCNC: 14.3 G/DL (ref 11.7–15.5)
HGB UR QL STRIP: NEGATIVE
HYALINE CASTS #/AREA URNS LPF: NORMAL /LPF
KETONES UR QL STRIP: NEGATIVE
LDLC SERPL CALC-MCNC: 129 MG/DL (CALC)
LEUKOCYTE ESTERASE UR QL STRIP: NEGATIVE
LYMPHOCYTES # BLD AUTO: 1442 CELLS/UL (ref 850–3900)
LYMPHOCYTES NFR BLD AUTO: 21.2 %
MCH RBC QN AUTO: 28.8 PG (ref 27–33)
MCHC RBC AUTO-ENTMCNC: 32.4 G/DL (ref 32–36)
MCV RBC AUTO: 89.1 FL (ref 80–100)
MICROALBUMIN UR-MCNC: 1 MG/DL
MONOCYTES # BLD AUTO: 510 CELLS/UL (ref 200–950)
MONOCYTES NFR BLD AUTO: 7.5 %
NEUTROPHILS # BLD AUTO: 4672 CELLS/UL (ref 1500–7800)
NEUTROPHILS NFR BLD AUTO: 68.7 %
NITRITE UR QL STRIP: NEGATIVE
NONHDLC SERPL-MCNC: 151 MG/DL (CALC)
PH UR STRIP: 6 [PH] (ref 5–8)
PLATELET # BLD AUTO: 213 THOUSAND/UL (ref 140–400)
PMV BLD REES-ECKER: 12.2 FL (ref 7.5–12.5)
POTASSIUM SERPL-SCNC: 4.5 MMOL/L (ref 3.5–5.3)
PROT SERPL-MCNC: 7.6 G/DL (ref 6.1–8.1)
PROT UR QL STRIP: NEGATIVE
RBC # BLD AUTO: 4.96 MILLION/UL (ref 3.8–5.1)
RBC #/AREA URNS HPF: NORMAL /HPF
SODIUM SERPL-SCNC: 140 MMOL/L (ref 135–146)
SP GR UR STRIP: 1.02 (ref 1–1.03)
SQUAMOUS #/AREA URNS HPF: NORMAL /HPF
TRIGL SERPL-MCNC: 117 MG/DL
TSH SERPL-ACNC: 2.64 MIU/L (ref 0.4–4.5)
WBC # BLD AUTO: 6.8 THOUSAND/UL (ref 3.8–10.8)
WBC #/AREA URNS HPF: NORMAL /HPF

## 2021-05-18 ENCOUNTER — OFFICE VISIT (OUTPATIENT)
Dept: FAMILY MEDICINE | Facility: CLINIC | Age: 71
End: 2021-05-18
Payer: MEDICARE

## 2021-05-18 VITALS
SYSTOLIC BLOOD PRESSURE: 128 MMHG | WEIGHT: 112 LBS | DIASTOLIC BLOOD PRESSURE: 78 MMHG | BODY MASS INDEX: 21.14 KG/M2 | HEART RATE: 68 BPM | HEIGHT: 61 IN

## 2021-05-18 DIAGNOSIS — M85.80 OSTEOPENIA, UNSPECIFIED LOCATION: ICD-10-CM

## 2021-05-18 DIAGNOSIS — E78.00 ELEVATED LDL CHOLESTEROL LEVEL: ICD-10-CM

## 2021-05-18 DIAGNOSIS — F41.9 ANXIETY: ICD-10-CM

## 2021-05-18 DIAGNOSIS — C50.411 MALIGNANT NEOPLASM OF UPPER-OUTER QUADRANT OF RIGHT BREAST IN FEMALE, ESTROGEN RECEPTOR POSITIVE: ICD-10-CM

## 2021-05-18 DIAGNOSIS — R19.5 POSITIVE COLORECTAL CANCER SCREENING USING COLOGUARD TEST: ICD-10-CM

## 2021-05-18 DIAGNOSIS — I34.1 MVP (MITRAL VALVE PROLAPSE): Primary | ICD-10-CM

## 2021-05-18 DIAGNOSIS — Z17.0 MALIGNANT NEOPLASM OF UPPER-OUTER QUADRANT OF RIGHT BREAST IN FEMALE, ESTROGEN RECEPTOR POSITIVE: ICD-10-CM

## 2021-05-18 DIAGNOSIS — M79.10 MYALGIA DUE TO STATIN: ICD-10-CM

## 2021-05-18 DIAGNOSIS — T46.6X5A MYALGIA DUE TO STATIN: ICD-10-CM

## 2021-05-18 PROCEDURE — 99214 OFFICE O/P EST MOD 30 MIN: CPT | Mod: S$GLB,,, | Performed by: NURSE PRACTITIONER

## 2021-05-18 PROCEDURE — 99214 PR OFFICE/OUTPT VISIT, EST, LEVL IV, 30-39 MIN: ICD-10-PCS | Mod: S$GLB,,, | Performed by: NURSE PRACTITIONER

## 2021-05-18 RX ORDER — LORAZEPAM 0.5 MG/1
0.5 TABLET ORAL EVERY 12 HOURS PRN
Qty: 10 TABLET | Refills: 1 | Status: SHIPPED | OUTPATIENT
Start: 2021-05-18 | End: 2022-10-11 | Stop reason: SDUPTHER

## 2021-05-20 ENCOUNTER — OFFICE VISIT (OUTPATIENT)
Dept: HEMATOLOGY/ONCOLOGY | Facility: CLINIC | Age: 71
End: 2021-05-20
Payer: MEDICARE

## 2021-05-20 VITALS
WEIGHT: 113.31 LBS | SYSTOLIC BLOOD PRESSURE: 144 MMHG | DIASTOLIC BLOOD PRESSURE: 65 MMHG | RESPIRATION RATE: 16 BRPM | HEIGHT: 61 IN | HEART RATE: 60 BPM | TEMPERATURE: 98 F | BODY MASS INDEX: 21.39 KG/M2

## 2021-05-20 DIAGNOSIS — C50.411 MALIGNANT NEOPLASM OF UPPER-OUTER QUADRANT OF RIGHT BREAST IN FEMALE, ESTROGEN RECEPTOR POSITIVE: Primary | ICD-10-CM

## 2021-05-20 DIAGNOSIS — Z17.0 MALIGNANT NEOPLASM OF UPPER-OUTER QUADRANT OF RIGHT BREAST IN FEMALE, ESTROGEN RECEPTOR POSITIVE: Primary | ICD-10-CM

## 2021-05-20 DIAGNOSIS — M85.80 OSTEOPENIA, UNSPECIFIED LOCATION: ICD-10-CM

## 2021-05-20 PROCEDURE — 99999 PR PBB SHADOW E&M-EST. PATIENT-LVL IV: CPT | Mod: PBBFAC,,, | Performed by: PHYSICIAN ASSISTANT

## 2021-05-20 PROCEDURE — 99214 OFFICE O/P EST MOD 30 MIN: CPT | Mod: PBBFAC | Performed by: PHYSICIAN ASSISTANT

## 2021-05-20 PROCEDURE — 99213 PR OFFICE/OUTPT VISIT, EST, LEVL III, 20-29 MIN: ICD-10-PCS | Mod: S$PBB,,, | Performed by: PHYSICIAN ASSISTANT

## 2021-05-20 PROCEDURE — 99213 OFFICE O/P EST LOW 20 MIN: CPT | Mod: S$PBB,,, | Performed by: PHYSICIAN ASSISTANT

## 2021-05-20 PROCEDURE — 99999 PR PBB SHADOW E&M-EST. PATIENT-LVL IV: ICD-10-PCS | Mod: PBBFAC,,, | Performed by: PHYSICIAN ASSISTANT

## 2021-06-02 ENCOUNTER — OFFICE VISIT (OUTPATIENT)
Dept: URBAN - METROPOLITAN AREA MEDICAL CENTER 2 | Facility: MEDICAL CENTER | Age: 71
End: 2021-06-02
Payer: MEDICARE

## 2021-06-02 ENCOUNTER — TELEPHONE ENCOUNTER (OUTPATIENT)
Dept: URBAN - METROPOLITAN AREA CLINIC 113 | Facility: CLINIC | Age: 71
End: 2021-06-02

## 2021-06-02 DIAGNOSIS — R13.19 CERVICAL DYSPHAGIA: ICD-10-CM

## 2021-06-02 PROCEDURE — 43450 DILATE ESOPHAGUS 1/MULT PASS: CPT | Performed by: INTERNAL MEDICINE

## 2021-06-02 PROCEDURE — 43235 EGD DIAGNOSTIC BRUSH WASH: CPT | Performed by: INTERNAL MEDICINE

## 2021-07-08 ENCOUNTER — PATIENT MESSAGE (OUTPATIENT)
Dept: HEMATOLOGY/ONCOLOGY | Facility: CLINIC | Age: 71
End: 2021-07-08

## 2021-07-08 DIAGNOSIS — M85.88 OSTEOPENIA OF SPINE: Primary | ICD-10-CM

## 2021-07-08 DIAGNOSIS — Z51.81 ENCOUNTER FOR THERAPEUTIC DRUG MONITORING: ICD-10-CM

## 2021-07-12 DIAGNOSIS — R42 VERTIGO: ICD-10-CM

## 2021-07-12 RX ORDER — MECLIZINE HYDROCHLORIDE 25 MG/1
12.5 TABLET ORAL NIGHTLY
Qty: 90 TABLET | Refills: 1 | Status: SHIPPED | OUTPATIENT
Start: 2021-07-12 | End: 2022-10-11

## 2021-07-14 ENCOUNTER — TELEPHONE (OUTPATIENT)
Dept: HEMATOLOGY/ONCOLOGY | Facility: CLINIC | Age: 71
End: 2021-07-14

## 2021-08-16 ENCOUNTER — PATIENT MESSAGE (OUTPATIENT)
Dept: CARDIOLOGY | Facility: CLINIC | Age: 71
End: 2021-08-16

## 2021-08-16 ENCOUNTER — PATIENT MESSAGE (OUTPATIENT)
Dept: HEMATOLOGY/ONCOLOGY | Facility: CLINIC | Age: 71
End: 2021-08-16

## 2021-08-16 ENCOUNTER — TELEPHONE (OUTPATIENT)
Dept: CARDIOLOGY | Facility: CLINIC | Age: 71
End: 2021-08-16

## 2021-08-17 ENCOUNTER — PATIENT MESSAGE (OUTPATIENT)
Dept: HEMATOLOGY/ONCOLOGY | Facility: CLINIC | Age: 71
End: 2021-08-17

## 2021-08-18 ENCOUNTER — IMMUNIZATION (OUTPATIENT)
Dept: PRIMARY CARE CLINIC | Facility: CLINIC | Age: 71
End: 2021-08-18
Payer: MEDICARE

## 2021-08-18 DIAGNOSIS — Z23 NEED FOR VACCINATION: Primary | ICD-10-CM

## 2021-08-18 PROCEDURE — 91300 COVID-19, MRNA, LNP-S, PF, 30 MCG/0.3 ML DOSE VACCINE: CPT | Mod: S$GLB,,, | Performed by: FAMILY MEDICINE

## 2021-08-18 PROCEDURE — 0003A COVID-19, MRNA, LNP-S, PF, 30 MCG/0.3 ML DOSE VACCINE: ICD-10-PCS | Mod: CV19,S$GLB,, | Performed by: FAMILY MEDICINE

## 2021-08-18 PROCEDURE — 0003A COVID-19, MRNA, LNP-S, PF, 30 MCG/0.3 ML DOSE VACCINE: CPT | Mod: CV19,S$GLB,, | Performed by: FAMILY MEDICINE

## 2021-08-18 PROCEDURE — 91300 COVID-19, MRNA, LNP-S, PF, 30 MCG/0.3 ML DOSE VACCINE: ICD-10-PCS | Mod: S$GLB,,, | Performed by: FAMILY MEDICINE

## 2021-08-30 ENCOUNTER — OFFICE VISIT (OUTPATIENT)
Dept: URBAN - METROPOLITAN AREA CLINIC 113 | Facility: CLINIC | Age: 71
End: 2021-08-30
Payer: MEDICARE

## 2021-08-30 VITALS
DIASTOLIC BLOOD PRESSURE: 80 MMHG | BODY MASS INDEX: 34.39 KG/M2 | HEART RATE: 57 BPM | WEIGHT: 214 LBS | SYSTOLIC BLOOD PRESSURE: 150 MMHG | HEIGHT: 66 IN | TEMPERATURE: 97.1 F | RESPIRATION RATE: 22 BRPM

## 2021-08-30 DIAGNOSIS — K59.09 OTHER CONSTIPATION: ICD-10-CM

## 2021-08-30 DIAGNOSIS — Z86.19 HISTORY OF HEPATITIS C: ICD-10-CM

## 2021-08-30 DIAGNOSIS — R13.10 ESOPHAGEAL DYSPHAGIA: ICD-10-CM

## 2021-08-30 DIAGNOSIS — K21.00 GASTROESOPHAGEAL REFLUX DISEASE WITH ESOPHAGITIS WITHOUT HEMORRHAGE: ICD-10-CM

## 2021-08-30 DIAGNOSIS — Z80.0 FAMILY HISTORY OF COLON CANCER: ICD-10-CM

## 2021-08-30 PROCEDURE — 99213 OFFICE O/P EST LOW 20 MIN: CPT | Performed by: INTERNAL MEDICINE

## 2021-08-30 RX ORDER — GLUCOSAMINE HCL/CHONDROITIN SU 500-400 MG
TAKE 1 CAPSULE DAILY CAPSULE ORAL
Refills: 0 | Status: ACTIVE | COMMUNITY

## 2021-08-30 RX ORDER — SODIUM PICOSULFATE, MAGNESIUM OXIDE, AND ANHYDROUS CITRIC ACID 10; 3.5; 12 MG/160ML; G/160ML; G/160ML
160 ML LIQUID ORAL
Qty: 320 MILLILITER | Refills: 0 | Status: ACTIVE | COMMUNITY

## 2021-08-30 RX ORDER — LATANOPROST/PF 0.005 %
INSTILL 1 DROP IN BOTH EYES AT BEDTIME DROPS OPHTHALMIC (EYE)
Refills: 0 | Status: ACTIVE | COMMUNITY
Start: 2014-05-10

## 2021-08-30 RX ORDER — BRINZOLAMIDE/BRIMONIDINE TARTRATE 10; 2 MG/ML; MG/ML
1 DROP INTO AFFECTED EYE SUSPENSION/ DROPS OPHTHALMIC THREE TIMES A DAY
Status: ACTIVE | COMMUNITY

## 2021-08-30 RX ORDER — LACTOBACILLUS RHAMNOSUS GG 10B CELL
TAKE 1 CAPSULE DAILY CAPSULE ORAL
Refills: 0 | Status: ACTIVE | COMMUNITY

## 2021-08-30 RX ORDER — BUDESONIDE 0.5 MG/2ML
USE 1 UNIT DOSE VIA NEBULIZER TWO TIMES A DAY INHALANT ORAL
Qty: 60 | Refills: 0 | Status: ACTIVE | COMMUNITY
Start: 2012-03-30

## 2021-08-30 RX ORDER — IPRATROPIUM BROMIDE 42 UG/1
SPRAY, METERED NASAL
Qty: 45 | Refills: 0 | Status: ACTIVE | COMMUNITY
Start: 2020-01-07

## 2021-08-30 RX ORDER — ASCORBIC ACID 500 MG
TAKE 1 TABLET DAILY TABLET ORAL
Refills: 0 | Status: ACTIVE | COMMUNITY
Start: 2007-04-18

## 2021-08-30 RX ORDER — DICLOFENAC SODIUM 10 MG/G
GEL TOPICAL
Qty: 300 | Refills: 0 | Status: ACTIVE | COMMUNITY
Start: 2019-09-18

## 2021-08-30 RX ORDER — ALBUTEROL SULFATE 90 UG/1
AEROSOL, METERED RESPIRATORY (INHALATION)
Qty: 18 | Refills: 0 | Status: ACTIVE | COMMUNITY
Start: 2012-01-02

## 2021-08-30 RX ORDER — TELMISARTAN 20 MG/1
1 TABLET TABLET ORAL ONCE A DAY
Refills: 0 | Status: ACTIVE | COMMUNITY
Start: 2019-11-04

## 2021-08-30 RX ORDER — AZELASTINE HCL 205.5 UG/1
1 PUFF IN EACH NOSTRIL SPRAY NASAL
Refills: 0 | Status: ACTIVE | COMMUNITY
Start: 2018-10-05

## 2021-08-30 RX ORDER — METFORMIN HYDROCHLORIDE 500 MG/1
1 TABLET WITH A MEAL TABLET, FILM COATED ORAL TWICE DAILY
Status: ACTIVE | COMMUNITY

## 2021-08-30 RX ORDER — ALBUTEROL SULFATE 2.5 MG/3ML
SOLUTION RESPIRATORY (INHALATION)
Qty: 255 | Refills: 0 | Status: ACTIVE | COMMUNITY
Start: 2012-03-14

## 2021-08-30 RX ORDER — DEXLANSOPRAZOLE 60 MG/1
TAKE 1 CAPSULE BY MOUTH EVERY DAY CAPSULE, DELAYED RELEASE ORAL ONCE A DAY
Qty: 90 | Refills: 4 | Status: ACTIVE | COMMUNITY

## 2021-08-30 RX ORDER — PREDNISOLONE/MOXIFLOXACIN HCL 1 %-0.5 %
AS DIRECTED SUSPENSION, DROPS(FINAL DOSAGE FORM)(ML) OPHTHALMIC (EYE) 4 TIMES DAILY
Status: ACTIVE | COMMUNITY

## 2021-08-30 NOTE — PHYSICAL EXAM NEUROLOGIC:
oriented to person, place and time; Flexion contracture right upper extremity; using a cane to ambulate.

## 2021-08-30 NOTE — HPI-TODAY'S VISIT:
This is a 71-year-old female with a history of hepatitis C, genotype 1b status post treatment with SIMEPREVIR and sofosbuvir (2015) with SVR, grade 2 stage II liver disease on biopsy in 2006, GERD, and a fraternal history of colon cancer (60s) due screening in 2022 presenting for follow-up After a recent upper endoscopy.  She was previously seen 1/28/2020.  GERD was well controlled.  She was instructed to continue Dexilant 60 mg daily.  Alpha-fetoprotein and abdominal ultrasound were recommended in June 2020. Labs 1/20/2021:BMP normal with exception of glucose 134.  LFTs TB 0.6, ALP 44, ALT 13, AST 15.:  Hemoglobin A1c 7.3.  At her last office visit on May 10, 2021, she reported a 4-5 month history of dysphagia, primarily to solids.  She denied heartburn, a sensation of regurgitation or abdominal pain.  She reported intermittent nausea without vomiting.  She had also been experiencing constipation.    After her last visit, she was scheduled for upper endoscopy and abdominal ultrasound examination.  Her ultrasound exam done on June 1, 2021 was normal.  Upper endoscopy done June 2, 2021 was grossly normal, she was empirically dilated with a 48 Uzbek Fuentes dilator.  She returns today with complete resolution of her dysphagia complaints.  Constipation is also better on milk of magnesia.  She actually drinking pretty well right now, and has no specific GI complaints.

## 2021-09-07 ENCOUNTER — PATIENT MESSAGE (OUTPATIENT)
Dept: CARDIOLOGY | Facility: CLINIC | Age: 71
End: 2021-09-07

## 2021-09-14 ENCOUNTER — CLINICAL SUPPORT (OUTPATIENT)
Dept: FAMILY MEDICINE | Facility: CLINIC | Age: 71
End: 2021-09-14
Payer: MEDICARE

## 2021-09-14 ENCOUNTER — OFFICE VISIT (OUTPATIENT)
Dept: CARDIOLOGY | Facility: CLINIC | Age: 71
End: 2021-09-14
Payer: MEDICARE

## 2021-09-14 VITALS
RESPIRATION RATE: 14 BRPM | HEART RATE: 62 BPM | SYSTOLIC BLOOD PRESSURE: 158 MMHG | WEIGHT: 115.94 LBS | DIASTOLIC BLOOD PRESSURE: 77 MMHG | BODY MASS INDEX: 21.89 KG/M2 | HEIGHT: 61 IN

## 2021-09-14 VITALS — TEMPERATURE: 98 F

## 2021-09-14 DIAGNOSIS — Z17.0 MALIGNANT NEOPLASM OF UPPER-OUTER QUADRANT OF RIGHT BREAST IN FEMALE, ESTROGEN RECEPTOR POSITIVE: ICD-10-CM

## 2021-09-14 DIAGNOSIS — I10 UNCONTROLLED HYPERTENSION: Primary | ICD-10-CM

## 2021-09-14 DIAGNOSIS — Z23 NEEDS FLU SHOT: Primary | ICD-10-CM

## 2021-09-14 DIAGNOSIS — E78.00 HYPERCHOLESTEROLEMIA: ICD-10-CM

## 2021-09-14 DIAGNOSIS — I35.8 AORTIC VALVE SCLEROSIS: ICD-10-CM

## 2021-09-14 DIAGNOSIS — C50.411 MALIGNANT NEOPLASM OF UPPER-OUTER QUADRANT OF RIGHT BREAST IN FEMALE, ESTROGEN RECEPTOR POSITIVE: ICD-10-CM

## 2021-09-14 PROCEDURE — 90662 IIV NO PRSV INCREASED AG IM: CPT | Mod: S$GLB,,, | Performed by: NURSE PRACTITIONER

## 2021-09-14 PROCEDURE — 99999 PR PBB SHADOW E&M-EST. PATIENT-LVL IV: CPT | Mod: PBBFAC,,, | Performed by: PHYSICIAN ASSISTANT

## 2021-09-14 PROCEDURE — G0008 ADMIN INFLUENZA VIRUS VAC: HCPCS | Mod: S$GLB,,, | Performed by: NURSE PRACTITIONER

## 2021-09-14 PROCEDURE — 99214 PR OFFICE/OUTPT VISIT, EST, LEVL IV, 30-39 MIN: ICD-10-PCS | Mod: S$PBB,,, | Performed by: PHYSICIAN ASSISTANT

## 2021-09-14 PROCEDURE — 99214 OFFICE O/P EST MOD 30 MIN: CPT | Mod: PBBFAC,PO | Performed by: PHYSICIAN ASSISTANT

## 2021-09-14 PROCEDURE — 90662 FLU VACCINE - QUADRIVALENT - HIGH DOSE (65+) PRESERVATIVE FREE IM: ICD-10-PCS | Mod: S$GLB,,, | Performed by: NURSE PRACTITIONER

## 2021-09-14 PROCEDURE — 99999 PR PBB SHADOW E&M-EST. PATIENT-LVL IV: ICD-10-PCS | Mod: PBBFAC,,, | Performed by: PHYSICIAN ASSISTANT

## 2021-09-14 PROCEDURE — 99214 OFFICE O/P EST MOD 30 MIN: CPT | Mod: S$PBB,,, | Performed by: PHYSICIAN ASSISTANT

## 2021-09-14 PROCEDURE — G0008 FLU VACCINE - QUADRIVALENT - HIGH DOSE (65+) PRESERVATIVE FREE IM: ICD-10-PCS | Mod: S$GLB,,, | Performed by: NURSE PRACTITIONER

## 2021-09-14 RX ORDER — LISINOPRIL 10 MG/1
TABLET ORAL
Qty: 30 TABLET | Refills: 11 | Status: SHIPPED | OUTPATIENT
Start: 2021-09-14 | End: 2022-10-11 | Stop reason: SDUPTHER

## 2021-09-14 RX ORDER — LISINOPRIL 10 MG/1
10 TABLET ORAL DAILY
Qty: 30 TABLET | Refills: 11 | Status: SHIPPED | OUTPATIENT
Start: 2021-09-14 | End: 2021-09-14

## 2021-09-16 ENCOUNTER — LAB VISIT (OUTPATIENT)
Dept: LAB | Facility: HOSPITAL | Age: 71
End: 2021-09-16
Attending: INTERNAL MEDICINE
Payer: MEDICARE

## 2021-09-16 ENCOUNTER — INFUSION (OUTPATIENT)
Dept: INFUSION THERAPY | Facility: HOSPITAL | Age: 71
End: 2021-09-16
Attending: INTERNAL MEDICINE
Payer: MEDICARE

## 2021-09-16 ENCOUNTER — OFFICE VISIT (OUTPATIENT)
Dept: HEMATOLOGY/ONCOLOGY | Facility: CLINIC | Age: 71
End: 2021-09-16
Payer: MEDICARE

## 2021-09-16 VITALS
WEIGHT: 114 LBS | HEART RATE: 62 BPM | TEMPERATURE: 98 F | RESPIRATION RATE: 18 BRPM | BODY MASS INDEX: 21.52 KG/M2 | DIASTOLIC BLOOD PRESSURE: 68 MMHG | SYSTOLIC BLOOD PRESSURE: 139 MMHG | HEIGHT: 61 IN | OXYGEN SATURATION: 97 %

## 2021-09-16 DIAGNOSIS — I10 ESSENTIAL HYPERTENSION: ICD-10-CM

## 2021-09-16 DIAGNOSIS — M85.80 OSTEOPENIA, UNSPECIFIED LOCATION: ICD-10-CM

## 2021-09-16 DIAGNOSIS — E78.00 HYPERCHOLESTEROLEMIA: ICD-10-CM

## 2021-09-16 DIAGNOSIS — Z51.81 ENCOUNTER FOR THERAPEUTIC DRUG MONITORING: ICD-10-CM

## 2021-09-16 DIAGNOSIS — C50.411 MALIGNANT NEOPLASM OF UPPER-OUTER QUADRANT OF RIGHT BREAST IN FEMALE, ESTROGEN RECEPTOR POSITIVE: Primary | ICD-10-CM

## 2021-09-16 DIAGNOSIS — C50.411 MALIGNANT NEOPLASM OF UPPER-OUTER QUADRANT OF RIGHT BREAST IN FEMALE, ESTROGEN RECEPTOR POSITIVE: ICD-10-CM

## 2021-09-16 DIAGNOSIS — Z17.0 MALIGNANT NEOPLASM OF UPPER-OUTER QUADRANT OF RIGHT BREAST IN FEMALE, ESTROGEN RECEPTOR POSITIVE: Primary | ICD-10-CM

## 2021-09-16 DIAGNOSIS — M85.88 OSTEOPENIA OF SPINE: ICD-10-CM

## 2021-09-16 DIAGNOSIS — C11.2 MALIGNANT NEOPLASM OF LATERAL WALL OF NASOPHARYNX: Primary | ICD-10-CM

## 2021-09-16 DIAGNOSIS — Z17.0 MALIGNANT NEOPLASM OF UPPER-OUTER QUADRANT OF RIGHT BREAST IN FEMALE, ESTROGEN RECEPTOR POSITIVE: ICD-10-CM

## 2021-09-16 DIAGNOSIS — Z79.811 LONG TERM CURRENT USE OF AROMATASE INHIBITOR: ICD-10-CM

## 2021-09-16 LAB
ALBUMIN SERPL BCP-MCNC: 3.8 G/DL (ref 3.5–5.2)
ALP SERPL-CCNC: 60 U/L (ref 55–135)
ALT SERPL W/O P-5'-P-CCNC: 21 U/L (ref 10–44)
ANION GAP SERPL CALC-SCNC: 4 MMOL/L (ref 8–16)
AST SERPL-CCNC: 21 U/L (ref 10–40)
BILIRUB SERPL-MCNC: 0.4 MG/DL (ref 0.1–1)
BUN SERPL-MCNC: 16 MG/DL (ref 8–23)
CALCIUM SERPL-MCNC: 9.7 MG/DL (ref 8.7–10.5)
CHLORIDE SERPL-SCNC: 104 MMOL/L (ref 95–110)
CO2 SERPL-SCNC: 27 MMOL/L (ref 23–29)
CREAT SERPL-MCNC: 0.7 MG/DL (ref 0.5–1.4)
EST. GFR  (AFRICAN AMERICAN): >60 ML/MIN/1.73 M^2
EST. GFR  (NON AFRICAN AMERICAN): >60 ML/MIN/1.73 M^2
GLUCOSE SERPL-MCNC: 79 MG/DL (ref 70–110)
POTASSIUM SERPL-SCNC: 4.1 MMOL/L (ref 3.5–5.1)
PROT SERPL-MCNC: 7.4 G/DL (ref 6–8.4)
SODIUM SERPL-SCNC: 135 MMOL/L (ref 136–145)

## 2021-09-16 PROCEDURE — 99999 PR PBB SHADOW E&M-EST. PATIENT-LVL IV: CPT | Mod: PBBFAC,,, | Performed by: NURSE PRACTITIONER

## 2021-09-16 PROCEDURE — 99999 PR PBB SHADOW E&M-EST. PATIENT-LVL IV: ICD-10-PCS | Mod: PBBFAC,,, | Performed by: NURSE PRACTITIONER

## 2021-09-16 PROCEDURE — 96372 THER/PROPH/DIAG INJ SC/IM: CPT

## 2021-09-16 PROCEDURE — 63600175 PHARM REV CODE 636 W HCPCS: Mod: JG | Performed by: NURSE PRACTITIONER

## 2021-09-16 PROCEDURE — 99214 OFFICE O/P EST MOD 30 MIN: CPT | Mod: 25,PBBFAC | Performed by: NURSE PRACTITIONER

## 2021-09-16 PROCEDURE — 99214 OFFICE O/P EST MOD 30 MIN: CPT | Mod: S$PBB,,, | Performed by: NURSE PRACTITIONER

## 2021-09-16 PROCEDURE — 80053 COMPREHEN METABOLIC PANEL: CPT | Performed by: INTERNAL MEDICINE

## 2021-09-16 PROCEDURE — 99214 PR OFFICE/OUTPT VISIT, EST, LEVL IV, 30-39 MIN: ICD-10-PCS | Mod: S$PBB,,, | Performed by: NURSE PRACTITIONER

## 2021-09-16 PROCEDURE — 36415 COLL VENOUS BLD VENIPUNCTURE: CPT | Performed by: INTERNAL MEDICINE

## 2021-09-16 RX ADMIN — DENOSUMAB 60 MG: 60 INJECTION SUBCUTANEOUS at 12:09

## 2021-09-17 ENCOUNTER — TELEPHONE (OUTPATIENT)
Dept: HEMATOLOGY/ONCOLOGY | Facility: CLINIC | Age: 71
End: 2021-09-17

## 2021-09-22 ENCOUNTER — PATIENT MESSAGE (OUTPATIENT)
Dept: HEMATOLOGY/ONCOLOGY | Facility: CLINIC | Age: 71
End: 2021-09-22

## 2021-09-22 ENCOUNTER — TELEPHONE (OUTPATIENT)
Dept: INFUSION THERAPY | Facility: HOSPITAL | Age: 71
End: 2021-09-22

## 2021-09-22 ENCOUNTER — TELEPHONE (OUTPATIENT)
Dept: HEMATOLOGY/ONCOLOGY | Facility: CLINIC | Age: 71
End: 2021-09-22

## 2021-09-22 PROBLEM — M25.511 ACUTE PAIN OF RIGHT SHOULDER: Status: RESOLVED | Noted: 2017-09-25 | Resolved: 2021-09-22

## 2021-09-22 PROBLEM — C11.2: Status: RESOLVED | Noted: 2021-09-16 | Resolved: 2021-09-22

## 2021-09-22 PROBLEM — R19.5 POSITIVE COLORECTAL CANCER SCREENING USING COLOGUARD TEST: Status: RESOLVED | Noted: 2020-02-20 | Resolved: 2021-09-22

## 2021-09-22 PROBLEM — R00.2 PALPITATIONS: Status: RESOLVED | Noted: 2019-07-19 | Resolved: 2021-09-22

## 2021-09-22 PROBLEM — I10 UNCONTROLLED HYPERTENSION: Status: RESOLVED | Noted: 2019-07-19 | Resolved: 2021-09-22

## 2021-10-18 ENCOUNTER — TELEPHONE (OUTPATIENT)
Dept: FAMILY MEDICINE | Facility: CLINIC | Age: 71
End: 2021-10-18

## 2021-10-19 ENCOUNTER — PATIENT MESSAGE (OUTPATIENT)
Dept: FAMILY MEDICINE | Facility: CLINIC | Age: 71
End: 2021-10-19

## 2021-10-20 ENCOUNTER — OFFICE VISIT (OUTPATIENT)
Dept: FAMILY MEDICINE | Facility: CLINIC | Age: 71
End: 2021-10-20
Payer: MEDICARE

## 2021-10-20 VITALS
WEIGHT: 115 LBS | SYSTOLIC BLOOD PRESSURE: 116 MMHG | BODY MASS INDEX: 21.73 KG/M2 | HEART RATE: 80 BPM | DIASTOLIC BLOOD PRESSURE: 66 MMHG

## 2021-10-20 DIAGNOSIS — J06.9 UPPER RESPIRATORY TRACT INFECTION, UNSPECIFIED TYPE: Primary | ICD-10-CM

## 2021-10-20 PROCEDURE — 99213 PR OFFICE/OUTPT VISIT, EST, LEVL III, 20-29 MIN: ICD-10-PCS | Mod: S$GLB,,, | Performed by: NURSE PRACTITIONER

## 2021-10-20 PROCEDURE — 99213 OFFICE O/P EST LOW 20 MIN: CPT | Mod: S$GLB,,, | Performed by: NURSE PRACTITIONER

## 2021-10-20 RX ORDER — AMOXICILLIN 875 MG/1
875 TABLET, FILM COATED ORAL 2 TIMES DAILY
Qty: 20 TABLET | Refills: 0 | Status: SHIPPED | OUTPATIENT
Start: 2021-10-20 | End: 2022-08-25

## 2021-11-10 ENCOUNTER — PATIENT MESSAGE (OUTPATIENT)
Dept: FAMILY MEDICINE | Facility: CLINIC | Age: 71
End: 2021-11-10
Payer: MEDICARE

## 2021-11-10 DIAGNOSIS — C50.411 MALIGNANT NEOPLASM OF UPPER-OUTER QUADRANT OF RIGHT BREAST IN FEMALE, ESTROGEN RECEPTOR POSITIVE: Primary | ICD-10-CM

## 2021-11-10 DIAGNOSIS — Z17.0 MALIGNANT NEOPLASM OF UPPER-OUTER QUADRANT OF RIGHT BREAST IN FEMALE, ESTROGEN RECEPTOR POSITIVE: Primary | ICD-10-CM

## 2021-12-14 ENCOUNTER — PATIENT MESSAGE (OUTPATIENT)
Dept: FAMILY MEDICINE | Facility: CLINIC | Age: 71
End: 2021-12-14
Payer: MEDICARE

## 2021-12-16 ENCOUNTER — TELEPHONE (OUTPATIENT)
Dept: HEMATOLOGY/ONCOLOGY | Facility: CLINIC | Age: 71
End: 2021-12-16
Payer: MEDICARE

## 2021-12-21 ENCOUNTER — PATIENT MESSAGE (OUTPATIENT)
Dept: FAMILY MEDICINE | Facility: CLINIC | Age: 71
End: 2021-12-21
Payer: MEDICARE

## 2021-12-26 ENCOUNTER — PATIENT MESSAGE (OUTPATIENT)
Dept: FAMILY MEDICINE | Facility: CLINIC | Age: 71
End: 2021-12-26
Payer: MEDICARE

## 2021-12-29 ENCOUNTER — TELEPHONE (OUTPATIENT)
Dept: FAMILY MEDICINE | Facility: CLINIC | Age: 71
End: 2021-12-29
Payer: MEDICARE

## 2022-01-25 PROBLEM — Z17.0 ER+ (ESTROGEN RECEPTOR POSITIVE STATUS): Status: ACTIVE | Noted: 2022-01-25

## 2022-01-25 PROBLEM — Z90.11 S/P MASTECTOMY, RIGHT: Status: ACTIVE | Noted: 2022-01-25

## 2022-01-25 PROBLEM — C50.911 HER2-NEGATIVE CARCINOMA OF RIGHT BREAST: Status: ACTIVE | Noted: 2022-01-25

## 2022-01-25 PROBLEM — Z17.32 HER2-NEGATIVE CARCINOMA OF RIGHT BREAST: Status: ACTIVE | Noted: 2022-01-25

## 2022-01-25 NOTE — PROGRESS NOTES
[facility] Hematolgy/Oncology  History & Physical    Subjective:      Patient ID:   NAME: Cecilia Valenzuela : 1950     71 y.o. female    Referring Doc: Cecilia Shaikh NP  Other Physicians: Mariel Vale (opthal); Vicente (Card); Kaleb De La Torre (ENT)        Chief Complaint: breast cancer      HPI:  71 y.o. female with diagnosis of right breast cancer who has been referred by Cecilia Shaikh NP for evaluation by medical hematology/oncology. Patient was originally diagnosed in 2017 and was previously under the care of Dr Ricardo Vale with oncology [facility] in [place]. She had a stage IIA (T2aN0) right upper outer breast cancer. She is here by herself. She is due for repeat annual mammogram. She is on oral antihormone therapy with anastrozole and is doing ok with the drug with only some mild side-effects.     She wants to to get the [facility] done [facility].     She was a housewife and volunteered at schools.       Breathing ok. No CP, SOB, HA's or N/V    Discussed covid19 precautions - she has been vaccinated and had her 1st booster already              ROS:   GEN: normal without any fever, night sweats or weight loss  HEENT: normal with no HA's, sore throat, stiff neck, changes in vision  CV: normal with no CP, SOB, PND, HIDALGO or orthopnea  PULM: normal with no SOB, cough, hemoptysis, sputum or pleuritic pain  GI: normal with no abdominal pain, nausea, vomiting, constipation, diarrhea, melanotic stools, BRBPR, or hematemesis  : normal with no hematuria, dysuria  BREAST: normal with no mass, discharge, pain  SKIN: normal with no rash, erythema, bruising, or swelling       Past Medical/Surgical History:  Past Medical History:   Diagnosis Date    Arthritis     Atherosclerosis     in left carotid artery    ER+ (estrogen receptor positive status) 2022    Fibrocystic breast     GERD (gastroesophageal reflux disease)     HER2-negative carcinoma of right breast 2022    Hyperlipidemia      Hypertension     Malignant neoplasm of upper-outer quadrant of right female breast 2017    right    Mitral valve prolapse     Neurofibromatosis     Osteopenia     Premature beats     Raynaud's disease     S/P mastectomy, right 2022    Seborrheic keratosis     Thyroid nodule     Tinnitus     Undiagnosed cardiac murmurs 2019     Past Surgical History:   Procedure Laterality Date    BACK SURGERY      BREAST BIOPSY Right     CATARACT EXTRACTION Bilateral      SECTION      x5    DILATION AND CURETTAGE OF UTERUS      miss Ab    HYSTERECTOMY      MASTECTOMY Right          Allergies:  Review of patient's allergies indicates:   Allergen Reactions    Methylprednisolone      Other reaction(s): Heart palpitations  PT STATES SHE HAS A REACTION TO ALL STEROIDS DUE TO HER DX; OF MVP..    Zofran [ondansetron hcl (pf)] Nausea Only    Corticosteroids (glucocorticoids) Other (See Comments)     Heart palpitations    Dilaudid [hydromorphone] Nausea And Vomiting     Does not help with pain    Fentanyl     Neosporin (neomycin-polymyx)     Norvasc [amlodipine]     Statins-hmg-coa reductase inhibitors      myalgias    Tetracyclines     Zoloft [sertraline]      Increased anxiety     Augmentin [amoxicillin-pot clavulanate] Nausea And Vomiting       Social/Family History:  Social History     Socioeconomic History    Marital status:    Tobacco Use    Smoking status: Never Smoker    Smokeless tobacco: Never Used   Substance and Sexual Activity    Alcohol use: No    Drug use: No    Sexual activity: Never     Partners: Male     Social Determinants of Health     Financial Resource Strain: Low Risk     Difficulty of Paying Living Expenses: Not hard at all   Food Insecurity: No Food Insecurity    Worried About Running Out of Food in the Last Year: Never true    Ran Out of Food in the Last Year: Never true   Transportation Needs: No Transportation Needs    Lack of  Transportation (Medical): No    Lack of Transportation (Non-Medical): No   Physical Activity: Sufficiently Active    Days of Exercise per Week: 5 days    Minutes of Exercise per Session: 40 min   Stress: No Stress Concern Present    Feeling of Stress : Not at all   Social Connections: Unknown    Frequency of Communication with Friends and Family: More than three times a week    Frequency of Social Gatherings with Friends and Family: Twice a week    Active Member of Clubs or Organizations: Yes    Attends Club or Organization Meetings: More than 4 times per year    Marital Status:      Family History   Problem Relation Age of Onset    Rheum arthritis Mother     Breast cancer Mother         over 85    Hypertension Mother     Heart disease Mother     Parkinsonism Father     Cancer Father     Breast cancer Sister 40        DCIS    Breast cancer Maternal Aunt         60's    Breast cancer Sister 50        DCIS    Ovarian cancer Neg Hx     Colon cancer Neg Hx     Melanoma Neg Hx          Medications:    Current Outpatient Medications:     amoxicillin (AMOXIL) 875 MG tablet, Take 1 tablet (875 mg total) by mouth 2 (two) times daily., Disp: 20 tablet, Rfl: 0    anastrozole (ARIMIDEX) 1 mg Tab, TAKE ONE (1) TABLET BY MOUTH EVERY DAY , Disp: 90 tablet, Rfl: 3    ascorbic acid, vitamin C, (VITAMIN C) 500 MG tablet, Take 500 mg by mouth once daily., Disp: , Rfl:     aspirin (ECOTRIN) 81 MG EC tablet, Take 81 mg by mouth once daily., Disp: , Rfl:     azelastine (ASTELIN) 137 mcg (0.1 %) nasal spray, 1 spray by Nasal route as needed for Rhinitis. , Disp: , Rfl:     calcium carbonate/vitamin D3 (CALCIUM 600 + D,3, ORAL), Take 1 tablet by mouth 2 (two) times a day., Disp: , Rfl:     coQ10, ubiquinol, 200 mg Cap, Take 1 capsule by mouth 2 (two) times a day., Disp: , Rfl:     denosumab (PROLIA) 60 mg/mL Syrg, Inject 60 mg into the skin every 6 (six) months. , Disp: , Rfl:     glucosamine  HCl/chondroitin springer (GLUCOSAMINE-CHONDROITIN ORAL), Take by mouth once daily. takes 500mg/400 capsule twice a day, Disp: , Rfl:     lisinopriL 10 MG tablet, One tablet daily as needed for SBP > 150., Disp: 30 tablet, Rfl: 11    LORazepam (ATIVAN) 0.5 MG tablet, Take 1 tablet (0.5 mg total) by mouth every 12 (twelve) hours as needed for Anxiety., Disp: 10 tablet, Rfl: 1    magnesium 250 mg Tab, Take 250 mg by mouth once daily., Disp: , Rfl:     meclizine (ANTIVERT) 25 mg tablet, Take 0.5 tablets (12.5 mg total) by mouth nightly., Disp: 90 tablet, Rfl: 1    multivitamin (THERAGRAN) per tablet, Take 1 tablet by mouth once daily., Disp: , Rfl:     omeprazole (PRILOSEC OTC) 20 MG tablet, Take 20 mg by mouth 2 (two) times daily before meals. Pt states that she take 20 mg in the morning ad 40 mg at night time., Disp: , Rfl:     PATADAY 0.2 % Drop, Place 1 drop into both eyes daily as needed (allergies). , Disp: , Rfl: 0    TOPROL XL 50 mg 24 hr tablet, TAKE 1 TABLET (50 MG TOTAL) BY MOUTH EVERY EVENING., Disp: 90 tablet, Rfl: 1    zinc gluconate 50 mg tablet, Take 50 mg by mouth once daily., Disp: , Rfl:       Pathology:  Cancer Staging  Malignant neoplasm of upper-outer quadrant of right female breast  Staging form: Onc Breast AJCC V7  - Pathologic stage from 9/25/2017: Stage IIA (T2, N0, cM0) - Signed by Ricardo Vale MD on 8/15/2019        Objective:   Vitals:  Blood pressure 124/68, pulse 79, temperature 97.5 °F (36.4 °C), weight 51.7 kg (114 lb).    Physical Examination:   GEN: no apparent distress, comfortable; AAOx3  HEAD: atraumatic and normocephalic  EYES: no pallor, no icterus, PERRLA  ENT: OMM, no pharyngeal erythema, external ears WNL; no nasal discharge; no thrush  NECK: no masses, thyroid normal, trachea midline, no LAD/LN's, supple  CV: RRR with no murmur; normal pulse; normal S1 and S2; no pedal edema  CHEST: Normal respiratory effort; CTAB; normal breath sounds; no wheeze or crackles  ABDOM:  nontender and nondistended; soft; normal bowel sounds; no rebound/guarding  MUSC/Skeletal: ROM normal; no crepitus; joints normal; no deformities or arthropathy  EXTREM: no clubbing, cyanosis, inflammation or swelling  SKIN: no rashes, lesions , ulcers, petechiae or subcutaneous nodules  : no dale  NEURO: grossly intact; motor/sensory WNL; AAOx3; no tremors  PSYCH: normal mood, affect and behavior  LYMPH: normal cervical, supraclavicular, axillary and groin LN's  Breast: s/p complete right mastectomy with no reconstruction    Labs:   Lab Results   Component Value Date    WBC 6.8 05/14/2021    HGB 14.3 05/14/2021    HCT 44.2 05/14/2021    MCV 89.1 05/14/2021     05/14/2021    CMP  Sodium   Date Value Ref Range Status   09/16/2021 135 (L) 136 - 145 mmol/L Final     Potassium   Date Value Ref Range Status   09/16/2021 4.1 3.5 - 5.1 mmol/L Final     Chloride   Date Value Ref Range Status   09/16/2021 104 95 - 110 mmol/L Final     CO2   Date Value Ref Range Status   09/16/2021 27 23 - 29 mmol/L Final     Glucose   Date Value Ref Range Status   09/16/2021 79 70 - 110 mg/dL Final     BUN   Date Value Ref Range Status   09/16/2021 16 8 - 23 mg/dL Final     Creatinine   Date Value Ref Range Status   09/16/2021 0.7 0.5 - 1.4 mg/dL Final     Calcium   Date Value Ref Range Status   09/16/2021 9.7 8.7 - 10.5 mg/dL Final     Total Protein   Date Value Ref Range Status   09/16/2021 7.4 6.0 - 8.4 g/dL Final     Albumin   Date Value Ref Range Status   09/16/2021 3.8 3.5 - 5.2 g/dL Final     Total Bilirubin   Date Value Ref Range Status   09/16/2021 0.4 0.1 - 1.0 mg/dL Final     Comment:     For infants and newborns, interpretation of results should be based  on gestational age, weight and in agreement with clinical  observations.    Premature Infant recommended reference ranges:  Up to 24 hours.............<8.0 mg/dL  Up to 48 hours............<12.0 mg/dL  3-5 days..................<15.0 mg/dL  6-29  days.................<15.0 mg/dL       Alkaline Phosphatase   Date Value Ref Range Status   09/16/2021 60 55 - 135 U/L Final     AST   Date Value Ref Range Status   09/16/2021 21 10 - 40 U/L Final     ALT   Date Value Ref Range Status   09/16/2021 21 10 - 44 U/L Final     Anion Gap   Date Value Ref Range Status   09/16/2021 4 (L) 8 - 16 mmol/L Final     eGFR if    Date Value Ref Range Status   09/16/2021 >60.0 >60 mL/min/1.73 m^2 Final     eGFR if non    Date Value Ref Range Status   09/16/2021 >60.0 >60 mL/min/1.73 m^2 Final     Comment:     Calculation used to obtain the estimated glomerular filtration  rate (eGFR) is the CKD-EPI equation.            Radiology/Diagnostic Studies:          All lab results and imaging results have been reviewed and discussed with the patient    Assessment:   (1) 71 y.o. female with diagnosis of right breast cancer who has been referred by Cecilai Shaikh NP for evaluation by medical hematology/oncology. Patient was originally diagnosed in 2017 and was previously under the care of Dr Ricardo Vale with oncology at [facility] in [place]. She had a stage IIA (T2aN0) right upper outer breast cancer  - s/p right mastectomy 9/14/2017  - invasive lobular carcinoma grade 2; LN's x2 negative; 2.7 cm  - on aromatase oral antihormone  - ER positive; OR negative; her2Nu negative  - Oncotype score was 19    1/26/2022:  - set up the mammogram for the left breast at the [facility]  - check up to date labs every 6 months at least  - anastrozole refill  - check breast index on prior pathology  - she is at 4 yr demetria in Oct 2022    (2) HTN and hypercholesterolemia    (3) AVS; premature heart beats    (4) Anxiety    (5) Osteopenia    VISIT DIAGNOSES:              Malignant neoplasm of upper-outer quadrant of right breast in female, estrogen receptor positive  -     Ambulatory referral/consult to Hematology / Oncology    S/P mastectomy, right    Long term current use of  aromatase inhibitor    ER+ (estrogen receptor positive status)    HER2-negative carcinoma of right breast            Plan:     PLAN:  1. Schedule annual mammogram  2. Check up to date labs at least every 6 months  3. Order Breast Index study on prior path specimen  4. F/u with PCP, Card, ENT, etc  RTC in 6 months  Fax note to Cecilia Shaikh NP; Kavin; Kaleb Lisa(ENT); Kavin    COVID-19 Discussion:    I had long discussion with patient and any applicable family about the COVID-19 coronavirus epidemic and the recommended precautions with regard to cancer and/or hematology patients. I have re-iterated the CDC recommendations for adequate hand washing, use of hand -like products, and coughing into elbow, etc. In addition, especially for our patients who are on chemotherapy and/or our otherwise immunocompromised patients, I have recommended avoidance of crowds, including movie theaters, restaurants, churches, etc. I have recommended avoidance of any sick or symptomatic family members and/or friends. I have also recommended avoidance of any raw and unwashed food products, and general avoidance of food items that have not been prepared by themselves. The patient has been asked to call us immediately with any symptom developments, issues, questions or other general concerns.       Pathology Discussion:    I reviewed and discussed the pathology report(s) and radiograph reports (if available) in as simple to understand and/or laymen's terms to the best of my ability. I had an indepth conversation with the patient and went over the patient's individual diagnosis based on the information that was currently available. I discussed the TNM staging process with regard to the patient's particular cancer type, and the calculated stage based on the currently available TNM data and literature. I discussed the available prognostic data with regard to the current staging information and how it relates to the  "prognosis of their particular neoplastic process.          NCCN Guidelines:    I discussed the available treatment option(s) in accordance with the latest literature from the NCCN Clinical Practice Guidelines for the patient's particular type of cancer disorder. The NCCN Guidelines provide a "document evidence-based (and) consensus-driven management" of the care of oncology patients. The treatment recommendations were made not only in accordance to the NCCN guidelines, but also factored in to account the patient's overall age, condition, performance status and their medical co-morbidities. I went over the risks and benefits of the the treatment options (if any could be made) with regard to their particular cancer type, their cancer stage, their age, and their co-morbidities.       Antihormone Therapy Discussion:    I discussed the advantages of antihormone therapy with the patient with regard to their particular neoplastic or carcinoma in situ condition. I went over the side-effect profile of the medication including risk for potential development of endometrial cancer and/or hyperplasia in women who still have a uterus and the need for yearly GYN evaluation and follow-up. I discussed the risks for thromboembolic events such as DVT's, pulmonary emboli, CVA's, retinal vascular clots, phlebitis, and TIA's. I discussed the potential risks for development of ocular disturbances, retinopathy, cataracts, corneal changes, flushing, hot flashes, amenorrhea, altered menses, fluid retention, weight changes, elevations in LFT's, liver damage, and mood disturbances. I discussed the potential risk of arthropathy and joint pains/aches which could be chronic and debilitating. I discussed potential adverse effects on bone mineralization and the risk of osteopenia and/or osteoporosis which could led to increase risk of fractures.   A consent form was obtained and a copy was provided to the patient.      I have explained and the " patient understands all of  the current recommendation(s). I have answered all of their questions to the best of my ability and to their complete satisfaction.             Thank you for allowing me to participate in this patient's care. Please call with any questions or concerns.    Electronically signed Jaquan Charles MD    Answers for HPI/ROS submitted by the patient on 1/25/2022  appetite change : No  unexpected weight change: No  mouth sores: No  visual disturbance: No  cough: No  shortness of breath: No  chest pain: No  abdominal pain: No  diarrhea: No  frequency: No  back pain: No  rash: No  headaches: No  adenopathy: No  nervous/ anxious: No

## 2022-01-26 ENCOUNTER — TELEPHONE (OUTPATIENT)
Dept: HEMATOLOGY/ONCOLOGY | Facility: CLINIC | Age: 72
End: 2022-01-26

## 2022-01-26 ENCOUNTER — OFFICE VISIT (OUTPATIENT)
Dept: HEMATOLOGY/ONCOLOGY | Facility: CLINIC | Age: 72
End: 2022-01-26
Payer: MEDICARE

## 2022-01-26 ENCOUNTER — PATIENT MESSAGE (OUTPATIENT)
Dept: HEMATOLOGY/ONCOLOGY | Facility: CLINIC | Age: 72
End: 2022-01-26

## 2022-01-26 ENCOUNTER — PATIENT MESSAGE (OUTPATIENT)
Dept: FAMILY MEDICINE | Facility: CLINIC | Age: 72
End: 2022-01-26
Payer: MEDICARE

## 2022-01-26 VITALS
SYSTOLIC BLOOD PRESSURE: 124 MMHG | TEMPERATURE: 98 F | BODY MASS INDEX: 21.54 KG/M2 | DIASTOLIC BLOOD PRESSURE: 68 MMHG | HEART RATE: 79 BPM | WEIGHT: 114 LBS

## 2022-01-26 DIAGNOSIS — C50.911 HER2-NEGATIVE CARCINOMA OF RIGHT BREAST: ICD-10-CM

## 2022-01-26 DIAGNOSIS — C50.411 MALIGNANT NEOPLASM OF UPPER-OUTER QUADRANT OF RIGHT BREAST IN FEMALE, ESTROGEN RECEPTOR POSITIVE: Primary | ICD-10-CM

## 2022-01-26 DIAGNOSIS — Z79.811 LONG TERM CURRENT USE OF AROMATASE INHIBITOR: ICD-10-CM

## 2022-01-26 DIAGNOSIS — Z12.31 ENCOUNTER FOR SCREENING MAMMOGRAM FOR MALIGNANT NEOPLASM OF BREAST: ICD-10-CM

## 2022-01-26 DIAGNOSIS — Z17.0 MALIGNANT NEOPLASM OF UPPER-OUTER QUADRANT OF RIGHT BREAST IN FEMALE, ESTROGEN RECEPTOR POSITIVE: Primary | ICD-10-CM

## 2022-01-26 DIAGNOSIS — Z17.0 ER+ (ESTROGEN RECEPTOR POSITIVE STATUS): ICD-10-CM

## 2022-01-26 DIAGNOSIS — Z90.11 S/P MASTECTOMY, RIGHT: ICD-10-CM

## 2022-01-26 PROCEDURE — 99204 PR OFFICE/OUTPT VISIT, NEW, LEVL IV, 45-59 MIN: ICD-10-PCS | Mod: S$GLB,,, | Performed by: INTERNAL MEDICINE

## 2022-01-26 PROCEDURE — 99204 OFFICE O/P NEW MOD 45 MIN: CPT | Mod: S$GLB,,, | Performed by: INTERNAL MEDICINE

## 2022-01-26 RX ORDER — ANASTROZOLE 1 MG/1
1 TABLET ORAL DAILY
Qty: 90 TABLET | Refills: 2 | Status: SHIPPED | OUTPATIENT
Start: 2022-01-26 | End: 2022-10-11

## 2022-01-27 ENCOUNTER — OFFICE VISIT (OUTPATIENT)
Dept: FAMILY MEDICINE | Facility: CLINIC | Age: 72
End: 2022-01-27
Payer: MEDICARE

## 2022-01-27 ENCOUNTER — PATIENT MESSAGE (OUTPATIENT)
Dept: FAMILY MEDICINE | Facility: CLINIC | Age: 72
End: 2022-01-27

## 2022-01-27 VITALS
SYSTOLIC BLOOD PRESSURE: 122 MMHG | HEART RATE: 70 BPM | WEIGHT: 113.63 LBS | BODY MASS INDEX: 21.45 KG/M2 | OXYGEN SATURATION: 100 % | DIASTOLIC BLOOD PRESSURE: 76 MMHG | HEIGHT: 61 IN

## 2022-01-27 DIAGNOSIS — U07.1 COVID-19 VIRUS DETECTED: ICD-10-CM

## 2022-01-27 DIAGNOSIS — U07.1 COVID-19 VIRUS INFECTION: Primary | ICD-10-CM

## 2022-01-27 LAB
CTP QC/QA: YES
CTP QC/QA: YES
POC MOLECULAR INFLUENZA A AGN: NEGATIVE
POC MOLECULAR INFLUENZA B AGN: NEGATIVE
SARS-COV-2 RDRP RESP QL NAA+PROBE: POSITIVE

## 2022-01-27 PROCEDURE — 87502 POCT INFLUENZA A/B MOLECULAR: ICD-10-PCS | Mod: QW,,, | Performed by: NURSE PRACTITIONER

## 2022-01-27 PROCEDURE — 87502 INFLUENZA DNA AMP PROBE: CPT | Mod: QW,,, | Performed by: NURSE PRACTITIONER

## 2022-01-27 PROCEDURE — 99213 OFFICE O/P EST LOW 20 MIN: CPT | Mod: CR,S$GLB,, | Performed by: NURSE PRACTITIONER

## 2022-01-27 PROCEDURE — U0002 COVID-19 LAB TEST NON-CDC: HCPCS | Mod: QW,CR,S$GLB, | Performed by: NURSE PRACTITIONER

## 2022-01-27 PROCEDURE — U0002: ICD-10-PCS | Mod: QW,CR,S$GLB, | Performed by: NURSE PRACTITIONER

## 2022-01-27 PROCEDURE — 99213 PR OFFICE/OUTPT VISIT, EST, LEVL III, 20-29 MIN: ICD-10-PCS | Mod: CR,S$GLB,, | Performed by: NURSE PRACTITIONER

## 2022-01-27 RX ORDER — BENZONATATE 100 MG/1
100 CAPSULE ORAL 3 TIMES DAILY PRN
Qty: 21 CAPSULE | Refills: 0 | Status: SHIPPED | OUTPATIENT
Start: 2022-01-27 | End: 2022-02-06

## 2022-01-27 NOTE — PATIENT INSTRUCTIONS
Patient Education       COVID-19 Discharge Instructions   About this topic   Coronavirus disease 2019 is also known as COVID-19. It is a viral illness that infects the lungs. It is caused by a virus called SARS-associated coronavirus (SARS-CoV-2).  The signs of COVID-19 most often start a few days after you have been infected. In some people, it takes longer to show signs. Others never show signs of the infection. You may have a cough, fever, shaking chills and it may be hard to breathe. You may be very tired, have muscle aches, a headache or sore throat. Some people have an upset stomach or loose stools. Others lose their sense of smell or taste. You may not have these signs all the time and they may come and go while you are sick.  The virus spreads easily through droplets when you talk, sneeze, or cough. You can pass the virus to others when you are talking close together, singing, hugging, sharing food, or shaking hands. Doctors believe the germs also survive on surfaces like tables, door handles, and telephones. However, this is not a common way that COVID-19 spreads. Doctors believe you can also spread the infection even if you dont have any symptoms, but they do not know how that happens. This is why getting vaccinated is one of the best ways to keep you healthy and slow the spread of the virus.  Some people have a mild case of COVID-19 and are able to stay at home and away from others until they feel better. Others may need to be in the hospital if they are very sick. Some people with COVID-19 can have some symptoms for weeks or months. People with COVID-19 must isolate themselves. You can start to be around others when your doctor says it is safe to do so.       What care is needed at home?   · Ask your doctor what you need to do when you go home. Make sure you ask questions if you do not understand what the doctor says.  · Drink lots of water, juice, or broth to replace fluids lost from a fever.  · You  may use cool mist humidifiers to help ease congestion and coughing.  · Use 2 to 3 pillows to prop yourself up when you lie down to make it easier to breathe and sleep.  · Do not smoke and do not drink beer, wine, and mixed drinks (alcohol).  · To lower the chance of passing the infection to others, get a COVID-19 vaccine after your infection has resolved.  · If you have not been fully vaccinated:  ? Wear a mask over your mouth and nose if you are around others who are not sick. Cloth masks work best if they have more than one layer of fabric.  ? Wash your hands often.  ? Stay home in a separate room, if possible, away from others. Only go out to get medical care.  ? Use a separate bathroom if possible.  ? Do not make food for others.  What follow-up care is needed?   · Your doctor may ask you to make visits to the office to check on your progress. Be sure to keep these visits. Make sure you wear a mask at these visits.  · If you can, tell the staff you have COVID-19 ahead of time so they can take extra care to stop the disease from spreading.  · It may take a few weeks before your health returns to normal.  What drugs may be needed?   The doctor may order drugs to:  · Help with breathing  · Help with fever  · Help with swelling in your airways and lungs  · Control coughing  · Ease a sore throat  · Help a runny or stuffy nose  Will physical activity be limited?   You may have to limit your physical activity. Talk to your doctor about the right amount of activity for you. If you have been very sick with COVID-19, it can take some time to get your strength back.  Will there be any other care needed?   Doctors do not know how long you can pass the virus on to others after you are sick. This is why it is important to stay in a separate room, if possible, when you are sick. For now, doctors are giving general guidelines for you to follow after you have been sick. Before you go around other people, you should:  · Be fever  free for 24 hours without taking any drugs to lower the fever  · Have no symptoms of cough or shortness of breath  · Wait at least 10 days after first having symptoms or your first positive test, and you need to be symptom free as above. Some experts suggest waiting 20 days if you have had a more severe infection.  Talk with your doctor about getting a COVID-19 vaccine.  What problems could happen?   · Fluid loss. This is dehydration.  · Short-term or long-term lung damage  · Heart problems  · Death  When do I need to call the doctor?   · You are having so much trouble breathing that you can only say one or two words at a time.  · You need to sit upright at all times to be able to breathe and/or cannot lie down.  · You are very confused or cannot stay awake.  · Your lips or skin start to turn blue or grey.  · You think you might be having a medical emergency. Some examples of medical emergencies are:  ? Severe chest pain.  ? Not able to speak or move normally.  · You have trouble breathing when talking or sitting still.  · You have new shortness of breath.  · You become weak or dizzy.  · You have very dark urine or do not pass urine for more than 8 hours.  · You have new or worsening COVID-19 symptoms like:  ? Fever  ? Cough  ? Feeling very tired  ? Shaking chills  ? Headache  ? Trouble swallowing  ? Throwing up  ? Loose stools  ? Reddish purple spots on your fingers or toes  Teach Back: Helping You Understand   The Teach Back Method helps you understand the information we are giving you. After you talk with the staff, tell them in your own words what you learned. This helps to make sure the staff has described each thing clearly. It also helps to explain things that may have been confusing. Before going home, make sure you can do these:  · I can tell you about my condition.  · I can tell you what may help ease my breathing.  · I can tell you what I can do to help avoid passing the infection to others.  · I can tell  you what I will do if I have trouble breathing; feel sleepy or confused; or my fingertips, fingernails, skin, or lips are blue.  Where can I learn more?   Centers for Disease Control and Prevention  https://www.cdc.gov/coronavirus/2019-ncov/about/index.html   Centers for Disease Control and Prevention  https://www.cdc.gov/coronavirus/2019-ncov/hcp/disposition-in-home-patients.html   World Health Organization  https://www.who.int/news-room/q-a-detail/o-p-thbljbksoqore   Last Reviewed Date   2021-10-05  Consumer Information Use and Disclaimer   This information is not specific medical advice and does not replace information you receive from your health care provider. This is only a brief summary of general information. It does NOT include all information about conditions, illnesses, injuries, tests, procedures, treatments, therapies, discharge instructions or life-style choices that may apply to you. You must talk with your health care provider for complete information about your health and treatment options. This information should not be used to decide whether or not to accept your health care providers advice, instructions or recommendations. Only your health care provider has the knowledge and training to provide advice that is right for you.  Copyright   Copyright © 2021 [employer] and its affiliates and/or licensors. All rights reserved.

## 2022-01-27 NOTE — PROGRESS NOTES
Patient ID: Cecilia Valenzuela is a 71 y.o. female.    Chief Complaint: Cough (No bottles, reviewed from list, covid sx x 1 day//dp)    Pt here for sick visit- reports started with scratchy throat and dry cough yesterday. Headache and low grade temp started last night, temp 99.4. 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    Hyperlipidemia     Hypertension     Malignant neoplasm of upper-outer quadrant of right female breast 2017    right    Mitral valve prolapse     Neurofibromatosis     Osteopenia     Premature beats     Raynaud's disease     S/P mastectomy, right 2022    Seborrheic keratosis     Thyroid nodule     Tinnitus     Undiagnosed cardiac murmurs 2019     Past Surgical History:   Procedure Laterality Date    BACK SURGERY      BREAST BIOPSY Right     CATARACT EXTRACTION Bilateral      SECTION      x5    DILATION AND CURETTAGE OF UTERUS      miss Ab    HYSTERECTOMY      MASTECTOMY Right          Tobacco History:  reports that she has never smoked. She has never used smokeless tobacco.      Review of patient's allergies indicates:   Allergen Reactions    Methylprednisolone      Other reaction(s): Heart palpitations  PT STATES SHE HAS A REACTION TO ALL STEROIDS DUE TO HER DX; OF MVP..    Zofran [ondansetron hcl (pf)] Nausea Only    Corticosteroids (glucocorticoids) Other (See Comments)     Heart palpitations    Dilaudid [hydromorphone] Nausea And Vomiting     Does not help with pain    Fentanyl     Neosporin (neomycin-polymyx)     Norvasc [amlodipine]     Statins-hmg-coa reductase inhibitors      myalgias    Tetracyclines     Zoloft [sertraline]      Increased  anxiety     Augmentin [amoxicillin-pot clavulanate] Nausea And Vomiting       Current Outpatient Medications:     amoxicillin (AMOXIL) 875 MG tablet, Take 1 tablet (875 mg total) by mouth 2 (two) times daily., Disp: 20 tablet, Rfl: 0    anastrozole (ARIMIDEX) 1 mg Tab, Take 1 tablet (1 mg total) by mouth once daily., Disp: 90 tablet, Rfl: 2    ascorbic acid, vitamin C, (VITAMIN C) 500 MG tablet, Take 500 mg by mouth once daily., Disp: , Rfl:     aspirin (ECOTRIN) 81 MG EC tablet, Take 81 mg by mouth once daily., Disp: , Rfl:     azelastine (ASTELIN) 137 mcg (0.1 %) nasal spray, 1 spray by Nasal route as needed for Rhinitis. , Disp: , Rfl:     calcium carbonate/vitamin D3 (CALCIUM 600 + D,3, ORAL), Take 1 tablet by mouth 2 (two) times a day., Disp: , Rfl:     coQ10, ubiquinol, 200 mg Cap, Take 1 capsule by mouth 2 (two) times a day., Disp: , Rfl:     denosumab (PROLIA) 60 mg/mL Syrg, Inject 60 mg into the skin every 6 (six) months. , Disp: , Rfl:     glucosamine HCl/chondroitin springer (GLUCOSAMINE-CHONDROITIN ORAL), Take by mouth once daily. takes 500mg/400 capsule twice a day, Disp: , Rfl:     lisinopriL 10 MG tablet, One tablet daily as needed for SBP > 150., Disp: 30 tablet, Rfl: 11    LORazepam (ATIVAN) 0.5 MG tablet, Take 1 tablet (0.5 mg total) by mouth every 12 (twelve) hours as needed for Anxiety., Disp: 10 tablet, Rfl: 1    magnesium 250 mg Tab, Take 250 mg by mouth once daily., Disp: , Rfl:     meclizine (ANTIVERT) 25 mg tablet, Take 0.5 tablets (12.5 mg total) by mouth nightly., Disp: 90 tablet, Rfl: 1    multivitamin (THERAGRAN) per tablet, Take 1 tablet by mouth once daily., Disp: , Rfl:     omeprazole (PRILOSEC OTC) 20 MG tablet, Take 20 mg by mouth 2 (two) times daily before meals. Pt states that she take 20 mg in the morning ad 40 mg at night time., Disp: , Rfl:     PATADAY 0.2 % Drop, Place 1 drop into both eyes daily as needed (allergies). , Disp: , Rfl: 0    TOPROL XL 50 mg 24 hr  "tablet, TAKE 1 TABLET (50 MG TOTAL) BY MOUTH EVERY EVENING., Disp: 90 tablet, Rfl: 1    zinc gluconate 50 mg tablet, Take 50 mg by mouth once daily., Disp: , Rfl:     benzonatate (TESSALON) 100 MG capsule, Take 1 capsule (100 mg total) by mouth 3 (three) times daily as needed for Cough., Disp: 21 capsule, Rfl: 0    Review of Systems   Constitutional: Positive for fever. Negative for chills and fatigue.   HENT: Positive for sore throat. Negative for congestion, ear pain, postnasal drip, rhinorrhea and sinus pain.    Respiratory: Positive for cough. Negative for shortness of breath and wheezing.    Cardiovascular: Negative for chest pain.   Gastrointestinal: Negative for diarrhea, nausea and vomiting.   Genitourinary: Negative for dysuria and hematuria.   Musculoskeletal: Negative for myalgias.   Skin: Negative for rash.   Neurological: Positive for headaches. Negative for dizziness.          Objective:      Vitals:    01/27/22 1519   BP: 122/76   Pulse: 70   SpO2: 100%   Weight: 51.5 kg (113 lb 9.6 oz)   Height: 5' 1" (1.549 m)     Physical Exam  Vitals and nursing note reviewed.   Constitutional:       General: She is not in acute distress.     Appearance: Normal appearance. She is well-developed and well-nourished. She is not toxic-appearing.   HENT:      Head: Normocephalic and atraumatic.      Right Ear: Tympanic membrane and ear canal normal.      Left Ear: Tympanic membrane and ear canal normal.      Nose: No mucosal edema or rhinorrhea.      Right Sinus: No maxillary sinus tenderness or frontal sinus tenderness.      Left Sinus: No maxillary sinus tenderness or frontal sinus tenderness.      Mouth/Throat:      Mouth: Mucous membranes are normal. Mucous membranes are moist.      Pharynx: Posterior oropharyngeal erythema (mild) present. No oropharyngeal exudate.      Tonsils: No tonsillar exudate.   Cardiovascular:      Rate and Rhythm: Normal rate and regular rhythm.      Heart sounds: No murmur " heard.      Pulmonary:      Effort: Pulmonary effort is normal. No respiratory distress.      Breath sounds: Normal breath sounds. No wheezing or rales.   Abdominal:      Palpations: Abdomen is soft.   Musculoskeletal:      Cervical back: Neck supple.   Lymphadenopathy:      Cervical: No cervical adenopathy.   Skin:     General: Skin is warm and dry.      Findings: No rash.   Neurological:      General: No focal deficit present.      Mental Status: She is alert and oriented to person, place, and time.           Assessment:       1. COVID-19 virus infection           Plan:       COVID-19 virus infection  Comments:  COVID swab +, discussed isolation and supportive care of fluids, rest, tylenol, mucinex- tessalon refilled. cautioned to call for any worsening  Orders:  -     POCT COVID-19 Rapid Screening  -     POCT Influenza A/B Molecular  -     benzonatate (TESSALON) 100 MG capsule; Take 1 capsule (100 mg total) by mouth 3 (three) times daily as needed for Cough.  Dispense: 21 capsule; Refill: 0      Follow up in 3 months (on 4/27/2022), or if symptoms worsen or fail to improve, for cancel next week's appt.        1/31/2022 Cecilia Shaikh NP

## 2022-02-08 ENCOUNTER — PATIENT MESSAGE (OUTPATIENT)
Dept: FAMILY MEDICINE | Facility: CLINIC | Age: 72
End: 2022-02-08
Payer: MEDICARE

## 2022-02-10 ENCOUNTER — OFFICE VISIT (OUTPATIENT)
Dept: URBAN - METROPOLITAN AREA CLINIC 113 | Facility: CLINIC | Age: 72
End: 2022-02-10
Payer: MEDICARE

## 2022-02-10 ENCOUNTER — PATIENT MESSAGE (OUTPATIENT)
Dept: FAMILY MEDICINE | Facility: CLINIC | Age: 72
End: 2022-02-10
Payer: MEDICARE

## 2022-02-10 VITALS
WEIGHT: 222 LBS | TEMPERATURE: 98.1 F | BODY MASS INDEX: 35.68 KG/M2 | RESPIRATION RATE: 18 BRPM | SYSTOLIC BLOOD PRESSURE: 160 MMHG | HEIGHT: 66 IN | HEART RATE: 80 BPM | DIASTOLIC BLOOD PRESSURE: 75 MMHG

## 2022-02-10 DIAGNOSIS — K21.00 GASTROESOPHAGEAL REFLUX DISEASE WITH ESOPHAGITIS WITHOUT HEMORRHAGE: ICD-10-CM

## 2022-02-10 DIAGNOSIS — K74.00 HEPATIC FIBROSIS: ICD-10-CM

## 2022-02-10 DIAGNOSIS — R10.13 EPIGASTRIC PAIN: ICD-10-CM

## 2022-02-10 DIAGNOSIS — K59.09 OTHER CONSTIPATION: ICD-10-CM

## 2022-02-10 PROCEDURE — 99214 OFFICE O/P EST MOD 30 MIN: CPT | Performed by: NURSE PRACTITIONER

## 2022-02-10 RX ORDER — DICLOFENAC SODIUM 10 MG/G
GEL TOPICAL
Qty: 300 | Refills: 0 | Status: ACTIVE | COMMUNITY
Start: 2019-09-18

## 2022-02-10 RX ORDER — ASCORBIC ACID 500 MG
TAKE 1 TABLET DAILY TABLET ORAL
Refills: 0 | Status: ACTIVE | COMMUNITY
Start: 2007-04-18

## 2022-02-10 RX ORDER — METFORMIN HYDROCHLORIDE 500 MG/1
1 TABLET WITH A MEAL TABLET, FILM COATED ORAL TWICE DAILY
Status: ACTIVE | COMMUNITY

## 2022-02-10 RX ORDER — BUDESONIDE 0.5 MG/2ML
USE 1 UNIT DOSE VIA NEBULIZER TWO TIMES A DAY INHALANT ORAL
Qty: 60 | Refills: 0 | Status: ACTIVE | COMMUNITY
Start: 2012-03-30

## 2022-02-10 RX ORDER — PREDNISOLONE/MOXIFLOXACIN HCL 1 %-0.5 %
AS DIRECTED SUSPENSION, DROPS(FINAL DOSAGE FORM)(ML) OPHTHALMIC (EYE) 4 TIMES DAILY
Status: ACTIVE | COMMUNITY

## 2022-02-10 RX ORDER — BRINZOLAMIDE/BRIMONIDINE TARTRATE 10; 2 MG/ML; MG/ML
1 DROP INTO AFFECTED EYE SUSPENSION/ DROPS OPHTHALMIC THREE TIMES A DAY
Status: ACTIVE | COMMUNITY

## 2022-02-10 RX ORDER — IPRATROPIUM BROMIDE 42 UG/1
SPRAY, METERED NASAL
Qty: 45 | Refills: 0 | Status: ACTIVE | COMMUNITY
Start: 2020-01-07

## 2022-02-10 RX ORDER — TELMISARTAN 20 MG/1
1 TABLET TABLET ORAL ONCE A DAY
Refills: 0 | Status: ACTIVE | COMMUNITY
Start: 2019-11-04

## 2022-02-10 RX ORDER — DEXLANSOPRAZOLE 60 MG/1
TAKE 1 CAPSULE BY MOUTH EVERY DAY CAPSULE, DELAYED RELEASE ORAL ONCE A DAY
Qty: 90 | Refills: 4 | Status: ACTIVE | COMMUNITY

## 2022-02-10 RX ORDER — AZELASTINE HCL 205.5 UG/1
1 PUFF IN EACH NOSTRIL SPRAY NASAL
Refills: 0 | Status: ACTIVE | COMMUNITY
Start: 2018-10-05

## 2022-02-10 RX ORDER — LATANOPROST/PF 0.005 %
INSTILL 1 DROP IN BOTH EYES AT BEDTIME DROPS OPHTHALMIC (EYE)
Refills: 0 | Status: ACTIVE | COMMUNITY
Start: 2014-05-10

## 2022-02-10 RX ORDER — ALBUTEROL SULFATE 2.5 MG/3ML
SOLUTION RESPIRATORY (INHALATION)
Qty: 255 | Refills: 0 | Status: ACTIVE | COMMUNITY
Start: 2012-03-14

## 2022-02-10 RX ORDER — FAMOTIDINE 40 MG/1
1 TABLET AT BEDTIME TABLET, FILM COATED ORAL ONCE A DAY
Qty: 30 | Refills: 5 | OUTPATIENT

## 2022-02-10 RX ORDER — GLUCOSAMINE HCL/CHONDROITIN SU 500-400 MG
TAKE 1 CAPSULE DAILY CAPSULE ORAL
Refills: 0 | Status: ACTIVE | COMMUNITY

## 2022-02-10 RX ORDER — LACTOBACILLUS RHAMNOSUS GG 10B CELL
TAKE 1 CAPSULE DAILY CAPSULE ORAL
Refills: 0 | Status: ACTIVE | COMMUNITY

## 2022-02-10 RX ORDER — ALBUTEROL SULFATE 90 UG/1
AEROSOL, METERED RESPIRATORY (INHALATION)
Qty: 18 | Refills: 0 | Status: ACTIVE | COMMUNITY
Start: 2012-01-02

## 2022-02-10 NOTE — PHYSICAL EXAM GASTROINTESTINAL
Abdomen , soft, tender epigastrium and LUQ, nondistended , no guarding or rigidity , no masses palpable , normal bowel sounds , Liver and Spleen , no hepatosplenomegaly

## 2022-02-10 NOTE — HPI-TODAY'S VISIT:
This is a 71-year-old female with a history of hepatitis C, genotype 1b status post treatment with SIMEPREVIR and sofosbuvir (2015) with SVR, grade 2 stage II liver disease on biopsy (2006), GERD, and a fraternal history of colon cancer (60s) due screening in September 2022 presenting for evaluation of abdominal pain. She was last seen 8/30/2021 following an EGD in early June that was grossly normal and included empiric dilation of the esophagus.  She reported resolution of dysphagia.  She had constipation that improved with milk of magnesia.  Heartburn was controlled on Dexilant which was continued.  She was to continue milk of magnesia for constipation.  Repeat labs due to a history of hepatitis C recommended in 6 months. She reports a 1 month history of worsening acid reflux and onset of pain indicated in the epigastrium.  She continues to take Dexilant.  She is requiring antacids every night because of heartburn occurring after bedtime.  She only has difficulty swallowing bread and states she can tolerate it if she eats it in small pieces.  She reports postprandial epigastric pain with associated swelling in the right upper quadrant.  It last 45 minutes or so and recurs after she eats again.  She states it improves if she is able to belch.  She admits to mild nausea.  She denies vomiting.  She has infrequent diarrhea that she attributes to Metformin side effect.  She has recently started a daily probiotic and is having regular bowel movements.  Occasionally, she has 2 or 3 a day.  She takes Metamucil as needed and usually requires a dose twice a week.  She denies red blood per rectum or any other abdominal symptoms.  Her weight has increased by 8 pounds since her last visit.  She has been requiring Roxicodone as needed for pain.

## 2022-02-10 NOTE — HPI-OTHER HISTORIES
Labs 10/19/2021:Ionized calcium 5.3.  10/6/2021 BMP normal with exception of glucose 132, BUN 19, calcium 10.5.  LFTs normal TB 0.6, ALP 41, ALT 17, AST 16.  Hemoglobin A1c 7. 5/10/2021 alpha-fetoprotein 4.0. EGD 6/2/2021:No endoscopic abnormality to explain dysphagia status post dilation to 48 Guamanian with a Fuentes dilator.  Normal stomach, normal examined duodenum.  No specimens collected. Abdominal ultrasound 6/1/2021:Negative study.

## 2022-02-13 PROBLEM — 62484002: Status: ACTIVE | Noted: 2022-02-10

## 2022-03-09 ENCOUNTER — PATIENT MESSAGE (OUTPATIENT)
Dept: HEMATOLOGY/ONCOLOGY | Facility: CLINIC | Age: 72
End: 2022-03-09
Payer: MEDICARE

## 2022-03-15 ENCOUNTER — TELEPHONE (OUTPATIENT)
Dept: HEMATOLOGY/ONCOLOGY | Facility: CLINIC | Age: 72
End: 2022-03-15
Payer: MEDICARE

## 2022-03-18 ENCOUNTER — PATIENT MESSAGE (OUTPATIENT)
Dept: HEMATOLOGY/ONCOLOGY | Facility: CLINIC | Age: 72
End: 2022-03-18
Payer: MEDICARE

## 2022-03-21 ENCOUNTER — TELEPHONE (OUTPATIENT)
Dept: HEMATOLOGY/ONCOLOGY | Facility: CLINIC | Age: 72
End: 2022-03-21
Payer: MEDICARE

## 2022-03-21 NOTE — TELEPHONE ENCOUNTER
----- Message from Renetta Gama sent at 3/21/2022  2:29 PM CDT -----    Pete Mcmullen,  Please add the breast ca dx to the Prolia order please, needed so that insurance covers Prolia.        Also please d/c the other Prolia plan from the other physician, now that she is transferring to here.  Pt is aware that we are now cancelling those orders.       Thank you,  Boogie        ----- Message -----  From: Ana Marin  Sent: 3/21/2022   9:27 AM CDT  To: Renetta Gama      ----- Message -----  From: Joanne Manley RN  Sent: 3/21/2022   9:15 AM CDT  To: [facility] Scheduling Pool      ----- Message -----  From: Jenniffer Weldon  Sent: 3/18/2022   4:04 PM CDT  To: [facility] Part[facility] Staff, #    Ready to schedule, no auth req. Prolia

## 2022-03-24 ENCOUNTER — LAB OUTSIDE AN ENCOUNTER (OUTPATIENT)
Dept: URBAN - METROPOLITAN AREA CLINIC 113 | Facility: CLINIC | Age: 72
End: 2022-03-24

## 2022-03-24 ENCOUNTER — TELEPHONE (OUTPATIENT)
Dept: HEMATOLOGY/ONCOLOGY | Facility: CLINIC | Age: 72
End: 2022-03-24
Payer: MEDICARE

## 2022-03-24 ENCOUNTER — OFFICE VISIT (OUTPATIENT)
Dept: URBAN - METROPOLITAN AREA CLINIC 113 | Facility: CLINIC | Age: 72
End: 2022-03-24
Payer: MEDICARE

## 2022-03-24 VITALS
TEMPERATURE: 96.9 F | WEIGHT: 216.8 LBS | BODY MASS INDEX: 34.84 KG/M2 | SYSTOLIC BLOOD PRESSURE: 149 MMHG | DIASTOLIC BLOOD PRESSURE: 77 MMHG | HEART RATE: 59 BPM | HEIGHT: 66 IN | RESPIRATION RATE: 20 BRPM

## 2022-03-24 DIAGNOSIS — D69.6 THROMBOCYTOPENIA: ICD-10-CM

## 2022-03-24 DIAGNOSIS — K59.09 OTHER CONSTIPATION: ICD-10-CM

## 2022-03-24 DIAGNOSIS — K21.00 GASTROESOPHAGEAL REFLUX DISEASE WITH ESOPHAGITIS WITHOUT HEMORRHAGE: ICD-10-CM

## 2022-03-24 DIAGNOSIS — Z86.19 HISTORY OF HEPATITIS C: ICD-10-CM

## 2022-03-24 DIAGNOSIS — Z80.0 FAMILY HISTORY OF COLON CANCER: ICD-10-CM

## 2022-03-24 DIAGNOSIS — C50.411 MALIGNANT NEOPLASM OF UPPER-OUTER QUADRANT OF RIGHT BREAST IN FEMALE, ESTROGEN RECEPTOR POSITIVE: Primary | ICD-10-CM

## 2022-03-24 DIAGNOSIS — Z17.0 MALIGNANT NEOPLASM OF UPPER-OUTER QUADRANT OF RIGHT BREAST IN FEMALE, ESTROGEN RECEPTOR POSITIVE: Primary | ICD-10-CM

## 2022-03-24 DIAGNOSIS — R13.19 CERVICAL DYSPHAGIA: ICD-10-CM

## 2022-03-24 PROCEDURE — 99214 OFFICE O/P EST MOD 30 MIN: CPT | Performed by: NURSE PRACTITIONER

## 2022-03-24 RX ORDER — GLUCOSAMINE HCL/CHONDROITIN SU 500-400 MG
TAKE 1 CAPSULE DAILY CAPSULE ORAL
Refills: 0 | Status: ACTIVE | COMMUNITY

## 2022-03-24 RX ORDER — FAMOTIDINE 40 MG/1
1 TABLET AT BEDTIME TABLET, FILM COATED ORAL ONCE A DAY
Qty: 30 | Refills: 5 | Status: ACTIVE | COMMUNITY

## 2022-03-24 RX ORDER — ALBUTEROL SULFATE 90 UG/1
AEROSOL, METERED RESPIRATORY (INHALATION)
Qty: 18 | Refills: 0 | Status: ACTIVE | COMMUNITY
Start: 2012-01-02

## 2022-03-24 RX ORDER — ASCORBIC ACID 500 MG
TAKE 1 TABLET DAILY TABLET ORAL
Refills: 0 | Status: ACTIVE | COMMUNITY
Start: 2007-04-18

## 2022-03-24 RX ORDER — ALBUTEROL SULFATE 2.5 MG/3ML
SOLUTION RESPIRATORY (INHALATION)
Qty: 255 | Refills: 0 | Status: ACTIVE | COMMUNITY
Start: 2012-03-14

## 2022-03-24 RX ORDER — LATANOPROST/PF 0.005 %
INSTILL 1 DROP IN BOTH EYES AT BEDTIME DROPS OPHTHALMIC (EYE)
Refills: 0 | Status: ACTIVE | COMMUNITY
Start: 2014-05-10

## 2022-03-24 RX ORDER — POLYETHYLENE GLYCOL 3350, SODIUM CHLORIDE, SODIUM BICARBONATE, POTASSIUM CHLORIDE 420; 11.2; 5.72; 1.48 G/4L; G/4L; G/4L; G/4L
AS DIRECTED POWDER, FOR SOLUTION ORAL ONCE
Qty: 1 BOTTLE | Refills: 0 | OUTPATIENT
Start: 2022-03-25 | End: 2022-03-26

## 2022-03-24 RX ORDER — TELMISARTAN 20 MG/1
1 TABLET TABLET ORAL ONCE A DAY
Refills: 0 | Status: ACTIVE | COMMUNITY
Start: 2019-11-04

## 2022-03-24 RX ORDER — BRINZOLAMIDE/BRIMONIDINE TARTRATE 10; 2 MG/ML; MG/ML
1 DROP INTO AFFECTED EYE SUSPENSION/ DROPS OPHTHALMIC THREE TIMES A DAY
Status: ACTIVE | COMMUNITY

## 2022-03-24 RX ORDER — IPRATROPIUM BROMIDE 42 UG/1
SPRAY, METERED NASAL
Qty: 45 | Refills: 0 | Status: ACTIVE | COMMUNITY
Start: 2020-01-07

## 2022-03-24 RX ORDER — DICLOFENAC SODIUM 10 MG/G
GEL TOPICAL
Qty: 300 | Refills: 0 | Status: ACTIVE | COMMUNITY
Start: 2019-09-18

## 2022-03-24 RX ORDER — AZELASTINE HCL 205.5 UG/1
1 PUFF IN EACH NOSTRIL SPRAY NASAL
Refills: 0 | Status: ACTIVE | COMMUNITY
Start: 2018-10-05

## 2022-03-24 RX ORDER — BUDESONIDE 0.5 MG/2ML
USE 1 UNIT DOSE VIA NEBULIZER TWO TIMES A DAY INHALANT ORAL
Qty: 60 | Refills: 0 | Status: ACTIVE | COMMUNITY
Start: 2012-03-30

## 2022-03-24 RX ORDER — PREDNISOLONE/MOXIFLOXACIN HCL 1 %-0.5 %
AS DIRECTED SUSPENSION, DROPS(FINAL DOSAGE FORM)(ML) OPHTHALMIC (EYE) 4 TIMES DAILY
Status: ACTIVE | COMMUNITY

## 2022-03-24 RX ORDER — DEXLANSOPRAZOLE 60 MG/1
TAKE 1 CAPSULE BY MOUTH EVERY DAY CAPSULE, DELAYED RELEASE ORAL ONCE A DAY
Qty: 90 | Refills: 4 | Status: ACTIVE | COMMUNITY

## 2022-03-24 RX ORDER — METFORMIN HYDROCHLORIDE 500 MG/1
1 TABLET WITH A MEAL TABLET, FILM COATED ORAL TWICE DAILY
Status: ACTIVE | COMMUNITY

## 2022-03-24 RX ORDER — LACTOBACILLUS RHAMNOSUS GG 10B CELL
TAKE 1 CAPSULE DAILY CAPSULE ORAL
Refills: 0 | Status: ACTIVE | COMMUNITY

## 2022-03-24 NOTE — HPI-OTHER HISTORIES
CT of the abdomen and pelvis with IV contrast 2/25/2022:No acute abnormality in the abdomen and pelvis.  Scattered colonic diverticula without evidence of acute diverticulitis.  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  Total arthroplasty of the left hip. Labs 2/15/2022:Alpha-fetoprotein 4.2.  No other lab results are available at this time. Labs 10/19/2021:Ionized calcium 5.3.  10/6/2021 BMP normal with exception of glucose 132, BUN 19, calcium 10.5.  LFTs normal TB 0.6, ALP 41, ALT 17, AST 16.  Hemoglobin A1c 7. 5/10/2021 alpha-fetoprotein 4.0. EGD 6/2/2021:No endoscopic abnormality to explain dysphagia status post dilation to 48 Arabic with a Fuentes dilator.  Normal stomach, normal examined duodenum.  No specimens collected. Abdominal ultrasound 6/1/2021:Negative study.

## 2022-03-24 NOTE — HPI-TODAY'S VISIT:
This is a 72-year-old female with a history of hepatitis C genotype 1b status post treatment with SIMEPREVIR and sofosbuvir (2015) with SVR, grade 2 stage II liver disease on biopsy (2006), GERD, fraternal history of colon cancer due screening in September 2022, postprandial epigastric pain and constipation presenting for follow-up.  She was last seen 2/10/2022.  She reported recent breakthrough heartburn on Dexilant 60 mg daily.  She was also complaining of postprandial epigastric pain.  Labs were ordered.  Gastroparesis was a consideration.  She was to continue Dexilant 60 mg daily.  Famotidine 40 mg at bedtime was added and lifestyle modification for GERD was discussed.  A CT was ordered.  EGD and gastric emptying study were future considerations.  Constipation had improved on a daily probiotic which she was to continue and she was to continue Metamucil as needed.

## 2022-03-25 ENCOUNTER — LAB OUTSIDE AN ENCOUNTER (OUTPATIENT)
Dept: URBAN - METROPOLITAN AREA CLINIC 113 | Facility: CLINIC | Age: 72
End: 2022-03-25

## 2022-03-25 PROBLEM — 415116008: Status: ACTIVE | Noted: 2022-03-25

## 2022-03-25 PROBLEM — 93871000119101: Status: ACTIVE | Noted: 2021-05-10

## 2022-03-29 LAB
ALBUMIN SERPL-MCNC: 4.3 G/DL (ref 3.6–5.1)
ALBUMIN/GLOB SERPL: 1.4 (CALC) (ref 1–2.5)
ALP SERPL-CCNC: 50 U/L (ref 37–153)
ALT SERPL-CCNC: 19 U/L (ref 6–29)
AST SERPL-CCNC: 18 U/L (ref 10–35)
BASOPHILS # BLD AUTO: 53 CELLS/UL (ref 0–200)
BASOPHILS NFR BLD AUTO: 0.8 %
BILIRUB SERPL-MCNC: 0.3 MG/DL (ref 0.2–1.2)
BUN SERPL-MCNC: 15 MG/DL (ref 7–25)
BUN/CREAT SERPL: NORMAL (CALC) (ref 6–22)
CALCIUM SERPL-MCNC: 9.4 MG/DL (ref 8.6–10.4)
CHLORIDE SERPL-SCNC: 107 MMOL/L (ref 98–110)
CO2 SERPL-SCNC: 24 MMOL/L (ref 20–32)
CREAT SERPL-MCNC: 0.65 MG/DL (ref 0.6–0.93)
EOSINOPHIL # BLD AUTO: 152 CELLS/UL (ref 15–500)
EOSINOPHIL NFR BLD AUTO: 2.3 %
ERYTHROCYTE [DISTWIDTH] IN BLOOD BY AUTOMATED COUNT: 14 % (ref 11–15)
GLOBULIN SER CALC-MCNC: 3 G/DL (CALC) (ref 1.9–3.7)
GLUCOSE SERPL-MCNC: 82 MG/DL (ref 65–139)
HCT VFR BLD AUTO: 40.4 % (ref 35–45)
HGB BLD-MCNC: 13.2 G/DL (ref 11.7–15.5)
LYMPHOCYTES # BLD AUTO: 1478 CELLS/UL (ref 850–3900)
LYMPHOCYTES NFR BLD AUTO: 22.4 %
MCH RBC QN AUTO: 28.8 PG (ref 27–33)
MCHC RBC AUTO-ENTMCNC: 32.7 G/DL (ref 32–36)
MCV RBC AUTO: 88.2 FL (ref 80–100)
MONOCYTES # BLD AUTO: 455 CELLS/UL (ref 200–950)
MONOCYTES NFR BLD AUTO: 6.9 %
NEUTROPHILS # BLD AUTO: 4462 CELLS/UL (ref 1500–7800)
NEUTROPHILS NFR BLD AUTO: 67.6 %
PLATELET # BLD AUTO: 211 THOUSAND/UL (ref 140–400)
PMV BLD REES-ECKER: 12 FL (ref 7.5–12.5)
POTASSIUM SERPL-SCNC: 3.9 MMOL/L (ref 3.5–5.3)
PROT SERPL-MCNC: 7.3 G/DL (ref 6.1–8.1)
RBC # BLD AUTO: 4.58 MILLION/UL (ref 3.8–5.1)
SODIUM SERPL-SCNC: 139 MMOL/L (ref 135–146)
WBC # BLD AUTO: 6.6 THOUSAND/UL (ref 3.8–10.8)

## 2022-04-06 ENCOUNTER — INFUSION (OUTPATIENT)
Dept: INFUSION THERAPY | Facility: HOSPITAL | Age: 72
End: 2022-04-06
Attending: INTERNAL MEDICINE
Payer: MEDICARE

## 2022-04-06 VITALS
OXYGEN SATURATION: 99 % | DIASTOLIC BLOOD PRESSURE: 76 MMHG | TEMPERATURE: 98 F | HEART RATE: 66 BPM | BODY MASS INDEX: 21.62 KG/M2 | HEIGHT: 61 IN | WEIGHT: 114.5 LBS | SYSTOLIC BLOOD PRESSURE: 134 MMHG | RESPIRATION RATE: 18 BRPM

## 2022-04-06 DIAGNOSIS — Z79.811 LONG TERM CURRENT USE OF AROMATASE INHIBITOR: Primary | ICD-10-CM

## 2022-04-06 DIAGNOSIS — Z17.0 MALIGNANT NEOPLASM OF UPPER-OUTER QUADRANT OF RIGHT BREAST IN FEMALE, ESTROGEN RECEPTOR POSITIVE: ICD-10-CM

## 2022-04-06 DIAGNOSIS — C50.411 MALIGNANT NEOPLASM OF UPPER-OUTER QUADRANT OF RIGHT BREAST IN FEMALE, ESTROGEN RECEPTOR POSITIVE: ICD-10-CM

## 2022-04-06 DIAGNOSIS — M85.80 OSTEOPENIA, UNSPECIFIED LOCATION: ICD-10-CM

## 2022-04-06 PROCEDURE — 63600175 PHARM REV CODE 636 W HCPCS: Mod: JG | Performed by: NURSE PRACTITIONER

## 2022-04-06 PROCEDURE — 96372 THER/PROPH/DIAG INJ SC/IM: CPT

## 2022-04-06 RX ADMIN — DENOSUMAB 60 MG: 60 INJECTION SUBCUTANEOUS at 01:04

## 2022-04-06 NOTE — PLAN OF CARE
Problem: Fatigue  Goal: Improved Activity Tolerance  Intervention: Promote Improved Energy  Flowsheets (Taken 4/6/2022 0494)  Fatigue Management: fatigue-related activity identified  Activity Management: Ambulated -L4

## 2022-04-08 ENCOUNTER — TELEPHONE (OUTPATIENT)
Dept: HEMATOLOGY/ONCOLOGY | Facility: CLINIC | Age: 72
End: 2022-04-08

## 2022-04-08 NOTE — TELEPHONE ENCOUNTER
----- Message from Renetta Gama sent at 4/7/2022  4:05 PM CDT -----    Patient received PROLIA on:  4/6/22    THE NEXT DOSE IS SCHEDULED FOR :  10/11/2022    To reschedule appointments, please contact [facility] SCHEDULING POOL or call [phone].

## 2022-04-15 PROBLEM — 79922009: Status: ACTIVE | Noted: 2022-02-10

## 2022-04-18 ENCOUNTER — OFFICE VISIT (OUTPATIENT)
Dept: FAMILY MEDICINE | Facility: CLINIC | Age: 72
End: 2022-04-18
Payer: MEDICARE

## 2022-04-18 VITALS
SYSTOLIC BLOOD PRESSURE: 136 MMHG | BODY MASS INDEX: 21.64 KG/M2 | HEIGHT: 61 IN | WEIGHT: 114.63 LBS | DIASTOLIC BLOOD PRESSURE: 84 MMHG | HEART RATE: 62 BPM

## 2022-04-18 DIAGNOSIS — R19.5 POSITIVE COLORECTAL CANCER SCREENING USING COLOGUARD TEST: ICD-10-CM

## 2022-04-18 DIAGNOSIS — R00.2 PALPITATIONS: ICD-10-CM

## 2022-04-18 DIAGNOSIS — I34.1 MVP (MITRAL VALVE PROLAPSE): ICD-10-CM

## 2022-04-18 DIAGNOSIS — C50.411 MALIGNANT NEOPLASM OF UPPER-OUTER QUADRANT OF RIGHT BREAST IN FEMALE, ESTROGEN RECEPTOR POSITIVE: ICD-10-CM

## 2022-04-18 DIAGNOSIS — I10 ESSENTIAL HYPERTENSION: Primary | ICD-10-CM

## 2022-04-18 DIAGNOSIS — Z17.0 MALIGNANT NEOPLASM OF UPPER-OUTER QUADRANT OF RIGHT BREAST IN FEMALE, ESTROGEN RECEPTOR POSITIVE: ICD-10-CM

## 2022-04-18 PROCEDURE — 99214 PR OFFICE/OUTPT VISIT, EST, LEVL IV, 30-39 MIN: ICD-10-PCS | Mod: S$GLB,,, | Performed by: NURSE PRACTITIONER

## 2022-04-18 PROCEDURE — 99214 OFFICE O/P EST MOD 30 MIN: CPT | Mod: S$GLB,,, | Performed by: NURSE PRACTITIONER

## 2022-04-18 NOTE — PROGRESS NOTES
SUBJECTIVE:    Patient ID: Cecilia Valenzuela is a 72 y.o. female.    Chief Complaint: Hypertension (No bottles//Pt is here for check up for her blood pressure.//Last time taken the lorazepam was 1-2 months ago.//No complaints today//Pt decline colonoscopy referral today.//Pt would like to discuss the PNA vaccine.//ABRIL)    Pt here for regular f/u- HTN.  Reports overall doing well since having COVID in January.  All symptoms resolved  Reports hasn't had any palpitations in the past 6 months- follow with Dr. Lisa and reports wants to talk to him about possibly reducing metoprolol at next visit  Follows with Dr. Charles for history of breast cancer on anastrozole      Office Visit on 01/27/2022   Component Date Value Ref Range Status    POC Rapid COVID 01/27/2022 Positive (A) Negative Final     Acceptable 01/27/2022 Yes   Final    POC Molecular Influenza A Ag 01/27/2022 Negative  Negative, Not Reported Final    POC Molecular Influenza B Ag 01/27/2022 Negative  Negative, Not Reported Final     Acceptable 01/27/2022 Yes   Final   Office Visit on 01/26/2022   Component Date Value Ref Range Status    WBC 03/28/2022 6.6  3.8 - 10.8 Thousand/uL Final    RBC 03/28/2022 4.58  3.80 - 5.10 Million/uL Final    Hemoglobin 03/28/2022 13.2  11.7 - 15.5 g/dL Final    Hematocrit 03/28/2022 40.4  35.0 - 45.0 % Final    MCV 03/28/2022 88.2  80.0 - 100.0 fL Final    MCH 03/28/2022 28.8  27.0 - 33.0 pg Final    MCHC 03/28/2022 32.7  32.0 - 36.0 g/dL Final    RDW 03/28/2022 14.0  11.0 - 15.0 % Final    Platelets 03/28/2022 211  140 - 400 Thousand/uL Final    MPV 03/28/2022 12.0  7.5 - 12.5 fL Final    Neutrophils, Abs 03/28/2022 4,462  1,500 - 7,800 cells/uL Final    Lymph # 03/28/2022 1,478  850 - 3,900 cells/uL Final    Mono # 03/28/2022 455  200 - 950 cells/uL Final    Eos # 03/28/2022 152  15 - 500 cells/uL Final    Baso # 03/28/2022 53  0 - 200 cells/uL Final    Neutrophils  Relative 03/28/2022 67.6  % Final    Lymph % 03/28/2022 22.4  % Final    Mono % 03/28/2022 6.9  % Final    Eosinophil % 03/28/2022 2.3  % Final    Basophil % 03/28/2022 0.8  % Final    Glucose 03/28/2022 82  65 - 139 mg/dL Final    BUN 03/28/2022 15  7 - 25 mg/dL Final    Creatinine 03/28/2022 0.65  0.60 - 0.93 mg/dL Final    eGFR if non African American 03/28/2022 88  > OR = 60 mL/min/1.73m2 Final    eGFR if  03/28/2022 102  > OR = 60 mL/min/1.73m2 Final    BUN/Creatinine Ratio 03/28/2022 NOT APPLICABLE  6 - 22 (calc) Final    Sodium 03/28/2022 139  135 - 146 mmol/L Final    Potassium 03/28/2022 3.9  3.5 - 5.3 mmol/L Final    Chloride 03/28/2022 107  98 - 110 mmol/L Final    CO2 03/28/2022 24  20 - 32 mmol/L Final    Calcium 03/28/2022 9.4  8.6 - 10.4 mg/dL Final    Total Protein 03/28/2022 7.3  6.1 - 8.1 g/dL Final    Albumin 03/28/2022 4.3  3.6 - 5.1 g/dL Final    Globulin, Total 03/28/2022 3.0  1.9 - 3.7 g/dL (calc) Final    Albumin/Globulin Ratio 03/28/2022 1.4  1.0 - 2.5 (calc) Final    Total Bilirubin 03/28/2022 0.3  0.2 - 1.2 mg/dL Final    Alkaline Phosphatase 03/28/2022 50  37 - 153 U/L Final    AST 03/28/2022 18  10 - 35 U/L Final    ALT 03/28/2022 19  6 - 29 U/L Final       Past Medical History:   Diagnosis Date    Arthritis     Atherosclerosis     in left carotid artery    ER+ (estrogen receptor positive status) 1/25/2022    Fibrocystic breast     GERD (gastroesophageal reflux disease)     HER2-negative carcinoma of right breast 1/25/2022    Hyperlipidemia     Hypertension     Malignant neoplasm of upper-outer quadrant of right female breast 9/14/2017    right    Mitral valve prolapse     Neurofibromatosis     Osteopenia     Premature beats     Raynaud's disease     S/P mastectomy, right 1/25/2022    Seborrheic keratosis     Thyroid nodule     Tinnitus     Undiagnosed cardiac murmurs 7/19/2019     Past Surgical History:   Procedure Laterality  Date    BACK SURGERY      BREAST BIOPSY Right 2017    CATARACT EXTRACTION Bilateral      SECTION      x5    DILATION AND CURETTAGE OF UTERUS      miss Ab    HYSTERECTOMY      MASTECTOMY Right 2017     Family History   Problem Relation Age of Onset    Rheum arthritis Mother     Breast cancer Mother         over 85    Hypertension Mother     Heart disease Mother     Parkinsonism Father     Cancer Father     Breast cancer Sister 40        DCIS    Breast cancer Maternal Aunt         60's    Breast cancer Sister 50        DCIS    Ovarian cancer Neg Hx     Colon cancer Neg Hx     Melanoma Neg Hx        The 10-year CVD risk score (Rona, et al., 2008) is: 16.6%    Values used to calculate the score:      Age: 72 years      Sex: Female      Diabetic: No      Tobacco smoker: No      Systolic Blood Pressure: 136 mmHg      Is BP treated: Yes      HDL Cholesterol: 55 mg/dL      Total Cholesterol: 206 mg/dL     Marital Status:   Alcohol History:  reports no history of alcohol use.  Tobacco History:  reports that she has never smoked. She has never used smokeless tobacco.  Drug History:  reports no history of drug use.    Health Maintenance Topics with due status: Not Due       Topic Last Completion Date    DEXA Scan 2021    Lipid Panel 2021     Immunization History   Administered Date(s) Administered    COVID-19, MRNA, LN-S, PF (Pfizer) (Purple Cap) 2021, 2021, 2021    Influenza - High Dose - PF (65 years and older) 10/06/2014, 2015, 10/11/2018, 2019    Influenza - Quadrivalent - High Dose - PF (65 years and older) 2020, 2021    Pneumococcal Conjugate - 13 Valent 2020    Pneumococcal Polysaccharide - 23 Valent 10/06/2014       Review of patient's allergies indicates:   Allergen Reactions    Methylprednisolone      Other reaction(s): Heart palpitations  PT STATES SHE HAS A REACTION TO ALL STEROIDS DUE TO HER DX; OF MVP..     Zofran [ondansetron hcl (pf)] Nausea Only    Corticosteroids (glucocorticoids) Other (See Comments)     Heart palpitations    Dilaudid [hydromorphone] Nausea And Vomiting     Does not help with pain    Fentanyl     Neosporin (neomycin-polymyx)     Norvasc [amlodipine]     Statins-hmg-coa reductase inhibitors      myalgias    Tetracyclines     Zoloft [sertraline]      Increased anxiety     Augmentin [amoxicillin-pot clavulanate] Nausea And Vomiting       Current Outpatient Medications:     amoxicillin (AMOXIL) 875 MG tablet, Take 1 tablet (875 mg total) by mouth 2 (two) times daily., Disp: 20 tablet, Rfl: 0    anastrozole (ARIMIDEX) 1 mg Tab, Take 1 tablet (1 mg total) by mouth once daily., Disp: 90 tablet, Rfl: 2    ascorbic acid, vitamin C, (VITAMIN C) 500 MG tablet, Take 500 mg by mouth once daily., Disp: , Rfl:     aspirin (ECOTRIN) 81 MG EC tablet, Take 81 mg by mouth once daily., Disp: , Rfl:     azelastine (ASTELIN) 137 mcg (0.1 %) nasal spray, 1 spray by Nasal route as needed for Rhinitis. , Disp: , Rfl:     calcium carbonate/vitamin D3 (CALCIUM 600 + D,3, ORAL), Take 1 tablet by mouth 2 (two) times a day., Disp: , Rfl:     coQ10, ubiquinol, 200 mg Cap, Take 1 capsule by mouth 2 (two) times a day., Disp: , Rfl:     denosumab (PROLIA) 60 mg/mL Syrg, Inject 60 mg into the skin every 6 (six) months. , Disp: , Rfl:     glucosamine HCl/chondroitin springer (GLUCOSAMINE-CHONDROITIN ORAL), Take by mouth once daily. takes 500mg/400 capsule twice a day, Disp: , Rfl:     lisinopriL 10 MG tablet, One tablet daily as needed for SBP > 150., Disp: 30 tablet, Rfl: 11    LORazepam (ATIVAN) 0.5 MG tablet, Take 1 tablet (0.5 mg total) by mouth every 12 (twelve) hours as needed for Anxiety., Disp: 10 tablet, Rfl: 1    magnesium 250 mg Tab, Take 250 mg by mouth once daily., Disp: , Rfl:     meclizine (ANTIVERT) 25 mg tablet, Take 0.5 tablets (12.5 mg total) by mouth nightly., Disp: 90 tablet, Rfl: 1     "multivitamin (THERAGRAN) per tablet, Take 1 tablet by mouth once daily., Disp: , Rfl:     omeprazole (PRILOSEC OTC) 20 MG tablet, Take 20 mg by mouth 2 (two) times daily before meals. Pt states that she take 20 mg in the morning ad 40 mg at night time., Disp: , Rfl:     PATADAY 0.2 % Drop, Place 1 drop into both eyes daily as needed (allergies). , Disp: , Rfl: 0    TOPROL XL 50 mg 24 hr tablet, TAKE 1 TABLET (50 MG TOTAL) BY MOUTH EVERY EVENING., Disp: 90 tablet, Rfl: 1    zinc gluconate 50 mg tablet, Take 50 mg by mouth once daily., Disp: , Rfl:     Review of Systems   Constitutional: Negative for activity change, chills, fatigue, fever and unexpected weight change.   HENT: Negative for congestion, ear pain, postnasal drip, rhinorrhea, sinus pain, sore throat, trouble swallowing and voice change.    Respiratory: Negative for cough, shortness of breath and wheezing.    Cardiovascular: Negative for chest pain, palpitations and leg swelling.   Gastrointestinal: Negative for abdominal pain, diarrhea, nausea and vomiting.   Genitourinary: Negative for dysuria and hematuria.   Musculoskeletal: Positive for arthralgias (wrist pain improved). Negative for joint swelling, myalgias and neck pain.   Skin: Negative for rash.   Neurological: Negative for dizziness, weakness and headaches.   Psychiatric/Behavioral: Negative for dysphoric mood. The patient is not nervous/anxious.           Objective:      Vitals:    04/18/22 0815   BP: 136/84   Pulse: 62   Weight: 52 kg (114 lb 9.6 oz)   Height: 5' 1" (1.549 m)     Physical Exam  Constitutional:       General: She is not in acute distress.     Appearance: Normal appearance. She is well-developed.   HENT:      Head: Normocephalic and atraumatic.      Right Ear: Tympanic membrane and ear canal normal.      Left Ear: Tympanic membrane and ear canal normal.   Neck:      Vascular: No carotid bruit.   Cardiovascular:      Rate and Rhythm: Normal rate and regular rhythm.      Heart " sounds: No murmur heard.    No friction rub.   Pulmonary:      Effort: Pulmonary effort is normal. No respiratory distress.      Breath sounds: Normal breath sounds. No wheezing or rales.   Abdominal:      Palpations: Abdomen is soft.      Tenderness: There is no abdominal tenderness.   Musculoskeletal:      Cervical back: Neck supple.      Right lower leg: No edema.      Left lower leg: No edema.   Skin:     General: Skin is warm and dry.   Neurological:      General: No focal deficit present.      Mental Status: She is alert and oriented to person, place, and time.   Psychiatric:         Mood and Affect: Mood normal.           Assessment:       1. Essential hypertension    2. Palpitations    3. MVP (mitral valve prolapse)    4. Malignant neoplasm of upper-outer quadrant of right breast in female, estrogen receptor positive    5. Positive colorectal cancer screening using Cologuard test           Plan:       Essential hypertension  Comments:  BP well controlled  Orders:  -     Lipid Panel; Future; Expected date: 04/18/2022  -     TSH w/reflex to FT4; Future; Expected date: 04/18/2022  -     Urinalysis, Reflex to Urine Culture Urine, Clean Catch; Future; Expected date: 04/18/2022  -     Microalbumin/Creatinine Ratio, Urine; Future; Expected date: 04/18/2022    Palpitations  Comments:  Reports no palpitations for the past 6 months.  Follow-up Dr. Dr. Lisa as scheduled    MVP (mitral valve prolapse)  Comments:  Stable    Malignant neoplasm of upper-outer quadrant of right breast in female, estrogen receptor positive  Comments:  Stable on anastrozole, follow-up Dr. Charles as scheduled    Positive colorectal cancer screening using Cologuard test  Comments:  Pt previously had + cologuard  however cancelled appt with GI and does not want to proceed with C scope- discussed risk of colon CA      Follow up in about 6 months (around 10/18/2022) for HTN, labs within next 2 weeks.          Counseled on age and gender  appropriate medical preventative services, including cancer screenings, immunizations, overall nutritional health, need for a consistent exercise regimen and an overall push towards maintaining a vigorous and active lifestyle.      4/18/2022 Cecilia Shaikh NP

## 2022-05-02 ENCOUNTER — TELEPHONE ENCOUNTER (OUTPATIENT)
Dept: URBAN - METROPOLITAN AREA CLINIC 113 | Facility: CLINIC | Age: 72
End: 2022-05-02

## 2022-05-03 ENCOUNTER — OFFICE VISIT (OUTPATIENT)
Dept: URBAN - METROPOLITAN AREA MEDICAL CENTER 2 | Facility: MEDICAL CENTER | Age: 72
End: 2022-05-03
Payer: MEDICARE

## 2022-05-03 DIAGNOSIS — R13.19 CERVICAL DYSPHAGIA: ICD-10-CM

## 2022-05-03 PROCEDURE — 43248 EGD GUIDE WIRE INSERTION: CPT | Performed by: INTERNAL MEDICINE

## 2022-05-12 ENCOUNTER — OFFICE VISIT (OUTPATIENT)
Dept: FAMILY MEDICINE | Facility: CLINIC | Age: 72
End: 2022-05-12
Payer: MEDICARE

## 2022-05-12 ENCOUNTER — PATIENT MESSAGE (OUTPATIENT)
Dept: FAMILY MEDICINE | Facility: CLINIC | Age: 72
End: 2022-05-12

## 2022-05-12 VITALS
WEIGHT: 112.31 LBS | SYSTOLIC BLOOD PRESSURE: 128 MMHG | HEIGHT: 61 IN | OXYGEN SATURATION: 99 % | DIASTOLIC BLOOD PRESSURE: 72 MMHG | HEART RATE: 74 BPM | BODY MASS INDEX: 21.2 KG/M2

## 2022-05-12 DIAGNOSIS — B34.9 VIRAL SYNDROME: Primary | ICD-10-CM

## 2022-05-12 LAB
CTP QC/QA: YES
CTP QC/QA: YES
POC MOLECULAR INFLUENZA A AGN: NEGATIVE
POC MOLECULAR INFLUENZA B AGN: NEGATIVE
SARS-COV-2 RDRP RESP QL NAA+PROBE: NEGATIVE

## 2022-05-12 PROCEDURE — U0002 COVID-19 LAB TEST NON-CDC: HCPCS | Mod: QW,CR,S$GLB, | Performed by: NURSE PRACTITIONER

## 2022-05-12 PROCEDURE — 87502 INFLUENZA DNA AMP PROBE: CPT | Mod: QW,,, | Performed by: NURSE PRACTITIONER

## 2022-05-12 PROCEDURE — U0002: ICD-10-PCS | Mod: QW,CR,S$GLB, | Performed by: NURSE PRACTITIONER

## 2022-05-12 PROCEDURE — 99213 PR OFFICE/OUTPT VISIT, EST, LEVL III, 20-29 MIN: ICD-10-PCS | Mod: 25,S$GLB,, | Performed by: NURSE PRACTITIONER

## 2022-05-12 PROCEDURE — 87502 POCT INFLUENZA A/B MOLECULAR: ICD-10-PCS | Mod: QW,,, | Performed by: NURSE PRACTITIONER

## 2022-05-12 PROCEDURE — 99213 OFFICE O/P EST LOW 20 MIN: CPT | Mod: 25,S$GLB,, | Performed by: NURSE PRACTITIONER

## 2022-05-12 NOTE — PROGRESS NOTES
Patient ID: Cecilia Valenzuela is a 72 y.o. female.    Chief Complaint: Cough (Pt c/o coughing, sore throat, sinus congestion, post nasal drip x 1-2 days.//Decline colonoscopy today.//Last taken the lorazepam was last week.//ABRIL)    Pt here for sick visit- reports started yesterday with mild scratchy throat, post nasal drip and nasal congestion- denies any chest symptoms- no cough, SOB, wheeze or CP. Denies fever/chills/myalgias. Had COVID in January- reports she's not feeling near as bad as she did with COVID. Going on vacation next week so hoping she doesn't need to isolate. Taking otc meds          Past Medical History:   Diagnosis Date    Arthritis     Atherosclerosis     in left carotid artery    ER+ (estrogen receptor positive status) 2022    Fibrocystic breast     GERD (gastroesophageal reflux disease)     HER2-negative carcinoma of right breast 2022    Hyperlipidemia     Hypertension     Malignant neoplasm of upper-outer quadrant of right female breast 2017    right    Mitral valve prolapse     Neurofibromatosis     Osteopenia     Premature beats     Raynaud's disease     S/P mastectomy, right 2022    Seborrheic keratosis     Thyroid nodule     Tinnitus     Undiagnosed cardiac murmurs 2019     Past Surgical History:   Procedure Laterality Date    BACK SURGERY      BREAST BIOPSY Right     CATARACT EXTRACTION Bilateral      SECTION      x5    DILATION AND CURETTAGE OF UTERUS      miss Ab    HYSTERECTOMY      MASTECTOMY Right 2017         Tobacco History:  reports that she has never smoked. She has never used smokeless tobacco.      Review of patient's allergies indicates:   Allergen Reactions    Methylprednisolone      Other reaction(s): Heart palpitations  PT STATES SHE HAS A REACTION TO ALL STEROIDS DUE TO HER DX; OF MVP..    Zofran [ondansetron hcl (pf)] Nausea Only    Corticosteroids (glucocorticoids) Other (See Comments)     Heart  palpitations    Dilaudid [hydromorphone] Nausea And Vomiting     Does not help with pain    Fentanyl     Neosporin (neomycin-polymyx)     Norvasc [amlodipine]     Statins-hmg-coa reductase inhibitors      myalgias    Tetracyclines     Zoloft [sertraline]      Increased anxiety     Augmentin [amoxicillin-pot clavulanate] Nausea And Vomiting       Current Outpatient Medications:     amoxicillin (AMOXIL) 875 MG tablet, Take 1 tablet (875 mg total) by mouth 2 (two) times daily., Disp: 20 tablet, Rfl: 0    anastrozole (ARIMIDEX) 1 mg Tab, Take 1 tablet (1 mg total) by mouth once daily., Disp: 90 tablet, Rfl: 2    ascorbic acid, vitamin C, (VITAMIN C) 500 MG tablet, Take 500 mg by mouth once daily., Disp: , Rfl:     aspirin (ECOTRIN) 81 MG EC tablet, Take 81 mg by mouth once daily., Disp: , Rfl:     azelastine (ASTELIN) 137 mcg (0.1 %) nasal spray, 1 spray by Nasal route as needed for Rhinitis. , Disp: , Rfl:     calcium carbonate/vitamin D3 (CALCIUM 600 + D,3, ORAL), Take 1 tablet by mouth 2 (two) times a day., Disp: , Rfl:     coQ10, ubiquinol, 200 mg Cap, Take 1 capsule by mouth 2 (two) times a day., Disp: , Rfl:     denosumab (PROLIA) 60 mg/mL Syrg, Inject 60 mg into the skin every 6 (six) months. , Disp: , Rfl:     glucosamine HCl/chondroitin springer (GLUCOSAMINE-CHONDROITIN ORAL), Take by mouth once daily. takes 500mg/400 capsule twice a day, Disp: , Rfl:     lisinopriL 10 MG tablet, One tablet daily as needed for SBP > 150., Disp: 30 tablet, Rfl: 11    LORazepam (ATIVAN) 0.5 MG tablet, Take 1 tablet (0.5 mg total) by mouth every 12 (twelve) hours as needed for Anxiety., Disp: 10 tablet, Rfl: 1    magnesium 250 mg Tab, Take 250 mg by mouth once daily., Disp: , Rfl:     meclizine (ANTIVERT) 25 mg tablet, Take 0.5 tablets (12.5 mg total) by mouth nightly., Disp: 90 tablet, Rfl: 1    multivitamin (THERAGRAN) per tablet, Take 1 tablet by mouth once daily., Disp: , Rfl:     omeprazole (PRILOSEC OTC) 20  "MG tablet, Take 20 mg by mouth 2 (two) times daily before meals. Pt states that she take 20 mg in the morning ad 40 mg at night time., Disp: , Rfl:     PATADAY 0.2 % Drop, Place 1 drop into both eyes daily as needed (allergies). , Disp: , Rfl: 0    TOPROL XL 50 mg 24 hr tablet, TAKE 1 TABLET (50 MG TOTAL) BY MOUTH EVERY EVENING., Disp: 90 tablet, Rfl: 1    zinc gluconate 50 mg tablet, Take 50 mg by mouth once daily., Disp: , Rfl:     Review of Systems   Constitutional: Negative for chills, fatigue and fever.   HENT: Positive for congestion, postnasal drip and sore throat. Negative for ear pain, rhinorrhea, sinus pain and trouble swallowing.    Respiratory: Negative for cough, shortness of breath and wheezing.    Cardiovascular: Negative for chest pain.   Gastrointestinal: Negative for abdominal pain, nausea and vomiting.   Genitourinary: Negative for dysuria and hematuria.   Musculoskeletal: Negative for myalgias.   Skin: Negative for rash.   Neurological: Negative for dizziness and headaches.          Objective:      Vitals:    05/12/22 1157   BP: 128/72   Pulse: 74   SpO2: 99%   Weight: 50.9 kg (112 lb 4.8 oz)   Height: 5' 1" (1.549 m)     Physical Exam  Vitals and nursing note reviewed.   Constitutional:       General: She is not in acute distress.     Appearance: Normal appearance. She is well-developed. She is not toxic-appearing.   HENT:      Head: Normocephalic and atraumatic.      Right Ear: Tympanic membrane and ear canal normal.      Left Ear: Tympanic membrane and ear canal normal.      Nose: Mucosal edema and rhinorrhea (clear) present.      Right Sinus: No maxillary sinus tenderness or frontal sinus tenderness.      Left Sinus: No maxillary sinus tenderness or frontal sinus tenderness.      Mouth/Throat:      Pharynx: No oropharyngeal exudate or posterior oropharyngeal erythema (mild erythema with thin clear mucus in posterior pharynx).      Tonsils: No tonsillar exudate.   Cardiovascular:      Rate " and Rhythm: Normal rate and regular rhythm.   Pulmonary:      Effort: Pulmonary effort is normal. No respiratory distress.      Breath sounds: Normal breath sounds. No wheezing or rales.   Musculoskeletal:      Cervical back: Neck supple.   Skin:     Findings: No rash.   Neurological:      General: No focal deficit present.      Mental Status: She is alert and oriented to person, place, and time.           Assessment:       1. Viral syndrome           Plan:       Viral syndrome  Comments:  Flu/COVID swab negative.  advised likely viral, Discussed supportive treatment with over-the-counter meds.  Cautioned to call for any worsening  Orders:  -     POCT COVID-19 Rapid Screening  -     POCT Influenza A/B Molecular      Follow up if symptoms worsen or fail to improve, for as scheduled.        5/12/2022 Cecilia Shaikh NP

## 2022-05-19 LAB
ALBUMIN/CREAT UR: 9 MCG/MG CREAT
APPEARANCE UR: CLEAR
BACTERIA #/AREA URNS HPF: ABNORMAL /HPF
BACTERIA UR CULT: ABNORMAL
BACTERIA UR CULT: ABNORMAL
BILIRUB UR QL STRIP: NEGATIVE
CHOLEST SERPL-MCNC: 218 MG/DL
CHOLEST/HDLC SERPL: 4 (CALC)
COLOR UR: ABNORMAL
CREAT UR-MCNC: 210 MG/DL (ref 20–275)
GLUCOSE UR QL STRIP: NEGATIVE
HDLC SERPL-MCNC: 55 MG/DL
HGB UR QL STRIP: NEGATIVE
HYALINE CASTS #/AREA URNS LPF: ABNORMAL /LPF
KETONES UR QL STRIP: ABNORMAL
LDLC SERPL CALC-MCNC: 138 MG/DL (CALC)
LEUKOCYTE ESTERASE UR QL STRIP: ABNORMAL
MICROALBUMIN UR-MCNC: 1.9 MG/DL
NITRITE UR QL STRIP: NEGATIVE
NONHDLC SERPL-MCNC: 163 MG/DL (CALC)
PH UR STRIP: 6.5 [PH] (ref 5–8)
PROT UR QL STRIP: ABNORMAL
RBC #/AREA URNS HPF: ABNORMAL /HPF
SP GR UR STRIP: 1.02 (ref 1–1.03)
SQUAMOUS #/AREA URNS HPF: ABNORMAL /HPF
TRIGL SERPL-MCNC: 129 MG/DL
TSH SERPL-ACNC: 3.98 MIU/L (ref 0.4–4.5)
WBC #/AREA URNS HPF: ABNORMAL /HPF

## 2022-05-23 ENCOUNTER — TELEPHONE (OUTPATIENT)
Dept: FAMILY MEDICINE | Facility: CLINIC | Age: 72
End: 2022-05-23

## 2022-05-23 NOTE — TELEPHONE ENCOUNTER
Spoke to pt verbatim per Vern. Pt voiced  understanding. She wants to get more info on the medication before she agrees to it. Remind me created sent this to vern as JUDY

## 2022-05-23 NOTE — TELEPHONE ENCOUNTER
----- Message from Cecilia Shaikh NP sent at 5/22/2022  7:10 PM CDT -----  Please call patient and let her know recent blood work shows her cholesterol levels continue to be elevated with her bad cholesterol , up slightly from 127 years ago and goal is <100.  Her good cholesterol HDL remains good at 55. Her 10 year cardiovascular risk is on the high intermediate range at 17%- would she consider starting ezetimibe which is a non-statin medication to help lower cholesterol? If she agrees recommend starting ezetimibe 10mg daily and recheck lipids, CMP in 3 mos  Thyroid level within normal range. Urine negative for infectio, protein or blood

## 2022-05-26 ENCOUNTER — TELEPHONE (OUTPATIENT)
Dept: FAMILY MEDICINE | Facility: CLINIC | Age: 72
End: 2022-05-26

## 2022-06-01 ENCOUNTER — WEB ENCOUNTER (OUTPATIENT)
Dept: URBAN - METROPOLITAN AREA CLINIC 113 | Facility: CLINIC | Age: 72
End: 2022-06-01

## 2022-06-01 ENCOUNTER — OFFICE VISIT (OUTPATIENT)
Dept: URBAN - METROPOLITAN AREA CLINIC 113 | Facility: CLINIC | Age: 72
End: 2022-06-01
Payer: MEDICARE

## 2022-06-01 VITALS
RESPIRATION RATE: 20 BRPM | DIASTOLIC BLOOD PRESSURE: 65 MMHG | HEIGHT: 66 IN | BODY MASS INDEX: 35.03 KG/M2 | SYSTOLIC BLOOD PRESSURE: 116 MMHG | TEMPERATURE: 98 F | HEART RATE: 61 BPM | WEIGHT: 218 LBS

## 2022-06-01 DIAGNOSIS — R13.19 CERVICAL DYSPHAGIA: ICD-10-CM

## 2022-06-01 DIAGNOSIS — Z80.0 FAMILY HISTORY OF COLON CANCER: ICD-10-CM

## 2022-06-01 DIAGNOSIS — K21.00 GASTROESOPHAGEAL REFLUX DISEASE WITH ESOPHAGITIS WITHOUT HEMORRHAGE: ICD-10-CM

## 2022-06-01 DIAGNOSIS — K59.09 OTHER CONSTIPATION: ICD-10-CM

## 2022-06-01 PROBLEM — 266433003: Status: ACTIVE | Noted: 2021-05-10

## 2022-06-01 PROBLEM — 14760008: Status: ACTIVE | Noted: 2021-05-10

## 2022-06-01 PROBLEM — 40890009: Status: ACTIVE | Noted: 2021-05-10

## 2022-06-01 PROCEDURE — 99214 OFFICE O/P EST MOD 30 MIN: CPT | Performed by: NURSE PRACTITIONER

## 2022-06-01 RX ORDER — ALBUTEROL SULFATE 2.5 MG/3ML
SOLUTION RESPIRATORY (INHALATION)
Qty: 255 | Refills: 0 | Status: ACTIVE | COMMUNITY
Start: 2012-03-14

## 2022-06-01 RX ORDER — ALBUTEROL SULFATE 90 UG/1
AEROSOL, METERED RESPIRATORY (INHALATION)
Qty: 18 | Refills: 0 | Status: ACTIVE | COMMUNITY
Start: 2012-01-02

## 2022-06-01 RX ORDER — SODIUM, POTASSIUM,MAG SULFATES 17.5-3.13G
AS DIRECTED SOLUTION, RECONSTITUTED, ORAL ORAL
OUTPATIENT
Start: 2022-06-01

## 2022-06-01 RX ORDER — METFORMIN HYDROCHLORIDE 500 MG/1
1 TABLET WITH A MEAL TABLET, FILM COATED ORAL TWICE DAILY
Status: ACTIVE | COMMUNITY

## 2022-06-01 RX ORDER — BRINZOLAMIDE/BRIMONIDINE TARTRATE 10; 2 MG/ML; MG/ML
1 DROP INTO AFFECTED EYE SUSPENSION/ DROPS OPHTHALMIC THREE TIMES A DAY
Status: ACTIVE | COMMUNITY

## 2022-06-01 RX ORDER — POLYETHYLENE GLYCOL 3350, SODIUM CHLORIDE, SODIUM BICARBONATE, POTASSIUM CHLORIDE 420; 11.2; 5.72; 1.48 G/4L; G/4L; G/4L; G/4L
AS DIRECTED POWDER, FOR SOLUTION ORAL ONCE
Qty: 1 BOTTLE | Refills: 0 | OUTPATIENT
Start: 2022-06-01 | End: 2022-06-02

## 2022-06-01 RX ORDER — DEXLANSOPRAZOLE 60 MG/1
TAKE 1 CAPSULE BY MOUTH EVERY DAY CAPSULE, DELAYED RELEASE ORAL ONCE A DAY
Qty: 90 | Refills: 4 | Status: ACTIVE | COMMUNITY

## 2022-06-01 RX ORDER — GLUCOSAMINE HCL/CHONDROITIN SU 500-400 MG
TAKE 1 CAPSULE DAILY CAPSULE ORAL
Refills: 0 | Status: ACTIVE | COMMUNITY

## 2022-06-01 RX ORDER — PREDNISOLONE/MOXIFLOXACIN HCL 1 %-0.5 %
AS DIRECTED SUSPENSION, DROPS(FINAL DOSAGE FORM)(ML) OPHTHALMIC (EYE) 4 TIMES DAILY
Status: ACTIVE | COMMUNITY

## 2022-06-01 RX ORDER — AZELASTINE HCL 205.5 UG/1
1 PUFF IN EACH NOSTRIL SPRAY NASAL
Refills: 0 | Status: ACTIVE | COMMUNITY
Start: 2018-10-05

## 2022-06-01 RX ORDER — LATANOPROST/PF 0.005 %
INSTILL 1 DROP IN BOTH EYES AT BEDTIME DROPS OPHTHALMIC (EYE)
Refills: 0 | Status: ACTIVE | COMMUNITY
Start: 2014-05-10

## 2022-06-01 RX ORDER — FAMOTIDINE 40 MG/1
1 TABLET AT BEDTIME TABLET, FILM COATED ORAL ONCE A DAY
Qty: 30 | Refills: 5 | Status: ACTIVE | COMMUNITY

## 2022-06-01 RX ORDER — IPRATROPIUM BROMIDE 42 UG/1
SPRAY, METERED NASAL
Qty: 45 | Refills: 0 | Status: ACTIVE | COMMUNITY
Start: 2020-01-07

## 2022-06-01 RX ORDER — LACTOBACILLUS RHAMNOSUS GG 10B CELL
TAKE 1 CAPSULE DAILY CAPSULE ORAL
Refills: 0 | Status: ACTIVE | COMMUNITY

## 2022-06-01 RX ORDER — BUDESONIDE 0.5 MG/2ML
USE 1 UNIT DOSE VIA NEBULIZER TWO TIMES A DAY INHALANT ORAL
Qty: 60 | Refills: 0 | Status: ACTIVE | COMMUNITY
Start: 2012-03-30

## 2022-06-01 RX ORDER — ASCORBIC ACID 500 MG
TAKE 1 TABLET DAILY TABLET ORAL
Refills: 0 | Status: ACTIVE | COMMUNITY
Start: 2007-04-18

## 2022-06-01 RX ORDER — TELMISARTAN 20 MG/1
1 TABLET TABLET ORAL ONCE A DAY
Refills: 0 | Status: ACTIVE | COMMUNITY
Start: 2019-11-04

## 2022-06-01 RX ORDER — DICLOFENAC SODIUM 10 MG/G
GEL TOPICAL
Qty: 300 | Refills: 0 | Status: ACTIVE | COMMUNITY
Start: 2019-09-18

## 2022-06-01 NOTE — HPI-TODAY'S VISIT:
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 2023, GERD, postprandial epigastric pain, constipation, and a fraternal history of colon cancer due screening in September 2022 presenting for follow-up. She was last seen 3/24/2022.  Heartburn was controlled with Dexilant and famotidine.  She reported intermittent solid food dysphagia and persistent postprandial epigastric pain for which CT was negative for source.  EGD in 2021 demonstrated normal esophagus.  However, she benefited from empiric dilation.  She was to continue her regimen for acid reflux and was scheduled for an EGD.  Gastroparesis was a consideration.  Constipation was controlled with daily probiotics and fiber which she was to continue.  A recent alpha-fetoprotein was normal and CT demonstrated unremarkable liver.  She reported her primary care physician mention thrombocytopenia with prior labs.  Labs were requested for review.  Repeat liver imaging and alpha-fetoprotein were recommended in 1 year.  She is taking famotidine 40 mg at lunchtime and Dexilant at bedtime.  She has infrequent nocturnal symptoms.  2 nights ago, she had significant reflux with burning in her chest and throat that persisted for a while.  She had wheezing afterward.  This represents an isolated event.  She admits eating a chocolate brownie later in the day.  She reports dysphagia has resolved following dilation.  Constipation is controlled with a probiotic and daily fiber.  She has postprandial bloating in the upper abdomen and denies any other abdominal symptoms.  She is ready to schedule colonoscopy for this fall.

## 2022-06-01 NOTE — HPI-OTHER HISTORIES
CT of the abdomen and pelvis with IV contrast 2/25/2022:No acute abnormality in the abdomen and pelvis.  Scattered colonic diverticula without evidence of acute diverticulitis.  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  Total arthroplasty of the left hip. Labs 2/15/2022:Alpha-fetoprotein 4.2.  No other lab results are available at this time. Labs 10/19/2021:Ionized calcium 5.3.  10/6/2021 BMP normal with exception of glucose 132, BUN 19, calcium 10.5.  LFTs normal TB 0.6, ALP 41, ALT 17, AST 16.  Hemoglobin A1c 7. 5/10/2021 alpha-fetoprotein 4.0. EGD 6/2/2021:No endoscopic abnormality to explain dysphagia status post dilation to 48 Thai with a Fuentes dilator.  Normal stomach, normal examined duodenum.  No specimens collected. Abdominal ultrasound 6/1/2021:Negative study.

## 2022-06-01 NOTE — HPI-OTHER HISTORIES
EGD 5/3/2022:No endoscopic abnormality evident to explain dysphagia status post empiric dilation with a 45 and 48 [place] Savary dilator, normal stomach, normal examined duodenum.

## 2022-06-27 PROBLEM — 312824007: Status: ACTIVE | Noted: 2021-05-10

## 2022-07-20 ENCOUNTER — OFFICE VISIT (OUTPATIENT)
Dept: OTOLARYNGOLOGY | Facility: CLINIC | Age: 72
End: 2022-07-20
Payer: MEDICARE

## 2022-07-20 VITALS — TEMPERATURE: 98 F | BODY MASS INDEX: 21.64 KG/M2 | HEIGHT: 61 IN | WEIGHT: 114.63 LBS

## 2022-07-20 DIAGNOSIS — R42 DIZZINESS: ICD-10-CM

## 2022-07-20 DIAGNOSIS — H61.23 BILATERAL IMPACTED CERUMEN: Primary | ICD-10-CM

## 2022-07-20 PROCEDURE — 99999 PR PBB SHADOW E&M-EST. PATIENT-LVL IV: CPT | Mod: PBBFAC,,, | Performed by: PHYSICIAN ASSISTANT

## 2022-07-20 PROCEDURE — 69210 REMOVE IMPACTED EAR WAX UNI: CPT | Mod: S$PBB,,, | Performed by: PHYSICIAN ASSISTANT

## 2022-07-20 PROCEDURE — 69210 EAR CERUMEN REMOVAL: ICD-10-PCS | Mod: S$PBB,,, | Performed by: PHYSICIAN ASSISTANT

## 2022-07-20 PROCEDURE — 99203 OFFICE O/P NEW LOW 30 MIN: CPT | Mod: 25,S$PBB,, | Performed by: PHYSICIAN ASSISTANT

## 2022-07-20 PROCEDURE — 99203 PR OFFICE/OUTPT VISIT, NEW, LEVL III, 30-44 MIN: ICD-10-PCS | Mod: 25,S$PBB,, | Performed by: PHYSICIAN ASSISTANT

## 2022-07-20 PROCEDURE — 99214 OFFICE O/P EST MOD 30 MIN: CPT | Mod: PBBFAC,PO | Performed by: PHYSICIAN ASSISTANT

## 2022-07-20 PROCEDURE — 69210 REMOVE IMPACTED EAR WAX UNI: CPT | Mod: 50,PBBFAC,PO | Performed by: PHYSICIAN ASSISTANT

## 2022-07-20 PROCEDURE — 99999 PR PBB SHADOW E&M-EST. PATIENT-LVL IV: ICD-10-PCS | Mod: PBBFAC,,, | Performed by: PHYSICIAN ASSISTANT

## 2022-07-20 NOTE — PROCEDURES
Ear Cerumen Removal    Date/Time: 7/20/2022 8:45 AM  Performed by: Asher Gaitan PA-C  Authorized by: Asher Gaitan PA-C     Consent Done?:  Yes (Verbal)    Local anesthetic:  None  Location details:  Both ears  Procedure type comment:  Suctions, alligator forceps, and curette  Cerumen  Removal Results:  Cerumen completely removed  Patient tolerance:  Patient tolerated the procedure well with no immediate complications     Procedure Note:    The patient was brought to the minor procedure room and placed under the operating microscope. Using suction, alligator forceps and curette, the patient's cerumen impactions were removed along with some dry skin from bilateral EACs. The tympanic membranes were evaluated and was unremarkable. The patient tolerated the procedure well. There were no complications.

## 2022-07-20 NOTE — PROGRESS NOTES
[facility]    Subjective:      Patient: Cecilia Valenzuela Patient PCP: Buddy Mathew MD         :  1950     Sex:  female      MRN:  [id number]          Date of Visit: 2022      Chief Complaint: Ear wax    Patient ID: Cecilia Valenzuela is a 72 y.o. female who was self-referred for ear cleaning. Pt previously seen by ENT MD Dr. Kaleb De La Torre and Marco A Lomeli for cerumen impaction and dizziness. Pt states she was previously told that she had vestibular weakness on the left side. Pt completed VPT and pt takes meclizine 12.5mg every night. Pt states she had flair up of dizziness last around 1 year ago and would like consult with VPT to go over exercises. Pt states that her ears have been getting clogged for around 1 week or so. Pt states the night before last she started having dizziness. Pt states she went she went through VNG and MRI and she was told that she had vestibular weakness without retrocochlear pathology. Pt denies fever/chills, ear pain or ear discharge. There is not a prior history of ear surgery. Pt states that she has benign bony growth behind left ear that has not grown in size and there were plans to have it shaved down with elective surgery with Dr. Lomeli, but pt never followed up for surgery.     Review of Systems   Constitutional: Negative.    Eyes: Negative.    Respiratory: Negative.    Cardiovascular: Negative.    Endocrine: Negative.    Genitourinary: Negative.    Musculoskeletal: Negative.    Skin: Negative.    Allergic/Immunologic: Negative.    Neurological: Positive for dizziness.   Hematological: Negative.    Psychiatric/Behavioral: Negative.       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    Family History  Her family history includes Breast cancer in her maternal aunt and mother; Breast cancer (age of onset: 40) in her sister; Breast cancer (age of onset: 50) in her sister; Cancer in her father; Heart disease in her mother; Hypertension in her mother; Parkinsonism in her father; Rheum arthritis in her mother.    Past Surgical History:   Procedure Laterality Date    BACK SURGERY      BREAST BIOPSY Right 2017    CATARACT EXTRACTION Bilateral      SECTION      x5    DILATION AND CURETTAGE OF UTERUS      miss Ab    HYSTERECTOMY      MASTECTOMY Right 2017     Social History     Tobacco Use    Smoking status: Never Smoker    Smokeless tobacco: Never Used   Substance and Sexual Activity    Alcohol use: No    Drug use: No    Sexual activity: Never     Partners: Male     Medications  She has a current medication list which includes the following prescription(s): anastrozole, ascorbic acid (vitamin c), aspirin, azelastine, calcium carbonate/vitamin d3, coq10 (ubiquinol), denosumab, glucosamine hcl/chondroitin springer, lisinopril, lorazepam, magnesium, meclizine, multivitamin, omeprazole, pataday, toprol xl, zinc gluconate, and amoxicillin.    Review of patient's allergies indicates:   Allergen Reactions    Methylprednisolone      Other reaction(s): Heart palpitations  PT STATES SHE HAS A REACTION TO ALL STEROIDS DUE TO HER DX; OF MVP..    Zofran [ondansetron hcl (pf)] Nausea Only    Corticosteroids (glucocorticoids) Other (See Comments)     Heart palpitations    Dilaudid [hydromorphone] Nausea And Vomiting     Does not help with pain    Fentanyl     Neosporin (neomycin-polymyx)     Norvasc [amlodipine]     Statins-hmg-coa reductase inhibitors      myalgias    Tetracyclines     Zoloft [sertraline]       Increased anxiety     Augmentin [amoxicillin-pot clavulanate] Nausea And Vomiting     All medications, allergies, and past history have been reviewed.    Objective:      Vitals:  Vitals - 1 value per visit 5/12/2022 7/20/2022 7/20/2022   SYSTOLIC 128 - -   DIASTOLIC 72 - -   Pulse 74 - -   Temp - - 98.3   Resp - - -   SPO2 99 - -   Weight (lb) 112.3 - 114.64   Weight (kg) 50.939 - 52   Height 61 - 61   BMI (Calculated) 21.2 - 21.7   VISIT REPORT - - -   Pain Score  - 0 -   Some recent data might be hidden       Body surface area is 1.5 meters squared.    Physical Exam  Constitutional:       General: She is not in acute distress.     Appearance: Normal appearance. She is not ill-appearing.   HENT:      Head: Normocephalic and atraumatic.      Right Ear: Tympanic membrane, ear canal and external ear normal. There is impacted cerumen.      Left Ear: Tympanic membrane, ear canal and external ear normal. There is impacted cerumen.      Ears:        Nose: Nose normal.      Mouth/Throat:      Lips: Pink. No lesions.      Mouth: Mucous membranes are moist. No oral lesions.      Tongue: No lesions.      Palate: No lesions.      Pharynx: Oropharynx is clear. Uvula midline. No pharyngeal swelling, oropharyngeal exudate, posterior oropharyngeal erythema or uvula swelling.   Eyes:      General:         Right eye: No discharge.         Left eye: No discharge.      Extraocular Movements: Extraocular movements intact.      Conjunctiva/sclera: Conjunctivae normal.   Pulmonary:      Effort: Pulmonary effort is normal.   Neurological:      General: No focal deficit present.      Mental Status: She is alert and oriented to person, place, and time. Mental status is at baseline.   Psychiatric:         Mood and Affect: Mood normal.         Behavior: Behavior normal.         Thought Content: Thought content normal.         Judgment: Judgment normal.       Fukuda Step Test: Negative  Romberg: Pt had slight wobbling  [place] Hallpike:  Negative    Ear Cerumen Removal     Date/Time: 7/20/2022 8:45 AM  Performed by: Asher Gaitan PA-C  Authorized by: Asher Gaitan PA-C      Consent Done?:  Yes (Verbal)     Local anesthetic:  None  Location details:  Both ears  Procedure type comment:  Suctions, alligator forceps, and curette  Cerumen  Removal Results:  Cerumen completely removed  Patient tolerance:  Patient tolerated the procedure well with no immediate complications      Procedure Note:     The patient was brought to the minor procedure room and placed under the operating microscope. Using suction, alligator forceps and curette, the patient's cerumen impactions were removed along with some dry skin from bilateral EACs. The tympanic membranes were evaluated and was unremarkable. The patient tolerated the procedure well. There were no complications.    Labs:  WBC   Date Value Ref Range Status   03/28/2022 6.6 3.8 - 10.8 Thousand/uL Final     Platelets   Date Value Ref Range Status   03/28/2022 211 140 - 400 Thousand/uL Final     Creatinine   Date Value Ref Range Status   03/28/2022 0.65 0.60 - 0.93 mg/dL Final     Comment:     For patients >49 years of age, the reference limit  for Creatinine is approximately 13% higher for people  identified as -American.          TSH   Date Value Ref Range Status   05/14/2021 2.64 0.40 - 4.50 mIU/L Final     Glucose   Date Value Ref Range Status   03/28/2022 82 65 - 139 mg/dL Final     Comment:               Non-fasting reference interval            All lab results and imaging results have been reviewed.    Assessment:        ICD-10-CM ICD-9-CM   1. Bilateral impacted cerumen  H61.23 380.4   2. Dizziness  R42 780.4            Plan:      Bilateral impacted cerumen  -     Ear Cerumen Removal performed in office. Please see procedure note. Pt advised to use baby oil or mineral oil 1-2 drops to each ear canal once a week to help with ear canal dryness. Pt is to follow up in 6 months for routine  ear cleaning.     Dizziness  - Request records from MD Marco A Lomeli at [facility] for records on dizziness.   -     Consult to VPT to go over exercises for dizziness.

## 2022-07-21 ENCOUNTER — TELEPHONE (OUTPATIENT)
Dept: OTOLARYNGOLOGY | Facility: CLINIC | Age: 72
End: 2022-07-21
Payer: MEDICARE

## 2022-08-03 ENCOUNTER — CLINICAL SUPPORT (OUTPATIENT)
Dept: REHABILITATION | Facility: HOSPITAL | Age: 72
End: 2022-08-03
Payer: MEDICARE

## 2022-08-03 DIAGNOSIS — R26.89 IMBALANCE: ICD-10-CM

## 2022-08-03 DIAGNOSIS — R42 DIZZINESS: ICD-10-CM

## 2022-08-03 PROCEDURE — 97161 PT EVAL LOW COMPLEX 20 MIN: CPT | Mod: PN

## 2022-08-03 PROCEDURE — 97112 NEUROMUSCULAR REEDUCATION: CPT | Mod: PN

## 2022-08-03 NOTE — PLAN OF CARE
[facility]  Physical Therapy Neurological Rehabilitation Initial Evaluation    Name: Cecilia Valenzuela  Clinic Number: [id number]    Therapy Diagnosis:   Encounter Diagnoses   Name Primary?    Dizziness     Imbalance      Physician: Asher Gaitan,*    Physician Orders: PT Eval and Treat   Medical Diagnosis from Referral: dizziness  Evaluation Date: 8/3/2022  Authorization Period Expiration: 07/20/2023  Plan of Care Expiration: 09/17/22  Visit # / Visits authorized: 1/ 1    Time In: 1115  Time Out: 1200  Total Billable Time: 45 minutes    Precautions: Fall and cancer       Subjective     Date of onset: 07/20/22  History of Current Symptoms, Cecilia reports: that she took a fall back in 2019 and experienced a severe episode of vertigo about 3-4 weeks later - described the symptoms as room-spinning with a duration that lasted for hours; she eventually was placed on Meclizine as has been taking it one a day; she stopped taking Meclizine in 2020 while she underwent virtual vestibular therapy; however, she resumed taking the Meclizine once she stopped therapy was over; patient endorses additional symptoms of intermittent tinnitus, ear fullness and difficulty walking in dark rooms; also reports has had a VNG in the past that noted left sided vestibular weakness; now reports intermittent flare ups of vertigo symptoms.     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 1/25/2022    Hyperlipidemia     Hypertension     Malignant neoplasm of upper-outer quadrant of right female breast 9/14/2017    right    Mitral valve prolapse     Neurofibromatosis     Osteopenia     Premature beats     Raynaud's disease     S/P  mastectomy, right 2022    Seborrheic keratosis     Thyroid nodule     Tinnitus     Undiagnosed cardiac murmurs 2019     Surgical History:   Cecilia Valenzuela  has a past surgical history that includes Cataract extraction (Bilateral); Back surgery;  section; Hysterectomy; Dilation and curettage of uterus; Breast biopsy (Right, 2017); and Mastectomy (Right, 2017).    Medications:   eCcilia has a current medication list which includes the following prescription(s): amoxicillin, anastrozole, ascorbic acid (vitamin c), aspirin, azelastine, calcium carbonate/vitamin d3, coq10 (ubiquinol), denosumab, glucosamine hcl/chondroitin springer, lisinopril, lorazepam, magnesium, meclizine, multivitamin, omeprazole, pataday, toprol xl, and zinc gluconate.    Allergies:   Review of patient's allergies indicates:   Allergen Reactions    Methylprednisolone      Other reaction(s): Heart palpitations  PT STATES SHE HAS A REACTION TO ALL STEROIDS DUE TO HER DX; OF MVP..    Zofran [ondansetron hcl (pf)] Nausea Only    Corticosteroids (glucocorticoids) Other (See Comments)     Heart palpitations    Dilaudid [hydromorphone] Nausea And Vomiting     Does not help with pain    Fentanyl     Neosporin (neomycin-polymyx)     Norvasc [amlodipine]     Statins-hmg-coa reductase inhibitors      myalgias    Tetracyclines     Zoloft [sertraline]      Increased anxiety     Augmentin [amoxicillin-pot clavulanate] Nausea And Vomiting      Imaging: none    Prior Therapy: previous round of vestibular therapy  Social History: lives with her family in 1-story home (no steps)  Falls: none   Occupation: retired  Prior Level of Function: independent  Current Level of Function: minimal difficulty with activities of daily living     Pain: no complaints of pain    Pts goals: decrease incidence of dizziness      Objective     - Follows commands: 100% of time   - Speech: no deficits     Functional Mobility & ADLs   Sit to stand:  supervision     Mental status: alert, oriented to person, place, and time, normal mood, behavior, speech, dress, motor activity, and thought processes  Appearance: Casually dressed  Behavior:  calm and cooperative  Attention Span and Concentration:  Normal    Posture Alignment in sitting: within functional limits     Sensation: Light Touch: Intact          Proprioception: Intact, Kinesthesia Intact         Visual/Auditory: denies changes   Tracking/Smooth Pursuits:Intact  Saccades: Intact  VOR: Intact  Head Impulse Test: intact  Dynamic Visual Acuity Test: normal (line 9 without VOR1, line 8 with VOR1)    Coordination:   - fine motor: within functional limits   - UE coordination: within functional limits    - LE coordination:  within functional limits    ROM:   CERVICAL SPINE  Flexion 40 degrees (80-90 deg)  Extension 55 degrees (70-80 deg)  L side bend 25 degrees, R side bend 25 degrees (20-45 degrees)  L rotation 50 degrees, R rotation 50 degrees (70-90 degrees)  Are concurrent symptoms present with any of these movements = no    Modified VAS (Vertebral Artery Screen), in sitting (rotation, then extension):  R: normal  L: normal        RANGE OF MOTION--LOWER EXTREMITIES  (R) LE Hip: normal   Knee: normal   Ankle: normal    (L) LE: Hip: normal   Knee: normal   Ankle: normal    Strength: manual muscle test grades below     Lower Extremity Strength  Right LE  Left LE    Hip flexion:  4/5 Hip flexion: 4/5   Knee extension: 4+/5 Knee extension: 4+/5   Ankle dorsiflexion:  4+/5 Ankle dorsiflexion: 4/5        Gait Assessment:(if indicated)  - AD used: none  - Assistance: supervision   - Distance: 120 feet     GAIT DEVIATIONS:  Cecilia displays the following deviations with ambulation: none    Impairments contributing to deviations: mild imbalance and lower extremity weakness    Endurance Deficit: mild fatigue       POSITIONAL CANAL TESTING  Looking for nystagmus (slow phase followed by quick phase to the affected side for  BPPV)    Bren Hallpike (posterior / CL anterior)   Right : NT   Left: NT  Horizontal Canals   Right: NT   Left: NT  Treatment Performed: n/a        CMS Impairment/Limitation/Restriction for FOTO n/a * Survey    Therapist reviewed FOTO scores for Cecilia Valenzuela on 8/3/2022.     Limitation Score: n/a * %    * n/a = wrong body part loaded into FOTO         OTHER: Dizziness Handicap Inventory = 22/100 (mild handicap); Tinetti = 25/28 (low fall risk)      TREATMENT     Treatment Time In: 1145  Treatment Time Out: 1200  Total Treatment time separate from Evaluation: 15 minutes      Cecilia participated in neuromuscular re-education activities to assess Vestibular Function:  for 15 minutes. The following activities were included:     X 5 each seated smooth pursuits = side to side, up and down              X 5 each seated saccades = side to side, up and down              X 5 each seated VOR1 = side to side, up and down              X 10 static standing holds      Home Exercises and Patient Education Provided    Education provided:   - proper therapeutic exercise technique  - treatment plan    Written Home Exercises Provided: to be provided at future appointment.      Assessment     Cecilia is a 72 y.o. female referred to outpatient Physical Therapy with a medical diagnosis of dizziness. Pt presents to PT with the following impairments leading to her functional decline: intermittent room spinning dizziness. Patient's symptoms are likely due to left sided vestibular hypofunction whose severity is currently masked due to long-term Meclizine use.    Pt prognosis is Fair.   Pt will benefit from skilled outpatient Physical Therapy to address the deficits stated above and in the chart below, provide pt/family education, and to maximize pt's level of independence.     Plan of care discussed with patient: Yes  Pt's spiritual, cultural and educational needs considered and patient is agreeable to the plan of care and goals as stated  below:     Anticipated Barriers for therapy: severity of vertigo symptoms    Medical Necessity is demonstrated by the following  History  Co-morbidities and personal factors that may impact the plan of care Co-morbidities:   history of cancer, HTN, prior abdominal surgery and prior lumbar surgery    Personal Factors:   no deficits     high   Examination  Body Structures and Functions, activity limitations and participation restrictions that may impact the plan of care Body Regions:   head    Body Systems:    balance  gait  transfers  vestibular    Participation Restrictions:   none    Activity limitations:   Learning and applying knowledge  no deficits    General Tasks and Commands  no deficits    Communication  no deficits    Mobility  lifting and carrying objects  walking  driving (bike, car, motorcycle)    Self care  no deficits    Domestic Life  shopping  cooking  doing house work (cleaning house, washing dishes, laundry)    Interactions/Relationships  no deficits    Life Areas  no deficits    Community and Social Life  no deficits         high   Clinical Presentation stable and uncomplicated low   Decision Making/ Complexity Score: low     Goals:    Short Term Goals (3 Weeks):   1. Patient to report that walking around her house in the dark sometimes increases her problem.  2. Patient to tolerate x 45 seconds oculomotor therapeutic exercise in full Romberg stance without an increase in her symptoms.  3. Patient to perform 50% Romberg stance, eyes closed, to improve upright tolerance.    Long Term Goals (6 Weeks):   1. Patient to demonstrate competence with home exercise program to maintain therapeutic gains.  2. Patient to perform x 10 repetitions sit to stand so that she may rise without using her arms to help.  3. Patient to ambulate 300 feet with supervision and without an elevation in her symptoms.      Plan     Plan of care Certification: 8/3/2022 to 09/17/22.    Outpatient Physical Therapy evaluation,  plus 2 times weekly for 6 weeks to include the following interventions (starting week of 08/08/22): Gait Training, Manual Therapy, Moist Heat/ Ice, Neuromuscular Re-ed, Patient Education, Self Care, Therapeutic Activities, Therapeutic Exercise and HEP.     Yung Lyle, PT

## 2022-08-10 ENCOUNTER — CLINICAL SUPPORT (OUTPATIENT)
Dept: REHABILITATION | Facility: HOSPITAL | Age: 72
End: 2022-08-10
Payer: MEDICARE

## 2022-08-10 DIAGNOSIS — R42 DIZZINESS: Primary | ICD-10-CM

## 2022-08-10 PROCEDURE — 97112 NEUROMUSCULAR REEDUCATION: CPT | Mod: PN

## 2022-08-10 PROCEDURE — 97110 THERAPEUTIC EXERCISES: CPT | Mod: PN

## 2022-08-10 NOTE — PROGRESS NOTES
[facility]   Physical Therapy Treatment Note     Name: Cecilia Valenzuela  Clinic Number: [id number]    Therapy Diagnosis:   Encounter Diagnosis   Name Primary?    Dizziness Yes     Physician: Asher Gaitan,*    Visit Date: 8/10/2022    Physician Orders: PT Eval and Treat   Medical Diagnosis from Referral: dizziness  Evaluation Date: 8/3/2022  Authorization Period Expiration: 12/31/2022  Plan of Care Expiration: 09/17/22  Visit # / Visits authorized: 1/ 20     Time In: 1418  Time Out: 1500  Total Billable Time: 42 minutes     Precautions: Fall and cancer       SUBJECTIVE     Pt reports: has tapered her Meclizine to every other day; endorses is fearful of getting a dizziness episode while driving.  She does not have a home exercise program.  Response to previous treatment: no changes  Functional change: none    Pain: no complaints of pain      OBJECTIVE     Objective Measures updated at progress report unless specified.     Treatment     Cecilia received the treatments listed below:      therapeutic exercises to develop endurance and flexibility for 10 minutes including:     X 10 minutes SciFit (level 2) to promote flexibility prior to strength/balance/mobility training       neuromuscular re-education activities to improve: Balance and Vestibular Hypofunction for 32 minutes. The following activities were included:     X 5 standing up and down while holding target   X 5 leaning over touch stool while holding target   X 30 seconds each seated saccades = side to side, up and down              X 30 seconds each seated VOR1 = side to side*, up and down   X 30 seconds each full Romberg stance = eyes open, eyes closed*   X 8 each standing bilateral move rings across body in rainbow pattern   X 15 alternating toe taps on 3-inch stool   X 15 each standing mini squats and heel raises/ toe raises    X 15 feet each balance gait = side stepping, backwards gait   X 30 feet each VOR1 gait = side  to side*, up and down*   X 1 minute stand on AirEx      * minimal increase in symptoms      Patient Education and Home Exercises     Home Exercises Provided and Patient Education Provided     Education provided:   - proper therapeutic exercise technique    Written Home Exercises Provided: to be provided at future appointment.       ASSESSMENT     Patient reported minimal elevation of her symptoms after activities that involved VOR; otherwise, was able to tolerate rest of treatment session without difficulty.    Cecilia Is not progressing well towards her goals.   Pt prognosis is Fair.     Pt will continue to benefit from skilled outpatient physical therapy to address the deficits listed in the problem list box on initial evaluation, provide pt/family education and to maximize pt's level of independence in the home and community environment.     Pt's spiritual, cultural and educational needs considered and pt agreeable to plan of care and goals.     Anticipated barriers to physical therapy: severity of symptoms    Goals:     Short Term Goals (3 Weeks):   1. Patient to report that walking around her house in the dark sometimes increases her problem. (NOT MET)  2. Patient to tolerate x 45 seconds oculomotor therapeutic exercise in full Romberg stance without an increase in her symptoms. (NOT MET)  3. Patient to perform 50% Romberg stance, eyes closed, to improve upright tolerance. (NOT MET)     Long Term Goals (6 Weeks):   1. Patient to demonstrate competence with home exercise program to maintain therapeutic gains. (NOT MET)  2. Patient to perform x 10 repetitions sit to stand so that she may rise without using her arms to help. (NOT MET)  3. Patient to ambulate 300 feet with supervision and without an elevation in her symptoms. (NOT MET)      PLAN     Continue to advance balance and vestibular training to patient's tolerance.      Yung Lyle, PT

## 2022-08-12 ENCOUNTER — CLINICAL SUPPORT (OUTPATIENT)
Dept: REHABILITATION | Facility: HOSPITAL | Age: 72
End: 2022-08-12
Payer: MEDICARE

## 2022-08-12 ENCOUNTER — TELEPHONE ENCOUNTER (OUTPATIENT)
Dept: URBAN - METROPOLITAN AREA CLINIC 113 | Facility: CLINIC | Age: 72
End: 2022-08-12

## 2022-08-12 DIAGNOSIS — R26.89 IMBALANCE: ICD-10-CM

## 2022-08-12 DIAGNOSIS — R42 DIZZINESS: Primary | ICD-10-CM

## 2022-08-12 PROCEDURE — 97112 NEUROMUSCULAR REEDUCATION: CPT | Mod: PN,CQ

## 2022-08-12 PROCEDURE — 97110 THERAPEUTIC EXERCISES: CPT | Mod: PN,CQ

## 2022-08-12 RX ORDER — SODIUM, POTASSIUM,MAG SULFATES 17.5-3.13G
354 ML SOLUTION, RECONSTITUTED, ORAL ORAL ONCE
Qty: 354 MILLILITER | Refills: 0 | OUTPATIENT

## 2022-08-12 NOTE — PROGRESS NOTES
[facility]   Physical Therapy Treatment Note     Name: Cecilia Valenzuela  Clinic Number: [id number]    Therapy Diagnosis:   Encounter Diagnoses   Name Primary?    Dizziness Yes    Imbalance      Physician: Asher Gaitan,*    Visit Date: 8/12/2022    Physician Orders: PT Eval and Treat   Medical Diagnosis from Referral: dizziness  Evaluation Date: 8/3/2022  Authorization Period Expiration: 12/31/2022  Plan of Care Expiration: 09/17/22  Visit # / Visits authorized: 2/ 20     Time In: 945  Time Out: 1030  Total Billable Time: 43 minutes     Precautions: Fall and cancer       SUBJECTIVE     Pt reports: minor increase in symptoms since not taking the medicine since Tuesday of this week, encouraged patient to not take medication in 24 hr period of therapy.   She does not have a home exercise program.  Response to previous treatment: no issues reported   Functional change: none    Pain: no complaints of pain      OBJECTIVE     Objective Measures updated at progress report unless specified.     Treatment     Cecilia received the treatments listed below:      therapeutic exercises to develop endurance and flexibility for 10 minutes including:     X 10 minutes SciFit (level 2) to promote flexibility prior to strength/balance/mobility training       neuromuscular re-education activities to improve: Balance and Vestibular Hypofunction for 33 minutes. The following activities were included:     X 7 standing up and down while holding target    X 7 leaning over touch stool while holding target   2 X 30 seconds each seated/ standing saccades = side to side, up and down               2 X 30 seconds each seated/ standing VOR1 = side to side*, up and down   X 30 seconds each full Romberg stance = eyes open, eyes closed*   X 8 each standing bilateral move rings across body in rainbow pattern   X 15 alternating toe taps on 3-inch stool   X figure 8 through 5 cone    X picking up 5 cones off the floor     X 15 self toss with tennis ball sitting and standing   2 X 15ft self toss during ambulation *unsteadiness    2 X 15 feet each balance gait = side stepping, backwards gait        * minimal increase in symptoms      Patient Education and Home Exercises     Home Exercises Provided and Patient Education Provided     Education provided:   - proper therapeutic exercise technique    Written Home Exercises Provided: to be provided at future appointment.       ASSESSMENT     Patient tolerated session well with only reports of unsteadiness during exercises, no increase in dizziness really reported. Encouraged patient to take medication on an as needed basis and not 24 hrs before her therapy session. Increase in symptoms with quick change of direction. Continue to progress as tolerated with habituation and dynamic balance exercises.     Cecilia Is progressing well towards her goals.   Pt prognosis is Fair.     Pt will continue to benefit from skilled outpatient physical therapy to address the deficits listed in the problem list box on initial evaluation, provide pt/family education and to maximize pt's level of independence in the home and community environment.     Pt's spiritual, cultural and educational needs considered and pt agreeable to plan of care and goals.     Anticipated barriers to physical therapy: severity of symptoms    Goals:     Short Term Goals (3 Weeks):   1. Patient to report that walking around her house in the dark sometimes increases her problem. (NOT MET)  2. Patient to tolerate x 45 seconds oculomotor therapeutic exercise in full Romberg stance without an increase in her symptoms. (NOT MET)  3. Patient to perform 50% Romberg stance, eyes closed, to improve upright tolerance. (NOT MET)     Long Term Goals (6 Weeks):   1. Patient to demonstrate competence with home exercise program to maintain therapeutic gains. (NOT MET)  2. Patient to perform x 10 repetitions sit to stand so that she may rise without  using her arms to help. (NOT MET)  3. Patient to ambulate 300 feet with supervision and without an elevation in her symptoms. (NOT MET)      PLAN     Continue to advance balance and vestibular training to patient's tolerance.      Laura Contreras, PTA

## 2022-08-16 ENCOUNTER — TELEPHONE ENCOUNTER (OUTPATIENT)
Dept: URBAN - METROPOLITAN AREA CLINIC 113 | Facility: CLINIC | Age: 72
End: 2022-08-16

## 2022-08-16 ENCOUNTER — CLINICAL SUPPORT (OUTPATIENT)
Dept: REHABILITATION | Facility: HOSPITAL | Age: 72
End: 2022-08-16
Payer: MEDICARE

## 2022-08-16 DIAGNOSIS — R26.89 IMBALANCE: ICD-10-CM

## 2022-08-16 DIAGNOSIS — R42 DIZZINESS: Primary | ICD-10-CM

## 2022-08-16 PROCEDURE — 97140 MANUAL THERAPY 1/> REGIONS: CPT | Mod: PN,CQ

## 2022-08-16 PROCEDURE — 97110 THERAPEUTIC EXERCISES: CPT | Mod: PN,CQ

## 2022-08-16 RX ORDER — SODIUM PICOSULFATE, MAGNESIUM OXIDE, AND ANHYDROUS CITRIC ACID 10; 3.5; 12 MG/160ML; G/160ML; G/160ML
160 ML LIQUID ORAL
Qty: 320 MILLILITER | Refills: 0 | OUTPATIENT

## 2022-08-16 RX ORDER — SODIUM, POTASSIUM,MAG SULFATES 17.5-3.13G
354 ML SOLUTION, RECONSTITUTED, ORAL ORAL
Qty: 354 MILLILITER | Refills: 0 | OUTPATIENT

## 2022-08-16 NOTE — PROGRESS NOTES
"[facility]   Physical Therapy Treatment Note     Name: Cecilia Valenzuela  Clinic Number: [id number]    Therapy Diagnosis:   Encounter Diagnoses   Name Primary?    Dizziness Yes    Imbalance      Physician: Asher Gaitan,*    Visit Date: 8/16/2022    Physician Orders: PT Eval and Treat   Medical Diagnosis from Referral: dizziness  Evaluation Date: 8/3/2022  Authorization Period Expiration: 12/31/2022  Plan of Care Expiration: 09/17/22  Visit # / Visits authorized: 4/20     Time In: 1500  Time Out: 1545  Total Billable Time: 45  minutes     Precautions: Fall and cancer       SUBJECTIVE     Pt reports: she is having just her usual dizziness but it did get better after her last treatment.  Pt stated it was better for her to walk into a room and walk straighter.  "[place]"  She does not have a home exercise program.  Response to previous treatment: felt better  Functional change: able to go into a dark room and walk straighter    Pain: no complaints of pain      OBJECTIVE     Objective Measures updated at progress report unless specified.     Treatment     Cecilia received the treatments listed below:      therapeutic exercises to develop endurance and flexibility for 10 minutes including:    X 10 minutes SciFit (level 3) to promote flexibility prior to strength/balance/mobility training     neuromuscular re-education activities to improve: Balance and Vestibular Hypofunction for 35 minutes. The following activities were included:    standing up and down while holding target x 7 reps   leaning over touch stool while holding target x 7 reps   Seated Saccades x 30 seconds up/down, side/side   Seated VOR1 x 30 seconds up/down, side/side   standing saccades = side to side, up and down   standing VOR1 = side to side, up and down  Convergence/divergence 5 cones x 10 reps   full Romberg stance x 30 seconds each= eyes open, eyes closed*  alternating toe taps on " 3-inch stool x 20 reps bilateral lower extremity alternating  figure 8 through 5 cone* x 2   picking up 5 cones off the floor   Floor ladder forward/sideways      * minimal increase in symptoms      Patient Education and Home Exercises     Home Exercises Provided and Patient Education Provided     Education provided:   - proper therapeutic exercise technique    Written Home Exercises Provided: Pt given Home exercise program for saccades and VOR1 exercises today       ASSESSMENT     Cecilia provided good effort and participation toward therapeutic interventions today with focus on balance and vestibular exercises.  Pt with noted improvement with exercises and decrease in her symptoms when she does exercises.    Cecilia Is progressing well towards her goals.   Pt prognosis is Fair.     Pt will continue to benefit from skilled outpatient physical therapy to address the deficits listed in the problem list box on initial evaluation, provide pt/family education and to maximize pt's level of independence in the home and community environment.     Pt's spiritual, cultural and educational needs considered and pt agreeable to plan of care and goals.     Anticipated barriers to physical therapy: severity of symptoms    Goals:     Short Term Goals (3 Weeks):   1. Patient to report that walking around her house in the dark sometimes increases her problem. (NOT MET)  2. Patient to tolerate x 45 seconds oculomotor therapeutic exercise in full Romberg stance without an increase in her symptoms. (NOT MET)  3. Patient to perform 50% Romberg stance, eyes closed, to improve upright tolerance. (NOT MET)     Long Term Goals (6 Weeks):   1. Patient to demonstrate competence with home exercise program to maintain therapeutic gains. (NOT MET)  2. Patient to perform x 10 repetitions sit to stand so that she may rise without using her arms to help. (NOT MET)  3. Patient to ambulate 300 feet with supervision and without an elevation in her  symptoms. (NOT MET)      PLAN   Plan of care Certification: 8/3/2022 to 09/17/22.     Outpatient Physical Therapy evaluation, plus 2 times weekly for 6 weeks to include the following interventions (starting week of 08/08/22): Gait Training, Manual Therapy, Moist Heat/ Ice, Neuromuscular Re-ed, Patient Education, Self Care, Therapeutic Activities, Therapeutic Exercise and HEP.      Continue to advance balance and vestibular training to patient's tolerance.      Donna Lanier, PTA

## 2022-08-16 NOTE — PROGRESS NOTES
PT/PTA met face to face to discuss pt's treatment plan and progress towards established goals. Pt will be seen by a physical therapist minimally every 6th visit or every 30 days.      Donna Lanier PTA

## 2022-08-19 ENCOUNTER — CLINICAL SUPPORT (OUTPATIENT)
Dept: REHABILITATION | Facility: HOSPITAL | Age: 72
End: 2022-08-19
Payer: MEDICARE

## 2022-08-19 DIAGNOSIS — R42 DIZZINESS: Primary | ICD-10-CM

## 2022-08-19 DIAGNOSIS — R26.89 IMBALANCE: ICD-10-CM

## 2022-08-19 PROCEDURE — 97110 THERAPEUTIC EXERCISES: CPT | Mod: PN

## 2022-08-19 PROCEDURE — 97112 NEUROMUSCULAR REEDUCATION: CPT | Mod: PN

## 2022-08-19 NOTE — PROGRESS NOTES
[facility]   Physical Therapy Treatment Note     Name: Cecilia Valenzuela  Clinic Number: [id number]    Therapy Diagnosis:   Encounter Diagnoses   Name Primary?    Dizziness Yes    Imbalance      Physician: Asher Gaitan,*    Visit Date: 8/19/2022    Physician Orders: PT Eval and Treat   Medical Diagnosis from Referral: dizziness  Evaluation Date: 8/3/2022  Authorization Period Expiration: 12/31/2022  Plan of Care Expiration: 09/17/22  Visit # / Visits authorized: 4/ 20     Time In: 0900  Time Out: 0945  Total Billable Time: 45 minutes     Precautions: Fall and cancer       SUBJECTIVE     Pt reports: episode of dysequilibrium while walking on uneven terrain - instructed to lock on to a stationary target to help improve stability.    She does not have a home exercise program.  Response to previous treatment: no changes  Functional change: none    Pain: no complaints of pain      OBJECTIVE     Objective Measures updated at progress report unless specified.     Treatment     Cecilia received the treatments listed below:      therapeutic exercises to develop endurance and flexibility for 10 minutes including:     X 10 minutes SciFit (level 2.5) to promote flexibility prior to strength/balance/mobility training       neuromuscular re-education activities to improve: Balance and Vestibular Hypofunction for 35 minutes. The following activities were included:     X 8 standing up and down while holding target   X 8 leaning over touch stool while holding target   X 10 reading letters in four-square saccades patten               X 25 reading letters while performing VOR1 = side to side, up and down   X 30 seconds each 50% Romberg stance = eyes open, eyes closed   X 30 seconds each bug walks and bug jumps ([employer])   X 15 standing self tennis ball tosses   2 x 30 feet ball catches during ambulation while holding target     X 15 cross reaches    X 1 minute stand on bilateral BOSU  minis   Reviewed oculomotor home exercise program - instructed to perform twice a day       * minimal increase in symptoms      Patient Education and Home Exercises     Home Exercises Provided and Patient Education Provided     Education provided:   - proper therapeutic exercise technique    Written Home Exercises Provided: yes. Exercises were reviewed and Cecilia was able to demonstrate them prior to the end of the session.  Cecilia demonstrated fair  understanding of the education provided. See EMR under Patient Instructions for exercises provided during therapy sessions.      ASSESSMENT     Patient was able to perform 50% Romberg training; oculomotor therapeutic exercise performed in standing; increased repetitions performed during habituation exercises; able to stand on bilateral BOSU minis;         reported minimal elevation of her symptoms after activities that involved VOR; otherwise, was able to tolerate rest of treatment session without difficulty.    Cecilia Is not progressing well towards her goals.   Pt prognosis is Fair.     Pt will continue to benefit from skilled outpatient physical therapy to address the deficits listed in the problem list box on initial evaluation, provide pt/family education and to maximize pt's level of independence in the home and community environment.     Pt's spiritual, cultural and educational needs considered and pt agreeable to plan of care and goals.     Anticipated barriers to physical therapy: severity of symptoms    Goals:     Short Term Goals (3 Weeks):   1. Patient to report that walking around her house in the dark sometimes increases her problem. (NOT MET)  2. Patient to tolerate x 45 seconds oculomotor therapeutic exercise in full Romberg stance without an increase in her symptoms. (NOT MET)  3. Patient to perform 50% Romberg stance, eyes closed, to improve upright tolerance. (NOT MET)     Long Term Goals (6 Weeks):   1. Patient to demonstrate competence with home  exercise program to maintain therapeutic gains. (NOT MET)  2. Patient to perform x 10 repetitions sit to stand so that she may rise without using her arms to help. (NOT MET)  3. Patient to ambulate 300 feet with supervision and without an elevation in her symptoms. (NOT MET)      PLAN     Continue to advance balance and vestibular training to patient's tolerance.      Yung Lyle, PT

## 2022-08-23 ENCOUNTER — TELEPHONE (OUTPATIENT)
Dept: FAMILY MEDICINE | Facility: CLINIC | Age: 72
End: 2022-08-23

## 2022-08-23 ENCOUNTER — CLINICAL SUPPORT (OUTPATIENT)
Dept: REHABILITATION | Facility: HOSPITAL | Age: 72
End: 2022-08-23
Payer: MEDICARE

## 2022-08-23 DIAGNOSIS — E78.00 ELEVATED LDL CHOLESTEROL LEVEL: Primary | ICD-10-CM

## 2022-08-23 DIAGNOSIS — R26.89 IMBALANCE: ICD-10-CM

## 2022-08-23 DIAGNOSIS — R42 DIZZINESS: Primary | ICD-10-CM

## 2022-08-23 DIAGNOSIS — I10 ESSENTIAL HYPERTENSION: ICD-10-CM

## 2022-08-23 DIAGNOSIS — Z79.899 ENCOUNTER FOR LONG-TERM (CURRENT) USE OF OTHER MEDICATIONS: ICD-10-CM

## 2022-08-23 PROCEDURE — 97112 NEUROMUSCULAR REEDUCATION: CPT | Mod: PN,CQ

## 2022-08-23 PROCEDURE — 97110 THERAPEUTIC EXERCISES: CPT | Mod: PN,CQ

## 2022-08-23 NOTE — TELEPHONE ENCOUNTER
Spoke to patient that fasting lab is due to recheck cholesterol, encouraged water, orders to [facility], said she goes to one in [place]. Updated remind me

## 2022-08-23 NOTE — TELEPHONE ENCOUNTER
----- Message from Roma Jolly LPN sent at 5/23/2022  3:40 PM CDT -----  Please call patient and let her know recent blood work shows her cholesterol levels continue to be elevated with her bad cholesterol , up slightly from 127 years ago and goal is <100.  Her good cholesterol HDL remains good at 55. Her 10 year cardiovascular risk is on the high intermediate range at 17%- would she consider starting ezetimibe which is a non-statin medication to help lower cholesterol? If she agrees recommend starting ezetimibe 10mg daily and recheck lipids, CMP in 3 mos   Thyroid level within normal range. Urine negative for infectio, protein or blood

## 2022-08-23 NOTE — PROGRESS NOTES
"[facility]   Physical Therapy Treatment Note     Name: Cecilia Valenzuela  Clinic Number: [id number]    Therapy Diagnosis:   Encounter Diagnoses   Name Primary?    Dizziness Yes    Imbalance      Physician: Asher Gaitan,*    Visit Date: 8/23/2022    Physician Orders: PT Eval and Treat   Medical Diagnosis from Referral: dizziness  Evaluation Date: 8/3/2022  Authorization Period Expiration: 12/31/2022  Plan of Care Expiration: 09/17/22  Visit # / Visits authorized: 6/20  PTA visit #  1/5     Time In: 1418  Time Out: 1500  Total Billable Time: 42 minutes     Precautions: Fall and cancer       SUBJECTIVE     Pt reports: she is not having any dizziness or pain today.  She does not have a home exercise program.  Response to previous treatment: felt better  Functional change: able to go into a dark room and walk straighter    Pain: no complaints of pain    OBJECTIVE     Objective Measures updated at progress report unless specified.     Treatment     Cecilia received the treatments listed below:      therapeutic exercises to develop endurance and flexibility for 10 minutes including:    X 10 minutes SciFit (level 3) to promote flexibility prior to strength/balance/mobility training     neuromuscular re-education activities to improve: Balance and Vestibular Hypofunction for 32 minutes. The following activities were included:    standing up and down while holding target x 7 reps   leaning over touch stool while holding target x 7 reps   Seated Saccades x 30 seconds up/down, side/side   standing saccades = side to side, up and down   Seated VOR1 x 30 seconds up/down, side/side   standing VOR1 = side to side, up and down  Throwing/catching with foot on 4" stool x 20 reps each    full Romberg stance x 30 seconds each= eyes open, eyes closed*  alternating toe taps on 3-inch stool x 20 reps bilateral lower extremity alternating  picking up 8 cones off various levels      * minimal increase in " symptoms      Patient Education and Home Exercises     Home Exercises Provided and Patient Education Provided     Education provided:   - proper therapeutic exercise technique    Written Home Exercises Provided: Pt given Home exercise program for saccades and VOR1 exercises today       ASSESSMENT     Cecilia provided good effort and participation toward therapeutic interventions today with focus on balance and vestibular exercises.  Pt only experienced increase in her vestibular symptoms with rhomberg eyes closed but minimal and not as bad as usual.      Cecilia Is progressing well towards her goals.   Pt prognosis is Fair.     Pt will continue to benefit from skilled outpatient physical therapy to address the deficits listed in the problem list box on initial evaluation, provide pt/family education and to maximize pt's level of independence in the home and community environment.     Pt's spiritual, cultural and educational needs considered and pt agreeable to plan of care and goals.     Anticipated barriers to physical therapy: severity of symptoms    Goals:     Short Term Goals (3 Weeks):   1. Patient to report that walking around her house in the dark sometimes increases her problem. (NOT MET)  2. Patient to tolerate x 45 seconds oculomotor therapeutic exercise in full Romberg stance without an increase in her symptoms. (NOT MET)  3. Patient to perform 50% Romberg stance, eyes closed, to improve upright tolerance. (NOT MET)     Long Term Goals (6 Weeks):   1. Patient to demonstrate competence with home exercise program to maintain therapeutic gains. (NOT MET)  2. Patient to perform x 10 repetitions sit to stand so that she may rise without using her arms to help. (NOT MET)  3. Patient to ambulate 300 feet with supervision and without an elevation in her symptoms. (NOT MET)      PLAN   Plan of care Certification: 8/3/2022 to 09/17/22.     Outpatient Physical Therapy evaluation, plus 2 times weekly for 6 weeks to  include the following interventions (starting week of 08/08/22): Gait Training, Manual Therapy, Moist Heat/ Ice, Neuromuscular Re-ed, Patient Education, Self Care, Therapeutic Activities, Therapeutic Exercise and HEP.      Continue to advance balance and vestibular training to patient's tolerance.      Donna Lanier, PTA

## 2022-08-26 ENCOUNTER — OFFICE VISIT (OUTPATIENT)
Dept: URBAN - METROPOLITAN AREA MEDICAL CENTER 2 | Facility: MEDICAL CENTER | Age: 72
End: 2022-08-26
Payer: MEDICARE

## 2022-08-26 ENCOUNTER — CLINICAL SUPPORT (OUTPATIENT)
Dept: REHABILITATION | Facility: HOSPITAL | Age: 72
End: 2022-08-26
Payer: MEDICARE

## 2022-08-26 DIAGNOSIS — R26.89 IMBALANCE: ICD-10-CM

## 2022-08-26 DIAGNOSIS — Z80.0 BROTHER AT YOUNG AGE FAMILY HISTORY OF COLON CANCER: ICD-10-CM

## 2022-08-26 DIAGNOSIS — R42 DIZZINESS: Primary | ICD-10-CM

## 2022-08-26 PROCEDURE — 97112 NEUROMUSCULAR REEDUCATION: CPT | Mod: PN

## 2022-08-26 PROCEDURE — 97110 THERAPEUTIC EXERCISES: CPT | Mod: PN

## 2022-08-26 PROCEDURE — G0105 COLORECTAL SCRN; HI RISK IND: HCPCS | Performed by: INTERNAL MEDICINE

## 2022-08-26 NOTE — PROGRESS NOTES
[facility]   Physical Therapy Progress Note     Name: Cecilia Valenzuela  Clinic Number: [id number]    Therapy Diagnosis:   Encounter Diagnoses   Name Primary?    Dizziness Yes    Imbalance      Physician: Asher Gaitan,*    Visit Date: 8/26/2022    Physician Orders: PT Eval and Treat   Medical Diagnosis from Referral: dizziness  Evaluation Date: 8/3/2022  Authorization Period Expiration: 12/31/2022  Plan of Care Expiration: 09/17/22  Visit # / Visits authorized: 6/ 20     Time In: 1032  Time Out: 1115  Total Billable Time: 43 minutes     Precautions: Fall and cancer       SUBJECTIVE     Pt reports: that she can manage her episodes of dysequilibrium by holding target.    She was compliant with home exercise program.  Response to previous treatment: no changes  Functional change: none    Pain: no complaints of pain      OBJECTIVE     Lower Extremity Strength  Right LE   Left LE     Hip flexion:  4/5 Hip flexion: 4/5   Knee extension: 4+/5 Knee extension: 4+/5   Ankle dorsiflexion:  4+/5 Ankle dorsiflexion: 4/5                    Gait Assessment:(if indicated)  - AD used: none  - Assistance: supervision   - Distance: 2 x 150 feet     Treatment     Cecilia received the treatments listed below:      therapeutic exercises to develop endurance and flexibility for 10 minutes including:     X 10 minutes NuStep (level 3) to promote flexibility prior to strength/balance/mobility training       neuromuscular re-education activities to improve: Balance and Vestibular Hypofunction for 33 minutes. The following activities were included:     X 10 standing up and down while holding target   X 10 leaning over touch stool while holding target   X 45 seconds each standing saccades = side to side, up and down              X 45 seconds each standing VOR1 = side to side, up and down    2 X 30 feet each VOR1 gait = side to side*, up and down   X 45 seconds each 50% Romberg stance = eyes open, eyes  closed   X 15 alternating toe taps on 4-inch cones   2 x 15 feet heel toe gait   2 x 15 feet each floor ladder high stepping = forwards, sideways      * minimal increase in symptoms      Patient Education and Home Exercises     Home Exercises Provided and Patient Education Provided     Education provided:   - proper therapeutic exercise technique  - continue home exercise program     Written Home Exercises Provided: Patient instructed to cont prior HEP.       ASSESSMENT     Patient reported minimal elevation of her symptoms after VOR gait - also able to perform multiple sets; able to demonstrate longer stance time during oculomotor exercises and 50% Romberg training; use of cones during alternating toe taps performed without difficulty; increased repetitions performed during habituation exercises.    Cecilia Is progressing fairly well towards her goals.   Pt prognosis is Fair.     Pt will continue to benefit from skilled outpatient physical therapy to address the deficits listed in the problem list box on initial evaluation, provide pt/family education and to maximize pt's level of independence in the home and community environment.     Pt's spiritual, cultural and educational needs considered and pt agreeable to plan of care and goals.     Anticipated barriers to physical therapy: severity of symptoms    Goals:     Short Term Goals (3 Weeks):   Patient to report that walking around her house in the dark sometimes increases her problem. (NOT MET)  Patient to tolerate x 45 seconds oculomotor therapeutic exercise in full Romberg stance without an increase in her symptoms. (PART MET)  Patient to perform 50% Romberg stance, eyes closed, to improve upright tolerance. (MET)     Long Term Goals (6 Weeks):   Patient to demonstrate competence with home exercise program to maintain therapeutic gains. (NOT MET)  Patient to perform x 10 repetitions sit to stand so that she may rise without using her arms to help. (PART  MET)  Patient to ambulate 300 feet with supervision and without an elevation in her symptoms. (NOT MET)      PLAN     Continue to advance balance and vestibular training to patient's tolerance.      Yung Lyle, PT      Thrombocytopenia

## 2022-08-29 ENCOUNTER — CLINICAL SUPPORT (OUTPATIENT)
Dept: REHABILITATION | Facility: HOSPITAL | Age: 72
End: 2022-08-29
Payer: MEDICARE

## 2022-08-29 DIAGNOSIS — R26.89 IMBALANCE: ICD-10-CM

## 2022-08-29 DIAGNOSIS — R42 DIZZINESS: Primary | ICD-10-CM

## 2022-08-29 PROCEDURE — 97112 NEUROMUSCULAR REEDUCATION: CPT | Mod: PN

## 2022-08-29 PROCEDURE — 97110 THERAPEUTIC EXERCISES: CPT | Mod: PN

## 2022-08-29 NOTE — PROGRESS NOTES
[facility]   Physical Therapy Treatment Note     Name: Cecilia Valenzuela  Clinic Number: [id number]    Therapy Diagnosis:   Encounter Diagnoses   Name Primary?    Dizziness Yes    Imbalance      Physician: Asher Gaitan,*    Visit Date: 8/29/2022    Physician Orders: PT Eval and Treat   Medical Diagnosis from Referral: dizziness  Evaluation Date: 8/3/2022  Authorization Period Expiration: 12/31/2022  Plan of Care Expiration: 09/17/22  Visit # / Visits authorized: 7/ 20     Time In: 1418  Time Out: 1501  Total Billable Time: 43 minutes     Precautions: Fall and cancer       SUBJECTIVE     Pt reports: that loud noise and bright lights tend to elevate her symptoms.    She was compliant with home exercise program.  Response to previous treatment: no changes  Functional change: none    Pain: no complaints of pain      OBJECTIVE      n/a    Treatment     Cecilia received the treatments listed below:      therapeutic exercises to develop endurance and flexibility for 10 minutes including:     X 10 minutes NuStep (level 3) to promote flexibility prior to strength/balance/mobility training       neuromuscular re-education activities to improve: Balance and Vestibular Hypofunction for 33 minutes. The following activities were included:     X 5 standing up and down on AirEx while holding target   X 5 leaning over touch stool on AirEx while holding target   X 30 seconds each AirEx standing saccades = side to side, up and down              X 30 seconds each AirEx standing VOR1 = side to side, up and down     X 8 each standing on AirEx bilateral move rings across body in rainbow pattern  4 x 30 feet ball catches during ambulation while holding target    2 x 10 feet each high stepping over 5-inch obstacles = forwards, sideways  X 30 seconds each bug walks (fast)* and bug jumps (fast)* = legalPAD       * minimal increase in symptoms      Patient Education and Home Exercises     Home  Exercises Provided and Patient Education Provided     Education provided:   - proper therapeutic exercise technique  - continue home exercise program   - lower volume and screen intensity with electronics    Written Home Exercises Provided: Patient instructed to cont prior HEP.       ASSESSMENT     Able to perform habituation and oculomotor exercises on AirEx; patient reported minimal elevation of her symptoms after bug walks (fast) and bug jumps (fast) - no other complains of elevated symptoms; able to demonstrate longer endurance during VOR gait with ball catches.    Cecilia Is progressing fairly well towards her goals.   Pt prognosis is Fair.     Pt will continue to benefit from skilled outpatient physical therapy to address the deficits listed in the problem list box on initial evaluation, provide pt/family education and to maximize pt's level of independence in the home and community environment.     Pt's spiritual, cultural and educational needs considered and pt agreeable to plan of care and goals.     Anticipated barriers to physical therapy: severity of symptoms    Goals:     Short Term Goals (3 Weeks):   Patient to report that walking around her house in the dark sometimes increases her problem. (NOT MET)  Patient to tolerate x 45 seconds oculomotor therapeutic exercise in full Romberg stance without an increase in her symptoms. (PART MET)  Patient to perform 50% Romberg stance, eyes closed, to improve upright tolerance. (MET)     Long Term Goals (6 Weeks):   Patient to demonstrate competence with home exercise program to maintain therapeutic gains. (NOT MET)  Patient to perform x 10 repetitions sit to stand so that she may rise without using her arms to help. (PART MET)  Patient to ambulate 300 feet with supervision and without an elevation in her symptoms. (NOT MET)      PLAN     Continue to advance balance and vestibular training to patient's tolerance.      Yung Lyle, PT

## 2022-08-30 ENCOUNTER — OFFICE VISIT (OUTPATIENT)
Dept: HEMATOLOGY/ONCOLOGY | Facility: CLINIC | Age: 72
End: 2022-08-30
Payer: MEDICARE

## 2022-08-30 VITALS
SYSTOLIC BLOOD PRESSURE: 130 MMHG | HEIGHT: 61 IN | WEIGHT: 114.63 LBS | BODY MASS INDEX: 21.64 KG/M2 | DIASTOLIC BLOOD PRESSURE: 64 MMHG | OXYGEN SATURATION: 96 % | TEMPERATURE: 98 F | RESPIRATION RATE: 18 BRPM | HEART RATE: 65 BPM

## 2022-08-30 DIAGNOSIS — Z17.0 MALIGNANT NEOPLASM OF UPPER-OUTER QUADRANT OF RIGHT BREAST IN FEMALE, ESTROGEN RECEPTOR POSITIVE: Primary | ICD-10-CM

## 2022-08-30 DIAGNOSIS — M85.80 OSTEOPENIA, UNSPECIFIED LOCATION: ICD-10-CM

## 2022-08-30 DIAGNOSIS — Z12.31 ENCOUNTER FOR SCREENING MAMMOGRAM FOR MALIGNANT NEOPLASM OF BREAST: ICD-10-CM

## 2022-08-30 DIAGNOSIS — C50.411 MALIGNANT NEOPLASM OF UPPER-OUTER QUADRANT OF RIGHT BREAST IN FEMALE, ESTROGEN RECEPTOR POSITIVE: Primary | ICD-10-CM

## 2022-08-30 PROCEDURE — 99214 OFFICE O/P EST MOD 30 MIN: CPT | Mod: PBBFAC | Performed by: PHYSICIAN ASSISTANT

## 2022-08-30 PROCEDURE — 99999 PR PBB SHADOW E&M-EST. PATIENT-LVL IV: CPT | Mod: PBBFAC,,, | Performed by: PHYSICIAN ASSISTANT

## 2022-08-30 PROCEDURE — 99213 OFFICE O/P EST LOW 20 MIN: CPT | Mod: S$PBB,,, | Performed by: PHYSICIAN ASSISTANT

## 2022-08-30 PROCEDURE — 99213 PR OFFICE/OUTPT VISIT, EST, LEVL III, 20-29 MIN: ICD-10-PCS | Mod: S$PBB,,, | Performed by: PHYSICIAN ASSISTANT

## 2022-08-30 PROCEDURE — 99999 PR PBB SHADOW E&M-EST. PATIENT-LVL IV: ICD-10-PCS | Mod: PBBFAC,,, | Performed by: PHYSICIAN ASSISTANT

## 2022-08-30 NOTE — Clinical Note
Pete Brooks, I saw the patient today and she is doing well. She will complete 5 years of AI therapy for her breast caner in 10/2022.  I am good with her going to you for annual follow up; we discussed getting her annual breast exam with you and I put in an order for her mammogram in March. At this stage I do not think she needs a separate oncologist in [place] given that she is 5 years out and just needs an annual visit/clinical breast exam and mammo.  Let me know in the future if you'd like me to see her again! Naila

## 2022-08-30 NOTE — Clinical Note
Mammogram in [place] in 3/2023. Patient has f/u scheduled with her PCP in [place], does not need f/u with us

## 2022-08-30 NOTE — PROGRESS NOTES
Subjective:       Patient ID: Cecilia Valenzuela is a 72 y.o. female.    Chief Complaint: Malignant neoplasm of upper-outer quadrant of right breast     Mrs. Valenzuela is a 72-year-old female with right breast cancer.  She started adjuvant letrozole in 11/2017.  She is also on Prolia (last given 4/6/2022)     Overall, she has been feeling well. She has some chronic arthralgias  Energy levels been good and she has been walking for exercise.  She has no shortness of breath.  Appetite and bowel function has been good.    Patient would like to discontinue letrozole at 5 years, not interested in BCI testing.            Left mammogram in 3/2022 was unremarkable.      Onc History:  Screening mammography on March 22 showed questionable asymmetry in the upper outer portion of the right breast.  There was no palpable abnormality.      Follow-up diagnostic mammogram on August 2 showed a high density mass in the upper-outer quadrant of the right breast.  By ultrasound this measured 1.2 x 0.7 x 1.2 cm.     Needle biopsy on August 7 showed infiltrating lobular carcinoma which was 99% ER positive, 84% NC positive.  HER-2 was 2+ but negative by FISH.  Ki-67 was 10%.     Right mastectomy and sentinel lymph node biopsy was performed on September.  That showed a 2.7 cm infiltrating lobular carcinoma.  2 sentinel lymph nodes were negative for malignancy.  Final pathological stage T2 N0 - stage IIA.     Oncotype score was 19 - just above low risk cutoff of 18.        Review of Systems   Constitutional: Negative.    Respiratory:  Negative for apnea, cough, chest tightness, shortness of breath, wheezing and stridor.    Cardiovascular:  Negative for chest pain, palpitations and leg swelling.   Gastrointestinal:  Negative for abdominal distention, abdominal pain, anal bleeding, blood in stool, constipation, diarrhea, nausea, rectal pain and vomiting.   Musculoskeletal:  Positive for arthralgias. Negative for gait problem.   Integumentary:   Negative.   Neurological:  Negative for dizziness, facial asymmetry, light-headedness and headaches.   Psychiatric/Behavioral:  Negative for confusion, decreased concentration, dysphoric mood and suicidal ideas.        Objective:      Physical Exam  Vitals reviewed.   Constitutional:       General: She is not in acute distress.     Appearance: She is well-developed.      Comments: Presents alone  ECOG 0   HENT:      Head: Normocephalic.      Mouth/Throat:      Pharynx: No oropharyngeal exudate.   Eyes:      General: No scleral icterus.     Conjunctiva/sclera: Conjunctivae normal.      Pupils: Pupils are equal, round, and reactive to light.   Neck:      Thyroid: No thyromegaly.   Cardiovascular:      Rate and Rhythm: Normal rate and regular rhythm.   Pulmonary:      Effort: Pulmonary effort is normal. No respiratory distress.      Breath sounds: Normal breath sounds.      Comments: S/p right mastectomy without chest wall mass, nodule or skin changes. Left breast without mass, nodule or skin changes. No axillary or supraclavicular adenopathy.      Abdominal:      General: Bowel sounds are normal. There is no distension.      Palpations: Abdomen is soft. There is no mass.      Tenderness: There is no abdominal tenderness.   Musculoskeletal:         General: No tenderness. Normal range of motion.      Cervical back: Normal range of motion and neck supple.      Comments: No spinal or paraspinal tenderness to palpation     Lymphadenopathy:      Cervical: No cervical adenopathy.   Skin:     General: Skin is warm and dry.   Neurological:      Mental Status: She is alert and oriented to person, place, and time.      Cranial Nerves: No cranial nerve deficit.   Psychiatric:         Behavior: Behavior normal.         Thought Content: Thought content normal.       Assessment:       Problem List Items Addressed This Visit          Oncology    Malignant neoplasm of upper-outer quadrant of right female breast - Primary        Orthopedic    Osteopenia       Plan:        1)Breast cancer- Patient will complete 5 years of adjuvant endocrine therapy in 10/2022. We discussed sending out BCI testing to see if she would benefit from extended AI therapy, however patient declines and would like to discontinue therapy at 5 years as her treatment related arthralgias effect quality of life.  She will follow up with her Primary Care in [place] and will go for annual mammogram in 3/2023.  I let her know that should she need to come back to our office that she was always welcome.  2)Osteopenia- up to date on Prolia, next due 10/2022. After discontinuation of AI therapy will defer to primary care if they would like her to continue this therapy. I recommended repeat bone denisty in 2/2023.    Patient amenable to plan.   Cecilia fernandez NP, cc on her note

## 2022-09-01 ENCOUNTER — CLINICAL SUPPORT (OUTPATIENT)
Dept: REHABILITATION | Facility: HOSPITAL | Age: 72
End: 2022-09-01
Payer: MEDICARE

## 2022-09-01 DIAGNOSIS — R26.89 IMBALANCE: ICD-10-CM

## 2022-09-01 DIAGNOSIS — R42 DIZZINESS: Primary | ICD-10-CM

## 2022-09-01 PROCEDURE — 97110 THERAPEUTIC EXERCISES: CPT | Mod: PN,CQ

## 2022-09-01 NOTE — PROGRESS NOTES
"[facility]   Physical Therapy Treatment Note     Name: Cecilia Valenzuela  Clinic Number: [id number]    Therapy Diagnosis:   Encounter Diagnoses   Name Primary?    Dizziness Yes    Imbalance      Physician: Asher Gaitan,*    Visit Date: 9/1/2022    Physician Orders: PT Eval and Treat   Medical Diagnosis from Referral: dizziness  Evaluation Date: 8/3/2022  Authorization Period Expiration: 12/31/2022  Plan of Care Expiration: 09/17/22  Visit # / Visits authorized: 9/ 20  PTA visit # 1/5      Time In: 1334  Time Out: 1415  Total Billable Time: 41 minutes     Precautions: Fall and cancer       SUBJECTIVE     Pt reports: "I woke up with my shoulder sore this morning"  She was compliant with home exercise program.  Response to previous treatment: no changes  Functional change: none    Pain: 2-3  Location: Right shoulder (mostly with touch)      OBJECTIVE      n/a    Treatment     Cecilia received the treatments listed below:      therapeutic exercises to develop endurance and flexibility for 10 minutes including:     X 10 minutes NuStep (level 3) to promote flexibility prior to strength/balance/mobility training     neuromuscular re-education activities to improve: Balance and Vestibular Hypofunction for 31 minutes. The following activities were included:     X 10 standing up and down on AirEx while holding target   X 10 leaning over touch stool on AirEx while holding target   X 30 seconds each AirEx standing saccades = side to side, up and down              X 30 seconds each AirEx standing VOR1 = side to side, up and down     X 8 each standing on AirEx bilateral move rings across body in rainbow pattern  2 x 50 feet ball catches during ambulation while holding target    2 x 10 feet each high stepping over 5-inch obstacles = forwards, sideways  X 30 seconds each bug walks (fast)* and bug jumps (fast) = [email or web address]   Throwing/catching ball with 1 foot on 4" step         * minimal " "increase in symptoms      Patient Education and Home Exercises     Home Exercises Provided and Patient Education Provided     Education provided:   - proper therapeutic exercise technique  - continue home exercise program   - lower volume and screen intensity with electronics    Written Home Exercises Provided: Patient instructed to cont prior HEP.       ASSESSMENT   Cecilia provided good effort and participation toward therapeutic interventions today with focus on  balance and vestibular exercises.  Patient reporting increased blurriness  and "jumping" with bug walking eye exercises only.  Pt has progressed well with her exercises and has significant improvement with her dizziness and balance.    Cecilia Is progressing fairly well towards her goals.   Pt prognosis is Fair.     Pt will continue to benefit from skilled outpatient physical therapy to address the deficits listed in the problem list box on initial evaluation, provide pt/family education and to maximize pt's level of independence in the home and community environment.     Pt's spiritual, cultural and educational needs considered and pt agreeable to plan of care and goals.     Anticipated barriers to physical therapy: severity of symptoms    Goals:     Short Term Goals (3 Weeks):   Patient to report that walking around her house in the dark sometimes increases her problem. (NOT MET)  Patient to tolerate x 45 seconds oculomotor therapeutic exercise in full Romberg stance without an increase in her symptoms. (PART MET)  Patient to perform 50% Romberg stance, eyes closed, to improve upright tolerance. (MET)     Long Term Goals (6 Weeks):   Patient to demonstrate competence with home exercise program to maintain therapeutic gains. (NOT MET)  Patient to perform x 10 repetitions sit to stand so that she may rise without using her arms to help. (PART MET)  Patient to ambulate 300 feet with supervision and without an elevation in her symptoms. (NOT MET)      PLAN "     Plan of care Certification: 8/3/2022 to 09/17/22.     Outpatient Physical Therapy evaluation, plus 2 times weekly for 6 weeks to include the following interventions (starting week of 08/08/22): Gait Training, Manual Therapy, Moist Heat/ Ice, Neuromuscular Re-ed, Patient Education, Self Care, Therapeutic Activities, Therapeutic Exercise and HEP.        Continue to advance balance and vestibular training to patient's tolerance.      Donna Lanier, PTA

## 2022-09-06 ENCOUNTER — CLINICAL SUPPORT (OUTPATIENT)
Dept: REHABILITATION | Facility: HOSPITAL | Age: 72
End: 2022-09-06
Payer: MEDICARE

## 2022-09-06 DIAGNOSIS — R26.89 IMBALANCE: ICD-10-CM

## 2022-09-06 DIAGNOSIS — R42 DIZZINESS: Primary | ICD-10-CM

## 2022-09-06 PROCEDURE — 97112 NEUROMUSCULAR REEDUCATION: CPT | Mod: PN

## 2022-09-06 PROCEDURE — 97110 THERAPEUTIC EXERCISES: CPT | Mod: PN

## 2022-09-06 NOTE — PROGRESS NOTES
[facility]   Physical Therapy Treatment Note     Name: Cecilia Valenzuela  Clinic Number: [id number]    Therapy Diagnosis:   Encounter Diagnoses   Name Primary?    Dizziness Yes    Imbalance      Physician: Asher Gaitan,*    Visit Date: 9/6/2022    Physician Orders: PT Eval and Treat   Medical Diagnosis from Referral: dizziness  Evaluation Date: 8/3/2022  Authorization Period Expiration: 12/31/2022  Plan of Care Expiration: 09/17/22  Visit # / Visits authorized: 9/ 20     Time In: 1032  Time Out: 1117  Total Billable Time: 45 minutes     Precautions: Fall and cancer       SUBJECTIVE     Pt reports: was able to walk in darkness last night without difficulty.    She was compliant with home exercise program.  Response to previous treatment: no changes  Functional change: none    Pain: no complaints of pain      OBJECTIVE      n/a    Treatment     Cecilia received the treatments listed below:      neuromuscular re-education activities to improve: Balance and Vestibular Hypofunction for 35 minutes. The following activities were included:     X 10 standing up and down on AirEx while holding target   X 10 leaning over touch stool on AirEx while holding target   X 45 seconds each AirEx standing saccades = side to side, up and down              X 45 seconds each AirEx standing VOR1 = side to side, up and down    2 X 30 feet each VOR1 gait = side to side, up and down   X 20 standing self tennis ball tosses on AirEx   X 30 seconds sharp Romberg stance = eyes open  X 15 power step ups on 5-inch stool (2# right ankle)   X 15 each cross reaches (2# right ankle, 2# dumbbell left hand)       therapeutic exercises to develop endurance and flexibility for 10 minutes including:     X 10 minutes NuStep (level 3) as an adjunct to strength/balance/mobility training         * minimal increase in symptoms      Patient Education and Home Exercises     Home Exercises Provided and Patient Education Provided      Education provided:   - proper therapeutic exercise technique  - continue home exercise program   - lower volume and screen intensity with electronics    Written Home Exercises Provided: Patient instructed to cont prior HEP.       ASSESSMENT     Able to demonstrate improved stamina during oculomotor exercises on AirEx; increased repetitions performed during self tennis ball tosses - also able to perform on AirEx; loss of balance x 1 during sharp Romberg stance, eyes open - unable to perform with eyes closed; resistance tolerated during cross reaches; able to complete treatment session without complains of elevated symptoms.    Cecilia Is progressing fairly well towards her goals.   Pt prognosis is Fair.     Pt will continue to benefit from skilled outpatient physical therapy to address the deficits listed in the problem list box on initial evaluation, provide pt/family education and to maximize pt's level of independence in the home and community environment.     Pt's spiritual, cultural and educational needs considered and pt agreeable to plan of care and goals.     Anticipated barriers to physical therapy: severity of symptoms    Goals:     Short Term Goals (3 Weeks):   Patient to report that walking around her house in the dark sometimes increases her problem. (PART MET)  Patient to tolerate x 45 seconds oculomotor therapeutic exercise in full Romberg stance without an increase in her symptoms. (PART MET)  Patient to perform 50% Romberg stance, eyes closed, to improve upright tolerance. (MET)     Long Term Goals (6 Weeks):   Patient to demonstrate competence with home exercise program to maintain therapeutic gains. (NOT MET)  Patient to perform x 10 repetitions sit to stand so that she may rise without using her arms to help. (PART MET)  Patient to ambulate 300 feet with supervision and without an elevation in her symptoms. (PART MET)      PLAN     Continue to advance balance and vestibular training to  patient's tolerance.      Yung Lyle, PT

## 2022-09-08 LAB
ALBUMIN SERPL-MCNC: 4.7 G/DL (ref 3.6–5.1)
ALBUMIN/GLOB SERPL: 1.4 (CALC) (ref 1–2.5)
ALP SERPL-CCNC: 51 U/L (ref 37–153)
ALT SERPL-CCNC: 14 U/L (ref 6–29)
AST SERPL-CCNC: 19 U/L (ref 10–35)
BILIRUB SERPL-MCNC: 0.5 MG/DL (ref 0.2–1.2)
BUN SERPL-MCNC: 14 MG/DL (ref 7–25)
BUN/CREAT SERPL: NORMAL (CALC) (ref 6–22)
CALCIUM SERPL-MCNC: 9.6 MG/DL (ref 8.6–10.4)
CHLORIDE SERPL-SCNC: 102 MMOL/L (ref 98–110)
CHOLEST SERPL-MCNC: 219 MG/DL
CHOLEST/HDLC SERPL: 4 (CALC)
CO2 SERPL-SCNC: 24 MMOL/L (ref 20–32)
CREAT SERPL-MCNC: 0.67 MG/DL (ref 0.6–1)
EGFR: 92 ML/MIN/1.73M2
GLOBULIN SER CALC-MCNC: 3.3 G/DL (CALC) (ref 1.9–3.7)
GLUCOSE SERPL-MCNC: 79 MG/DL (ref 65–99)
HDLC SERPL-MCNC: 55 MG/DL
LDLC SERPL CALC-MCNC: 134 MG/DL (CALC)
NONHDLC SERPL-MCNC: 164 MG/DL (CALC)
POTASSIUM SERPL-SCNC: 4 MMOL/L (ref 3.5–5.3)
PROT SERPL-MCNC: 8 G/DL (ref 6.1–8.1)
SODIUM SERPL-SCNC: 136 MMOL/L (ref 135–146)
TRIGL SERPL-MCNC: 161 MG/DL

## 2022-09-09 ENCOUNTER — TELEPHONE (OUTPATIENT)
Dept: FAMILY MEDICINE | Facility: CLINIC | Age: 72
End: 2022-09-09

## 2022-09-09 NOTE — TELEPHONE ENCOUNTER
----- Message from Cecilia Shaikh NP sent at 9/8/2022  8:43 PM CDT -----  Please call patient and let her know recent labs show triglyceride levels are slightly elevated when compared to 3 months ago, up to 161 with less than 150 being normal.  Bad cholesterol LDL continues to be slightly elevated at 134.  Good cholesterol HDL is stable.  Would recommend continuing to work on low-fat diet given she cannot tolerate cholesterol medication.  Her blood sugar, kidney and liver function all within normal range.

## 2022-09-12 ENCOUNTER — PATIENT MESSAGE (OUTPATIENT)
Dept: FAMILY MEDICINE | Facility: CLINIC | Age: 72
End: 2022-09-12

## 2022-09-13 NOTE — TELEPHONE ENCOUNTER
Spoke with pt in regards to recent lab results. Verbalized per Cecilia that recent labs show triglyceride levels are slightly elevated when compared to 3 months ago, up to 161 with less than 150 being normal.  Bad cholesterol LDL continues to be slightly elevated at 134.  Good cholesterol HDL is stable. Cecilia would recommend continuing to work on low-fat diet given she cannot tolerate cholesterol medication.  Her blood sugar, kidney and liver function all within normal range. Pt acknowledge understanding.

## 2022-09-13 NOTE — TELEPHONE ENCOUNTER
Spoke with pt already in regards to recent lab results. Please see telephone encounter for 09/09/2022.

## 2022-09-14 ENCOUNTER — CLINICAL SUPPORT (OUTPATIENT)
Dept: REHABILITATION | Facility: HOSPITAL | Age: 72
End: 2022-09-14
Payer: MEDICARE

## 2022-09-14 DIAGNOSIS — R42 DIZZINESS: Primary | ICD-10-CM

## 2022-09-14 DIAGNOSIS — R26.89 IMBALANCE: ICD-10-CM

## 2022-09-14 PROCEDURE — 97112 NEUROMUSCULAR REEDUCATION: CPT | Mod: PN

## 2022-09-14 PROCEDURE — 97110 THERAPEUTIC EXERCISES: CPT | Mod: PN

## 2022-09-14 NOTE — PROGRESS NOTES
[facility]   Physical Therapy Discharge Note     Name: Cecilia Valenzuela  Clinic Number: [id number]    Therapy Diagnosis:   Encounter Diagnoses   Name Primary?    Dizziness Yes    Imbalance      Physician: Asher Gaitan,*    Visit Date: 9/14/2022    Physician Orders: PT Eval and Treat   Medical Diagnosis from Referral: dizziness  Evaluation Date: 8/3/2022    Date of Last visit: 09/14/22  Total Visits Received: 12     Time In: 1113  Time Out: 1158  Total Billable Time: 45 minutes     Precautions: Fall and cancer       SUBJECTIVE     Pt reports: has been doing her exercises on her own.    She was compliant with home exercise program.  Response to previous treatment: no changes  Functional change: none    Pain: no complaints of pain      OBJECTIVE     Lower Extremity Strength  Right LE   Left LE     Hip flexion:  4+/5 Hip flexion: 4+/5   Knee extension: 4+/5 Knee extension: 4+/5   Ankle dorsiflexion:  4+/5 Ankle dorsiflexion: 4+/5                    Gait Assessment:(if indicated)  - AD used: none  - Assistance: supervision   - Distance: 300 feet       OTHER: Dizziness Handicap Inventory = 6/100 (less than mild handicap); Tinetti = 28/28 (low fall risk)      Treatment     Cecilia received the treatments listed below:       therapeutic exercises to develop endurance and flexibility for 15 minutes including:     X 10 minutes SciFit (level 3) to promote flexibility prior to strength/balance/mobility training    Functional ambulation 300 feet with supervision    Up/down 60 feet ramp with supervision   Up/down 3 x 4 (6-inch) steps with supervision       neuromuscular re-education activities to improve: Balance and Vestibular Hypofunction for 28 minutes. The following activities were included:     X 10 standing up and down on AirEx while holding target   X 10 leaning over touch stool on AirEx while holding target  X 45 seconds each 50% Romberg stance = eyes open, eyes closed   X 10 sit to  stand without upper extremity support    X 45 seconds each standing saccades in 50% Romberg stance = side to side, up and down              X 45 seconds each standing VOR1 in 50% Romberg stance = side to side, up and down     X 20 standing self tennis ball tosses on AirEx   X 20 ball catches while standing on AirEx   Reviewed home exercise program - instructed to continue once a day        Patient Education and Home Exercises     Home Exercises Provided and Patient Education Provided     Education provided:   - proper therapeutic exercise technique  - continue home exercise program     Written Home Exercises Provided: Patient instructed to cont prior HEP.       ASSESSMENT     Good overall mobility - no increase in symptoms; improved scores on both her Tinetti and Dizziness Handicap Inventory assessments.    Cecilia has progressed well towards her goals.     Pt's spiritual, cultural and educational needs considered and pt agreeable to plan of care and goals.    Goals:     Short Term Goals (3 Weeks):   Patient to report that walking around her house in the dark sometimes increases her problem. (MET)  Patient to tolerate x 45 seconds oculomotor therapeutic exercise in full Romberg stance without an increase in her symptoms. (MET)  Patient to perform 50% Romberg stance, eyes closed, to improve upright tolerance. (MET)     Long Term Goals (6 Weeks):   Patient to demonstrate competence with home exercise program to maintain therapeutic gains. (MET)  Patient to perform x 10 repetitions sit to stand so that she may rise without using her arms to help. (MET)  Patient to ambulate 300 feet with supervision and without an elevation in her symptoms. (MET)    Discharge reason: Patient has met all of her goals.      PLAN     This patient is discharged from Physical Therapy.      Yung Lyle, PT

## 2022-09-15 ENCOUNTER — OFFICE VISIT (OUTPATIENT)
Dept: CARDIOLOGY | Facility: CLINIC | Age: 72
End: 2022-09-15
Payer: MEDICARE

## 2022-09-15 VITALS
HEIGHT: 61 IN | HEART RATE: 65 BPM | WEIGHT: 114 LBS | SYSTOLIC BLOOD PRESSURE: 136 MMHG | BODY MASS INDEX: 21.52 KG/M2 | DIASTOLIC BLOOD PRESSURE: 76 MMHG

## 2022-09-15 DIAGNOSIS — E78.00 HYPERCHOLESTEROLEMIA: Chronic | ICD-10-CM

## 2022-09-15 DIAGNOSIS — I10 ESSENTIAL HYPERTENSION: Chronic | ICD-10-CM

## 2022-09-15 DIAGNOSIS — I35.8 AORTIC VALVE SCLEROSIS: Chronic | ICD-10-CM

## 2022-09-15 DIAGNOSIS — R09.89 BRUIT OF RIGHT CAROTID ARTERY: ICD-10-CM

## 2022-09-15 DIAGNOSIS — I49.49 PREMATURE BEATS: Primary | Chronic | ICD-10-CM

## 2022-09-15 PROCEDURE — 99214 PR OFFICE/OUTPT VISIT, EST, LEVL IV, 30-39 MIN: ICD-10-PCS | Mod: S$PBB,,, | Performed by: INTERNAL MEDICINE

## 2022-09-15 PROCEDURE — 99999 PR PBB SHADOW E&M-EST. PATIENT-LVL III: CPT | Mod: PBBFAC,,, | Performed by: INTERNAL MEDICINE

## 2022-09-15 PROCEDURE — 99214 OFFICE O/P EST MOD 30 MIN: CPT | Mod: S$PBB,,, | Performed by: INTERNAL MEDICINE

## 2022-09-15 PROCEDURE — 99213 OFFICE O/P EST LOW 20 MIN: CPT | Mod: PBBFAC,PO | Performed by: INTERNAL MEDICINE

## 2022-09-15 PROCEDURE — 99999 PR PBB SHADOW E&M-EST. PATIENT-LVL III: ICD-10-PCS | Mod: PBBFAC,,, | Performed by: INTERNAL MEDICINE

## 2022-09-15 RX ORDER — METOPROLOL SUCCINATE 25 MG/1
25 TABLET, EXTENDED RELEASE ORAL DAILY
Qty: 90 TABLET | Refills: 3 | Status: SHIPPED | OUTPATIENT
Start: 2022-09-15 | End: 2023-09-07 | Stop reason: SDUPTHER

## 2022-09-15 NOTE — PROGRESS NOTES
Subjective:    Patient ID:  Cecilia Valenzuela is a 72 y.o. female who presents for Hypertension and Follow-up        Hypertension  Pertinent negatives include no blurred vision, chest pain, headaches, malaise/fatigue, orthopnea, palpitations (MUCH LESS), PND or shortness of breath.   Follow-up  Pertinent negatives include no abdominal pain, change in bowel habit, chest pain, chills, coughing, diaphoresis, fever, headaches, nausea, numbness, rash, sore throat or weakness.     BP LOG MOSTLY OK, DOING OK, CMP OK, , PT FOR VERTIGO, DOING WELL, SEE DRAKE  Past Medical History:   Diagnosis Date    Arthritis     Atherosclerosis     in left carotid artery    ER+ (estrogen receptor positive status) 2022    Fibrocystic breast     GERD (gastroesophageal reflux disease)     HER2-negative carcinoma of right breast 2022    Hyperlipidemia     Hypertension     Malignant neoplasm of upper-outer quadrant of right female breast 2017    right    Mitral valve prolapse     Neurofibromatosis     Osteopenia     Premature beats     Raynaud's disease     S/P mastectomy, right 2022    Seborrheic keratosis     Thyroid nodule     Tinnitus     Undiagnosed cardiac murmurs 2019     Past Surgical History:   Procedure Laterality Date    BACK SURGERY      BREAST BIOPSY Right     CATARACT EXTRACTION Bilateral      SECTION      x5    DILATION AND CURETTAGE OF UTERUS      miss Ab    HYSTERECTOMY      MASTECTOMY Right 2017     Family History   Problem Relation Age of Onset    Rheum arthritis Mother     Breast cancer Mother         over 85    Hypertension Mother     Heart disease Mother     Parkinsonism Father     Cancer Father     Breast cancer Sister 40        DCIS    Breast cancer Maternal Aunt         60's    Breast cancer Sister 50        DCIS    Ovarian cancer Neg Hx     Colon cancer Neg Hx     Melanoma Neg Hx      Social History     Socioeconomic History    Marital status:    Tobacco Use     Smoking status: Never    Smokeless tobacco: Never   Substance and Sexual Activity    Alcohol use: No    Drug use: No    Sexual activity: Never     Partners: Male     Social Determinants of Health     Financial Resource Strain: Low Risk     Difficulty of Paying Living Expenses: Not hard at all   Food Insecurity: No Food Insecurity    Worried About Running Out of Food in the Last Year: Never true    Ran Out of Food in the Last Year: Never true   Transportation Needs: No Transportation Needs    Lack of Transportation (Medical): No    Lack of Transportation (Non-Medical): No   Physical Activity: Sufficiently Active    Days of Exercise per Week: 4 days    Minutes of Exercise per Session: 50 min   Stress: Unknown    Feeling of Stress : Patient refused   Social Connections: Unknown    Frequency of Communication with Friends and Family: More than three times a week    Frequency of Social Gatherings with Friends and Family: More than three times a week    Active Member of Clubs or Organizations: Yes    Attends Club or Organization Meetings: More than 4 times per year    Marital Status:    Housing Stability: Low Risk     Unable to Pay for Housing in the Last Year: No    Number of Places Lived in the Last Year: 1    Unstable Housing in the Last Year: No       Review of patient's allergies indicates:   Allergen Reactions    Methylprednisolone      Other reaction(s): Heart palpitations  PT STATES SHE HAS A REACTION TO ALL STEROIDS DUE TO HER DX; OF MVP..    Zofran [ondansetron hcl (pf)] Nausea Only    Corticosteroids (glucocorticoids) Other (See Comments)     Heart palpitations    Dilaudid [hydromorphone] Nausea And Vomiting     Does not help with pain    Fentanyl     Neosporin (neomycin-polymyx)     Norvasc [amlodipine]     Statins-hmg-coa reductase inhibitors      myalgias    Tetracyclines     Zoloft [sertraline]      Increased anxiety     Augmentin [amoxicillin-pot clavulanate] Nausea And Vomiting       Current  Outpatient Medications:     anastrozole (ARIMIDEX) 1 mg Tab, Take 1 tablet (1 mg total) by mouth once daily., Disp: 90 tablet, Rfl: 2    ascorbic acid, vitamin C, (VITAMIN C) 500 MG tablet, Take 500 mg by mouth once daily., Disp: , Rfl:     aspirin (ECOTRIN) 81 MG EC tablet, Take 81 mg by mouth once daily., Disp: , Rfl:     azelastine (ASTELIN) 137 mcg (0.1 %) nasal spray, 1 spray by Nasal route as needed for Rhinitis. , Disp: , Rfl:     calcium carbonate/vitamin D3 (CALCIUM 600 + D,3, ORAL), Take 1 tablet by mouth 2 (two) times a day., Disp: , Rfl:     coQ10, ubiquinol, 200 mg Cap, Take 1 capsule by mouth 2 (two) times a day., Disp: , Rfl:     denosumab (PROLIA) 60 mg/mL Syrg, Inject 60 mg into the skin every 6 (six) months. , Disp: , Rfl:     glucosamine HCl/chondroitin springer (GLUCOSAMINE-CHONDROITIN ORAL), Take by mouth once daily. takes 500mg/400 capsule twice a day, Disp: , Rfl:     lisinopriL 10 MG tablet, One tablet daily as needed for SBP > 150., Disp: 30 tablet, Rfl: 11    LORazepam (ATIVAN) 0.5 MG tablet, Take 1 tablet (0.5 mg total) by mouth every 12 (twelve) hours as needed for Anxiety., Disp: 10 tablet, Rfl: 1    magnesium 250 mg Tab, Take 250 mg by mouth once daily., Disp: , Rfl:     multivitamin (THERAGRAN) per tablet, Take 1 tablet by mouth once daily., Disp: , Rfl:     omeprazole (PRILOSEC OTC) 20 MG tablet, Take 20 mg by mouth 2 (two) times daily before meals. Pt states that she take 20 mg in the morning ad 40 mg at night time., Disp: , Rfl:     PATADAY 0.2 % Drop, Place 1 drop into both eyes daily as needed (allergies). , Disp: , Rfl: 0    zinc gluconate 50 mg tablet, Take 50 mg by mouth once daily., Disp: , Rfl:     meclizine (ANTIVERT) 25 mg tablet, Take 0.5 tablets (12.5 mg total) by mouth nightly., Disp: 90 tablet, Rfl: 1    metoprolol succinate (TOPROL-XL) 25 MG 24 hr tablet, Take 1 tablet (25 mg total) by mouth once daily., Disp: 90 tablet, Rfl: 3    Review of Systems   Constitutional: Negative  "for chills, diaphoresis, fever, malaise/fatigue, night sweats and weight loss.   HENT:  Negative for sore throat.    Eyes:  Negative for blurred vision and visual disturbance.   Cardiovascular:  Negative for chest pain, claudication, cyanosis, dyspnea on exertion, irregular heartbeat, leg swelling, near-syncope, orthopnea, palpitations (MUCH LESS), paroxysmal nocturnal dyspnea and syncope.   Respiratory:  Negative for cough, hemoptysis, shortness of breath and wheezing.    Endocrine: Negative.    Hematologic/Lymphatic: Negative for adenopathy. Does not bruise/bleed easily.   Skin:  Negative for color change and rash.   Musculoskeletal:  Negative for back pain, falls and muscle weakness.   Gastrointestinal:  Negative for abdominal pain, change in bowel habit, dysphagia, jaundice, melena and nausea.   Genitourinary:  Negative for dysuria and flank pain.   Neurological:  Negative for dizziness, focal weakness, headaches, light-headedness, loss of balance, numbness and weakness.   Psychiatric/Behavioral:  Negative for altered mental status and depression.         BAD DREAMS      Objective:      Vitals:    09/15/22 1228   BP: 136/76   Pulse: 65   Weight: 51.7 kg (113 lb 15.7 oz)   Height: 5' 1" (1.549 m)   PainSc: 0-No pain     Body mass index is 21.54 kg/m².    Physical Exam  Constitutional:       General: She is not in acute distress.     Appearance: She is well-developed.   HENT:      Head: Normocephalic and atraumatic.   Eyes:      General: No scleral icterus.     Extraocular Movements: Extraocular movements intact.   Neck:      Vascular: Normal carotid pulses. No carotid bruit or JVD.   Cardiovascular:      Rate and Rhythm: Normal rate and regular rhythm.      Pulses:           Carotid pulses are 2+ on the right side with bruit and 2+ on the left side.       Radial pulses are 2+ on the right side and 2+ on the left side.        Posterior tibial pulses are 2+ on the right side and 2+ on the left side.      Heart " sounds: No murmur heard.    No friction rub. No gallop.   Pulmonary:      Effort: Pulmonary effort is normal.      Breath sounds: Normal breath sounds. No rales.   Chest:      Chest wall: No tenderness.   Abdominal:      General: Bowel sounds are normal.      Palpations: Abdomen is soft.      Tenderness: There is no abdominal tenderness.   Musculoskeletal:      Cervical back: Neck supple.      Right lower leg: No edema.      Left lower leg: No edema.   Skin:     General: Skin is warm and dry.      Findings: No erythema or rash.   Neurological:      General: No focal deficit present.      Mental Status: She is alert and oriented to person, place, and time.      Cranial Nerves: Cranial nerves 2-12 are intact. No cranial nerve deficit.   Psychiatric:         Mood and Affect: Mood normal.         Speech: Speech normal.         Behavior: Behavior normal.             ..    Chemistry        Component Value Date/Time     09/07/2022 0720    K 4.0 09/07/2022 0720     09/07/2022 0720    CO2 24 09/07/2022 0720    BUN 14 09/07/2022 0720    CREATININE 0.67 09/07/2022 0720    GLU 79 09/07/2022 0720        Component Value Date/Time    CALCIUM 9.6 09/07/2022 0720    ALKPHOS 60 09/16/2021 1137    AST 19 09/07/2022 0720    ALT 14 09/07/2022 0720    BILITOT 0.5 09/07/2022 0720    ESTGFRAFRICA 102 03/28/2022 1321    EGFRNONAA 88 03/28/2022 1321            ..  Lab Results   Component Value Date    CHOL 219 (H) 09/07/2022    CHOL 218 (H) 05/17/2022    CHOL 206 (H) 05/14/2021     Lab Results   Component Value Date    HDL 55 09/07/2022    HDL 55 05/17/2022    HDL 55 05/14/2021     Lab Results   Component Value Date    LDLCALC 134 (H) 09/07/2022    LDLCALC 138 (H) 05/17/2022    LDLCALC 129 (H) 05/14/2021     Lab Results   Component Value Date    TRIG 161 (H) 09/07/2022    TRIG 129 05/17/2022    TRIG 117 05/14/2021     Lab Results   Component Value Date    CHOLHDL 4.0 09/07/2022    CHOLHDL 4.0 05/17/2022    CHOLHDL 3.7 05/14/2021      ..  Lab Results   Component Value Date    WBC 6.6 03/28/2022    HGB 13.2 03/28/2022    HCT 40.4 03/28/2022    MCV 88.2 03/28/2022     03/28/2022       Test(s) Reviewed  I have reviewed the following in detail:  [] Stress test   [] Angiography   [] Echocardiogram   [x] Labs   [] Other:       Assessment:         ICD-10-CM ICD-9-CM   1. Premature beats  I49.49 427.60   2. Bruit of right carotid artery  R09.89 785.9   3. Essential hypertension  I10 401.9   4. Aortic valve sclerosis  I35.8 424.1   5. Hypercholesterolemia  E78.00 272.0     Problem List Items Addressed This Visit          Cardiac/Vascular    Essential hypertension    Premature beats - Primary    Hypercholesterolemia    Aortic valve sclerosis    Bruit of right carotid artery    Relevant Orders    CV Ultrasound Bilateral Doppler Carotid        Plan:     DECREASE TOPROL TO 25 MG, ? BAD DREAMS, CHECK CAROTID ULTRASOUND WATCH BLOOD PRESSURE,ALL CV CLINICALLY STABLE, NO ANGINA, NO HF, NO TIA, BENIGN STABLE CLINICAL ARRHYTHMIA,CONTINUE CURRENT MEDS, EDUCATION, DIET, EXERCISE , RETURN TO CLINIC IN 1 YEAR      Premature beats    Bruit of right carotid artery  Comments:  ULTRASOUND  Orders:  -     CV Ultrasound Bilateral Doppler Carotid; Future    Essential hypertension  Comments:  CONTROLLED    Aortic valve sclerosis  Comments:  MINOR    Hypercholesterolemia    Other orders  -     metoprolol succinate (TOPROL-XL) 25 MG 24 hr tablet; Take 1 tablet (25 mg total) by mouth once daily.  Dispense: 90 tablet; Refill: 3  RTC Low level/low impact aerobic exercise 5x's/wk. Heart healthy diet and risk factor modification.    See labs and med orders.    Aerobic exercise 5x's/wk. Heart healthy diet and risk factor modification.    See labs and med orders.

## 2022-10-03 ENCOUNTER — PATIENT MESSAGE (OUTPATIENT)
Dept: FAMILY MEDICINE | Facility: CLINIC | Age: 72
End: 2022-10-03

## 2022-10-04 ENCOUNTER — CLINICAL SUPPORT (OUTPATIENT)
Dept: CARDIOLOGY | Facility: HOSPITAL | Age: 72
End: 2022-10-04
Attending: INTERNAL MEDICINE
Payer: MEDICARE

## 2022-10-04 DIAGNOSIS — R09.89 BRUIT OF RIGHT CAROTID ARTERY: ICD-10-CM

## 2022-10-04 LAB
LEFT ARM DIASTOLIC BLOOD PRESSURE: 76 MMHG
LEFT ARM SYSTOLIC BLOOD PRESSURE: 136 MMHG
LEFT CBA DIAS: 15 CM/S
LEFT CBA SYS: 62 CM/S
LEFT CCA DIST DIAS: 16 CM/S
LEFT CCA DIST SYS: 63 CM/S
LEFT CCA MID DIAS: 16 CM/S
LEFT CCA MID SYS: 72 CM/S
LEFT CCA PROX DIAS: 19 CM/S
LEFT CCA PROX SYS: 84 CM/S
LEFT ECA DIAS: 8 CM/S
LEFT ECA SYS: 53 CM/S
LEFT ICA DIST DIAS: 40 CM/S
LEFT ICA DIST SYS: 98 CM/S
LEFT ICA MID DIAS: 31 CM/S
LEFT ICA MID SYS: 82 CM/S
LEFT ICA PROX DIAS: 18 CM/S
LEFT ICA PROX SYS: 66 CM/S
LEFT VERTEBRAL DIAS: 9 CM/S
LEFT VERTEBRAL SYS: 37 CM/S
OHS CV CAROTID RIGHT ICA EDV HIGHEST: 37
OHS CV CAROTID ULTRASOUND LEFT ICA/CCA RATIO: 1.56
OHS CV CAROTID ULTRASOUND RIGHT ICA/CCA RATIO: 1.48
OHS CV PV CAROTID LEFT HIGHEST CCA: 84
OHS CV PV CAROTID LEFT HIGHEST ICA: 98
OHS CV PV CAROTID RIGHT HIGHEST CCA: 68
OHS CV PV CAROTID RIGHT HIGHEST ICA: 95
OHS CV US CAROTID LEFT HIGHEST EDV: 40
RIGHT ARM DIASTOLIC BLOOD PRESSURE: 76 MMHG
RIGHT ARM SYSTOLIC BLOOD PRESSURE: 136 MMHG
RIGHT CBA DIAS: 18 CM/S
RIGHT CBA SYS: 54 CM/S
RIGHT CCA DIST DIAS: 18 CM/S
RIGHT CCA DIST SYS: 64 CM/S
RIGHT CCA MID DIAS: 17 CM/S
RIGHT CCA MID SYS: 67 CM/S
RIGHT CCA PROX DIAS: 14 CM/S
RIGHT CCA PROX SYS: 68 CM/S
RIGHT ECA DIAS: 11 CM/S
RIGHT ECA SYS: 89 CM/S
RIGHT ICA DIST DIAS: 35 CM/S
RIGHT ICA DIST SYS: 91 CM/S
RIGHT ICA MID DIAS: 37 CM/S
RIGHT ICA MID SYS: 95 CM/S
RIGHT ICA PROX DIAS: 29 CM/S
RIGHT ICA PROX SYS: 84 CM/S
RIGHT VERTEBRAL DIAS: 18 CM/S
RIGHT VERTEBRAL SYS: 61 CM/S

## 2022-10-04 PROCEDURE — 93880 CV US DOPPLER CAROTID (CUPID ONLY): ICD-10-PCS | Mod: 26,,, | Performed by: INTERNAL MEDICINE

## 2022-10-04 PROCEDURE — 93880 EXTRACRANIAL BILAT STUDY: CPT | Mod: 26,,, | Performed by: INTERNAL MEDICINE

## 2022-10-04 PROCEDURE — 93880 EXTRACRANIAL BILAT STUDY: CPT | Mod: PO

## 2022-10-11 ENCOUNTER — OFFICE VISIT (OUTPATIENT)
Dept: FAMILY MEDICINE | Facility: CLINIC | Age: 72
End: 2022-10-11
Payer: MEDICARE

## 2022-10-11 ENCOUNTER — INFUSION (OUTPATIENT)
Dept: INFUSION THERAPY | Facility: HOSPITAL | Age: 72
End: 2022-10-11
Attending: INTERNAL MEDICINE
Payer: MEDICARE

## 2022-10-11 VITALS
BODY MASS INDEX: 21.36 KG/M2 | DIASTOLIC BLOOD PRESSURE: 73 MMHG | RESPIRATION RATE: 18 BRPM | TEMPERATURE: 98 F | HEIGHT: 61 IN | SYSTOLIC BLOOD PRESSURE: 127 MMHG | HEART RATE: 65 BPM | OXYGEN SATURATION: 98 % | WEIGHT: 113.13 LBS

## 2022-10-11 VITALS
BODY MASS INDEX: 21.28 KG/M2 | DIASTOLIC BLOOD PRESSURE: 72 MMHG | WEIGHT: 112.69 LBS | HEIGHT: 61 IN | SYSTOLIC BLOOD PRESSURE: 122 MMHG | HEART RATE: 57 BPM | OXYGEN SATURATION: 98 %

## 2022-10-11 DIAGNOSIS — Z23 FLU VACCINE NEED: ICD-10-CM

## 2022-10-11 DIAGNOSIS — Z17.0 MALIGNANT NEOPLASM OF UPPER-OUTER QUADRANT OF RIGHT BREAST IN FEMALE, ESTROGEN RECEPTOR POSITIVE: ICD-10-CM

## 2022-10-11 DIAGNOSIS — M85.80 OSTEOPENIA, UNSPECIFIED LOCATION: ICD-10-CM

## 2022-10-11 DIAGNOSIS — I10 ESSENTIAL HYPERTENSION: Primary | ICD-10-CM

## 2022-10-11 DIAGNOSIS — R19.5 POSITIVE COLORECTAL CANCER SCREENING USING COLOGUARD TEST: ICD-10-CM

## 2022-10-11 DIAGNOSIS — C50.411 MALIGNANT NEOPLASM OF UPPER-OUTER QUADRANT OF RIGHT BREAST IN FEMALE, ESTROGEN RECEPTOR POSITIVE: ICD-10-CM

## 2022-10-11 DIAGNOSIS — F41.9 ANXIETY: ICD-10-CM

## 2022-10-11 DIAGNOSIS — Z79.811 LONG TERM CURRENT USE OF AROMATASE INHIBITOR: Primary | ICD-10-CM

## 2022-10-11 PROCEDURE — G0008 FLU VACCINE - QUADRIVALENT - HIGH DOSE (65+) PRESERVATIVE FREE IM: ICD-10-PCS | Mod: S$GLB,,, | Performed by: NURSE PRACTITIONER

## 2022-10-11 PROCEDURE — 90662 FLU VACCINE - QUADRIVALENT - HIGH DOSE (65+) PRESERVATIVE FREE IM: ICD-10-PCS | Mod: S$GLB,,, | Performed by: NURSE PRACTITIONER

## 2022-10-11 PROCEDURE — 99214 OFFICE O/P EST MOD 30 MIN: CPT | Mod: S$GLB,,, | Performed by: NURSE PRACTITIONER

## 2022-10-11 PROCEDURE — 90662 IIV NO PRSV INCREASED AG IM: CPT | Mod: S$GLB,,, | Performed by: NURSE PRACTITIONER

## 2022-10-11 PROCEDURE — 99214 PR OFFICE/OUTPT VISIT, EST, LEVL IV, 30-39 MIN: ICD-10-PCS | Mod: S$GLB,,, | Performed by: NURSE PRACTITIONER

## 2022-10-11 PROCEDURE — G0008 ADMIN INFLUENZA VIRUS VAC: HCPCS | Mod: S$GLB,,, | Performed by: NURSE PRACTITIONER

## 2022-10-11 PROCEDURE — 96372 THER/PROPH/DIAG INJ SC/IM: CPT

## 2022-10-11 PROCEDURE — 63600175 PHARM REV CODE 636 W HCPCS: Mod: JG | Performed by: NURSE PRACTITIONER

## 2022-10-11 RX ORDER — LORAZEPAM 0.5 MG/1
0.5 TABLET ORAL EVERY 12 HOURS PRN
Qty: 10 TABLET | Refills: 1 | Status: SHIPPED | OUTPATIENT
Start: 2022-10-11 | End: 2023-04-11 | Stop reason: SDUPTHER

## 2022-10-11 RX ORDER — LISINOPRIL 5 MG/1
5 TABLET ORAL DAILY
Qty: 90 TABLET | Refills: 1 | Status: SHIPPED | OUTPATIENT
Start: 2022-10-11

## 2022-10-11 RX ADMIN — DENOSUMAB 60 MG: 60 INJECTION SUBCUTANEOUS at 01:10

## 2022-10-11 NOTE — PROGRESS NOTES
SUBJECTIVE:    Patient ID: Cecilia Valenzuela is a 72 y.o. female.    Chief Complaint: Hypertension (No bottles//Pt is here for a check up and medication refills.//Decline colo//Pt would like her Flu vaccine.//ABRIL )    Pt here for regular f/u- HTN.    Patient reports overall she has been doing well.  States has not had any palpitations in the past year.  Had recent follow-up with Dr. Lisa, cardiology and he advised her she could reduce metoprolol to 25 mg daily.  She states with the reduction in metoprolol she did notice her blood pressure started trending up to the 150s so has been taking half a tablet lisinopril 10 mg over the past week and blood pressure is now running in the 120-130 range.    Had follow-up with heme Onc in August for breast CA and completed her 5 years of anastrozole some now off medication      Clinical Support on 10/04/2022   Component Date Value Ref Range Status    Left ICA/CCA ratio 10/04/2022 1.56   Final    Right ICA/CCA ratio 10/04/2022 1.48   Final    Left Highest ICA 10/04/2022 98.00   Final    Left Highest CCA 10/04/2022 84   Final    Right Highest ICA 10/04/2022 95.00   Final    Right Highest CCA 10/04/2022 68   Final    Right Highest EDV 10/04/2022 37.00   Final    LT Highest EDV 10/04/2022 40.00   Final    Right CCA prox sys 10/04/2022 68  cm/s Final    Right CCA prox basurto 10/04/2022 14  cm/s Final    Right CCA dist sys 10/04/2022 64  cm/s Final    Right CCA dist basurto 10/04/2022 18  cm/s Final    Right ICA prox sys 10/04/2022 84  cm/s Final    Right ICA prox basurto 10/04/2022 29  cm/s Final    Right ICA mid sys 10/04/2022 95  cm/s Final    Right ICA mid basurto 10/04/2022 37  cm/s Final    Right ICA dist sys 10/04/2022 91  cm/s Final    Right ICA dist basurto 10/04/2022 35  cm/s Final    Right ECA sys 10/04/2022 89  cm/s Final    Right vertebral sys 10/04/2022 61  cm/s Final    Left CCA prox sys 10/04/2022 84  cm/s Final    Left CCA prox basurto 10/04/2022 19  cm/s Final    Left CCA dist  sys 10/04/2022 63  cm/s Final    Left CCA dist basurto 10/04/2022 16  cm/s Final    Left ICA prox sys 10/04/2022 66  cm/s Final    Left ICA prox basurto 10/04/2022 18  cm/s Final    Left ICA mid sys 10/04/2022 82  cm/s Final    Left ICA mid basurto 10/04/2022 31  cm/s Final    Left ICA dist sys 10/04/2022 98  cm/s Final    Left ICA dist basurto 10/04/2022 40  cm/s Final    Left ECA sys 10/04/2022 53  cm/s Final    Left vertebral sys 10/04/2022 37  cm/s Final    Right arm systolic blood pressure 10/04/2022 136  mmHg Final    Right arm diastolic blood pressure 10/04/2022 76  mmHg Final    Left arm systolic blood pressure 10/04/2022 136  mmHg Final    Left arm diastolic blood pressure 10/04/2022 76  mmHg Final    Left CCA mid sys 10/04/2022 72  cm/s Final    Left CCA mid basurto 10/04/2022 16  cm/s Final    Right CCA mid sys 10/04/2022 67  cm/s Final    Right CCA mid basurto 10/04/2022 17  cm/s Final    Right vertebral basurto 10/04/2022 18  cm/s Final    Right ECA basurto 10/04/2022 11  cm/s Final    Left vertebral basurto 10/04/2022 9  cm/s Final    Left ECA basurto 10/04/2022 8  cm/s Final    Left CBA sys 10/04/2022 62  cm/s Final    Left CBA basurto 10/04/2022 15  cm/s Final    Rigth CBA sys 10/04/2022 54  cm/s Final    Right CBA basurto 10/04/2022 18  cm/s Final   Telephone on 08/23/2022   Component Date Value Ref Range Status    Glucose 09/07/2022 79  65 - 99 mg/dL Final    BUN 09/07/2022 14  7 - 25 mg/dL Final    Creatinine 09/07/2022 0.67  0.60 - 1.00 mg/dL Final    eGFR 09/07/2022 92  > OR = 60 mL/min/1.73m2 Final    BUN/Creatinine Ratio 09/07/2022 NOT APPLICABLE  6 - 22 (calc) Final    Sodium 09/07/2022 136  135 - 146 mmol/L Final    Potassium 09/07/2022 4.0  3.5 - 5.3 mmol/L Final    Chloride 09/07/2022 102  98 - 110 mmol/L Final    CO2 09/07/2022 24  20 - 32 mmol/L Final    Calcium 09/07/2022 9.6  8.6 - 10.4 mg/dL Final    Total Protein 09/07/2022 8.0  6.1 - 8.1 g/dL Final    Albumin 09/07/2022 4.7  3.6 - 5.1 g/dL Final    Globulin, Total  09/07/2022 3.3  1.9 - 3.7 g/dL (calc) Final    Albumin/Globulin Ratio 09/07/2022 1.4  1.0 - 2.5 (calc) Final    Total Bilirubin 09/07/2022 0.5  0.2 - 1.2 mg/dL Final    Alkaline Phosphatase 09/07/2022 51  37 - 153 U/L Final    AST 09/07/2022 19  10 - 35 U/L Final    ALT 09/07/2022 14  6 - 29 U/L Final    Cholesterol 09/07/2022 219 (H)  <200 mg/dL Final    HDL 09/07/2022 55  > OR = 50 mg/dL Final    Triglycerides 09/07/2022 161 (H)  <150 mg/dL Final    LDL Cholesterol 09/07/2022 134 (H)  mg/dL (calc) Final    HDL/Cholesterol Ratio 09/07/2022 4.0  <5.0 (calc) Final    Non HDL Chol. (LDL+VLDL) 09/07/2022 164 (H)  <130 mg/dL (calc) Final   Office Visit on 05/12/2022   Component Date Value Ref Range Status    POC Rapid COVID 05/12/2022 Negative  Negative Final     Acceptable 05/12/2022 Yes   Final    POC Molecular Influenza A Ag 05/12/2022 Negative  Negative, Not Reported Final    POC Molecular Influenza B Ag 05/12/2022 Negative  Negative, Not Reported Final     Acceptable 05/12/2022 Yes   Final   Office Visit on 04/18/2022   Component Date Value Ref Range Status    Cholesterol 05/17/2022 218 (H)  <200 mg/dL Final    HDL 05/17/2022 55  > OR = 50 mg/dL Final    Triglycerides 05/17/2022 129  <150 mg/dL Final    LDL Cholesterol 05/17/2022 138 (H)  mg/dL (calc) Final    HDL/Cholesterol Ratio 05/17/2022 4.0  <5.0 (calc) Final    Non HDL Chol. (LDL+VLDL) 05/17/2022 163 (H)  <130 mg/dL (calc) Final    TSH w/reflex to FT4 05/17/2022 3.98  0.40 - 4.50 mIU/L Final    Color, UA 05/17/2022 DARK YELLOW  YELLOW Final    Appearance, UA 05/17/2022 CLEAR  CLEAR Final    Specific Gravity, UA 05/17/2022 1.022  1.001 - 1.035 Final    pH, UA 05/17/2022 6.5  5.0 - 8.0 Final    Glucose, UA 05/17/2022 NEGATIVE  NEGATIVE Final    Bilirubin, UA 05/17/2022 NEGATIVE  NEGATIVE Final    Ketones, UA 05/17/2022 TRACE (A)  NEGATIVE Final    Occult Blood UA 05/17/2022 NEGATIVE  NEGATIVE Final    Protein, UA 05/17/2022 TRACE  (A)  NEGATIVE Final    Nitrite, UA 2022 NEGATIVE  NEGATIVE Final    Leukocytes, UA 2022 1+ (A)  NEGATIVE Final    WBC Casts, UA 2022 0-5  < OR = 5 /HPF Final    RBC Casts, UA 2022 NONE SEEN  < OR = 2 /HPF Final    Squam Epithel, UA 2022 NONE SEEN  < OR = 5 /HPF Final    Bacteria, UA 2022 NONE SEEN  NONE SEEN /HPF Final    Hyaline Casts, UA 2022 NONE SEEN  NONE SEEN /LPF Final    Reflexive Urine Culture 2022    Final    Urine Culture, Routine 2022    Final    Creatinine, Urine 2022 210  20 - 275 mg/dL Final    Microalb, Ur 2022 1.9  See Note: mg/dL Final    Microalb/Creat Ratio 2022 9  <30 mcg/mg creat Final       Past Medical History:   Diagnosis Date    Arthritis     Atherosclerosis     in left carotid artery    ER+ (estrogen receptor positive status) 2022    Fibrocystic breast     GERD (gastroesophageal reflux disease)     HER2-negative carcinoma of right breast 2022    Hyperlipidemia     Hypertension     Malignant neoplasm of upper-outer quadrant of right female breast 2017    right    Mitral valve prolapse     Neurofibromatosis     Osteopenia     Premature beats     Raynaud's disease     S/P mastectomy, right 2022    Seborrheic keratosis     Thyroid nodule     Tinnitus     Undiagnosed cardiac murmurs 2019     Past Surgical History:   Procedure Laterality Date    BACK SURGERY      BREAST BIOPSY Right     CATARACT EXTRACTION Bilateral      SECTION      x5    DILATION AND CURETTAGE OF UTERUS      miss Ab    HYSTERECTOMY      MASTECTOMY Right      Family History   Problem Relation Age of Onset    Rheum arthritis Mother     Breast cancer Mother         over 85    Hypertension Mother     Heart disease Mother     Parkinsonism Father     Cancer Father     Breast cancer Sister 40        DCIS    Breast cancer Maternal Aunt         60's    Breast cancer Sister 50        DCIS    Ovarian cancer Neg Hx     Colon  cancer Neg Hx     Melanoma Neg Hx        The 10-year CVD risk score (KIKI'Agoalicia, et al., 2008) is: 14.9%    Values used to calculate the score:      Age: 72 years      Sex: Female      Diabetic: No      Tobacco smoker: No      Systolic Blood Pressure: 127 mmHg      Is BP treated: Yes      HDL Cholesterol: 55 mg/dL      Total Cholesterol: 219 mg/dL     Marital Status:   Alcohol History:  reports no history of alcohol use.  Tobacco History:  reports that she has never smoked. She has never used smokeless tobacco.  Drug History:  reports no history of drug use.    Health Maintenance Topics with due status: Not Due       Topic Last Completion Date    DEXA Scan 02/17/2021    Lipid Panel 09/07/2022     Immunization History   Administered Date(s) Administered    COVID-19, MRNA, LN-S, PF (Pfizer) (Purple Cap) 01/09/2021, 01/30/2021, 08/18/2021    Influenza - High Dose - PF (65 years and older) 10/06/2014, 11/12/2015, 10/11/2018, 11/01/2019    Influenza - Quadrivalent - High Dose - PF (65 years and older) 09/09/2020, 09/14/2021, 10/11/2022    Pneumococcal Conjugate - 13 Valent 01/21/2020    Pneumococcal Polysaccharide - 23 Valent 10/06/2014       Review of patient's allergies indicates:   Allergen Reactions    Methylprednisolone      Other reaction(s): Heart palpitations  PT STATES SHE HAS A REACTION TO ALL STEROIDS DUE TO HER DX; OF MVP..    Zofran [ondansetron hcl (pf)] Nausea Only    Corticosteroids (glucocorticoids) Other (See Comments)     Heart palpitations    Dilaudid [hydromorphone] Nausea And Vomiting     Does not help with pain    Fentanyl     Neosporin (neomycin-polymyx)     Norvasc [amlodipine]     Statins-hmg-coa reductase inhibitors      myalgias    Tetracyclines     Zoloft [sertraline]      Increased anxiety     Augmentin [amoxicillin-pot clavulanate] Nausea And Vomiting       Current Outpatient Medications:     aspirin (ECOTRIN) 81 MG EC tablet, Take 81 mg by mouth once daily., Disp: , Rfl:      azelastine (ASTELIN) 137 mcg (0.1 %) nasal spray, 1 spray by Nasal route as needed for Rhinitis. , Disp: , Rfl:     calcium carbonate/vitamin D3 (CALCIUM 600 + D,3, ORAL), Take 1 tablet by mouth 2 (two) times a day., Disp: , Rfl:     denosumab (PROLIA) 60 mg/mL Syrg, Inject 60 mg into the skin every 6 (six) months. , Disp: , Rfl:     lisinopriL (PRINIVIL,ZESTRIL) 5 MG tablet, Take 1 tablet (5 mg total) by mouth once daily., Disp: 90 tablet, Rfl: 1    LORazepam (ATIVAN) 0.5 MG tablet, Take 1 tablet (0.5 mg total) by mouth every 12 (twelve) hours as needed for Anxiety., Disp: 10 tablet, Rfl: 1    metoprolol succinate (TOPROL-XL) 25 MG 24 hr tablet, Take 1 tablet (25 mg total) by mouth once daily., Disp: 90 tablet, Rfl: 3    multivitamin (THERAGRAN) per tablet, Take 1 tablet by mouth once daily., Disp: , Rfl:     omeprazole (PRILOSEC OTC) 20 MG tablet, Take 20 mg by mouth 2 (two) times daily before meals. Pt states that she take 20 mg in the morning ad 40 mg at night time., Disp: , Rfl:     PATADAY 0.2 % Drop, Place 1 drop into both eyes daily as needed (allergies). , Disp: , Rfl: 0  No current facility-administered medications for this visit.    Review of Systems   Constitutional:  Negative for activity change, chills, fatigue, fever and unexpected weight change.   HENT:  Negative for congestion, ear pain, postnasal drip, rhinorrhea, sinus pain, sore throat, trouble swallowing and voice change.    Respiratory:  Negative for cough, shortness of breath and wheezing.    Cardiovascular:  Negative for chest pain, palpitations and leg swelling.   Gastrointestinal:  Negative for abdominal pain, diarrhea, nausea and vomiting.   Genitourinary:  Negative for dysuria and hematuria.   Musculoskeletal:  Negative for arthralgias, joint swelling, myalgias and neck pain.   Skin:  Negative for rash.   Neurological:  Negative for dizziness, weakness and headaches.   Psychiatric/Behavioral:  Negative for dysphoric mood. The patient is  "nervous/anxious (occasional anxiety, rarely takes lorazepam).         Objective:      Vitals:    10/11/22 1059   BP: 122/72   Pulse: (!) 57   SpO2: 98%   Weight: 51.1 kg (112 lb 11.2 oz)   Height: 5' 1" (1.549 m)     Physical Exam  Constitutional:       General: She is not in acute distress.     Appearance: Normal appearance. She is well-developed.   HENT:      Head: Normocephalic and atraumatic.      Right Ear: Tympanic membrane and ear canal normal.      Left Ear: Tympanic membrane and ear canal normal.   Neck:      Vascular: No carotid bruit.   Cardiovascular:      Rate and Rhythm: Normal rate and regular rhythm.      Heart sounds: No murmur heard.    No friction rub.   Pulmonary:      Effort: Pulmonary effort is normal. No respiratory distress.      Breath sounds: Normal breath sounds. No wheezing or rales.   Abdominal:      Palpations: Abdomen is soft.      Tenderness: There is no abdominal tenderness.   Musculoskeletal:      Cervical back: Neck supple.      Right lower leg: No edema.      Left lower leg: No edema.   Skin:     General: Skin is warm and dry.   Neurological:      General: No focal deficit present.      Mental Status: She is alert and oriented to person, place, and time.   Psychiatric:         Mood and Affect: Mood normal.         Assessment:       1. Essential hypertension    2. Anxiety    3. Positive colorectal cancer screening using Cologuard test    4. Flu vaccine need           Plan:       Essential hypertension  Comments:  BP stable on current regimen  Orders:  -     lisinopriL (PRINIVIL,ZESTRIL) 5 MG tablet; Take 1 tablet (5 mg total) by mouth once daily.  Dispense: 90 tablet; Refill: 1  -     CBC Auto Differential; Future; Expected date: 10/11/2022  -     Comprehensive Metabolic Panel; Future; Expected date: 10/11/2022  -     Lipid Panel; Future; Expected date: 10/11/2022  -     Urinalysis, Reflex to Urine Culture Urine, Clean Catch; Future; Expected date: 10/11/2022  -     TSH w/reflex to " FT4; Future; Expected date: 10/11/2022    Anxiety  Comments:  Overall doing well and rarely requiring lorazepam.   reviewed and last filled 9/2021  Orders:  -     LORazepam (ATIVAN) 0.5 MG tablet; Take 1 tablet (0.5 mg total) by mouth every 12 (twelve) hours as needed for Anxiety.  Dispense: 10 tablet; Refill: 1    Positive colorectal cancer screening using Cologuard test  Comments:  Pt with history of positive Cologuard however she declines cscope.  States she feels Cologuard was positive d/t hemorrhoid- cautioned cannot r/o colon CA without cscope however she continues to decline    Flu vaccine need  -     Influenza - Quadrivalent - High Dose (65+) (PF) (IM)    Follow up in about 6 months (around 4/11/2023) for HTN, Labs before next visit.          Counseled on age and gender appropriate medical preventative services, including cancer screenings, immunizations, overall nutritional health, need for a consistent exercise regimen and an overall push towards maintaining a vigorous and active lifestyle.      10/11/2022 Cecilia Shaikh NP

## 2022-10-11 NOTE — PLAN OF CARE
Problem: Fatigue  Goal: Improved Activity Tolerance  Intervention: Promote Improved Energy  Flowsheets (Taken 10/11/2022 1301)  Fatigue Management: frequent rest breaks encouraged  Activity Management: Ambulated -L4

## 2022-10-12 ENCOUNTER — PATIENT MESSAGE (OUTPATIENT)
Dept: FAMILY MEDICINE | Facility: CLINIC | Age: 72
End: 2022-10-12

## 2022-11-08 ENCOUNTER — TELEPHONE (OUTPATIENT)
Dept: FAMILY MEDICINE | Facility: CLINIC | Age: 72
End: 2022-11-08

## 2022-11-08 ENCOUNTER — OFFICE VISIT (OUTPATIENT)
Dept: FAMILY MEDICINE | Facility: CLINIC | Age: 72
End: 2022-11-08
Payer: MEDICARE

## 2022-11-08 ENCOUNTER — PATIENT MESSAGE (OUTPATIENT)
Dept: FAMILY MEDICINE | Facility: CLINIC | Age: 72
End: 2022-11-08

## 2022-11-08 VITALS
BODY MASS INDEX: 21.52 KG/M2 | HEIGHT: 61 IN | WEIGHT: 114 LBS | TEMPERATURE: 99 F | DIASTOLIC BLOOD PRESSURE: 76 MMHG | HEART RATE: 78 BPM | SYSTOLIC BLOOD PRESSURE: 136 MMHG

## 2022-11-08 DIAGNOSIS — I10 ESSENTIAL HYPERTENSION: ICD-10-CM

## 2022-11-08 DIAGNOSIS — F41.9 ANXIETY: ICD-10-CM

## 2022-11-08 DIAGNOSIS — T50.Z95A SIDE EFFECTS OF VACCINATION, INITIAL ENCOUNTER: Primary | ICD-10-CM

## 2022-11-08 PROCEDURE — 99213 OFFICE O/P EST LOW 20 MIN: CPT | Mod: S$GLB,,, | Performed by: PHYSICIAN ASSISTANT

## 2022-11-08 PROCEDURE — 99213 PR OFFICE/OUTPT VISIT, EST, LEVL III, 20-29 MIN: ICD-10-PCS | Mod: S$GLB,,, | Performed by: PHYSICIAN ASSISTANT

## 2022-11-08 NOTE — PROGRESS NOTES
"  SUBJECTIVE:    Patient ID: Cecilia Valenzuela is a 72 y.o. female.    Chief Complaint: Headache (No bottles, pt had shingles shot yesterday, fever , declined Colonoscopy, abc )    Pt is a 72-year-old female who presents today for a sick visit with a CC of "headaches, myalgia, and fevers." She reports receiving the 1st dose of the shingles vaccine yesterday and these symptoms started acutely yesterday evening.  These symptoms are constantly present, but not worsening. The highest temperature recorded was 99.1 ° F. Nothing tends to exacerbate the reported symptoms.  She has not attempted any OTC measures at this time to help alleviate these symptoms.  She denies any SOB, coughing, diarrhea, nausea, or vomiting.      Clinical Support on 10/04/2022   Component Date Value Ref Range Status    Left ICA/CCA ratio 10/04/2022 1.56   Final    Right ICA/CCA ratio 10/04/2022 1.48   Final    Left Highest ICA 10/04/2022 98.00   Final    Left Highest CCA 10/04/2022 84   Final    Right Highest ICA 10/04/2022 95.00   Final    Right Highest CCA 10/04/2022 68   Final    Right Highest EDV 10/04/2022 37.00   Final    LT Highest EDV 10/04/2022 40.00   Final    Right CCA prox sys 10/04/2022 68  cm/s Final    Right CCA prox basurto 10/04/2022 14  cm/s Final    Right CCA dist sys 10/04/2022 64  cm/s Final    Right CCA dist basurto 10/04/2022 18  cm/s Final    Right ICA prox sys 10/04/2022 84  cm/s Final    Right ICA prox basurto 10/04/2022 29  cm/s Final    Right ICA mid sys 10/04/2022 95  cm/s Final    Right ICA mid basurto 10/04/2022 37  cm/s Final    Right ICA dist sys 10/04/2022 91  cm/s Final    Right ICA dist basurto 10/04/2022 35  cm/s Final    Right ECA sys 10/04/2022 89  cm/s Final    Right vertebral sys 10/04/2022 61  cm/s Final    Left CCA prox sys 10/04/2022 84  cm/s Final    Left CCA prox basurto 10/04/2022 19  cm/s Final    Left CCA dist sys 10/04/2022 63  cm/s Final    Left CCA dist basurto 10/04/2022 16  cm/s Final    Left ICA prox sys " 10/04/2022 66  cm/s Final    Left ICA prox basurto 10/04/2022 18  cm/s Final    Left ICA mid sys 10/04/2022 82  cm/s Final    Left ICA mid basurto 10/04/2022 31  cm/s Final    Left ICA dist sys 10/04/2022 98  cm/s Final    Left ICA dist basurto 10/04/2022 40  cm/s Final    Left ECA sys 10/04/2022 53  cm/s Final    Left vertebral sys 10/04/2022 37  cm/s Final    Right arm systolic blood pressure 10/04/2022 136  mmHg Final    Right arm diastolic blood pressure 10/04/2022 76  mmHg Final    Left arm systolic blood pressure 10/04/2022 136  mmHg Final    Left arm diastolic blood pressure 10/04/2022 76  mmHg Final    Left CCA mid sys 10/04/2022 72  cm/s Final    Left CCA mid basurto 10/04/2022 16  cm/s Final    Right CCA mid sys 10/04/2022 67  cm/s Final    Right CCA mid basurto 10/04/2022 17  cm/s Final    Right vertebral basurto 10/04/2022 18  cm/s Final    Right ECA basurto 10/04/2022 11  cm/s Final    Left vertebral basurto 10/04/2022 9  cm/s Final    Left ECA basurto 10/04/2022 8  cm/s Final    Left CBA sys 10/04/2022 62  cm/s Final    Left CBA basurto 10/04/2022 15  cm/s Final    Rigth CBA sys 10/04/2022 54  cm/s Final    Right CBA basurto 10/04/2022 18  cm/s Final   Telephone on 08/23/2022   Component Date Value Ref Range Status    Glucose 09/07/2022 79  65 - 99 mg/dL Final    BUN 09/07/2022 14  7 - 25 mg/dL Final    Creatinine 09/07/2022 0.67  0.60 - 1.00 mg/dL Final    eGFR 09/07/2022 92  > OR = 60 mL/min/1.73m2 Final    BUN/Creatinine Ratio 09/07/2022 NOT APPLICABLE  6 - 22 (calc) Final    Sodium 09/07/2022 136  135 - 146 mmol/L Final    Potassium 09/07/2022 4.0  3.5 - 5.3 mmol/L Final    Chloride 09/07/2022 102  98 - 110 mmol/L Final    CO2 09/07/2022 24  20 - 32 mmol/L Final    Calcium 09/07/2022 9.6  8.6 - 10.4 mg/dL Final    Total Protein 09/07/2022 8.0  6.1 - 8.1 g/dL Final    Albumin 09/07/2022 4.7  3.6 - 5.1 g/dL Final    Globulin, Total 09/07/2022 3.3  1.9 - 3.7 g/dL (calc) Final    Albumin/Globulin Ratio 09/07/2022 1.4  1.0 - 2.5 (calc)  Final    Total Bilirubin 2022 0.5  0.2 - 1.2 mg/dL Final    Alkaline Phosphatase 2022 51  37 - 153 U/L Final    AST 2022 19  10 - 35 U/L Final    ALT 2022 14  6 - 29 U/L Final    Cholesterol 2022 219 (H)  <200 mg/dL Final    HDL 2022 55  > OR = 50 mg/dL Final    Triglycerides 2022 161 (H)  <150 mg/dL Final    LDL Cholesterol 2022 134 (H)  mg/dL (calc) Final    HDL/Cholesterol Ratio 2022 4.0  <5.0 (calc) Final    Non HDL Chol. (LDL+VLDL) 2022 164 (H)  <130 mg/dL (calc) Final   Office Visit on 2022   Component Date Value Ref Range Status    POC Rapid COVID 2022 Negative  Negative Final     Acceptable 2022 Yes   Final    POC Molecular Influenza A Ag 2022 Negative  Negative, Not Reported Final    POC Molecular Influenza B Ag 2022 Negative  Negative, Not Reported Final     Acceptable 2022 Yes   Final       Past Medical History:   Diagnosis Date    Arthritis     Atherosclerosis     in left carotid artery    ER+ (estrogen receptor positive status) 2022    Fibrocystic breast     GERD (gastroesophageal reflux disease)     HER2-negative carcinoma of right breast 2022    Hyperlipidemia     Hypertension     Malignant neoplasm of upper-outer quadrant of right female breast 2017    right    Mitral valve prolapse     Neurofibromatosis     Osteopenia     Premature beats     Raynaud's disease     S/P mastectomy, right 2022    Seborrheic keratosis     Thyroid nodule     Tinnitus     Undiagnosed cardiac murmurs 2019     Past Surgical History:   Procedure Laterality Date    BACK SURGERY      BREAST BIOPSY Right 2017    CATARACT EXTRACTION Bilateral      SECTION      x5    DILATION AND CURETTAGE OF UTERUS      miss Ab    HYSTERECTOMY      MASTECTOMY Right 2017     Family History   Problem Relation Age of Onset    Rheum arthritis Mother     Breast cancer Mother         over 85     Hypertension Mother     Heart disease Mother     Parkinsonism Father     Cancer Father     Breast cancer Sister 40        DCIS    Breast cancer Maternal Aunt         60's    Breast cancer Sister 50        DCIS    Ovarian cancer Neg Hx     Colon cancer Neg Hx     Melanoma Neg Hx        Marital Status:   Alcohol History:  reports no history of alcohol use.  Tobacco History:  reports that she has never smoked. She has never used smokeless tobacco.  Drug History:  reports no history of drug use.    Health Maintenance Topics with due status: Not Due       Topic Last Completion Date    DEXA Scan 02/17/2021    Lipid Panel 09/07/2022     Immunization History   Administered Date(s) Administered    COVID-19, MRNA, LN-S, PF (Pfizer) (Purple Cap) 01/09/2021, 01/30/2021, 08/18/2021    Influenza - High Dose - PF (65 years and older) 10/06/2014, 11/12/2015, 10/11/2018, 11/01/2019    Influenza - Quadrivalent - High Dose - PF (65 years and older) 09/09/2020, 09/14/2021, 10/11/2022    Pneumococcal Conjugate - 13 Valent 01/21/2020    Pneumococcal Polysaccharide - 23 Valent 10/06/2014       Review of patient's allergies indicates:   Allergen Reactions    Methylprednisolone      Other reaction(s): Heart palpitations  PT STATES SHE HAS A REACTION TO ALL STEROIDS DUE TO HER DX; OF MVP..    Zofran [ondansetron hcl (pf)] Nausea Only    Corticosteroids (glucocorticoids) Other (See Comments)     Heart palpitations    Dilaudid [hydromorphone] Nausea And Vomiting     Does not help with pain    Fentanyl     Neosporin (neomycin-polymyx)     Norvasc [amlodipine]     Statins-hmg-coa reductase inhibitors      myalgias    Tetracyclines     Zoloft [sertraline]      Increased anxiety     Augmentin [amoxicillin-pot clavulanate] Nausea And Vomiting       Current Outpatient Medications:     aspirin (ECOTRIN) 81 MG EC tablet, Take 81 mg by mouth once daily., Disp: , Rfl:     azelastine (ASTELIN) 137 mcg (0.1 %) nasal spray, 1 spray by Nasal route as  "needed for Rhinitis. , Disp: , Rfl:     calcium carbonate/vitamin D3 (CALCIUM 600 + D,3, ORAL), Take 1 tablet by mouth 2 (two) times a day., Disp: , Rfl:     denosumab (PROLIA) 60 mg/mL Syrg, Inject 60 mg into the skin every 6 (six) months. , Disp: , Rfl:     lisinopriL (PRINIVIL,ZESTRIL) 5 MG tablet, Take 1 tablet (5 mg total) by mouth once daily., Disp: 90 tablet, Rfl: 1    LORazepam (ATIVAN) 0.5 MG tablet, Take 1 tablet (0.5 mg total) by mouth every 12 (twelve) hours as needed for Anxiety., Disp: 10 tablet, Rfl: 1    metoprolol succinate (TOPROL-XL) 25 MG 24 hr tablet, Take 1 tablet (25 mg total) by mouth once daily., Disp: 90 tablet, Rfl: 3    multivitamin (THERAGRAN) per tablet, Take 1 tablet by mouth once daily., Disp: , Rfl:     omeprazole (PRILOSEC OTC) 20 MG tablet, Take 20 mg by mouth 2 (two) times daily before meals. Pt states that she take 20 mg in the morning ad 40 mg at night time., Disp: , Rfl:     PATADAY 0.2 % Drop, Place 1 drop into both eyes daily as needed (allergies). , Disp: , Rfl: 0    Review of Systems   Constitutional:  Positive for fatigue and fever. Negative for activity change and chills.   HENT:  Negative for congestion, ear discharge, ear pain, postnasal drip, rhinorrhea, sore throat, trouble swallowing and voice change.    Respiratory:  Negative for apnea, cough, chest tightness, shortness of breath and wheezing.    Cardiovascular:  Negative for chest pain, palpitations and leg swelling.   Gastrointestinal:  Negative for abdominal pain, constipation, diarrhea, nausea and vomiting.   Genitourinary: Negative.    Musculoskeletal:  Positive for myalgias. Negative for arthralgias.   Neurological:  Positive for headaches. Negative for dizziness, syncope, weakness and light-headedness.   Psychiatric/Behavioral:  Negative for behavioral problems.         Objective:      Vitals:    11/08/22 1208   BP: 136/76   Pulse: 78   Temp: 98.8 °F (37.1 °C)   Weight: 51.7 kg (114 lb)   Height: 5' 1" (1.549 " m)     Physical Exam  Vitals and nursing note reviewed.   Constitutional:       General: She is not in acute distress.     Appearance: Normal appearance. She is normal weight. She is not ill-appearing, toxic-appearing or diaphoretic.   HENT:      Head: Normocephalic and atraumatic.      Right Ear: Tympanic membrane, ear canal and external ear normal. There is no impacted cerumen.      Left Ear: Tympanic membrane, ear canal and external ear normal. There is no impacted cerumen.      Nose: Nose normal. No rhinorrhea.      Mouth/Throat:      Mouth: Mucous membranes are moist.      Pharynx: Oropharynx is clear. No oropharyngeal exudate or posterior oropharyngeal erythema.   Eyes:      General: No scleral icterus.     Extraocular Movements: Extraocular movements intact.      Conjunctiva/sclera: Conjunctivae normal.   Cardiovascular:      Rate and Rhythm: Normal rate and regular rhythm.      Pulses: Normal pulses.      Heart sounds: Normal heart sounds. No murmur heard.    No friction rub.   Pulmonary:      Effort: Pulmonary effort is normal. No respiratory distress.      Breath sounds: Normal breath sounds. No wheezing, rhonchi or rales.   Abdominal:      Palpations: Abdomen is soft.      Tenderness: There is no abdominal tenderness. There is no guarding or rebound.   Musculoskeletal:         General: Normal range of motion.        Arms:       Right lower leg: No edema.      Left lower leg: No edema.   Lymphadenopathy:      Cervical: No cervical adenopathy.   Skin:     General: Skin is warm and dry.      Coloration: Skin is not jaundiced.      Findings: No rash.      Comments: Neurofibromatosis masses noted in the bilateral upper extremities.   Neurological:      General: No focal deficit present.      Mental Status: She is alert and oriented to person, place, and time. Mental status is at baseline.      Cranial Nerves: No cranial nerve deficit.      Motor: No weakness.   Psychiatric:         Mood and Affect: Mood  normal.         Behavior: Behavior normal. Behavior is cooperative.         Thought Content: Thought content normal.         Judgment: Judgment normal.         Assessment:       1. Side effects of vaccination, initial encounter    2. Anxiety    3. Essential hypertension           Plan:       Side effects of vaccination, initial encounter  Based on patient's presentation, vital signs, and physical exam findings, strong suspicion of reported myalgia, headaches, and fever (99.1° F) is secondary to transient side effects after receiving the first dose of the Shingrix vaccine yesterday.  Reassurance provided.  Rest, remain well hydrated, start OTC Tylenol extra-strength 2 tablets q8h.  Okay to alternate with p.r.n. Advil.  If symptoms persist or worsen, contact the office.  Encourage completing the Shingrix series and receiving the 2nd dose in 8 weeks.    Anxiety    Essential hypertension  Stable and well controlled.  Continue as is.    Follow up if symptoms worsen or fail to improve, for Headache, fever, myalgia after Shingrix vaccine..        11/8/2022 Laci White PA-C

## 2022-11-08 NOTE — TELEPHONE ENCOUNTER
----- Message from Trish Leon MA sent at 11/8/2022 10:04 AM CST -----  Pt calling to be seen today for low grade fever and headache. [phone]

## 2022-11-17 ENCOUNTER — OFFICE VISIT (OUTPATIENT)
Dept: URBAN - METROPOLITAN AREA CLINIC 113 | Facility: CLINIC | Age: 72
End: 2022-11-17

## 2023-02-02 ENCOUNTER — OFFICE VISIT (OUTPATIENT)
Dept: OTOLARYNGOLOGY | Facility: CLINIC | Age: 73
End: 2023-02-02
Payer: MEDICARE

## 2023-02-02 VITALS
HEIGHT: 61 IN | DIASTOLIC BLOOD PRESSURE: 54 MMHG | BODY MASS INDEX: 21.52 KG/M2 | TEMPERATURE: 98 F | SYSTOLIC BLOOD PRESSURE: 92 MMHG | WEIGHT: 114 LBS

## 2023-02-02 DIAGNOSIS — H61.22 IMPACTED CERUMEN OF LEFT EAR: Primary | ICD-10-CM

## 2023-02-02 DIAGNOSIS — H93.13 TINNITUS OF BOTH EARS: ICD-10-CM

## 2023-02-02 PROCEDURE — 99999 PR PBB SHADOW E&M-EST. PATIENT-LVL III: ICD-10-PCS | Mod: PBBFAC,,, | Performed by: PHYSICIAN ASSISTANT

## 2023-02-02 PROCEDURE — 99213 OFFICE O/P EST LOW 20 MIN: CPT | Mod: S$PBB,25,, | Performed by: PHYSICIAN ASSISTANT

## 2023-02-02 PROCEDURE — 99999 PR PBB SHADOW E&M-EST. PATIENT-LVL III: CPT | Mod: PBBFAC,,, | Performed by: PHYSICIAN ASSISTANT

## 2023-02-02 PROCEDURE — 99213 OFFICE O/P EST LOW 20 MIN: CPT | Mod: PBBFAC,PO | Performed by: PHYSICIAN ASSISTANT

## 2023-02-02 PROCEDURE — 69210 EAR CERUMEN REMOVAL: ICD-10-PCS | Mod: S$PBB,,, | Performed by: PHYSICIAN ASSISTANT

## 2023-02-02 PROCEDURE — 69210 REMOVE IMPACTED EAR WAX UNI: CPT | Mod: PBBFAC,PO | Performed by: PHYSICIAN ASSISTANT

## 2023-02-02 PROCEDURE — 99213 PR OFFICE/OUTPT VISIT, EST, LEVL III, 20-29 MIN: ICD-10-PCS | Mod: S$PBB,25,, | Performed by: PHYSICIAN ASSISTANT

## 2023-02-02 PROCEDURE — 69210 REMOVE IMPACTED EAR WAX UNI: CPT | Mod: S$PBB,,, | Performed by: PHYSICIAN ASSISTANT

## 2023-02-02 NOTE — PROCEDURES
Ear Cerumen Removal    Date/Time: 2/2/2023 2:00 PM  Performed by: Asher Gaitan PA-C  Authorized by: Asher Gaitan PA-C     Consent Done?:  Yes (Verbal)    Local anesthetic:  None  Location details:  Left ear  Procedure type: curette    Cerumen  Removal Results:  Cerumen completely removed  Patient tolerance:  Patient tolerated the procedure well with no immediate complications

## 2023-02-02 NOTE — PROGRESS NOTES
[facility]    Subjective:      Patient: Cecilia Valenzuela Patient PCP: Buddy Mathew MD         :  1950     Sex:  female      MRN:  [id number]          Date of Visit: 2023      Chief Complaint: Ear wax    Interval History 2023: Pt presents to clinic for ear cleaning. Pt did VPT and weaned off of meclizine and says this helped a lot with her dizziness and she does at home exercises. Pt states that she has left aural fullness and has sensation that ear wax is in her left ear. Pt states she had audiogram 2-3 years ago that showed normal hearing. Pt states that she has had longstanding bilateral tinnitus. Pt has no other complaints at this time.     Patient ID 2022: Cecilia Valenzuela is a 72 y.o. female who was self-referred for  ear cleaning . Pt previously seen by ENT MD Dr. Kaleb De La Torre and Marco A Lomeli for cerumen impaction and dizziness. Pt states she was previously told that she had vestibular weakness on the left side. Pt completed VPT and pt takes meclizine 12.5mg every night. Pt states she had flair up of dizziness last around 1 year ago and would like consult with VPT to go over exercises. Pt states that her ears have been getting clogged for around 1 week or so. Pt states the night before last she started having dizziness. Pt states she went she went through [facility] and MRI and she was told that she had vestibular weakness without retrocochlear pathology. Pt denies fever/chills, ear pain or ear discharge. There is not a prior history of ear surgery. Pt states that she has benign bony growth behind left ear that has not grown in size and there were plans to have it shaved down with elective surgery with Dr. Lomeli, but pt never followed up for surgery.     Past Medical History  She has a past medical history of Arthritis, Atherosclerosis, ER+ (estrogen receptor positive status), Fibrocystic breast, GERD (gastroesophageal reflux disease), HER2-negative carcinoma of right breast,  Hyperlipidemia, Hypertension, Malignant neoplasm of upper-outer quadrant of right female breast, Mitral valve prolapse, Neurofibromatosis, Osteopenia, Premature beats, Raynaud's disease, S/P mastectomy, right, Seborrheic keratosis, Thyroid nodule, Tinnitus, and Undiagnosed cardiac murmurs.    Family History  Her family history includes Breast cancer in her maternal aunt and mother; Breast cancer (age of onset: 40) in her sister; Breast cancer (age of onset: 50) in her sister; Cancer in her father; Heart disease in her mother; Hypertension in her mother; Parkinsonism in her father; Rheum arthritis in her mother.    Past Surgical History:   Procedure Laterality Date    BACK SURGERY      BREAST BIOPSY Right 2017    CATARACT EXTRACTION Bilateral      SECTION      x5    DILATION AND CURETTAGE OF UTERUS      miss Ab    HYSTERECTOMY      MASTECTOMY Right 2017     Social History     Tobacco Use    Smoking status: Never    Smokeless tobacco: Never   Substance and Sexual Activity    Alcohol use: No    Drug use: No    Sexual activity: Never     Partners: Male     Medications  She has a current medication list which includes the following prescription(s): aspirin, azelastine, calcium carbonate/vitamin d3, denosumab, lisinopril, lorazepam, metoprolol succinate, multivitamin, omeprazole, and pataday.    Review of patient's allergies indicates:   Allergen Reactions    Methylprednisolone      Other reaction(s): Heart palpitations  PT STATES SHE HAS A REACTION TO ALL STEROIDS DUE TO HER DX; OF MVP..    Zofran [ondansetron hcl (pf)] Nausea Only    Corticosteroids (glucocorticoids) Other (See Comments)     Heart palpitations    Dilaudid [hydromorphone] Nausea And Vomiting     Does not help with pain    Fentanyl     Neosporin (neomycin-polymyx)     Norvasc [amlodipine]     Statins-hmg-coa reductase inhibitors      myalgias    Tetracyclines     Zoloft [sertraline]      Increased anxiety     Augmentin [amoxicillin-pot  clavulanate] Nausea And Vomiting     All medications, allergies, and past history have been reviewed.    Objective:      Vitals:  Vitals - 1 value per visit 11/8/2022 2/2/2023 2/2/2023   SYSTOLIC 136 - 92   DIASTOLIC 76 - 54   Pulse 78 - -   Temp 98.8 - 97.6   Resp - - -   SPO2 - - -   Weight (lb) 114 - 114   Weight (kg) 51.71 - 51.71   Height 61 - 61   BMI (Calculated) 21.6 - 21.6   VISIT REPORT - - -   Pain Score  - 0 -   Some recent data might be hidden       Body surface area is 1.49 meters squared.    Physical Exam  Constitutional:       General: She is not in acute distress.     Appearance: Normal appearance. She is not ill-appearing.   HENT:      Head: Normocephalic and atraumatic.      Right Ear: Tympanic membrane, ear canal and external ear normal.      Left Ear: Tympanic membrane, ear canal and external ear normal. There is impacted cerumen.      Ears:        Comments: Scant amount of dry skin and cerumen cleared from right ear with curette under microscope in office.      Nose: Nose normal.      Mouth/Throat:      Lips: Pink. No lesions.      Mouth: Mucous membranes are moist. No oral lesions.      Tongue: No lesions.      Palate: No lesions.      Pharynx: Oropharynx is clear. Uvula midline. No pharyngeal swelling, oropharyngeal exudate, posterior oropharyngeal erythema or uvula swelling.   Eyes:      General:         Right eye: No discharge.         Left eye: No discharge.      Extraocular Movements: Extraocular movements intact.      Conjunctiva/sclera: Conjunctivae normal.   Pulmonary:      Effort: Pulmonary effort is normal.   Neurological:      General: No focal deficit present.      Mental Status: She is alert and oriented to person, place, and time. Mental status is at baseline.   Psychiatric:         Mood and Affect: Mood normal.         Behavior: Behavior normal.         Thought Content: Thought content normal.         Judgment: Judgment normal.     Ear Cerumen Removal     Date/Time: 2/2/2023  2:00 PM  Performed by: Asher Gaitan PA-C  Authorized by: Asher Gaitan PA-C      Consent Done?:  Yes (Verbal)     Local anesthetic:  None  Location details:  Left ear  Procedure type: curette    Cerumen  Removal Results:  Cerumen completely removed  Patient tolerance:  Patient tolerated the procedure well with no immediate complications    Labs:  WBC   Date Value Ref Range Status   03/28/2022 6.6 3.8 - 10.8 Thousand/uL Final     Platelets   Date Value Ref Range Status   03/28/2022 211 140 - 400 Thousand/uL Final     Creatinine   Date Value Ref Range Status   09/07/2022 0.67 0.60 - 1.00 mg/dL Final     TSH   Date Value Ref Range Status   05/14/2021 2.64 0.40 - 4.50 mIU/L Final     Glucose   Date Value Ref Range Status   09/07/2022 79 65 - 99 mg/dL Final     Comment:                   Fasting reference interval            All lab results and imaging results have been reviewed.    Assessment:        ICD-10-CM ICD-9-CM   1. Impacted cerumen of left ear  H61.22 380.4   2. Tinnitus of both ears  H93.13 388.30           Plan:      Impacted cerumen of left ear  -     Ear Cerumen Removal performed in office. Please see procedure note above.     Tinnitus of both ears  -This has been longstanding. Pt states she had audiogram completed 2-3 years ago that showed normal hearing. Pt will defer on updated audiogram at this time.     Follow up in clinic in 6 months for routine ear cleaning.

## 2023-03-07 ENCOUNTER — TELEPHONE (OUTPATIENT)
Dept: FAMILY MEDICINE | Facility: CLINIC | Age: 73
End: 2023-03-07

## 2023-03-07 ENCOUNTER — OFFICE VISIT (OUTPATIENT)
Dept: FAMILY MEDICINE | Facility: CLINIC | Age: 73
End: 2023-03-07
Payer: MEDICARE

## 2023-03-07 VITALS
HEIGHT: 61 IN | DIASTOLIC BLOOD PRESSURE: 62 MMHG | BODY MASS INDEX: 20.84 KG/M2 | OXYGEN SATURATION: 99 % | SYSTOLIC BLOOD PRESSURE: 106 MMHG | HEART RATE: 62 BPM | WEIGHT: 110.38 LBS

## 2023-03-07 DIAGNOSIS — M81.0 AGE-RELATED OSTEOPOROSIS WITHOUT CURRENT PATHOLOGICAL FRACTURE: ICD-10-CM

## 2023-03-07 DIAGNOSIS — J06.9 UPPER RESPIRATORY TRACT INFECTION, UNSPECIFIED TYPE: Primary | ICD-10-CM

## 2023-03-07 PROCEDURE — 87502 POCT INFLUENZA A/B MOLECULAR: ICD-10-PCS | Mod: QW,,, | Performed by: NURSE PRACTITIONER

## 2023-03-07 PROCEDURE — 99213 OFFICE O/P EST LOW 20 MIN: CPT | Mod: S$GLB,,, | Performed by: NURSE PRACTITIONER

## 2023-03-07 PROCEDURE — 87635 SARS-COV-2 COVID-19 AMP PRB: CPT | Mod: QW,CR,S$GLB, | Performed by: NURSE PRACTITIONER

## 2023-03-07 PROCEDURE — 99213 PR OFFICE/OUTPT VISIT, EST, LEVL III, 20-29 MIN: ICD-10-PCS | Mod: S$GLB,,, | Performed by: NURSE PRACTITIONER

## 2023-03-07 PROCEDURE — 87502 INFLUENZA DNA AMP PROBE: CPT | Mod: QW,,, | Performed by: NURSE PRACTITIONER

## 2023-03-07 PROCEDURE — 87635: ICD-10-PCS | Mod: QW,CR,S$GLB, | Performed by: NURSE PRACTITIONER

## 2023-03-07 RX ORDER — BENZONATATE 100 MG/1
100 CAPSULE ORAL 3 TIMES DAILY PRN
Qty: 30 CAPSULE | Refills: 1 | Status: SHIPPED | OUTPATIENT
Start: 2023-03-07 | End: 2023-03-17

## 2023-03-07 RX ORDER — AZITHROMYCIN 250 MG/1
TABLET, FILM COATED ORAL
Qty: 6 TABLET | Refills: 0 | Status: SHIPPED | OUTPATIENT
Start: 2023-03-07 | End: 2023-08-25

## 2023-03-07 RX ORDER — AZELASTINE 1 MG/ML
1 SPRAY, METERED NASAL
Qty: 30 ML | Refills: 3 | Status: SHIPPED | OUTPATIENT
Start: 2023-03-07

## 2023-03-07 NOTE — TELEPHONE ENCOUNTER
Spoke to pt and she stated she has a low grade fever 99.0, sore throat, and runny nose. Started 4 days ago. No at home covid test. OTC Claritin, and afrin. Pt is scheduled with Cecilia at 2pm

## 2023-03-07 NOTE — PROGRESS NOTES
Patient ID: Cecilia Valenzuela is a 73 y.o. female.    Chief Complaint: Sore Throat (Meds verbally confirmed/ sore throat, runny nose, nasal congestion all sx x 4 days//dp)    Pt here for sick visit- reports started 4 days ago with dry scratchy throat and then developed sinus/nasal congestion with post nasal drip and cough. Reports low grade temp to 99.8, no chills/body aches. Denies CP, SOB or wheezing          Past Medical History:   Diagnosis Date    Arthritis     Atherosclerosis     in left carotid artery    ER+ (estrogen receptor positive status) 2022    Fibrocystic breast     GERD (gastroesophageal reflux disease)     HER2-negative carcinoma of right breast 2022    Hyperlipidemia     Hypertension     Malignant neoplasm of upper-outer quadrant of right female breast 2017    right    Mitral valve prolapse     Neurofibromatosis     Osteopenia     Premature beats     Raynaud's disease     S/P mastectomy, right 2022    Seborrheic keratosis     Thyroid nodule     Tinnitus     Undiagnosed cardiac murmurs 2019     Past Surgical History:   Procedure Laterality Date    BACK SURGERY      BREAST BIOPSY Right     CATARACT EXTRACTION Bilateral      SECTION      x5    DILATION AND CURETTAGE OF UTERUS      miss Ab    HYSTERECTOMY      MASTECTOMY Right 2017         Tobacco History:  reports that she has never smoked. She has never used smokeless tobacco.      Review of patient's allergies indicates:   Allergen Reactions    Methylprednisolone      Other reaction(s): Heart palpitations  PT STATES SHE HAS A REACTION TO ALL STEROIDS DUE TO HER DX; OF MVP..    Zofran [ondansetron hcl (pf)] Nausea Only    Corticosteroids (glucocorticoids) Other (See Comments)     Heart palpitations    Dilaudid [hydromorphone] Nausea And Vomiting     Does not help with pain    Fentanyl     Neosporin (neomycin-polymyx)     Norvasc [amlodipine]     Statins-hmg-coa reductase inhibitors      myalgias     Tetracyclines     Zoloft [sertraline]      Increased anxiety     Augmentin [amoxicillin-pot clavulanate] Nausea And Vomiting       Current Outpatient Medications:     aspirin (ECOTRIN) 81 MG EC tablet, Take 81 mg by mouth once daily., Disp: , Rfl:     calcium carbonate/vitamin D3 (CALCIUM 600 + D,3, ORAL), Take 1 tablet by mouth 2 (two) times a day., Disp: , Rfl:     denosumab (PROLIA) 60 mg/mL Syrg, Inject 60 mg into the skin every 6 (six) months. , Disp: , Rfl:     lisinopriL (PRINIVIL,ZESTRIL) 5 MG tablet, Take 1 tablet (5 mg total) by mouth once daily., Disp: 90 tablet, Rfl: 1    LORazepam (ATIVAN) 0.5 MG tablet, Take 1 tablet (0.5 mg total) by mouth every 12 (twelve) hours as needed for Anxiety., Disp: 10 tablet, Rfl: 1    metoprolol succinate (TOPROL-XL) 25 MG 24 hr tablet, Take 1 tablet (25 mg total) by mouth once daily., Disp: 90 tablet, Rfl: 3    multivitamin (THERAGRAN) per tablet, Take 1 tablet by mouth once daily., Disp: , Rfl:     omeprazole (PRILOSEC OTC) 20 MG tablet, Take 20 mg by mouth 2 (two) times daily before meals. Pt states that she take 20 mg in the morning ad 20 mg at night time., Disp: , Rfl:     PATADAY 0.2 % Drop, Place 1 drop into both eyes daily as needed (allergies). , Disp: , Rfl: 0    azelastine (ASTELIN) 137 mcg (0.1 %) nasal spray, 1 spray (137 mcg total) by Nasal route as needed for Rhinitis., Disp: 30 mL, Rfl: 3    azithromycin (Z-MARILEE) 250 MG tablet, Take 2 tablets today then 1 tablet daily for 4 days, Disp: 6 tablet, Rfl: 0    benzonatate (TESSALON) 100 MG capsule, Take 1 capsule (100 mg total) by mouth 3 (three) times daily as needed for Cough., Disp: 30 capsule, Rfl: 1    Review of Systems   Constitutional:  Positive for fever (low grade). Negative for chills.   HENT:  Positive for congestion, postnasal drip, rhinorrhea and sore throat. Negative for ear pain and sinus pain.    Respiratory:  Positive for cough. Negative for shortness of breath and wheezing.    Cardiovascular:   "Negative for chest pain and palpitations.   Gastrointestinal:  Negative for abdominal pain, diarrhea, nausea and vomiting.   Genitourinary:  Negative for dysuria and hematuria.   Skin:  Negative for rash.   Neurological:  Negative for dizziness and headaches.        Objective:      Vitals:    03/07/23 1358   BP: 106/62   Pulse: 62   SpO2: 99%   Weight: 50.1 kg (110 lb 6.4 oz)   Height: 5' 1" (1.549 m)     Physical Exam  Vitals and nursing note reviewed.   Constitutional:       General: She is not in acute distress.     Appearance: Normal appearance. She is well-developed. She is not toxic-appearing.   HENT:      Head: Normocephalic and atraumatic.      Right Ear: Tympanic membrane and ear canal normal.      Left Ear: Tympanic membrane and ear canal normal.      Nose: Mucosal edema and rhinorrhea (clear) present.      Mouth/Throat:      Pharynx: Posterior oropharyngeal erythema (minimal) present. No oropharyngeal exudate.      Tonsils: No tonsillar exudate.   Cardiovascular:      Rate and Rhythm: Normal rate and regular rhythm.   Pulmonary:      Effort: Pulmonary effort is normal. No respiratory distress.      Breath sounds: Normal breath sounds. No wheezing or rales.   Abdominal:      Palpations: Abdomen is soft.   Musculoskeletal:      Cervical back: Neck supple.   Skin:     General: Skin is warm and dry.      Findings: No rash.   Neurological:      General: No focal deficit present.      Mental Status: She is alert and oriented to person, place, and time.         Assessment:       1. Upper respiratory tract infection, unspecified type    2. Age-related osteoporosis without current pathological fracture           Plan:       Upper respiratory tract infection, unspecified type  -COVID/flu negative. Pt advised likely viral illness so recommend supportive treatment. Refill tessalon and astelin, pt advised I will send in zpak to begin only if symptoms are worsening over the weekned  -     POCT COVID-19 Rapid " Screening  -     POCT Influenza A/B Molecular  -     azithromycin (Z-MARILEE) 250 MG tablet; Take 2 tablets today then 1 tablet daily for 4 days  Dispense: 6 tablet; Refill: 0  -     azelastine (ASTELIN) 137 mcg (0.1 %) nasal spray; 1 spray (137 mcg total) by Nasal route as needed for Rhinitis.  Dispense: 30 mL; Refill: 3  -     benzonatate (TESSALON) 100 MG capsule; Take 1 capsule (100 mg total) by mouth 3 (three) times daily as needed for Cough.  Dispense: 30 capsule; Refill: 1    Age-related osteoporosis without current pathological fracture  -due for f/u DEXA, on prolia  -     DXA Bone Density Axial Skeleton 1 or more sites; Future; Expected date: 03/14/2023      Follow up if symptoms worsen or fail to improve, for as scheduled.        3/7/2023 Cecilia Shaikh NP

## 2023-03-08 ENCOUNTER — TELEPHONE (OUTPATIENT)
Dept: FAMILY MEDICINE | Facility: CLINIC | Age: 73
End: 2023-03-08

## 2023-03-08 NOTE — TELEPHONE ENCOUNTER
----- Message from Cecilia Shaikh NP sent at 3/7/2023  9:19 PM CST -----  Pt needs flu/COVID resulted

## 2023-03-23 ENCOUNTER — PATIENT OUTREACH (OUTPATIENT)
Dept: FAMILY MEDICINE | Facility: CLINIC | Age: 73
End: 2023-03-23

## 2023-03-23 NOTE — TELEPHONE ENCOUNTER
Patient Outreach - Colorectal Cancer Screen  Attempted to contact patient about overdue colorectal cancer screen.  No answer, LVM

## 2023-03-29 ENCOUNTER — TELEPHONE (OUTPATIENT)
Dept: FAMILY MEDICINE | Facility: CLINIC | Age: 73
End: 2023-03-29

## 2023-04-03 ENCOUNTER — OFFICE VISIT (OUTPATIENT)
Dept: FAMILY MEDICINE | Facility: CLINIC | Age: 73
End: 2023-04-03
Payer: MEDICARE

## 2023-04-03 VITALS
BODY MASS INDEX: 21.03 KG/M2 | HEART RATE: 63 BPM | SYSTOLIC BLOOD PRESSURE: 120 MMHG | DIASTOLIC BLOOD PRESSURE: 78 MMHG | HEIGHT: 61 IN | WEIGHT: 111.38 LBS | TEMPERATURE: 98 F | OXYGEN SATURATION: 99 %

## 2023-04-03 DIAGNOSIS — J30.9 ALLERGIC RHINITIS, UNSPECIFIED SEASONALITY, UNSPECIFIED TRIGGER: Primary | ICD-10-CM

## 2023-04-03 PROCEDURE — 99213 OFFICE O/P EST LOW 20 MIN: CPT | Mod: S$GLB,,, | Performed by: NURSE PRACTITIONER

## 2023-04-03 PROCEDURE — 99213 PR OFFICE/OUTPT VISIT, EST, LEVL III, 20-29 MIN: ICD-10-PCS | Mod: S$GLB,,, | Performed by: NURSE PRACTITIONER

## 2023-04-03 NOTE — PROGRESS NOTES
" Patient ID: Cecilia Valenzuela is a 73 y.o. female.    Chief Complaint: Sore Throat (No bottles//Pt is c/o continuous URI symptoms, coughing, sore throat x 4 weeks//Decline colo//ABRIL )    Pt here for sick visit- seen here 3/7 with URI symptoms- treated with zpak. Reports after that visit she started to feel slightly better but has never really "been able to shake it". Continues with mild scratchy throat and clears her throat frequently. Denies any fever/chills.no pain with swallowing. Reports minimal cough. No CP, Sob or wheezing. Hasn't been taking anything otc. Worried she may be contagious and has been around young children  Reports can't tolerate steroids d/t palpitations          Past Medical History:   Diagnosis Date    Arthritis     Atherosclerosis     in left carotid artery    ER+ (estrogen receptor positive status) 2022    Fibrocystic breast     GERD (gastroesophageal reflux disease)     HER2-negative carcinoma of right breast 2022    Hyperlipidemia     Hypertension     Malignant neoplasm of upper-outer quadrant of right female breast 2017    right    Mitral valve prolapse     Neurofibromatosis     Osteopenia     Premature beats     Raynaud's disease     S/P mastectomy, right 2022    Seborrheic keratosis     Thyroid nodule     Tinnitus     Undiagnosed cardiac murmurs 2019     Past Surgical History:   Procedure Laterality Date    BACK SURGERY      BREAST BIOPSY Right 2017    CATARACT EXTRACTION Bilateral      SECTION      x5    DILATION AND CURETTAGE OF UTERUS      miss Ab    HYSTERECTOMY      MASTECTOMY Right 2017         Tobacco History:  reports that she has never smoked. She has never been exposed to tobacco smoke. She has never used smokeless tobacco.      Review of patient's allergies indicates:   Allergen Reactions    Methylprednisolone      Other reaction(s): Heart palpitations  PT STATES SHE HAS A REACTION TO ALL STEROIDS DUE TO HER DX; OF MVP..    Zofran " [ondansetron hcl (pf)] Nausea Only    Corticosteroids (glucocorticoids) Other (See Comments)     Heart palpitations    Dilaudid [hydromorphone] Nausea And Vomiting     Does not help with pain    Fentanyl     Neosporin (neomycin-polymyx)     Norvasc [amlodipine]     Statins-hmg-coa reductase inhibitors      myalgias    Tetracyclines     Zoloft [sertraline]      Increased anxiety     Augmentin [amoxicillin-pot clavulanate] Nausea And Vomiting       Current Outpatient Medications:     aspirin (ECOTRIN) 81 MG EC tablet, Take 81 mg by mouth once daily., Disp: , Rfl:     azelastine (ASTELIN) 137 mcg (0.1 %) nasal spray, 1 spray (137 mcg total) by Nasal route as needed for Rhinitis., Disp: 30 mL, Rfl: 3    azithromycin (Z-MARILEE) 250 MG tablet, Take 2 tablets today then 1 tablet daily for 4 days, Disp: 6 tablet, Rfl: 0    calcium carbonate/vitamin D3 (CALCIUM 600 + D,3, ORAL), Take 1 tablet by mouth 2 (two) times a day., Disp: , Rfl:     denosumab (PROLIA) 60 mg/mL Syrg, Inject 60 mg into the skin every 6 (six) months. , Disp: , Rfl:     lisinopriL (PRINIVIL,ZESTRIL) 5 MG tablet, Take 1 tablet (5 mg total) by mouth once daily., Disp: 90 tablet, Rfl: 1    LORazepam (ATIVAN) 0.5 MG tablet, Take 1 tablet (0.5 mg total) by mouth every 12 (twelve) hours as needed for Anxiety., Disp: 10 tablet, Rfl: 1    metoprolol succinate (TOPROL-XL) 25 MG 24 hr tablet, Take 1 tablet (25 mg total) by mouth once daily., Disp: 90 tablet, Rfl: 3    multivitamin (THERAGRAN) per tablet, Take 1 tablet by mouth once daily., Disp: , Rfl:     omeprazole (PRILOSEC OTC) 20 MG tablet, Take 20 mg by mouth 2 (two) times daily before meals. Pt states that she take 20 mg in the morning ad 20 mg at night time., Disp: , Rfl:     PATADAY 0.2 % Drop, Place 1 drop into both eyes daily as needed (allergies). , Disp: , Rfl: 0    Review of Systems   Constitutional:  Negative for appetite change, chills and fever.   HENT:  Positive for postnasal drip. Negative for  "congestion, ear pain, rhinorrhea, sinus pain and sore throat ("scratchy throat").    Respiratory:  Positive for cough (minimal). Negative for shortness of breath and wheezing.    Cardiovascular:  Negative for chest pain and palpitations.   Gastrointestinal:  Negative for abdominal pain, diarrhea, nausea and vomiting.   Genitourinary:  Negative for dysuria and hematuria.   Skin:  Negative for rash.   Neurological:  Negative for dizziness and headaches.        Objective:      Vitals:    04/03/23 1356   BP: 120/78   Pulse: 63   Temp: 98.1 °F (36.7 °C)   SpO2: 99%   Weight: 50.5 kg (111 lb 6.4 oz)   Height: 5' 1" (1.549 m)     Physical Exam  Vitals and nursing note reviewed.   Constitutional:       General: She is not in acute distress.     Appearance: Normal appearance. She is well-developed. She is not toxic-appearing.   HENT:      Head: Normocephalic and atraumatic.      Right Ear: Tympanic membrane and ear canal normal.      Left Ear: Tympanic membrane and ear canal normal.      Nose: Mucosal edema (mild) present. No rhinorrhea.      Mouth/Throat:      Pharynx: No oropharyngeal exudate or posterior oropharyngeal erythema.      Tonsils: No tonsillar exudate.   Cardiovascular:      Rate and Rhythm: Normal rate and regular rhythm.   Pulmonary:      Effort: Pulmonary effort is normal. No respiratory distress.      Breath sounds: Normal breath sounds. No wheezing or rales.   Abdominal:      Palpations: Abdomen is soft.   Musculoskeletal:      Cervical back: Neck supple.   Skin:     General: Skin is warm and dry.      Findings: No rash.   Neurological:      General: No focal deficit present.      Mental Status: She is alert and oriented to person, place, and time.         Assessment:       1. Allergic rhinitis, unspecified seasonality, unspecified trigger           Plan:       Allergic rhinitis, unspecified seasonality, unspecified trigger   -minimal symptoms and exam WNL- reassured pt she is not contagious and would " recommend otc antihistamine and resume astelin NS- call if she develops fever or purulent sputum    Follow up for as scheduled.        4/3/2023 Cecilia Shaikh NP

## 2023-04-06 ENCOUNTER — TELEPHONE (OUTPATIENT)
Dept: FAMILY MEDICINE | Facility: CLINIC | Age: 73
End: 2023-04-06

## 2023-04-06 NOTE — TELEPHONE ENCOUNTER
Spoke with pt in regards to recent dexa scan results. Verbalized per Cecilia that pt's bone density test shows improvement in bone strength and now in the osteopenia range. Continue with Prolia. Pt acknowledge understanding.

## 2023-04-06 NOTE — TELEPHONE ENCOUNTER
----- Message from Precious Abdi LPN sent at 4/6/2023 10:33 AM CDT -----  Regarding: FW: dexa scan result    ----- Message -----  From: Yary Coffey MA  Sent: 4/6/2023  10:31 AM CDT  To: Cecilia Shaikh Staff  Subject: dexa scan result                                 Returning jonas call re: dexa scan result  [phone]

## 2023-04-06 NOTE — TELEPHONE ENCOUNTER
----- Message from Cecilia Shaikh NP sent at 4/5/2023  9:02 PM CDT -----  Please call patient let her know recent bone density test shows improvement in bone strength and now in the osteopenia range.  Continue with Prolia

## 2023-04-06 NOTE — TELEPHONE ENCOUNTER
Spoke with pt already in regards to recent dexa scan results. Please see other telephone encounter for today.

## 2023-04-08 LAB
ALBUMIN SERPL-MCNC: 4.6 G/DL (ref 3.6–5.1)
ALBUMIN/GLOB SERPL: 1.3 (CALC) (ref 1–2.5)
ALP SERPL-CCNC: 49 U/L (ref 37–153)
ALT SERPL-CCNC: 18 U/L (ref 6–29)
APPEARANCE UR: CLEAR
AST SERPL-CCNC: 28 U/L (ref 10–35)
BACTERIA #/AREA URNS HPF: ABNORMAL /HPF
BACTERIA UR CULT: ABNORMAL
BACTERIA UR CULT: ABNORMAL
BASOPHILS # BLD AUTO: 57 CELLS/UL (ref 0–200)
BASOPHILS NFR BLD AUTO: 0.7 %
BILIRUB SERPL-MCNC: 0.5 MG/DL (ref 0.2–1.2)
BILIRUB UR QL STRIP: NEGATIVE
BUN SERPL-MCNC: 11 MG/DL (ref 7–25)
BUN/CREAT SERPL: NORMAL (CALC) (ref 6–22)
CALCIUM SERPL-MCNC: 9.9 MG/DL (ref 8.6–10.4)
CHLORIDE SERPL-SCNC: 102 MMOL/L (ref 98–110)
CHOLEST SERPL-MCNC: 246 MG/DL
CHOLEST/HDLC SERPL: 3.7 (CALC)
CO2 SERPL-SCNC: 26 MMOL/L (ref 20–32)
COLOR UR: YELLOW
CREAT SERPL-MCNC: 0.68 MG/DL (ref 0.6–1)
EGFR: 91 ML/MIN/1.73M2
EOSINOPHIL # BLD AUTO: 186 CELLS/UL (ref 15–500)
EOSINOPHIL NFR BLD AUTO: 2.3 %
ERYTHROCYTE [DISTWIDTH] IN BLOOD BY AUTOMATED COUNT: 13.3 % (ref 11–15)
GLOBULIN SER CALC-MCNC: 3.5 G/DL (CALC) (ref 1.9–3.7)
GLUCOSE SERPL-MCNC: 75 MG/DL (ref 65–99)
GLUCOSE UR QL STRIP: NEGATIVE
HCT VFR BLD AUTO: 46.6 % (ref 35–45)
HDLC SERPL-MCNC: 66 MG/DL
HGB BLD-MCNC: 15.4 G/DL (ref 11.7–15.5)
HGB UR QL STRIP: NEGATIVE
HYALINE CASTS #/AREA URNS LPF: ABNORMAL /LPF
KETONES UR QL STRIP: NEGATIVE
LDLC SERPL CALC-MCNC: 157 MG/DL (CALC)
LEUKOCYTE ESTERASE UR QL STRIP: ABNORMAL
LYMPHOCYTES # BLD AUTO: 1474 CELLS/UL (ref 850–3900)
LYMPHOCYTES NFR BLD AUTO: 18.2 %
MCH RBC QN AUTO: 29.5 PG (ref 27–33)
MCHC RBC AUTO-ENTMCNC: 33 G/DL (ref 32–36)
MCV RBC AUTO: 89.3 FL (ref 80–100)
MONOCYTES # BLD AUTO: 437 CELLS/UL (ref 200–950)
MONOCYTES NFR BLD AUTO: 5.4 %
NEUTROPHILS # BLD AUTO: 5945 CELLS/UL (ref 1500–7800)
NEUTROPHILS NFR BLD AUTO: 73.4 %
NITRITE UR QL STRIP: NEGATIVE
NONHDLC SERPL-MCNC: 180 MG/DL (CALC)
PH UR STRIP: 7.5 [PH] (ref 5–8)
PLATELET # BLD AUTO: 222 THOUSAND/UL (ref 140–400)
PMV BLD REES-ECKER: 11.7 FL (ref 7.5–12.5)
POTASSIUM SERPL-SCNC: 4.6 MMOL/L (ref 3.5–5.3)
PROT SERPL-MCNC: 8.1 G/DL (ref 6.1–8.1)
PROT UR QL STRIP: NEGATIVE
RBC # BLD AUTO: 5.22 MILLION/UL (ref 3.8–5.1)
RBC #/AREA URNS HPF: ABNORMAL /HPF
SERVICE CMNT-IMP: ABNORMAL
SODIUM SERPL-SCNC: 138 MMOL/L (ref 135–146)
SP GR UR STRIP: 1 (ref 1–1.03)
SQUAMOUS #/AREA URNS HPF: ABNORMAL /HPF
TRIGL SERPL-MCNC: 115 MG/DL
TSH SERPL-ACNC: 2.17 MIU/L (ref 0.4–4.5)
WBC # BLD AUTO: 8.1 THOUSAND/UL (ref 3.8–10.8)
WBC #/AREA URNS HPF: ABNORMAL /HPF

## 2023-04-11 ENCOUNTER — OFFICE VISIT (OUTPATIENT)
Dept: FAMILY MEDICINE | Facility: CLINIC | Age: 73
End: 2023-04-11
Payer: MEDICARE

## 2023-04-11 ENCOUNTER — TELEPHONE (OUTPATIENT)
Dept: FAMILY MEDICINE | Facility: CLINIC | Age: 73
End: 2023-04-11

## 2023-04-11 ENCOUNTER — PATIENT MESSAGE (OUTPATIENT)
Dept: ADMINISTRATIVE | Facility: HOSPITAL | Age: 73
End: 2023-04-11
Payer: MEDICARE

## 2023-04-11 VITALS
HEART RATE: 70 BPM | WEIGHT: 110.81 LBS | DIASTOLIC BLOOD PRESSURE: 76 MMHG | SYSTOLIC BLOOD PRESSURE: 132 MMHG | BODY MASS INDEX: 20.92 KG/M2 | OXYGEN SATURATION: 94 % | HEIGHT: 61 IN

## 2023-04-11 DIAGNOSIS — R00.2 PALPITATIONS: ICD-10-CM

## 2023-04-11 DIAGNOSIS — M81.0 AGE-RELATED OSTEOPOROSIS WITHOUT CURRENT PATHOLOGICAL FRACTURE: ICD-10-CM

## 2023-04-11 DIAGNOSIS — E78.00 ELEVATED LDL CHOLESTEROL LEVEL: ICD-10-CM

## 2023-04-11 DIAGNOSIS — T46.6X5A MYALGIA DUE TO STATIN: ICD-10-CM

## 2023-04-11 DIAGNOSIS — F41.9 ANXIETY: ICD-10-CM

## 2023-04-11 DIAGNOSIS — I10 ESSENTIAL HYPERTENSION: Primary | ICD-10-CM

## 2023-04-11 DIAGNOSIS — M79.10 MYALGIA DUE TO STATIN: ICD-10-CM

## 2023-04-11 DIAGNOSIS — J06.9 UPPER RESPIRATORY TRACT INFECTION, UNSPECIFIED TYPE: ICD-10-CM

## 2023-04-11 PROCEDURE — 99214 OFFICE O/P EST MOD 30 MIN: CPT | Mod: S$GLB,,, | Performed by: NURSE PRACTITIONER

## 2023-04-11 PROCEDURE — 99214 PR OFFICE/OUTPT VISIT, EST, LEVL IV, 30-39 MIN: ICD-10-PCS | Mod: S$GLB,,, | Performed by: NURSE PRACTITIONER

## 2023-04-11 RX ORDER — LORAZEPAM 0.5 MG/1
0.5 TABLET ORAL EVERY 12 HOURS PRN
Qty: 10 TABLET | Refills: 1 | Status: SHIPPED | OUTPATIENT
Start: 2023-04-11 | End: 2023-10-04 | Stop reason: SDUPTHER

## 2023-04-11 RX ORDER — AMOXICILLIN 500 MG/1
500 CAPSULE ORAL 3 TIMES DAILY
Qty: 21 CAPSULE | Refills: 0 | Status: SHIPPED | OUTPATIENT
Start: 2023-04-11 | End: 2023-08-25

## 2023-04-11 NOTE — PROGRESS NOTES
SUBJECTIVE:    Patient ID: Cecilia Valenzuela is a 73 y.o. female.    Chief Complaint: Hypertension (No bottles//pt is here for a follow up//decline colo//stefany)    Pt here for regular f/u- HTN.    Pt reports shes still not feeling much better since last week's visit with URI symptoms. Started with sxs over 5 weeks ago now. Initially treated with zpak. Last week recommended to resume astelin and antihistamine but she reports antihistamine was too drying. Still has mild sore throat, post nasal drip and cough. Denies fever/chills. No CP, SOB or wheeze    Follows with Dr. Lisa, cards- reports hasn't have any issues with palpitations recently. Monitors BP at home and it's been well controlled    Hx of breast CA and completed her 5 years and has been off anastrozole since October. Was started on prolia several years ago when anastrozole added. Most recent DEXA shows osteopenia, T score -2.3 femur- asking if she still needs prolia. Reports was previously tried on oral biphosphanate but didn't tolerate d/t GI      Office Visit on 03/07/2023   Component Date Value Ref Range Status    POC Rapid COVID 03/08/2023 Negative  Negative Final     Acceptable 03/08/2023 Yes   Final    POC Molecular Influenza A Ag 03/08/2023 Negative  Negative, Not Reported Final    POC Molecular Influenza B Ag 03/08/2023 Negative  Negative, Not Reported Final     Acceptable 03/08/2023 Yes   Final       Past Medical History:   Diagnosis Date    Arthritis     Atherosclerosis     in left carotid artery    ER+ (estrogen receptor positive status) 1/25/2022    Fibrocystic breast     GERD (gastroesophageal reflux disease)     HER2-negative carcinoma of right breast 1/25/2022    Hyperlipidemia     Hypertension     Malignant neoplasm of upper-outer quadrant of right female breast 9/14/2017    right    Mitral valve prolapse     Neurofibromatosis     Osteopenia     Premature beats     Raynaud's disease     S/P mastectomy, right  2022    Seborrheic keratosis     Thyroid nodule     Tinnitus     Undiagnosed cardiac murmurs 2019     Past Surgical History:   Procedure Laterality Date    BACK SURGERY      BREAST BIOPSY Right 2017    CATARACT EXTRACTION Bilateral      SECTION      x5    DILATION AND CURETTAGE OF UTERUS      miss Ab    HYSTERECTOMY      MASTECTOMY Right 2017     Family History   Problem Relation Age of Onset    Rheum arthritis Mother     Breast cancer Mother         over 85    Hypertension Mother     Heart disease Mother     Parkinsonism Father     Cancer Father     Breast cancer Sister 40        DCIS    Breast cancer Maternal Aunt         60's    Breast cancer Sister 50        DCIS    Ovarian cancer Neg Hx     Colon cancer Neg Hx     Melanoma Neg Hx        The 10-year CVD risk score (Rona, et al., 2008) is: 17.1%    Values used to calculate the score:      Age: 73 years      Sex: Female      Diabetic: No      Tobacco smoker: No      Systolic Blood Pressure: 132 mmHg      Is BP treated: Yes      HDL Cholesterol: 66 mg/dL      Total Cholesterol: 246 mg/dL     Marital Status:   Alcohol History:  reports no history of alcohol use.  Tobacco History:  reports that she has never smoked. She has never been exposed to tobacco smoke. She has never used smokeless tobacco.  Drug History:  reports no history of drug use.    Health Maintenance Topics with due status: Not Due       Topic Last Completion Date    DEXA Scan 2023    Lipid Panel 2023     Immunization History   Administered Date(s) Administered    COVID-19, MRNA, LN-S, PF (Pfizer) (Purple Cap) 2021, 2021, 2021    Influenza - High Dose - PF (65 years and older) 10/06/2014, 2015, 10/11/2018, 2019    Influenza - Quadrivalent - High Dose - PF (65 years and older) 2020, 2021, 10/11/2022    Pneumococcal Conjugate - 13 Valent 2020    Pneumococcal Polysaccharide - 23 Valent 10/06/2014       Review of  patient's allergies indicates:   Allergen Reactions    Methylprednisolone      Other reaction(s): Heart palpitations  PT STATES SHE HAS A REACTION TO ALL STEROIDS DUE TO HER DX; OF MVP..    Zofran [ondansetron hcl (pf)] Nausea Only    Corticosteroids (glucocorticoids) Other (See Comments)     Heart palpitations    Dilaudid [hydromorphone] Nausea And Vomiting     Does not help with pain    Fentanyl     Neosporin (neomycin-polymyx)     Norvasc [amlodipine]     Statins-hmg-coa reductase inhibitors      myalgias    Tetracyclines     Zoloft [sertraline]      Increased anxiety     Augmentin [amoxicillin-pot clavulanate] Nausea And Vomiting       Current Outpatient Medications:     amoxicillin (AMOXIL) 500 MG capsule, Take 1 capsule (500 mg total) by mouth 3 (three) times daily., Disp: 21 capsule, Rfl: 0    aspirin (ECOTRIN) 81 MG EC tablet, Take 81 mg by mouth once daily., Disp: , Rfl:     azelastine (ASTELIN) 137 mcg (0.1 %) nasal spray, 1 spray (137 mcg total) by Nasal route as needed for Rhinitis., Disp: 30 mL, Rfl: 3    azithromycin (Z-MARILEE) 250 MG tablet, Take 2 tablets today then 1 tablet daily for 4 days, Disp: 6 tablet, Rfl: 0    calcium carbonate/vitamin D3 (CALCIUM 600 + D,3, ORAL), Take 1 tablet by mouth 2 (two) times a day., Disp: , Rfl:     denosumab (PROLIA) 60 mg/mL Syrg, Inject 60 mg into the skin every 6 (six) months. , Disp: , Rfl:     lisinopriL (PRINIVIL,ZESTRIL) 5 MG tablet, Take 1 tablet (5 mg total) by mouth once daily., Disp: 90 tablet, Rfl: 1    LORazepam (ATIVAN) 0.5 MG tablet, Take 1 tablet (0.5 mg total) by mouth every 12 (twelve) hours as needed for Anxiety., Disp: 10 tablet, Rfl: 1    metoprolol succinate (TOPROL-XL) 25 MG 24 hr tablet, Take 1 tablet (25 mg total) by mouth once daily., Disp: 90 tablet, Rfl: 3    multivitamin (THERAGRAN) per tablet, Take 1 tablet by mouth once daily., Disp: , Rfl:     omeprazole (PRILOSEC OTC) 20 MG tablet, Take 20 mg by mouth 2 (two) times daily before meals.  "Pt states that she take 20 mg in the morning ad 20 mg at night time., Disp: , Rfl:     PATADAY 0.2 % Drop, Place 1 drop into both eyes daily as needed (allergies). , Disp: , Rfl: 0    Review of Systems   Constitutional:  Negative for activity change, chills, fatigue, fever and unexpected weight change.   HENT:  Positive for postnasal drip and sore throat (mild). Negative for congestion, ear pain, rhinorrhea, sinus pain, trouble swallowing and voice change.    Respiratory:  Positive for cough. Negative for shortness of breath and wheezing.    Cardiovascular:  Negative for chest pain, palpitations and leg swelling.   Gastrointestinal:  Negative for abdominal pain, diarrhea, nausea and vomiting.   Genitourinary:  Negative for dysuria, frequency and hematuria.   Musculoskeletal:  Negative for arthralgias and gait problem.   Skin:  Negative for rash.   Neurological:  Negative for dizziness, syncope, speech difficulty, weakness and headaches.   Psychiatric/Behavioral:  Negative for dysphoric mood. The patient is nervous/anxious (occasional anxiety, rarely takes lorazepam rarely).         Objective:      Vitals:    04/11/23 1053   BP: 132/76   Pulse: 70   SpO2: (!) 94%   Weight: 50.3 kg (110 lb 12.8 oz)   Height: 5' 1" (1.549 m)     Physical Exam  Constitutional:       General: She is not in acute distress.     Appearance: Normal appearance. She is well-developed and normal weight.      Comments: Thin small built WF   HENT:      Head: Normocephalic and atraumatic.      Right Ear: Tympanic membrane and ear canal normal.      Left Ear: Tympanic membrane and ear canal normal.      Mouth/Throat:      Pharynx: Posterior oropharyngeal erythema (minimal) present. No oropharyngeal exudate.      Comments: +thick mucus in posterior pharynx  Neck:      Vascular: No carotid bruit.   Cardiovascular:      Rate and Rhythm: Normal rate and regular rhythm.      Heart sounds: No murmur heard.    No friction rub.   Pulmonary:      Effort: " Pulmonary effort is normal. No respiratory distress.      Breath sounds: Normal breath sounds. No wheezing or rales.   Abdominal:      Palpations: Abdomen is soft.      Tenderness: There is no abdominal tenderness.   Musculoskeletal:      Cervical back: Neck supple.      Right lower leg: No edema.      Left lower leg: No edema.   Lymphadenopathy:      Cervical: No cervical adenopathy.   Skin:     General: Skin is warm and dry.   Neurological:      General: No focal deficit present.      Mental Status: She is alert and oriented to person, place, and time.      Gait: Gait normal.   Psychiatric:         Mood and Affect: Mood normal.         Assessment:       1. Essential hypertension    2. Upper respiratory tract infection, unspecified type    3. Anxiety    4. Age-related osteoporosis without current pathological fracture    5. Elevated LDL cholesterol level    6. Myalgia due to statin    7. Palpitations           Plan:       1. Essential hypertension   -BP well controlled    2. Upper respiratory tract infection, unspecified type  -given lingering symptoms will treat with amoxil which pt states she can tolerate- lists augmentin as allergy d/t GI side effects  -     amoxicillin (AMOXIL) 500 MG capsule; Take 1 capsule (500 mg total) by mouth 3 (three) times daily.  Dispense: 21 capsule; Refill: 0    3. Anxiety  -stable, only rarely takes lorazepam.  reviewed and last filled #10 in Oct 2022  -     LORazepam (ATIVAN) 0.5 MG tablet; Take 1 tablet (0.5 mg total) by mouth every 12 (twelve) hours as needed for Anxiety.  Dispense: 10 tablet; Refill: 1    4. Age-related osteoporosis without current pathological fracture   -reviewed most recent DEXA. Given intolerant of biphosphonate would recommend continuing with prolia    5. Elevated LDL cholesterol level   -reviewed recent lipids with LDL up to 157- pt reports statin myalgias. Recommend zetia however she declines any cholesterol medication d/t fear of side effects    6.  Myalgia due to statin    7. Palpitations   -has been doing well without symptoms    Follow up in about 6 months (around 10/11/2023) for HTN.      Pt declines any further cscopes    Counseled on age and gender appropriate medical preventative services, including cancer screenings, immunizations, overall nutritional health, need for a consistent exercise regimen and an overall push towards maintaining a vigorous and active lifestyle.      4/11/2023 Cecilia Shaikh NP

## 2023-04-12 ENCOUNTER — TELEPHONE (OUTPATIENT)
Dept: HEMATOLOGY/ONCOLOGY | Facility: CLINIC | Age: 73
End: 2023-04-12

## 2023-04-18 ENCOUNTER — INFUSION (OUTPATIENT)
Dept: INFUSION THERAPY | Facility: HOSPITAL | Age: 73
End: 2023-04-18
Attending: NURSE PRACTITIONER
Payer: MEDICARE

## 2023-04-18 VITALS
HEART RATE: 69 BPM | TEMPERATURE: 98 F | RESPIRATION RATE: 15 BRPM | DIASTOLIC BLOOD PRESSURE: 79 MMHG | HEIGHT: 61 IN | SYSTOLIC BLOOD PRESSURE: 130 MMHG | WEIGHT: 111.69 LBS | OXYGEN SATURATION: 100 % | BODY MASS INDEX: 21.09 KG/M2

## 2023-04-18 DIAGNOSIS — C50.411 MALIGNANT NEOPLASM OF UPPER-OUTER QUADRANT OF RIGHT BREAST IN FEMALE, ESTROGEN RECEPTOR POSITIVE: ICD-10-CM

## 2023-04-18 DIAGNOSIS — Z79.811 LONG TERM CURRENT USE OF AROMATASE INHIBITOR: ICD-10-CM

## 2023-04-18 DIAGNOSIS — Z17.0 MALIGNANT NEOPLASM OF UPPER-OUTER QUADRANT OF RIGHT BREAST IN FEMALE, ESTROGEN RECEPTOR POSITIVE: ICD-10-CM

## 2023-04-18 DIAGNOSIS — M81.0 AGE-RELATED OSTEOPOROSIS WITHOUT CURRENT PATHOLOGICAL FRACTURE: Primary | ICD-10-CM

## 2023-04-18 PROCEDURE — 63600175 PHARM REV CODE 636 W HCPCS: Mod: JZ,JG | Performed by: NURSE PRACTITIONER

## 2023-04-18 PROCEDURE — 96372 THER/PROPH/DIAG INJ SC/IM: CPT

## 2023-04-18 RX ADMIN — DENOSUMAB 60 MG: 60 INJECTION SUBCUTANEOUS at 03:04

## 2023-04-18 NOTE — PLAN OF CARE
Problem: Fall Injury Risk  Goal: Absence of Fall and Fall-Related Injury  Outcome: Ongoing, Progressing  Intervention: Identify and Manage Contributors  Flowsheets (Taken 4/18/2023 1544)  Self-Care Promotion:   independence encouraged   safe use of adaptive equipment encouraged  Medication Review/Management: medications reviewed  Intervention: Promote Injury-Free Environment  Flowsheets (Taken 4/18/2023 1544)  Safety Promotion/Fall Prevention: medications reviewed

## 2023-05-02 ENCOUNTER — TELEPHONE (OUTPATIENT)
Dept: FAMILY MEDICINE | Facility: CLINIC | Age: 73
End: 2023-05-02

## 2023-05-04 ENCOUNTER — TELEPHONE ENCOUNTER (OUTPATIENT)
Dept: URBAN - METROPOLITAN AREA CLINIC 113 | Facility: CLINIC | Age: 73
End: 2023-05-04

## 2023-05-04 ENCOUNTER — LAB OUTSIDE AN ENCOUNTER (OUTPATIENT)
Dept: URBAN - METROPOLITAN AREA CLINIC 113 | Facility: CLINIC | Age: 73
End: 2023-05-04

## 2023-05-04 ENCOUNTER — OFFICE VISIT (OUTPATIENT)
Dept: URBAN - METROPOLITAN AREA CLINIC 113 | Facility: CLINIC | Age: 73
End: 2023-05-04
Payer: MEDICARE

## 2023-05-04 VITALS
HEIGHT: 66 IN | TEMPERATURE: 95.7 F | SYSTOLIC BLOOD PRESSURE: 114 MMHG | DIASTOLIC BLOOD PRESSURE: 60 MMHG | BODY MASS INDEX: 33.75 KG/M2 | HEART RATE: 55 BPM | WEIGHT: 210 LBS

## 2023-05-04 DIAGNOSIS — K21.00 GASTROESOPHAGEAL REFLUX DISEASE WITH ESOPHAGITIS WITHOUT HEMORRHAGE: ICD-10-CM

## 2023-05-04 DIAGNOSIS — K74.00 LIVER FIBROSIS: ICD-10-CM

## 2023-05-04 DIAGNOSIS — Z86.19 HISTORY OF HEPATITIS C: ICD-10-CM

## 2023-05-04 DIAGNOSIS — R10.11 RUQ ABDOMINAL PAIN: ICD-10-CM

## 2023-05-04 PROBLEM — 62484002: Status: ACTIVE | Noted: 2023-05-04

## 2023-05-04 PROCEDURE — 99214 OFFICE O/P EST MOD 30 MIN: CPT | Performed by: NURSE PRACTITIONER

## 2023-05-04 RX ORDER — SODIUM, POTASSIUM,MAG SULFATES 17.5-3.13G
AS DIRECTED SOLUTION, RECONSTITUTED, ORAL ORAL
Status: ON HOLD | COMMUNITY
Start: 2022-06-01

## 2023-05-04 RX ORDER — DICLOFENAC SODIUM 10 MG/G
GEL TOPICAL
Qty: 300 | Refills: 0 | Status: ACTIVE | COMMUNITY
Start: 2019-09-18

## 2023-05-04 RX ORDER — TELMISARTAN 20 MG/1
1 TABLET TABLET ORAL ONCE A DAY
Refills: 0 | Status: ACTIVE | COMMUNITY
Start: 2019-11-04

## 2023-05-04 RX ORDER — FAMOTIDINE 40 MG/1
1 TABLET AT BEDTIME TABLET, FILM COATED ORAL ONCE A DAY
Qty: 30 | Refills: 5 | Status: ON HOLD | COMMUNITY

## 2023-05-04 RX ORDER — SEMAGLUTIDE 0.68 MG/ML
AS DIRECTED INJECTION, SOLUTION SUBCUTANEOUS
Status: ACTIVE | COMMUNITY

## 2023-05-04 RX ORDER — LATANOPROST/PF 0.005 %
INSTILL 1 DROP IN BOTH EYES AT BEDTIME DROPS OPHTHALMIC (EYE)
Refills: 0 | Status: ACTIVE | COMMUNITY
Start: 2014-05-10

## 2023-05-04 RX ORDER — METFORMIN HYDROCHLORIDE 500 MG/1
1 TABLET WITH A MEAL TABLET, FILM COATED ORAL TWICE DAILY
Status: ACTIVE | COMMUNITY

## 2023-05-04 RX ORDER — DICYCLOMINE HYDROCHLORIDE 10 MG/1
1 TABLET CAPSULE ORAL
Qty: 60 | Refills: 3 | OUTPATIENT
Start: 2023-05-04 | End: 2023-09-01

## 2023-05-04 RX ORDER — AZELASTINE HCL 205.5 UG/1
1 PUFF IN EACH NOSTRIL SPRAY NASAL
Refills: 0 | Status: ACTIVE | COMMUNITY
Start: 2018-10-05

## 2023-05-04 RX ORDER — BUDESONIDE 0.5 MG/2ML
USE 1 UNIT DOSE VIA NEBULIZER TWO TIMES A DAY INHALANT ORAL
Qty: 60 | Refills: 0 | Status: ACTIVE | COMMUNITY
Start: 2012-03-30

## 2023-05-04 RX ORDER — DEXLANSOPRAZOLE 60 MG/1
TAKE 1 CAPSULE BY MOUTH EVERY DAY CAPSULE, DELAYED RELEASE ORAL ONCE A DAY
Qty: 90 | Refills: 4

## 2023-05-04 RX ORDER — ALBUTEROL SULFATE 2.5 MG/3ML
SOLUTION RESPIRATORY (INHALATION)
Qty: 255 | Refills: 0 | Status: ACTIVE | COMMUNITY
Start: 2012-03-14

## 2023-05-04 RX ORDER — SODIUM PICOSULFATE, MAGNESIUM OXIDE, AND ANHYDROUS CITRIC ACID 10; 3.5; 12 MG/160ML; G/160ML; G/160ML
160 ML LIQUID ORAL
Qty: 320 MILLILITER | Refills: 0 | Status: ON HOLD | COMMUNITY

## 2023-05-04 RX ORDER — LACTOBACILLUS RHAMNOSUS GG 10B CELL
TAKE 1 CAPSULE DAILY CAPSULE ORAL
Refills: 0 | Status: ACTIVE | COMMUNITY

## 2023-05-04 RX ORDER — SODIUM, POTASSIUM,MAG SULFATES 17.5-3.13G
354 ML SOLUTION, RECONSTITUTED, ORAL ORAL
Qty: 354 MILLILITER | Refills: 0 | Status: ON HOLD | COMMUNITY

## 2023-05-04 RX ORDER — ALBUTEROL SULFATE 90 UG/1
AEROSOL, METERED RESPIRATORY (INHALATION)
Qty: 18 | Refills: 0 | Status: ON HOLD | COMMUNITY
Start: 2012-01-02

## 2023-05-04 RX ORDER — BRINZOLAMIDE/BRIMONIDINE TARTRATE 10; 2 MG/ML; MG/ML
1 DROP INTO AFFECTED EYE SUSPENSION/ DROPS OPHTHALMIC THREE TIMES A DAY
Status: ACTIVE | COMMUNITY

## 2023-05-04 RX ORDER — ASCORBIC ACID 500 MG
TAKE 1 TABLET DAILY TABLET ORAL
Refills: 0 | Status: ACTIVE | COMMUNITY
Start: 2007-04-18

## 2023-05-04 RX ORDER — SODIUM, POTASSIUM,MAG SULFATES 17.5-3.13G
354 ML SOLUTION, RECONSTITUTED, ORAL ORAL ONCE
Qty: 354 MILLILITER | Refills: 0 | Status: ON HOLD | COMMUNITY

## 2023-05-04 RX ORDER — GLUCOSAMINE HCL/CHONDROITIN SU 500-400 MG
TAKE 1 CAPSULE DAILY CAPSULE ORAL
Refills: 0 | Status: ON HOLD | COMMUNITY

## 2023-05-04 RX ORDER — IPRATROPIUM BROMIDE 42 UG/1
SPRAY, METERED NASAL
Qty: 45 | Refills: 0 | Status: ACTIVE | COMMUNITY
Start: 2020-01-07

## 2023-05-04 RX ORDER — PREDNISOLONE/MOXIFLOXACIN HCL 1 %-0.5 %
AS DIRECTED SUSPENSION, DROPS(FINAL DOSAGE FORM)(ML) OPHTHALMIC (EYE) 4 TIMES DAILY
Status: ON HOLD | COMMUNITY

## 2023-05-04 RX ORDER — DEXLANSOPRAZOLE 60 MG/1
TAKE 1 CAPSULE BY MOUTH EVERY DAY CAPSULE, DELAYED RELEASE ORAL ONCE A DAY
Qty: 90 | Refills: 4 | Status: ACTIVE | COMMUNITY

## 2023-05-04 NOTE — HPI-OTHER HISTORIES
Colonoscopy 8/16/2022:BBPS 8, sigmoid and descending diverticulosis, nonbleeding internal no specimens collected.  Due to her family history, repeat colonoscopy recommended in 2027. recall set  EGD 5/3/2022:No endoscopic abnormality evident to explain dysphagia status post empiric dilation with a 45 and 48 Angolan Savary dilator, normal stomach, normal examined duodenum. CT of the abdomen and pelvis with IV contrast 2/25/2022:No acute abnormality in the abdomen and pelvis.  Scattered colonic diverticula without evidence of acute diverticulitis.  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  Total arthroplasty of the left hip. Labs  2/15/2022:Alpha-fetoprotein 4.2.  No other lab results are available at this time. Labs 10/19/2021:Ionized calcium 5.3.  10/6/2021 BMP normal with exception of glucose 132, BUN 19, calcium 10.5.  LFTs normal TB 0.6, ALP 41, ALT 17, AST 16.  Hemoglobin A1c 7. 5/10/2021 alpha-fetoprotein 4.0. EGD 6/2/2021:No endoscopic abnormality to explain dysphagia status post dilation to 48 Angolan with a Fuentes dilator.  Normal stomach, normal examined duodenum.  No specimens collected. Abdominal ultrasound 6/1/2021:Negative study.

## 2023-05-04 NOTE — HPI-TODAY'S VISIT:
This is a 73-year-old female with a history of hepatitis C status posttreatment with simeprevir and sofosbuvir (2015) with SVR, grade 2 stage II liver disease on biopsy (2006) due surveillance ultrasound and alpha-fetoprotein in February 2023, GERD, postprandial epigastric pain, constipation, and a fraternal history of colon cancer presenting for follow-up after screening colonoscopy. She was last seen 6/1/2022.  She had infrequent breakthrough acid reflux symptoms on Dexilant and famotidine.  Historically, her symptoms were nocturnal.  Lifestyle modification was discussed and she was instructed to change the timing of Dexilant to between lunch and supper and change timing of famotidine to bedtime.  Gastric emptying study was a consideration.  She had symptoms of esophageal dysphagia.  She had undergone a recent EGD that was negative for endoscopic abnormality.  She had benefited from dilation.  If symptoms recurred, barium swallow was a consideration.  Chronic constipation was controlled with probiotics and daily fiber. Colonoscopy 8/16/2022:BBPS 8, sigmoid and descending diverticulosis, nonbleeding internal no specimens collected.  Due to her family history, repeat colonoscopy recommended in 2027. She is no longer taking famotidine.  She is taking Dexilant 60 mg daily.  She has infrequent diet dependent symptoms.  She reports pain indicated in the right upper quadrant and epigastrium occurring postprandially lasting 2 days.  This occurs intermittently.  There were no ameliorating factors.  She recently started Ozempic.  She was informed by Dr. Zepeda that this may cause abdominal pain and nausea but reports pain preceded onset of this medication.  She has experienced nausea.  She denies vomiting.  She is having bowel movements most days.  She denies hard stools.  She is taking Culturelle.  She is no longer on a fiber supplement.  She does not like taking powdered fiber and has had difficulty finding Metamucil tablets.

## 2023-05-06 ENCOUNTER — TELEPHONE ENCOUNTER (OUTPATIENT)
Dept: URBAN - METROPOLITAN AREA CLINIC 113 | Facility: CLINIC | Age: 73
End: 2023-05-06

## 2023-05-06 PROBLEM — 235675006: Status: ACTIVE | Noted: 2023-05-06

## 2023-05-07 ENCOUNTER — TELEPHONE ENCOUNTER (OUTPATIENT)
Dept: URBAN - METROPOLITAN AREA CLINIC 113 | Facility: CLINIC | Age: 73
End: 2023-05-07

## 2023-05-23 ENCOUNTER — TELEPHONE ENCOUNTER (OUTPATIENT)
Dept: URBAN - METROPOLITAN AREA CLINIC 113 | Facility: CLINIC | Age: 73
End: 2023-05-23

## 2023-06-01 ENCOUNTER — TELEPHONE ENCOUNTER (OUTPATIENT)
Dept: URBAN - METROPOLITAN AREA CLINIC 113 | Facility: CLINIC | Age: 73
End: 2023-06-01

## 2023-08-04 ENCOUNTER — OFFICE VISIT (OUTPATIENT)
Dept: URBAN - METROPOLITAN AREA CLINIC 113 | Facility: CLINIC | Age: 73
End: 2023-08-04
Payer: MEDICARE

## 2023-08-04 VITALS
SYSTOLIC BLOOD PRESSURE: 112 MMHG | TEMPERATURE: 96.9 F | HEART RATE: 76 BPM | DIASTOLIC BLOOD PRESSURE: 65 MMHG | WEIGHT: 199 LBS | BODY MASS INDEX: 31.98 KG/M2 | RESPIRATION RATE: 18 BRPM | HEIGHT: 66 IN

## 2023-08-04 DIAGNOSIS — K59.09 OTHER CONSTIPATION: ICD-10-CM

## 2023-08-04 DIAGNOSIS — K74.00 HEPATIC FIBROSIS: ICD-10-CM

## 2023-08-04 DIAGNOSIS — Z86.19 HISTORY OF HEPATITIS C: ICD-10-CM

## 2023-08-04 DIAGNOSIS — K21.00 GASTROESOPHAGEAL REFLUX DISEASE WITH ESOPHAGITIS WITHOUT HEMORRHAGE: ICD-10-CM

## 2023-08-04 PROCEDURE — 99213 OFFICE O/P EST LOW 20 MIN: CPT | Performed by: NURSE PRACTITIONER

## 2023-08-04 RX ORDER — IPRATROPIUM BROMIDE 42 UG/1
SPRAY, METERED NASAL
Qty: 45 | Refills: 0 | Status: ACTIVE | COMMUNITY
Start: 2020-01-07

## 2023-08-04 RX ORDER — SODIUM, POTASSIUM,MAG SULFATES 17.5-3.13G
354 ML SOLUTION, RECONSTITUTED, ORAL ORAL ONCE
Qty: 354 MILLILITER | Refills: 0 | Status: ON HOLD | COMMUNITY

## 2023-08-04 RX ORDER — PREDNISOLONE/MOXIFLOXACIN HCL 1 %-0.5 %
AS DIRECTED SUSPENSION, DROPS(FINAL DOSAGE FORM)(ML) OPHTHALMIC (EYE) 4 TIMES DAILY
Status: ON HOLD | COMMUNITY

## 2023-08-04 RX ORDER — FAMOTIDINE 40 MG/1
1 TABLET AT BEDTIME TABLET, FILM COATED ORAL ONCE A DAY
Qty: 30 | Refills: 5 | Status: ON HOLD | COMMUNITY

## 2023-08-04 RX ORDER — DICYCLOMINE HYDROCHLORIDE 10 MG/1
1 TABLET CAPSULE ORAL
Qty: 60 | Refills: 3 | Status: ON HOLD | COMMUNITY
Start: 2023-05-04 | End: 2023-09-01

## 2023-08-04 RX ORDER — LANSOPRAZOLE 15 MG/1
1 CAPSULE BEFORE BREAKFAST AND SUPPER CAPSULE, DELAYED RELEASE ORAL TWICE A DAY
Qty: 60 CAPSULE | Refills: 5 | OUTPATIENT
Start: 2023-08-04

## 2023-08-04 RX ORDER — SODIUM, POTASSIUM,MAG SULFATES 17.5-3.13G
AS DIRECTED SOLUTION, RECONSTITUTED, ORAL ORAL
Status: ON HOLD | COMMUNITY
Start: 2022-06-01

## 2023-08-04 RX ORDER — METFORMIN HYDROCHLORIDE 500 MG/1
1 TABLET WITH A MEAL TABLET, FILM COATED ORAL TWICE DAILY
Status: ACTIVE | COMMUNITY

## 2023-08-04 RX ORDER — ALBUTEROL SULFATE 2.5 MG/3ML
SOLUTION RESPIRATORY (INHALATION)
Qty: 255 | Refills: 0 | Status: ACTIVE | COMMUNITY
Start: 2012-03-14

## 2023-08-04 RX ORDER — DICLOFENAC SODIUM 10 MG/G
GEL TOPICAL
Qty: 300 | Refills: 0 | Status: ACTIVE | COMMUNITY
Start: 2019-09-18

## 2023-08-04 RX ORDER — BUDESONIDE 0.5 MG/2ML
USE 1 UNIT DOSE VIA NEBULIZER TWO TIMES A DAY INHALANT ORAL
Qty: 60 | Refills: 0 | Status: ACTIVE | COMMUNITY
Start: 2012-03-30

## 2023-08-04 RX ORDER — GLUCOSAMINE HCL/CHONDROITIN SU 500-400 MG
TAKE 1 CAPSULE DAILY CAPSULE ORAL
Refills: 0 | Status: ON HOLD | COMMUNITY

## 2023-08-04 RX ORDER — LATANOPROST/PF 0.005 %
INSTILL 1 DROP IN BOTH EYES AT BEDTIME DROPS OPHTHALMIC (EYE)
Refills: 0 | Status: ACTIVE | COMMUNITY
Start: 2014-05-10

## 2023-08-04 RX ORDER — AZELASTINE HCL 205.5 UG/1
1 PUFF IN EACH NOSTRIL SPRAY NASAL
Refills: 0 | Status: ACTIVE | COMMUNITY
Start: 2018-10-05

## 2023-08-04 RX ORDER — DEXLANSOPRAZOLE 60 MG/1
TAKE 1 CAPSULE BY MOUTH EVERY DAY CAPSULE, DELAYED RELEASE ORAL ONCE A DAY
Qty: 90 | Refills: 4 | Status: ON HOLD | COMMUNITY

## 2023-08-04 RX ORDER — SODIUM PICOSULFATE, MAGNESIUM OXIDE, AND ANHYDROUS CITRIC ACID 10; 3.5; 12 MG/160ML; G/160ML; G/160ML
160 ML LIQUID ORAL
Qty: 320 MILLILITER | Refills: 0 | Status: ON HOLD | COMMUNITY

## 2023-08-04 RX ORDER — TELMISARTAN 20 MG/1
1 TABLET TABLET ORAL ONCE A DAY
Refills: 0 | Status: ACTIVE | COMMUNITY
Start: 2019-11-04

## 2023-08-04 RX ORDER — ALBUTEROL SULFATE 90 UG/1
AEROSOL, METERED RESPIRATORY (INHALATION)
Qty: 18 | Refills: 0 | Status: ON HOLD | COMMUNITY
Start: 2012-01-02

## 2023-08-04 RX ORDER — SODIUM, POTASSIUM,MAG SULFATES 17.5-3.13G
354 ML SOLUTION, RECONSTITUTED, ORAL ORAL
Qty: 354 MILLILITER | Refills: 0 | Status: ON HOLD | COMMUNITY

## 2023-08-04 RX ORDER — BRINZOLAMIDE/BRIMONIDINE TARTRATE 10; 2 MG/ML; MG/ML
1 DROP INTO AFFECTED EYE SUSPENSION/ DROPS OPHTHALMIC THREE TIMES A DAY
Status: ACTIVE | COMMUNITY

## 2023-08-04 RX ORDER — LACTOBACILLUS RHAMNOSUS GG 10B CELL
TAKE 1 CAPSULE DAILY CAPSULE ORAL
Refills: 0 | Status: ACTIVE | COMMUNITY

## 2023-08-04 RX ORDER — SEMAGLUTIDE 0.68 MG/ML
AS DIRECTED INJECTION, SOLUTION SUBCUTANEOUS
Status: ACTIVE | COMMUNITY

## 2023-08-04 RX ORDER — ASCORBIC ACID 500 MG
TAKE 1 TABLET DAILY TABLET ORAL
Refills: 0 | Status: ACTIVE | COMMUNITY
Start: 2007-04-18

## 2023-08-04 NOTE — HPI-OTHER HISTORIES
Labs 5/17/2023:Alpha-fetoprotein 3.4.  Labs 4/20/2023: BMP normal with exception of glucose 129.  LFTs normal TB 0.9, ALP 54, ALT 10, AST 20.  Hemoglobin A1c 7.6.   Abdominal ultrasound 5/17/2023: Heterogeneous liver suggesting underlying hepatocellular disease.

## 2023-08-04 NOTE — HPI-TODAY'S VISIT:
This is a 73-year-old female with a history of hepatitis C status posttreatment with simeprevir and sofosbuvir (2015) with SVR, grade 2 stage II liver disease on biopsy (2006), GERD, postprandial epigastric pain, constipation, and a fraternal history of colon cancer due screening in 2027 presenting for follow-up. She was last seen 5/4/2023 for follow-up regarding GERD, hepatitis C, liver fibrosis, right upper quadrant abdominal pain, chronic constipation, and gastroparesis.  She had undergone a colonoscopy notable for diverticulosis in August 2022.  GERD was controlled with Dexilant 60 mg daily.  She reported persistent epigastric/right upper quadrant pain which had been a problem for years.  She reported recent recurrence.  She had an unremarkable CT and EGD in 2022.  She had a remote history of a positive gastric emptying study in 2002 and benefited from as needed metoclopramide.  It was discussed this may be contributing.  Functional abdominal pain associated with spasm is also a consideration.  She was prescribed dicyclomine.  Dietary information on gastroparesis was provided and she was instructed to observe small, frequent, low residue meals.  The importance of glycemic control was discussed.  Ozempic had recently been added.  She was due screening for HCC and was scheduled for an ultrasound and an alpha-fetoprotein was ordered.  Recent labs from her primary care physician were requested.  Constipation was controlled with a daily probiotic.  She plan to restart fiber tablets when she can find them.  Her symptoms were discussed with Dr. Trujillo.  He recommended that she restart metoclopramide.  It was decided to reassess her symptoms after dietary modification at her follow-up visit prior to restarting it. Labs 5/17/2023:Alpha-fetoprotein 3.4.  Labs 4/20/2023: BMP normal with exception of glucose 129.  LFTs normal TB 0.9, ALP 54, ALT 10, AST 20.  Hemoglobin A1c 7.6.   Abdominal ultrasound 5/17/2023: Heterogeneous liver suggesting underlying hepatocellular disease.  Dr. Trujillo reviewed and reported this was likely associated with fatty liver. She is currently off Dexilant.  She has been waiting for prior authorization which has not been accomplished.  She started taking over-the-counter Prevacid daily.  Late in the evening, she is having breakthrough symptoms and malodorous belching.  She is using mustard and reports some relief.  She has been observing smaller meals and is no longer having abdominal pain.  She recently had nausea and vomiting associated with an increased dose of Ozempic.  2 days after the increase, this resolved.  She is taking Metamucil daily and is having regular bowel movements. She has lost 11 pounds since her last visit. She is currently recovering from back surgery performed on 7/22.

## 2023-08-06 ENCOUNTER — TELEPHONE ENCOUNTER (OUTPATIENT)
Dept: URBAN - METROPOLITAN AREA CLINIC 113 | Facility: CLINIC | Age: 73
End: 2023-08-06

## 2023-08-25 ENCOUNTER — TELEPHONE (OUTPATIENT)
Dept: FAMILY MEDICINE | Facility: CLINIC | Age: 73
End: 2023-08-25

## 2023-08-25 NOTE — TELEPHONE ENCOUNTER
----- Message from Yusef Moran sent at 8/25/2023 11:17 AM CDT -----  Contact: pt  Type:  Same Day Appointment Request    Caller is requesting a same day appointment.  Caller declined first available appointment listed below.    Name of Caller:pt   When is the first available appointment?books are closed   Symptoms:rash on both hands   Best Call Back Number:[phone]  Additional Information:

## 2023-08-25 NOTE — TELEPHONE ENCOUNTER
Spoke to pt and she stated she has been taking care of her  who has MRSA and she has a red painful rash on the back of her hand. Pt went to urgent care yesterday and was told it was not MRSA it was skin irration from washing her hands so much. Pt stated she was give hydrocortisone cream and it was not helping, pt stated she started using Sebamed and it helps with the pain but her hand still itches and is still red and she would like a hand cream sent in the help with the itching and redness because hydrocortisone cream is not helping

## 2023-08-25 NOTE — TELEPHONE ENCOUNTER
I do not have any way of being able to evaluate this.  I would not go against what the urgent care did yesterday.  Probably should continue to give their recommendations a chance to work and if not any better she can see her PCP next week

## 2023-08-28 ENCOUNTER — TELEPHONE (OUTPATIENT)
Dept: FAMILY MEDICINE | Facility: CLINIC | Age: 73
End: 2023-08-28

## 2023-08-28 NOTE — TELEPHONE ENCOUNTER
----- Message from Velia Rojas sent at 8/28/2023 11:24 AM CDT -----  Patient called and stated that she use the wrong thermometer and she does not have a low grade fever, she stated that the one she used is broken if any questions please give her a call at [phone]

## 2023-08-28 NOTE — TELEPHONE ENCOUNTER
----- Message from Charissa Sykes sent at 8/28/2023 11:16 AM CDT -----  Pt would like to be seen on today states she has a low grade fever of 98.9. She has been caring for her  who has MRSA [phone]

## 2023-08-28 NOTE — TELEPHONE ENCOUNTER
Spoke with pt. States she just checked her temp because her skin was a little warm. Pt states she has been doing errands outside.   Pt states she is not sick nor feels sick. Pt states she is sorry to bother us with the call. Advised pt its ok. Call back if she needs anything. Pt also educated temp considered a fever at 100.4. pt voiced understanding.

## 2023-09-07 ENCOUNTER — HOSPITAL ENCOUNTER (OUTPATIENT)
Dept: RADIOLOGY | Facility: HOSPITAL | Age: 73
Discharge: HOME | End: 2023-09-07
Attending: NURSE PRACTITIONER
Payer: MEDICARE

## 2023-09-07 ENCOUNTER — TELEPHONE (OUTPATIENT)
Dept: FAMILY MEDICINE | Facility: CLINIC | Age: 73
End: 2023-09-07

## 2023-09-07 ENCOUNTER — OFFICE VISIT (OUTPATIENT)
Dept: CARDIOLOGY | Facility: CLINIC | Age: 73
End: 2023-09-07
Payer: MEDICARE

## 2023-09-07 VITALS
HEART RATE: 66 BPM | SYSTOLIC BLOOD PRESSURE: 139 MMHG | HEIGHT: 61 IN | BODY MASS INDEX: 20.6 KG/M2 | DIASTOLIC BLOOD PRESSURE: 76 MMHG | WEIGHT: 109.13 LBS

## 2023-09-07 DIAGNOSIS — R05.3 CHRONIC COUGH: Primary | ICD-10-CM

## 2023-09-07 DIAGNOSIS — I65.22 CAROTID ARTERY PLAQUE, LEFT: Chronic | ICD-10-CM

## 2023-09-07 DIAGNOSIS — Z80.1 FAMILY HISTORY OF LUNG CANCER: ICD-10-CM

## 2023-09-07 DIAGNOSIS — I35.8 AORTIC VALVE SCLEROSIS: Chronic | ICD-10-CM

## 2023-09-07 DIAGNOSIS — I49.49 PREMATURE BEATS: Primary | Chronic | ICD-10-CM

## 2023-09-07 DIAGNOSIS — R05.3 CHRONIC COUGH: ICD-10-CM

## 2023-09-07 DIAGNOSIS — E78.00 HYPERCHOLESTEROLEMIA: Chronic | ICD-10-CM

## 2023-09-07 DIAGNOSIS — I10 ESSENTIAL HYPERTENSION: Chronic | ICD-10-CM

## 2023-09-07 PROCEDURE — 99213 OFFICE O/P EST LOW 20 MIN: CPT | Mod: PBBFAC,25,PO | Performed by: INTERNAL MEDICINE

## 2023-09-07 PROCEDURE — 99214 PR OFFICE/OUTPT VISIT, EST, LEVL IV, 30-39 MIN: ICD-10-PCS | Mod: S$PBB,,, | Performed by: INTERNAL MEDICINE

## 2023-09-07 PROCEDURE — 71250 CT THORAX DX C-: CPT | Mod: TC,PO

## 2023-09-07 PROCEDURE — 99999 PR PBB SHADOW E&M-EST. PATIENT-LVL III: CPT | Mod: PBBFAC,,, | Performed by: INTERNAL MEDICINE

## 2023-09-07 PROCEDURE — 99214 OFFICE O/P EST MOD 30 MIN: CPT | Mod: S$PBB,,, | Performed by: INTERNAL MEDICINE

## 2023-09-07 PROCEDURE — 71250 CT THORAX DX C-: CPT | Mod: 26,,, | Performed by: RADIOLOGY

## 2023-09-07 PROCEDURE — 71250 CT CHEST WITHOUT CONTRAST: ICD-10-PCS | Mod: 26,,, | Performed by: RADIOLOGY

## 2023-09-07 PROCEDURE — 99999 PR PBB SHADOW E&M-EST. PATIENT-LVL III: ICD-10-PCS | Mod: PBBFAC,,, | Performed by: INTERNAL MEDICINE

## 2023-09-07 RX ORDER — MAGNESIUM 30 MG
250 TABLET ORAL ONCE
COMMUNITY

## 2023-09-07 RX ORDER — METOPROLOL SUCCINATE 25 MG/1
25 TABLET, EXTENDED RELEASE ORAL DAILY
Qty: 90 TABLET | Refills: 3 | Status: SHIPPED | OUTPATIENT
Start: 2023-09-07 | End: 2024-09-06

## 2023-09-07 NOTE — TELEPHONE ENCOUNTER
Spoke to pt and let her know order is in and pt verbalized understanding Pt is going to do it today. Pt has order at [facility] in [facility]

## 2023-09-07 NOTE — TELEPHONE ENCOUNTER
Pt would like it sent to [facility], [address] [facility] [address] [place], [place].   Screening for Lung cancer. Pt is aware it is being sent, Pt is doing it tomorrow

## 2023-09-07 NOTE — PROGRESS NOTES
Subjective:    Patient ID:  Cecilia Valenzuela is a 73 y.o. female who presents for Aortic valve sclerosis        HPI    LAST , TSH OK, MILD L CAROTID PLAQUE, DOING WELL, BP MOSTLY OK, NO CHEST PAIN NO SHORTNESS OF BREATH NO TIA TYPE SYMPTOMS NO NEAR-SYNCOPE, SEE ROS  Past Medical History:   Diagnosis Date    Arthritis     Atherosclerosis     in left carotid artery    Carotid artery plaque, left 2023    ER+ (estrogen receptor positive status) 2022    Fibrocystic breast     GERD (gastroesophageal reflux disease)     HER2-negative carcinoma of right breast 2022    Hyperlipidemia     Hypertension     Malignant neoplasm of upper-outer quadrant of right female breast 2017    right    Mitral valve prolapse     Neurofibromatosis     Osteopenia     Premature beats     Raynaud's disease     S/P mastectomy, right 2022    Seborrheic keratosis     Thyroid nodule     Tinnitus     Undiagnosed cardiac murmurs 2019     Past Surgical History:   Procedure Laterality Date    BACK SURGERY      BREAST BIOPSY Right     CATARACT EXTRACTION Bilateral      SECTION      x5    DILATION AND CURETTAGE OF UTERUS      miss Ab    HYSTERECTOMY      MASTECTOMY Right      Family History   Problem Relation Age of Onset    Rheum arthritis Mother     Breast cancer Mother         over 85    Hypertension Mother     Heart disease Mother     Parkinsonism Father     Cancer Father     Breast cancer Sister 40        DCIS    Breast cancer Maternal Aunt         60's    Breast cancer Sister 50        DCIS    Ovarian cancer Neg Hx     Colon cancer Neg Hx     Melanoma Neg Hx      Social History     Socioeconomic History    Marital status:    Tobacco Use    Smoking status: Never     Passive exposure: Never    Smokeless tobacco: Never   Substance and Sexual Activity    Alcohol use: No    Drug use: No    Sexual activity: Never     Partners: Male     Social Determinants of Health     Financial Resource  Strain: Low Risk  (10/10/2022)    Overall Financial Resource Strain (CARDIA)     Difficulty of Paying Living Expenses: Not hard at all   Food Insecurity: No Food Insecurity (10/10/2022)    Hunger Vital Sign     Worried About Running Out of Food in the Last Year: Never true     Ran Out of Food in the Last Year: Never true   Transportation Needs: No Transportation Needs (10/10/2022)    PRAPARE - Transportation     Lack of Transportation (Medical): No     Lack of Transportation (Non-Medical): No   Physical Activity: Sufficiently Active (10/10/2022)    Exercise Vital Sign     Days of Exercise per Week: 5 days     Minutes of Exercise per Session: 50 min   Stress: Unknown (10/10/2022)    Citizen of [place] [employer] - Occupational Stress Questionnaire     Feeling of Stress : Patient refused   Social Connections: Unknown (10/10/2022)    Social Connection and Isolation Panel [NHANES]     Frequency of Communication with Friends and Family: More than three times a week     Frequency of Social Gatherings with Friends and Family: More than three times a week     Active Member of Clubs or Organizations: Yes     Attends Club or Organization Meetings: More than 4 times per year     Marital Status:    Housing Stability: Low Risk  (10/10/2022)    Housing Stability Vital Sign     Unable to Pay for Housing in the Last Year: No     Number of Places Lived in the Last Year: 1     Unstable Housing in the Last Year: No       Review of patient's allergies indicates:   Allergen Reactions    Methylprednisolone      Other reaction(s): Heart palpitations  PT STATES SHE HAS A REACTION TO ALL STEROIDS DUE TO HER DX; OF MVP..    Zofran [ondansetron hcl (pf)] Nausea Only    Corticosteroids (glucocorticoids) Other (See Comments)     Heart palpitations    Dilaudid [hydromorphone] Nausea And Vomiting     Does not help with pain    Fentanyl     Neosporin (neomycin-polymyx)     Norvasc [amlodipine]     Statins-hmg-coa reductase  inhibitors      myalgias    Tetracyclines     Zoloft [sertraline]      Increased anxiety     Augmentin [amoxicillin-pot clavulanate] Nausea And Vomiting       Current Outpatient Medications:     aspirin (ECOTRIN) 81 MG EC tablet, Take 81 mg by mouth once daily., Disp: , Rfl:     azelastine (ASTELIN) 137 mcg (0.1 %) nasal spray, 1 spray (137 mcg total) by Nasal route as needed for Rhinitis., Disp: 30 mL, Rfl: 3    calcium carbonate/vitamin D3 (CALCIUM 600 + D,3, ORAL), Take 1 tablet by mouth 2 (two) times a day., Disp: , Rfl:     denosumab (PROLIA) 60 mg/mL Syrg, Inject 60 mg into the skin every 6 (six) months. , Disp: , Rfl:     lisinopriL (PRINIVIL,ZESTRIL) 5 MG tablet, Take 1 tablet (5 mg total) by mouth once daily., Disp: 90 tablet, Rfl: 1    LORazepam (ATIVAN) 0.5 MG tablet, Take 1 tablet (0.5 mg total) by mouth every 12 (twelve) hours as needed for Anxiety., Disp: 10 tablet, Rfl: 1    magnesium 30 mg Tab, Take 250 mg by mouth once., Disp: , Rfl:     multivitamin (THERAGRAN) per tablet, Take 1 tablet by mouth once daily., Disp: , Rfl:     omeprazole (PRILOSEC OTC) 20 MG tablet, Take 20 mg by mouth 2 (two) times daily before meals. Pt states that she take 20 mg in the morning ad 20 mg at night time., Disp: , Rfl:     PATADAY 0.2 % Drop, Place 1 drop into both eyes daily as needed (allergies). , Disp: , Rfl: 0    doxycycline (VIBRAMYCIN) 100 MG Cap, 1 po bid with food, Disp: 14 capsule, Rfl: 0    metoprolol succinate (TOPROL-XL) 25 MG 24 hr tablet, Take 1 tablet (25 mg total) by mouth once daily., Disp: 90 tablet, Rfl: 3    mupirocin (BACTROBAN) 2 % ointment, Apply topically 2 (two) times daily., Disp: 22 g, Rfl: 11    Review of Systems   Constitutional: Negative for chills, diaphoresis, fever, malaise/fatigue, night sweats and weight loss.   HENT:  Negative for sore throat.    Eyes:  Negative for blurred vision and visual disturbance.   Cardiovascular:  Negative for chest pain, claudication, cyanosis, dyspnea on  "exertion, irregular heartbeat, leg swelling, near-syncope, orthopnea, palpitations (RARE), paroxysmal nocturnal dyspnea and syncope.   Respiratory:  Negative for cough, hemoptysis, shortness of breath and wheezing.    Endocrine: Negative.    Hematologic/Lymphatic: Negative for adenopathy. Does not bruise/bleed easily.   Skin:  Negative for color change and rash.   Musculoskeletal:  Negative for back pain and falls.   Gastrointestinal:  Negative for abdominal pain, change in bowel habit, dysphagia, jaundice, melena and nausea.   Genitourinary:  Negative for dysuria and flank pain.   Neurological:  Negative for dizziness, focal weakness, headaches, light-headedness, loss of balance, numbness and weakness.   Psychiatric/Behavioral:  Negative for altered mental status and depression.    Allergic/Immunologic: Negative for persistent infections.        Objective:      Vitals:    09/07/23 1241   BP: 139/76   Pulse: 66   Weight: 49.5 kg (109 lb 2 oz)   Height: 5' 1" (1.549 m)   PainSc: 0-No pain     Body mass index is 20.62 kg/m².    Physical Exam  Constitutional:       General: She is not in acute distress.     Appearance: She is well-developed.   HENT:      Head: Normocephalic and atraumatic.   Eyes:      Extraocular Movements: Extraocular movements intact.      Conjunctiva/sclera: Conjunctivae normal.   Neck:      Vascular: Normal carotid pulses. No JVD.   Cardiovascular:      Rate and Rhythm: Normal rate and regular rhythm. No extrasystoles are present.     Pulses:           Carotid pulses are 2+ on the right side and 2+ on the left side.       Radial pulses are 2+ on the right side and 2+ on the left side.        Posterior tibial pulses are 2+ on the right side and 2+ on the left side.      Heart sounds:      No friction rub. No gallop.   Pulmonary:      Effort: Pulmonary effort is normal.      Breath sounds: Normal breath sounds and air entry. No rales.   Chest:      Chest wall: No tenderness.   Abdominal:      " General: Bowel sounds are normal.      Palpations: Abdomen is soft.      Tenderness: There is no abdominal tenderness.   Musculoskeletal:      Cervical back: Neck supple.      Right lower leg: No edema.      Left lower leg: No edema.   Skin:     General: Skin is warm and dry.   Neurological:      General: No focal deficit present.      Mental Status: She is alert and oriented to person, place, and time.      Cranial Nerves: Cranial nerves 2-12 are intact.   Psychiatric:         Mood and Affect: Mood normal.         Speech: Speech normal.         Behavior: Behavior normal.                 ..    Chemistry        Component Value Date/Time     04/06/2023 0905    K 4.6 04/06/2023 0905     04/06/2023 0905    CO2 26 04/06/2023 0905    BUN 11 04/06/2023 0905    CREATININE 0.68 04/06/2023 0905    GLU 75 04/06/2023 0905        Component Value Date/Time    CALCIUM 9.9 04/06/2023 0905    ALKPHOS 60 09/16/2021 1137    AST 28 04/06/2023 0905    ALT 18 04/06/2023 0905    BILITOT 0.5 04/06/2023 0905    ESTGFRAFRICA 102 03/28/2022 1321    EGFRNONAA 88 03/28/2022 1321            ..  Lab Results   Component Value Date    CHOL 246 (H) 04/06/2023    CHOL 219 (H) 09/07/2022    CHOL 218 (H) 05/17/2022     Lab Results   Component Value Date    HDL 66 04/06/2023    HDL 55 09/07/2022    HDL 55 05/17/2022     Lab Results   Component Value Date    LDLCALC 157 (H) 04/06/2023    LDLCALC 134 (H) 09/07/2022    LDLCALC 138 (H) 05/17/2022     Lab Results   Component Value Date    TRIG 115 04/06/2023    TRIG 161 (H) 09/07/2022    TRIG 129 05/17/2022     Lab Results   Component Value Date    CHOLHDL 3.7 04/06/2023    CHOLHDL 4.0 09/07/2022    CHOLHDL 4.0 05/17/2022     ..  Lab Results   Component Value Date    WBC 8.1 04/06/2023    HGB 15.4 04/06/2023    HCT 46.6 (H) 04/06/2023    MCV 89.3 04/06/2023     04/06/2023       Test(s) Reviewed  I have reviewed the following in detail:  [] Stress test   [] Angiography   [] Echocardiogram    [x] Labs   [x] Other:       Assessment:         ICD-10-CM ICD-9-CM   1. Premature beats  I49.49 427.60   2. Carotid artery plaque, left  I65.22 433.10   3. Aortic valve sclerosis  I35.8 424.1   4. Essential hypertension  I10 401.9   5. Hypercholesterolemia  E78.00 272.0     Problem List Items Addressed This Visit          Cardiac/Vascular    Essential hypertension    Premature beats - Primary    Hypercholesterolemia    Aortic valve sclerosis    Carotid artery plaque, left        Plan:     DECLINES CHOLESTEROL MEDS, NEEDS TO LOWER LDL VIEW OF ATHEROSCLEROSIS IN THE PLAQUE, PATIENT UNDERSTAND RISK, ADD FISH OIL,ALL CV CLINICALLY STABLE, NO ANGINA, NO HF, NO TIA, NO SIGNIFICANT CLINICAL ARRHYTHMIA,CONTINUE CURRENT MEDS, EDUCATION, DIET, EXERCISE, RETURN TO CLINIC IN  1 YEAR      Premature beats    Carotid artery plaque, left    Aortic valve sclerosis    Essential hypertension  Comments:  CONTROLLED    Hypercholesterolemia  Comments:  DECLINES MEDS    Other orders  -     metoprolol succinate (TOPROL-XL) 25 MG 24 hr tablet; Take 1 tablet (25 mg total) by mouth once daily.  Dispense: 90 tablet; Refill: 3    RTC Low level/low impact aerobic exercise 5x's/wk. Heart healthy diet and risk factor modification.    See labs and med orders.    Aerobic exercise 5x's/wk. Heart healthy diet and risk factor modification.    See labs and med orders.

## 2023-09-08 ENCOUNTER — TELEPHONE (OUTPATIENT)
Dept: FAMILY MEDICINE | Facility: CLINIC | Age: 73
End: 2023-09-08

## 2023-09-08 NOTE — TELEPHONE ENCOUNTER
Spoke with pt in regards to recent Ct scan of the lung results. Verbalized per Cecilia that pt's CT scan of the lungs shows a couple small calcified nodules, largest being 6 mm which is still considered small.  There are no lung masses or enlarged lymph nodes in the chest.  Recommend follow-up CT scan in 3-6 months to monitor the nodules and ensure no change. Pt acknowledged understanding.       Reminder set up.

## 2023-09-08 NOTE — TELEPHONE ENCOUNTER
----- Message from Sabrina Keen sent at 10/23/2019  1:00 PM CDT -----  Contact: pt  Pt states that she is coming in on friday for her appointment and would like to be given the flu shot as well,please..[phone] (home)     
Spoke to patient and informed that we do not do flu shots in the office however she can have it done at the pharmacy. Patient verbalized understanding.  
Adult

## 2023-09-08 NOTE — TELEPHONE ENCOUNTER
----- Message from Cecilia Shaikh NP sent at 9/7/2023  6:19 PM CDT -----  Please call patient and let her know CT scan of the lungs shows a couple small calcified nodules, largest being 6 mm which is still considered small.  There are no lung masses or enlarged lymph nodes in the chest.  Recommend follow-up CT scan in 3-6 months to monitor the nodules and ensure no change

## 2023-09-19 ENCOUNTER — PATIENT MESSAGE (OUTPATIENT)
Dept: FAMILY MEDICINE | Facility: CLINIC | Age: 73
End: 2023-09-19

## 2023-09-19 NOTE — TELEPHONE ENCOUNTER
Please let patient know I am not aware of any  in [place] or [place].  The only one I was aware of was at [facility] in [place] but that was years ago and I'm not sure if they're still there.

## 2023-09-20 ENCOUNTER — TELEPHONE (OUTPATIENT)
Dept: GENETICS | Facility: CLINIC | Age: 73
End: 2023-09-20
Payer: MEDICARE

## 2023-09-20 NOTE — TELEPHONE ENCOUNTER
Pt wants to know if she aguirre be tested for fibromatosis. Informed pt I would forward and message to a GC and call her back with a update. Pt verbalized understanding.       ----- Message from Jessica Mcdonough sent at 9/20/2023 10:09 AM CDT -----  Contact: [phone]  Would like to receive medical advice.  Pt called and stated that she had testing done  back in 2017 and want to know about a different type of testing.    Would they like a call back or a response via [facility]:  call back     Additional information:  please call pt to advise            
[FreeTextEntry1] : starting of a pneumonia\par return in two days\par  work more on nebulizer/ albuetrol treatemnts difficulty adminitering\par return if owrsening

## 2023-09-22 ENCOUNTER — TELEPHONE (OUTPATIENT)
Dept: HEMATOLOGY/ONCOLOGY | Facility: CLINIC | Age: 73
End: 2023-09-22
Payer: MEDICARE

## 2023-09-22 NOTE — TELEPHONE ENCOUNTER
Genetic consult scheduled Wednesday, 10/4 at 12pm with Alexandre.    ----- Message from Beth Morel [facility] sent at 9/20/2023 10:29 AM CDT -----  Regarding: RE: Please advise  Contact: [phone]  This would be one for the cancer folks. Astrid can you help schedule?  ----- Message -----  From: Kimberly Solis MA  Sent: 9/20/2023  10:19 AM CDT  To: Beth Morel [facility]  Subject: Please advise                                    Pt wants to know if we test for fibromatosis.      ----- Message -----  From: Jessica Mcdonough  Sent: 9/20/2023  10:10 AM CDT  To: [facility] Genetics Clinical Support Staff    Would like to receive medical advice.  Pt called and stated that she had testing done  back in 2017 and want to know about a different type of testing.    Would they like a call back or a response via [facility]:  call back     Additional information:  please call pt to advise

## 2023-10-04 DIAGNOSIS — M81.0 AGE-RELATED OSTEOPOROSIS WITHOUT CURRENT PATHOLOGICAL FRACTURE: Primary | ICD-10-CM

## 2023-10-04 DIAGNOSIS — Z79.899 ENCOUNTER FOR LONG-TERM (CURRENT) USE OF OTHER MEDICATIONS: ICD-10-CM

## 2023-10-04 DIAGNOSIS — F41.9 ANXIETY: ICD-10-CM

## 2023-10-04 RX ORDER — LORAZEPAM 0.5 MG/1
0.5 TABLET ORAL EVERY 12 HOURS PRN
Qty: 30 TABLET | Refills: 1 | Status: SHIPPED | OUTPATIENT
Start: 2023-10-04

## 2023-10-04 NOTE — TELEPHONE ENCOUNTER
----- Message from Lesvia Lowery MA sent at 10/4/2023  1:14 PM CDT -----    ----- Message -----  From: Sarah Valenzuela  Sent: 10/4/2023   1:05 PM CDT  To: Cecilia Shaikh Staff    Please review patient's Appointment Desk for an upcoming PROLIA INJECTION appointment.       The following are needed for this appointment.   Reminding to please contact patient, if needed:      ORDER.  Current / active in the Epic Therapy Plan, signed within a year.  LABS. Serum Calcium within 6 weeks of treatment.    DEXA SCAN within the last 2 years   DENTAL PRECAUTION CONFIRMED WITH PATIENT. No recent invasive dental procedures within the last month (tooth extraction, etc). A patient will need to bring a Dental Clearance signed by a dental provider if there was any recent dental procedure within the last month.   FOLLOW-UP APPOINTMENT within the last 12 months with the diagnosis mentioned in the visit notes.         Any item unavailable by the day prior to the scheduled appointment will cause the appointment to be CANCELLED.        To reschedule appointments, please contact [facility] INFUSION SCHEDULING POOL or call [phone] or [phone].       Thank you,  [facility], Infusion Department

## 2023-10-04 NOTE — TELEPHONE ENCOUNTER
Needs Ca+- wants sent to [facility]  Dexa done 4/4/23    Pt also requesting refill of ativan. States she has been through a lot of stress and would like a refill of ativan.

## 2023-10-10 ENCOUNTER — LAB VISIT (OUTPATIENT)
Dept: LAB | Facility: HOSPITAL | Age: 73
End: 2023-10-10
Attending: FAMILY MEDICINE
Payer: MEDICARE

## 2023-10-10 ENCOUNTER — OFFICE VISIT (OUTPATIENT)
Dept: OTOLARYNGOLOGY | Facility: CLINIC | Age: 73
End: 2023-10-10
Payer: MEDICARE

## 2023-10-10 VITALS
HEART RATE: 59 BPM | BODY MASS INDEX: 20.77 KG/M2 | SYSTOLIC BLOOD PRESSURE: 141 MMHG | WEIGHT: 110 LBS | HEIGHT: 61 IN | RESPIRATION RATE: 16 BRPM | DIASTOLIC BLOOD PRESSURE: 73 MMHG

## 2023-10-10 DIAGNOSIS — M81.0 AGE-RELATED OSTEOPOROSIS WITHOUT CURRENT PATHOLOGICAL FRACTURE: ICD-10-CM

## 2023-10-10 DIAGNOSIS — H61.23 BILATERAL IMPACTED CERUMEN: Primary | ICD-10-CM

## 2023-10-10 DIAGNOSIS — Z79.899 ENCOUNTER FOR LONG-TERM (CURRENT) USE OF OTHER MEDICATIONS: ICD-10-CM

## 2023-10-10 DIAGNOSIS — R42 DIZZINESS: ICD-10-CM

## 2023-10-10 LAB — CALCIUM SERPL-MCNC: 9.8 MG/DL (ref 8.7–10.5)

## 2023-10-10 PROCEDURE — 99999 PR PBB SHADOW E&M-EST. PATIENT-LVL III: ICD-10-PCS | Mod: PBBFAC,,, | Performed by: PHYSICIAN ASSISTANT

## 2023-10-10 PROCEDURE — 69210 REMOVE IMPACTED EAR WAX UNI: CPT | Mod: S$PBB,,, | Performed by: PHYSICIAN ASSISTANT

## 2023-10-10 PROCEDURE — 99213 OFFICE O/P EST LOW 20 MIN: CPT | Mod: PBBFAC,PO | Performed by: PHYSICIAN ASSISTANT

## 2023-10-10 PROCEDURE — 82310 ASSAY OF CALCIUM: CPT | Performed by: FAMILY MEDICINE

## 2023-10-10 PROCEDURE — 99213 OFFICE O/P EST LOW 20 MIN: CPT | Mod: 25,S$PBB,, | Performed by: PHYSICIAN ASSISTANT

## 2023-10-10 PROCEDURE — 36415 COLL VENOUS BLD VENIPUNCTURE: CPT | Performed by: FAMILY MEDICINE

## 2023-10-10 PROCEDURE — 99999 PR PBB SHADOW E&M-EST. PATIENT-LVL III: CPT | Mod: PBBFAC,,, | Performed by: PHYSICIAN ASSISTANT

## 2023-10-10 PROCEDURE — 69210 REMOVE IMPACTED EAR WAX UNI: CPT | Mod: PBBFAC,PO | Performed by: PHYSICIAN ASSISTANT

## 2023-10-10 PROCEDURE — 69210 EAR CERUMEN REMOVAL: ICD-10-PCS | Mod: S$PBB,,, | Performed by: PHYSICIAN ASSISTANT

## 2023-10-10 PROCEDURE — 99213 PR OFFICE/OUTPT VISIT, EST, LEVL III, 20-29 MIN: ICD-10-PCS | Mod: 25,S$PBB,, | Performed by: PHYSICIAN ASSISTANT

## 2023-10-10 RX ORDER — AMOXICILLIN 500 MG
1 CAPSULE ORAL 2 TIMES DAILY
COMMUNITY

## 2023-10-10 NOTE — PROGRESS NOTES
[facility]    Subjective:      Patient: Cecilia Valenzuela Patient PCP: Buddy Mathew MD         :  1950     Sex:  female      MRN:  [id number]          Date of Visit: 10/10/2023      Chief Complaint: Cerumen Impaction    Interval History 10/10/2023: Pt presents to office for ear cleaning for cerumen impaction. Pt has been having issues with dizziness, but would like to defer at this time on dizziness evaluation. Pt denies ear pain/discharge.     Interval History 2023: Pt presents to clinic for ear cleaning. Pt did VPT and weaned off of meclizine and says this helped a lot with her dizziness and she does at home exercises. Pt states that she has left aural fullness and has sensation that ear wax is in her left ear. Pt states she had audiogram 2-3 years ago that showed normal hearing. Pt states that she has had longstanding bilateral tinnitus. Pt has no other complaints at this time.     Patient ID 2022: Cecilia Valenzuela is a 73 y.o. female who was self-referred for  ear cleaning . Pt previously seen by ENT MD Dr. Kaleb De La Torre and Marco A Lomeli for cerumen impaction and dizziness. Pt states she was previously told that she had vestibular weakness on the left side. Pt completed VPT and pt takes meclizine 12.5mg every night. Pt states she had flair up of dizziness last around 1 year ago and would like consult with VPT to go over exercises. Pt states that her ears have been getting clogged for around 1 week or so. Pt states the night before last she started having dizziness. Pt states she went she went through [facility] and MRI and she was told that she had vestibular weakness without retrocochlear pathology. Pt denies fever/chills, ear pain or ear discharge. There is not a prior history of ear surgery. Pt states that she has benign bony growth behind left ear that has not grown in size and there were plans to have it shaved down with elective surgery with Dr. Lomeli, but pt never followed up for  surgery.     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    Family History  Her family history includes Breast cancer in her maternal aunt and mother; Breast cancer (age of onset: 40) in her sister; Breast cancer (age of onset: 50) in her sister; Cancer in her father; Heart disease in her mother; Hypertension in her mother; Parkinsonism in her father; Rheum arthritis in her mother.    Past Surgical History:   Procedure Laterality Date    BACK SURGERY      BREAST BIOPSY Right 2017    CATARACT EXTRACTION Bilateral      SECTION      x5    DILATION AND CURETTAGE OF UTERUS      miss Ab    HYSTERECTOMY      MASTECTOMY Right 2017     Social History     Tobacco Use    Smoking status: Never     Passive exposure: Never    Smokeless tobacco: Never   Substance and Sexual Activity    Alcohol use: No    Drug use: No    Sexual activity: Never     Partners: Male     Medications  She has a current medication list which includes the following prescription(s): ascorbic acid, aspirin, azelastine, calcium carbonate/vitamin d3, denosumab, lisinopril, lorazepam, magnesium, meclizine, metoprolol succinate, multivitamin, fish oil-omega-3 fatty acids, omeprazole, pataday, and zinc glycinate.    Review of patient's allergies indicates:   Allergen Reactions    Methylprednisolone      Other reaction(s): Heart palpitations  PT STATES SHE HAS A REACTION TO ALL STEROIDS DUE TO HER DX; OF MVP..    Zofran [ondansetron hcl (pf)] Nausea Only     "Corticosteroids (glucocorticoids) Other (See Comments)     Heart palpitations    Dilaudid [hydromorphone] Nausea And Vomiting     Does not help with pain    Fentanyl     Neosporin (neomycin-polymyx)     Norvasc [amlodipine]     Statins-hmg-coa reductase inhibitors      myalgias    Tetracyclines     Zoloft [sertraline]      Increased anxiety     Augmentin [amoxicillin-pot clavulanate] Nausea And Vomiting     All medications, allergies, and past history have been reviewed.    Objective:      Vitals:      4/18/2023     3:55 PM 9/7/2023    12:41 PM 10/10/2023     2:51 PM   Vitals - 1 value per visit   SYSTOLIC 130 139 141   DIASTOLIC 79 76 73   Pulse 69 66 59   Temp 97.5 °F (36.4 °C)     Resp 15  16   SPO2 100 %     Weight (lb) 111.7 109.13 110.01   Weight (kg) 50.667 49.5 49.9   Height 5' 1" (1.549 m) 5' 1" (1.549 m) 5' 1" (1.549 m)   BMI (Calculated) 21.1 20.6 20.8   Pain Score Zero Zero Zero       Body surface area is 1.47 meters squared.    Physical Exam  Constitutional:       General: She is not in acute distress.     Appearance: Normal appearance. She is not ill-appearing.   HENT:      Head: Normocephalic and atraumatic.      Right Ear: Tympanic membrane, ear canal and external ear normal. There is impacted cerumen (partial).      Left Ear: Tympanic membrane, ear canal and external ear normal. There is impacted cerumen (partial).      Ears:        Nose: Nose normal.      Mouth/Throat:      Lips: Pink. No lesions.      Mouth: Mucous membranes are moist. No oral lesions.      Tongue: No lesions.      Palate: No lesions.      Pharynx: Oropharynx is clear. Uvula midline. No pharyngeal swelling, oropharyngeal exudate, posterior oropharyngeal erythema or uvula swelling.   Eyes:      General:         Right eye: No discharge.         Left eye: No discharge.      Extraocular Movements: Extraocular movements intact.      Conjunctiva/sclera: Conjunctivae normal.   Pulmonary:      Effort: Pulmonary effort is normal. "   Neurological:      General: No focal deficit present.      Mental Status: She is alert and oriented to person, place, and time. Mental status is at baseline.   Psychiatric:         Mood and Affect: Mood normal.         Behavior: Behavior normal.         Thought Content: Thought content normal.         Judgment: Judgment normal.          Ear Cerumen Removal     Date/Time: 10/10/2023 3:00 PM     Performed by: Asher Gaitan PA-C  Authorized by: Asher Gaitan PA-C    Consent Done?:  Yes (Verbal)     Local anesthetic:  None  Location details:  Both ears  Procedure type: curette    Cerumen  Removal Results:  Cerumen completely removed  Patient tolerance:  Patient tolerated the procedure well with no immediate complications          Labs:  WBC   Date Value Ref Range Status   04/06/2023 8.1 3.8 - 10.8 Thousand/uL Final     Platelets   Date Value Ref Range Status   04/06/2023 222 140 - 400 Thousand/uL Final     Creatinine   Date Value Ref Range Status   04/06/2023 0.68 0.60 - 1.00 mg/dL Final     TSH   Date Value Ref Range Status   05/14/2021 2.64 0.40 - 4.50 mIU/L Final     Glucose   Date Value Ref Range Status   04/06/2023 75 65 - 99 mg/dL Final     Comment:                   Fasting reference interval            All lab results and imaging results have been reviewed.    Assessment:        ICD-10-CM ICD-9-CM   1. Bilateral impacted cerumen  H61.23 380.4   2. Dizziness  R42 780.4           Plan:      Bilateral ear cleaning performed today in office. Pt would like to defer on dizziness assessment today in office. Pt advised that she can follow up if she wants to be further assessed for dizziness. Pt will follow up in 6 months for routine ear cleaning and may follow up sooner for ENT issues/concerns.

## 2023-10-13 NOTE — PROCEDURES
Ear Cerumen Removal    Date/Time: 10/10/2023 3:00 PM    Performed by: Asher Gaitan PA-C  Authorized by: Asher Gaitan PA-C    Consent Done?:  Yes (Verbal)    Local anesthetic:  None  Location details:  Both ears  Procedure type: curette    Cerumen  Removal Results:  Cerumen completely removed  Patient tolerance:  Patient tolerated the procedure well with no immediate complications

## 2023-10-16 ENCOUNTER — TELEPHONE (OUTPATIENT)
Dept: FAMILY MEDICINE | Facility: CLINIC | Age: 73
End: 2023-10-16

## 2023-10-16 NOTE — TELEPHONE ENCOUNTER
Spoke to patient with results verbatim per Dr Mathew. Verbalized understanding and said she saw them on her portal.

## 2023-10-16 NOTE — TELEPHONE ENCOUNTER
----- Message from Buddy Mathew MD sent at 10/15/2023  3:25 PM CDT -----  Call patient.  Calcium level normal.  Okay to continue your injections

## 2023-10-18 ENCOUNTER — INFUSION (OUTPATIENT)
Dept: INFUSION THERAPY | Facility: HOSPITAL | Age: 73
End: 2023-10-18
Attending: NURSE PRACTITIONER
Payer: MEDICARE

## 2023-10-18 VITALS
DIASTOLIC BLOOD PRESSURE: 72 MMHG | HEART RATE: 75 BPM | WEIGHT: 113.19 LBS | RESPIRATION RATE: 16 BRPM | TEMPERATURE: 98 F | OXYGEN SATURATION: 99 % | BODY MASS INDEX: 21.37 KG/M2 | SYSTOLIC BLOOD PRESSURE: 127 MMHG | HEIGHT: 61 IN

## 2023-10-18 DIAGNOSIS — Z79.811 LONG TERM CURRENT USE OF AROMATASE INHIBITOR: ICD-10-CM

## 2023-10-18 DIAGNOSIS — Z17.0 MALIGNANT NEOPLASM OF UPPER-OUTER QUADRANT OF RIGHT BREAST IN FEMALE, ESTROGEN RECEPTOR POSITIVE: ICD-10-CM

## 2023-10-18 DIAGNOSIS — M81.0 AGE-RELATED OSTEOPOROSIS WITHOUT CURRENT PATHOLOGICAL FRACTURE: Primary | ICD-10-CM

## 2023-10-18 DIAGNOSIS — C50.411 MALIGNANT NEOPLASM OF UPPER-OUTER QUADRANT OF RIGHT BREAST IN FEMALE, ESTROGEN RECEPTOR POSITIVE: ICD-10-CM

## 2023-10-18 PROCEDURE — 96372 THER/PROPH/DIAG INJ SC/IM: CPT

## 2023-10-18 PROCEDURE — 63600175 PHARM REV CODE 636 W HCPCS: Mod: JZ,JG | Performed by: NURSE PRACTITIONER

## 2023-10-18 RX ADMIN — DENOSUMAB 60 MG: 60 INJECTION SUBCUTANEOUS at 01:10

## 2023-10-18 NOTE — PLAN OF CARE
Problem: Fall Injury Risk  Goal: Absence of Fall and Fall-Related Injury  Outcome: Ongoing, Progressing  Intervention: Identify and Manage Contributors  Flowsheets (Taken 10/18/2023 1300)  Self-Care Promotion: safe use of adaptive equipment encouraged  Medication Review/Management: medications reviewed  Intervention: Promote Injury-Free Environment  Flowsheets (Taken 10/18/2023 1300)  Safety Promotion/Fall Prevention: assistive device/personal item within reach

## 2023-10-19 ENCOUNTER — TELEPHONE (OUTPATIENT)
Dept: NEUROLOGY | Facility: CLINIC | Age: 73
End: 2023-10-19
Payer: MEDICARE

## 2023-10-19 NOTE — TELEPHONE ENCOUNTER
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 Call back    Best Call Back Number: [phone]      Additional Information: Sts wants to know if you treat  neurofibromatosis and if not, who does.Please advise -- Thank you

## 2023-10-23 ENCOUNTER — TELEPHONE (OUTPATIENT)
Dept: FAMILY MEDICINE | Facility: CLINIC | Age: 73
End: 2023-10-23

## 2023-10-23 NOTE — TELEPHONE ENCOUNTER
Please let patient know she can see the dentist on Prolia but if they are trying to do any type of work involving the bone of the jaw there is a slightly increased risk for osteonecrosis of the jaw.  With a broken tooth I would not suspect they would need to do something like that but I would just make the dentist aware of Prolia

## 2023-10-23 NOTE — TELEPHONE ENCOUNTER
Spoke with pt in regards to message sent. Verbalized per Cecilia that you can see the dentist on Prolia, but if they are trying to do any type of work involving the bone of the jaw there is a slightly increased risk for osteonecrosis of the jaw.  With a broken tooth, Cecilia would not suspect they would need to do something like that but I would just make the dentist aware of Prolia. Pt acknowledge understanding.

## 2023-10-23 NOTE — TELEPHONE ENCOUNTER
----- Message from Beth Vasquez sent at 10/23/2023  9:33 AM CDT -----  -9:05-pt had a prolia shot last Tuesday and broke a tooth this morning. Needs to know if she can go to the dentist  [phone]

## 2023-11-06 ENCOUNTER — OFFICE VISIT (OUTPATIENT)
Dept: URBAN - METROPOLITAN AREA CLINIC 113 | Facility: CLINIC | Age: 73
End: 2023-11-06
Payer: MEDICARE

## 2023-11-06 VITALS
RESPIRATION RATE: 14 BRPM | SYSTOLIC BLOOD PRESSURE: 145 MMHG | HEIGHT: 66 IN | HEART RATE: 97.1 BPM | WEIGHT: 203 LBS | DIASTOLIC BLOOD PRESSURE: 75 MMHG | BODY MASS INDEX: 32.62 KG/M2 | TEMPERATURE: 97.1 F

## 2023-11-06 DIAGNOSIS — R19.7 ACUTE DIARRHEA: ICD-10-CM

## 2023-11-06 DIAGNOSIS — K21.00 GASTROESOPHAGEAL REFLUX DISEASE WITH ESOPHAGITIS WITHOUT HEMORRHAGE: ICD-10-CM

## 2023-11-06 DIAGNOSIS — Z86.19 HISTORY OF HEPATITIS C: ICD-10-CM

## 2023-11-06 DIAGNOSIS — K74.00 LIVER FIBROSIS: ICD-10-CM

## 2023-11-06 PROCEDURE — 99213 OFFICE O/P EST LOW 20 MIN: CPT | Performed by: NURSE PRACTITIONER

## 2023-11-06 RX ORDER — LATANOPROST/PF 0.005 %
INSTILL 1 DROP IN BOTH EYES AT BEDTIME DROPS OPHTHALMIC (EYE)
Refills: 0 | Status: ACTIVE | COMMUNITY
Start: 2014-05-10

## 2023-11-06 RX ORDER — GLUCOSAMINE HCL/CHONDROITIN SU 500-400 MG
TAKE 1 CAPSULE DAILY CAPSULE ORAL
Refills: 0 | Status: ON HOLD | COMMUNITY

## 2023-11-06 RX ORDER — PREDNISOLONE/MOXIFLOXACIN HCL 1 %-0.5 %
AS DIRECTED SUSPENSION, DROPS(FINAL DOSAGE FORM)(ML) OPHTHALMIC (EYE) 4 TIMES DAILY
Status: ON HOLD | COMMUNITY

## 2023-11-06 RX ORDER — BUDESONIDE 0.5 MG/2ML
USE 1 UNIT DOSE VIA NEBULIZER TWO TIMES A DAY INHALANT ORAL
Qty: 60 | Refills: 0 | Status: ACTIVE | COMMUNITY
Start: 2012-03-30

## 2023-11-06 RX ORDER — LACTOBACILLUS RHAMNOSUS GG 10B CELL
TAKE 1 CAPSULE DAILY CAPSULE ORAL
Refills: 0 | Status: ACTIVE | COMMUNITY

## 2023-11-06 RX ORDER — SEMAGLUTIDE 0.68 MG/ML
AS DIRECTED INJECTION, SOLUTION SUBCUTANEOUS
Status: ACTIVE | COMMUNITY

## 2023-11-06 RX ORDER — SODIUM, POTASSIUM,MAG SULFATES 17.5-3.13G
354 ML SOLUTION, RECONSTITUTED, ORAL ORAL
Qty: 354 MILLILITER | Refills: 0 | Status: ON HOLD | COMMUNITY

## 2023-11-06 RX ORDER — METFORMIN HYDROCHLORIDE 500 MG/1
1 TABLET WITH A MEAL TABLET, FILM COATED ORAL TWICE DAILY
Status: ACTIVE | COMMUNITY

## 2023-11-06 RX ORDER — BRINZOLAMIDE/BRIMONIDINE TARTRATE 10; 2 MG/ML; MG/ML
1 DROP INTO AFFECTED EYE SUSPENSION/ DROPS OPHTHALMIC THREE TIMES A DAY
Status: ACTIVE | COMMUNITY

## 2023-11-06 RX ORDER — LANSOPRAZOLE 15 MG/1
1 CAPSULE BEFORE BREAKFAST AND SUPPER CAPSULE, DELAYED RELEASE ORAL TWICE A DAY
Qty: 60 CAPSULE | Refills: 5 | Status: ACTIVE | COMMUNITY
Start: 2023-08-04

## 2023-11-06 RX ORDER — ALBUTEROL SULFATE 90 UG/1
AEROSOL, METERED RESPIRATORY (INHALATION)
Qty: 18 | Refills: 0 | Status: ON HOLD | COMMUNITY
Start: 2012-01-02

## 2023-11-06 RX ORDER — ALBUTEROL SULFATE 2.5 MG/3ML
SOLUTION RESPIRATORY (INHALATION)
Qty: 255 | Refills: 0 | Status: ACTIVE | COMMUNITY
Start: 2012-03-14

## 2023-11-06 RX ORDER — SODIUM, POTASSIUM,MAG SULFATES 17.5-3.13G
354 ML SOLUTION, RECONSTITUTED, ORAL ORAL ONCE
Qty: 354 MILLILITER | Refills: 0 | Status: ON HOLD | COMMUNITY

## 2023-11-06 RX ORDER — AZELASTINE HCL 205.5 UG/1
1 PUFF IN EACH NOSTRIL SPRAY NASAL
Refills: 0 | Status: ACTIVE | COMMUNITY
Start: 2018-10-05

## 2023-11-06 RX ORDER — DICLOFENAC SODIUM 10 MG/G
GEL TOPICAL
Qty: 300 | Refills: 0 | Status: ACTIVE | COMMUNITY
Start: 2019-09-18

## 2023-11-06 RX ORDER — ASCORBIC ACID 500 MG
TAKE 1 TABLET DAILY TABLET ORAL
Refills: 0 | Status: ACTIVE | COMMUNITY
Start: 2007-04-18

## 2023-11-06 RX ORDER — IPRATROPIUM BROMIDE 42 UG/1
SPRAY, METERED NASAL
Qty: 45 | Refills: 0 | Status: ACTIVE | COMMUNITY
Start: 2020-01-07

## 2023-11-06 RX ORDER — DEXLANSOPRAZOLE 60 MG/1
TAKE 1 CAPSULE BY MOUTH EVERY DAY CAPSULE, DELAYED RELEASE ORAL ONCE A DAY
Qty: 90 | Refills: 4 | Status: ON HOLD | COMMUNITY

## 2023-11-06 RX ORDER — SODIUM PICOSULFATE, MAGNESIUM OXIDE, AND ANHYDROUS CITRIC ACID 10; 3.5; 12 MG/160ML; G/160ML; G/160ML
160 ML LIQUID ORAL
Qty: 320 MILLILITER | Refills: 0 | Status: ON HOLD | COMMUNITY

## 2023-11-06 RX ORDER — FAMOTIDINE 40 MG/1
1 TABLET AT BEDTIME TABLET, FILM COATED ORAL ONCE A DAY
Qty: 30 | Refills: 5 | Status: ON HOLD | COMMUNITY

## 2023-11-06 RX ORDER — SODIUM, POTASSIUM,MAG SULFATES 17.5-3.13G
AS DIRECTED SOLUTION, RECONSTITUTED, ORAL ORAL
Status: ON HOLD | COMMUNITY
Start: 2022-06-01

## 2023-11-06 RX ORDER — TELMISARTAN 20 MG/1
1 TABLET TABLET ORAL ONCE A DAY
Refills: 0 | Status: ACTIVE | COMMUNITY
Start: 2019-11-04

## 2023-11-06 NOTE — HPI-TODAY'S VISIT:
This is a 73-year-old female with a history of hepatitis C status posttreatment with simeprevir and sofosbuvir (2015) with SVR, grade 2 stage II liver disease on biopsy (2006), GERD, postprandial epigastric pain, constipation, and a fraternal history of colon cancer due screening in 2027 presenting for follow-up. She was last seen 8/4/2023.  She was no longer taking Dexilant and then started lansoprazole 15 mg daily.  She was having consistent symptoms in the evenings.  Prior approval for Dexilant was initiated and it looked as though information was required from the pharmacy did not been received.  Follow-up on this was initiated.  She was prescribed lansoprazole 15 mg twice a day.  She reported persistent, chronic epigastric/right upper quadrant pain.  There was discussion at her prior visit that this may be associated with gastroparesis due to an abnormal gastric emptying study in 2002.  This was discussed with Dr. Trujillo who recommended considering metoclopramide.  She had been observing small meals and this had resolved.  Metoclopramide was a future consideration although she was unlikely to agree after side effect profile was discussed.  Alpha-fetoprotein and ultrasound for HCC screening was due in May 2024.  Chronic constipation was controlled with Metamucil. She states that lansoprazole was ineffective.  She reports over-the-counter Prevacid is more efficacious.  She requires it twice a day as needed related to diet.  She is also found that eating a teaspoon of mustard helps with episodes of heartburn. She reports a change in bowel habits.  For the last 2 months, she has experienced diarrhea.  She has a soft bowel movement every other day and, every other day, has watery stools numbering 5/day that we will begin at night or in the early morning and persist until 1 PM.  She has experienced fecal incontinence occasionally at night.  She is taking Metamucil wafers or Metamucil powder every day.  She has not used Imodium.  She admits crampy abdominal pain and "shooting" pains yesterday associated with diarrhea.  She has occasional malodorous belches and otherwise denies abdominal symptoms. She denies antibiotic use.  She denies ingestion of dairy products.  She reports her blood glucose levels have been good.  Metformin was decreased from twice a day to once a day.  Ozempic was started in May.  Her dose has been adjusted and was last increased 2 months ago prior to onset of diarrhea.  She is concerned this may be a side effect and plans to discuss this with Dr. Zepeda at her follow-up visit at the end of this month.

## 2023-11-06 NOTE — HPI-OTHER HISTORIES
Colonoscopy 8/16/2022:BBPS 8, sigmoid and descending diverticulosis, nonbleeding internal no specimens collected.  Due to her family history, repeat colonoscopy recommended in 2027. recall set  EGD 5/3/2022:No endoscopic abnormality evident to explain dysphagia status post empiric dilation with a 45 and 48 [place] Savary dilator, normal stomach, normal examined duodenum. CT of the abdomen and pelvis with IV contrast 2/25/2022:No acute abnormality in the abdomen and pelvis.  Scattered colonic diverticula without evidence of acute diverticulitis.  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  Total arthroplasty of the left hip. Labs  2/15/2022:Alpha-fetoprotein 4.2.  No other lab results are available at this time. Labs 10/19/2021:Ionized calcium 5.3.  10/6/2021 BMP normal with exception of glucose 132, BUN 19, calcium 10.5.  LFTs normal TB 0.6, ALP 41, ALT 17, AST 16.  Hemoglobin A1c 7. 5/10/2021 alpha-fetoprotein 4.0. EGD 6/2/2021:No endoscopic abnormality to explain dysphagia status post dilation to 48 [place] with a Fuentes dilator.  Normal stomach, normal examined duodenum.  No specimens collected. Abdominal ultrasound 6/1/2021:Negative study.

## 2023-11-10 PROBLEM — 62315008: Status: ACTIVE | Noted: 2023-11-10

## 2023-11-15 ENCOUNTER — PATIENT MESSAGE (OUTPATIENT)
Dept: FAMILY MEDICINE | Facility: CLINIC | Age: 73
End: 2023-11-15

## 2023-11-15 DIAGNOSIS — Q85.00 NEUROFIBROMATOSIS: Primary | ICD-10-CM

## 2023-11-15 NOTE — TELEPHONE ENCOUNTER
I can not find a genetics referral to [facility] in [facility]. does she have an actual doctor's name to send the referral to?

## 2023-12-06 ENCOUNTER — TELEPHONE (OUTPATIENT)
Dept: FAMILY MEDICINE | Facility: CLINIC | Age: 73
End: 2023-12-06

## 2023-12-06 NOTE — TELEPHONE ENCOUNTER
Spoke to pt and she stated [facility] is calling for derm biopsy done years ago. Let pt know she will need to get it from derm to send to [facility]. Pt verbalized understanding.

## 2023-12-06 NOTE — TELEPHONE ENCOUNTER
----- Message from Lesvia Lowery MA sent at 12/6/2023 11:56 AM CST -----    ----- Message -----  From: Beth Vasquez  Sent: 12/6/2023  11:55 AM CST  To: Buddy Mathew Staff    Otto alejo with [facility] genetics is calling to get her placed in the right clinic. They have a few questions and the patient is unsure. Please send a copy of a biopsy   [phone] fax   [phone] phone

## 2023-12-12 ENCOUNTER — TELEPHONE (OUTPATIENT)
Dept: FAMILY MEDICINE | Facility: CLINIC | Age: 73
End: 2023-12-12

## 2023-12-12 NOTE — TELEPHONE ENCOUNTER
Kasey returned clinic's phone call.   Informed Kasey we see that patient was informed to reach out to her dermatologist to obtain reports needed.   Kasey will be contacting patient.

## 2023-12-12 NOTE — TELEPHONE ENCOUNTER
Left voice message for Kasey to return clinics phone call.     Patient was needing to get the biopsy report from her dermatologist and send it to [facility].

## 2023-12-12 NOTE — TELEPHONE ENCOUNTER
----- Message from Beth Vasquez sent at 12/12/2023 11:58 AM CST -----  [facility] genetics is calling for biopsy report to be faxed to them for the referral we sent   [phone] sapna   [phone] fax

## 2024-01-02 ENCOUNTER — TELEPHONE (OUTPATIENT)
Dept: FAMILY MEDICINE | Facility: CLINIC | Age: 74
End: 2024-01-02
Payer: MEDICARE

## 2024-01-02 NOTE — TELEPHONE ENCOUNTER
----- Message from Charlette Leal MA sent at 9/8/2023 10:58 AM CDT -----  Regarding: repeat Ct scan  Message from Cecilia Shaikh NP sent at 9/7/2023  6:19 PM CDT -----  Please call patient and let her know CT scan of the lungs shows a couple small calcified nodules, largest being 6 mm which is still considered small.  There are no lung masses or enlarged lymph nodes in the chest.  Recommend follow-up CT scan in 3-6 months to monitor the nodules and ensure no change

## 2024-01-02 NOTE — TELEPHONE ENCOUNTER
Spoke to patient, said she has appt with specialist coming up and wants to get his opinion as to what treatment she needs first. Updated remind me.

## 2024-01-12 DIAGNOSIS — Z00.00 ENCOUNTER FOR MEDICARE ANNUAL WELLNESS EXAM: ICD-10-CM

## 2024-02-08 ENCOUNTER — TELEPHONE (OUTPATIENT)
Dept: FAMILY MEDICINE | Facility: CLINIC | Age: 74
End: 2024-02-08
Payer: MEDICARE

## 2024-02-08 DIAGNOSIS — R91.1 PULMONARY NODULE: Primary | ICD-10-CM

## 2024-02-08 NOTE — TELEPHONE ENCOUNTER
Spoke to patient . Said that she did see a specialist who advised her to do less radiation. Agrees to get CT. Aware [facility] will be calling to schedule this. Please order. Updated reminder.

## 2024-02-27 ENCOUNTER — HOSPITAL ENCOUNTER (OUTPATIENT)
Dept: RADIOLOGY | Facility: HOSPITAL | Age: 74
Discharge: HOME | End: 2024-02-27
Attending: NURSE PRACTITIONER
Payer: MEDICARE

## 2024-02-27 DIAGNOSIS — R91.1 PULMONARY NODULE: ICD-10-CM

## 2024-02-27 PROCEDURE — 71250 CT THORAX DX C-: CPT | Mod: TC,PO

## 2024-02-28 ENCOUNTER — TELEPHONE (OUTPATIENT)
Dept: FAMILY MEDICINE | Facility: CLINIC | Age: 74
End: 2024-02-28
Payer: MEDICARE

## 2024-02-28 NOTE — TELEPHONE ENCOUNTER
Please call [facility] and see if we can get a reread of the recent CT scan- CT from 9/2023 mentioned subpleural nodule left base and ground glass nodule RUL with recs to repeat in 6 months but this recent CT scan does not mention anything about this.  Patient does not have history of pneumonia

## 2024-02-28 NOTE — TELEPHONE ENCOUNTER
----- Message from Buddy Mathew MD sent at 2/27/2024  7:35 PM CST -----  Call patient.  CT scan of the chest shows no evidence of residual pneumonia.  No lung tumors seen.

## 2024-02-28 NOTE — TELEPHONE ENCOUNTER
----- Message from Molly Mar sent at 2/28/2024  3:03 PM CST -----  The patient is returning your call. Pt's # [phone] GH

## 2024-02-28 NOTE — TELEPHONE ENCOUNTER
"Spoke to patient with result per Dr Mathew. Said that she has never had pneumonia and this was follow-up on pulmonary nodule. Said the x-ray tech questioned pneumonia yesterday when she had scan and she told her that this was follow-up on nodule and she never had pneumonia, yet CT was still read for this reason and nodule wasn't mentioned. I looked at Cecilia's order and dx is pulmonary nodule, but "indication of use" states pneumonia. I'm assuming we need to get this cleared up with [facility] and get an addendum on reading.  "

## 2024-02-29 ENCOUNTER — TELEPHONE (OUTPATIENT)
Dept: FAMILY MEDICINE | Facility: CLINIC | Age: 74
End: 2024-02-29
Payer: MEDICARE

## 2024-02-29 DIAGNOSIS — I10 ESSENTIAL HYPERTENSION: Primary | ICD-10-CM

## 2024-02-29 NOTE — TELEPHONE ENCOUNTER
Looks like she is due for all her annual labs so tell patient to have them drawn fasting and will check everything including her calcium

## 2024-02-29 NOTE — TELEPHONE ENCOUNTER
Spoke with [facility] who states they will get a message over to Joseline in their office to call us back in regards to CT re-read.

## 2024-02-29 NOTE — TELEPHONE ENCOUNTER
Spoke with pt in regards to recent message sent. Verbalized per Cecilia that all her annual labs  are due,so  you can have them drawn fasting and will check everything including her calcium. Pt acknowledged understanding.

## 2024-02-29 NOTE — TELEPHONE ENCOUNTER
----- Message from Boogie Gama sent at 2/29/2024  4:23 PM CST -----    Patient moved her Prolia appointment from 4/18 to 4/29/24 because she will be out of town, and informed that she will need calcium labs and a follow up visit prior to Prolia.     Please contact patient to schedule labs & clinic follow up visit.         ORDER.  Current / active in the Epic Therapy Plan, signed within a year.  LABS. Serum Calcium within 6 weeks of treatment.    DENTAL PRECAUTION CONFIRMED WITH PATIENT. No recent invasive dental procedures within the last month (tooth extraction, etc). A patient will need to bring a Dental Clearance signed by a dental provider if there was any recent dental procedure within the last month.   FOLLOW-UP APPOINTMENT within the last 12 months with the diagnosis mentioned in the visit notes.         Thank you,  Boogie Botello

## 2024-02-29 NOTE — TELEPHONE ENCOUNTER
Informed patient that we have been in touch with [facility] and will contact her once addendum has been done.

## 2024-03-04 ENCOUNTER — PATIENT MESSAGE (OUTPATIENT)
Dept: FAMILY MEDICINE | Facility: CLINIC | Age: 74
End: 2024-03-04
Payer: MEDICARE

## 2024-03-04 DIAGNOSIS — H35.3190 NONEXUDATIVE AGE-RELATED MACULAR DEGENERATION, UNSPECIFIED LATERALITY, UNSPECIFIED STAGE: ICD-10-CM

## 2024-03-04 DIAGNOSIS — H43.813 VITREOUS DEGENERATION OF BOTH EYES: Primary | ICD-10-CM

## 2024-03-05 NOTE — TELEPHONE ENCOUNTER
Deborah, did you hear back from imaging center? If not, will you please reach out again today? Thank you.

## 2024-03-06 ENCOUNTER — OV EP (OUTPATIENT)
Dept: URBAN - METROPOLITAN AREA CLINIC 113 | Facility: CLINIC | Age: 74
End: 2024-03-06
Payer: MEDICARE

## 2024-03-06 ENCOUNTER — LAB (OUTPATIENT)
Dept: URBAN - METROPOLITAN AREA CLINIC 113 | Facility: CLINIC | Age: 74
End: 2024-03-06

## 2024-03-06 VITALS
TEMPERATURE: 97.2 F | DIASTOLIC BLOOD PRESSURE: 74 MMHG | HEIGHT: 66 IN | HEART RATE: 64 BPM | WEIGHT: 203.2 LBS | SYSTOLIC BLOOD PRESSURE: 135 MMHG | BODY MASS INDEX: 32.66 KG/M2 | RESPIRATION RATE: 20 BRPM

## 2024-03-06 DIAGNOSIS — K74.00 HEPATIC FIBROSIS: ICD-10-CM

## 2024-03-06 DIAGNOSIS — K21.00 GASTROESOPHAGEAL REFLUX DISEASE WITH ESOPHAGITIS WITHOUT HEMORRHAGE: ICD-10-CM

## 2024-03-06 PROCEDURE — 99214 OFFICE O/P EST MOD 30 MIN: CPT | Performed by: NURSE PRACTITIONER

## 2024-03-06 RX ORDER — BRINZOLAMIDE/BRIMONIDINE TARTRATE 10; 2 MG/ML; MG/ML
1 DROP INTO AFFECTED EYE SUSPENSION/ DROPS OPHTHALMIC THREE TIMES A DAY
Status: ACTIVE | COMMUNITY

## 2024-03-06 RX ORDER — OMEGA-3/DHA/EPA/FISH OIL 1000 MG
TAKE 1 CAPSULE DAILY CAPSULE ORAL
Refills: 0 | Status: ON HOLD | COMMUNITY

## 2024-03-06 RX ORDER — PREDNISOLONE/MOXIFLOXACIN HCL 1 %-0.5 %
AS DIRECTED SUSPENSION, DROPS(FINAL DOSAGE FORM)(ML) OPHTHALMIC (EYE) 4 TIMES DAILY
Status: ON HOLD | COMMUNITY

## 2024-03-06 RX ORDER — AZELASTINE HCL 205.5 UG/1
1 PUFF IN EACH NOSTRIL SPRAY NASAL
Refills: 0 | Status: ACTIVE | COMMUNITY
Start: 2018-10-05

## 2024-03-06 RX ORDER — SODIUM, POTASSIUM,MAG SULFATES 17.5-3.13G
354 ML SOLUTION, RECONSTITUTED, ORAL ORAL ONCE
Qty: 354 MILLILITER | Refills: 0 | Status: ON HOLD | COMMUNITY

## 2024-03-06 RX ORDER — ASCORBIC ACID 500 MG
TAKE 1 TABLET DAILY TABLET ORAL
Refills: 0 | Status: ACTIVE | COMMUNITY
Start: 2007-04-18

## 2024-03-06 RX ORDER — SUCRALFATE 1 G/1
1 TABLET; MAY DISSOLVE IN 2 TEASPOONS OF WATER TABLET ORAL
Qty: 90 | Refills: 3 | OUTPATIENT
Start: 2024-03-06 | End: 2024-07-04

## 2024-03-06 RX ORDER — LANSOPRAZOLE 30 MG/1
1 CAPSULE BEFORE A MEAL CAPSULE, DELAYED RELEASE ORAL TWICE A DAY
Qty: 60 CAPSULE | Refills: 5 | OUTPATIENT
Start: 2024-03-06

## 2024-03-06 RX ORDER — LANSOPRAZOLE 15 MG/1
1 CAPSULE BEFORE BREAKFAST AND SUPPER CAPSULE, DELAYED RELEASE ORAL TWICE A DAY
Qty: 60 CAPSULE | Refills: 5 | Status: ACTIVE | COMMUNITY
Start: 2023-08-04

## 2024-03-06 RX ORDER — SODIUM PICOSULFATE, MAGNESIUM OXIDE, AND ANHYDROUS CITRIC ACID 10; 3.5; 12 MG/160ML; G/160ML; G/160ML
160 ML LIQUID ORAL
Qty: 320 MILLILITER | Refills: 0 | Status: ON HOLD | COMMUNITY

## 2024-03-06 RX ORDER — IPRATROPIUM BROMIDE 42 UG/1
SPRAY, METERED NASAL
Qty: 45 | Refills: 0 | Status: ACTIVE | COMMUNITY
Start: 2020-01-07

## 2024-03-06 RX ORDER — TELMISARTAN 20 MG/1
1 TABLET TABLET ORAL ONCE A DAY
Refills: 0 | Status: ACTIVE | COMMUNITY
Start: 2019-11-04

## 2024-03-06 RX ORDER — SODIUM, POTASSIUM,MAG SULFATES 17.5-3.13G
AS DIRECTED SOLUTION, RECONSTITUTED, ORAL ORAL
Status: ON HOLD | COMMUNITY
Start: 2022-06-01

## 2024-03-06 RX ORDER — METFORMIN HYDROCHLORIDE 500 MG/1
1 TABLET WITH A MEAL TABLET, FILM COATED ORAL TWICE DAILY
Status: ACTIVE | COMMUNITY

## 2024-03-06 RX ORDER — SEMAGLUTIDE 0.68 MG/ML
AS DIRECTED INJECTION, SOLUTION SUBCUTANEOUS
Status: ACTIVE | COMMUNITY

## 2024-03-06 RX ORDER — ALBUTEROL SULFATE 2.5 MG/3ML
SOLUTION RESPIRATORY (INHALATION)
Qty: 255 | Refills: 0 | Status: ACTIVE | COMMUNITY
Start: 2012-03-14

## 2024-03-06 RX ORDER — LACTOBACILLUS RHAMNOSUS GG 10B CELL
TAKE 1 CAPSULE DAILY CAPSULE ORAL
Refills: 0 | Status: ACTIVE | COMMUNITY

## 2024-03-06 RX ORDER — LATANOPROST/PF 0.005 %
INSTILL 1 DROP IN BOTH EYES AT BEDTIME DROPS OPHTHALMIC (EYE)
Refills: 0 | Status: ACTIVE | COMMUNITY
Start: 2014-05-10

## 2024-03-06 RX ORDER — BUDESONIDE 0.5 MG/2ML
USE 1 UNIT DOSE VIA NEBULIZER TWO TIMES A DAY INHALANT ORAL
Qty: 60 | Refills: 0 | Status: ACTIVE | COMMUNITY
Start: 2012-03-30

## 2024-03-06 RX ORDER — SODIUM, POTASSIUM,MAG SULFATES 17.5-3.13G
354 ML SOLUTION, RECONSTITUTED, ORAL ORAL
Qty: 354 MILLILITER | Refills: 0 | Status: ON HOLD | COMMUNITY

## 2024-03-06 RX ORDER — FAMOTIDINE 40 MG/1
1 TABLET AT BEDTIME TABLET, FILM COATED ORAL ONCE A DAY
Qty: 30 | Refills: 5 | Status: ON HOLD | COMMUNITY

## 2024-03-06 RX ORDER — DEXLANSOPRAZOLE 60 MG/1
TAKE 1 CAPSULE BY MOUTH EVERY DAY CAPSULE, DELAYED RELEASE ORAL ONCE A DAY
Qty: 90 | Refills: 4 | Status: ON HOLD | COMMUNITY

## 2024-03-06 RX ORDER — DICLOFENAC SODIUM 10 MG/G
GEL TOPICAL
Qty: 300 | Refills: 0 | Status: ACTIVE | COMMUNITY
Start: 2019-09-18

## 2024-03-06 RX ORDER — ALBUTEROL SULFATE 90 UG/1
AEROSOL, METERED RESPIRATORY (INHALATION)
Qty: 18 | Refills: 0 | Status: ON HOLD | COMMUNITY
Start: 2012-01-02

## 2024-03-06 NOTE — HPI-TODAY'S VISIT:
This is a 73-year-old female with a history of hepatitis C status posttreatment with simeprevir Ancef last year (2015) with SVR, grade 2 stage II liver disease on biopsy (2006), GERD, postprandial epigastric pain, constipation, diarrhea, and fraternal history of colon cancer due screening in 2027 presenting for follow-up. She was last seen in the office 11/6/2023.  GERD was controlled with lansoprazole 15 mg once or twice a day and mustard as needed.  She was due alpha-fetoprotein and ultrasound in May 2024 for HCC surveillance due to a history of hepatitis C and hepatic fibrosis.  She reported the diarrhea predominant change in bowel habits.  She had constipation at baseline.  She was compliant with daily fiber.  Recent symptoms coincided with an increase in Ozempic.  It was suspected this was a side effect.  She was to discuss this with Dr. Zepeda at follow-up later in the month.  If diarrhea worsen or persisted, she was to contact the office so that we could arrange stool studies.  In the interim, she was to use Imodium as needed and continue daily fiber. She reports resolution of diarrhea after she stopped metformin.  She had persistent loose stools and occasional fecal incontinence while taking metformin and Ozempic.  She is continue to take Ozempic.  She reports onset of epigastric pain over the last few weeks.  She has consistent postprandial pain and upper abdominal bloating after meals lasting 1 hour.  She describes it as a "hard pain like someone is walking in my stomach."  She has tried belching and symptoms persist.  She is compliant with lansoprazole 15 mg twice a day.  She denies acid reflux symptoms.  If she eats bread, she reports pain in her throat when she swallows.  She admits occasional random episodes of nausea without vomiting.  She is having regular bowel movements.  She denies other abdominal symptoms.

## 2024-03-06 NOTE — HPI-OTHER HISTORIES
Colonoscopy 8/16/2022:BBPS 8, sigmoid and descending diverticulosis, nonbleeding internal no specimens collected.  Due to her family history, repeat colonoscopy recommended in 2027. recall set  EGD 5/3/2022:No endoscopic abnormality evident to explain dysphagia status post empiric dilation with a 45 and 48 [place] Savary dilator, normal stomach, normal examined duodenum. CT of the abdomen and pelvis with IV contrast 2/25/2022:No acute abnormality in the abdomen and pelvis.  Scattered colonic diverticula without evidence of acute diverticulitis.  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  Total arthroplasty of the left hip. Labs  2/15/2022:Alpha-fetoprotein 4.2.  No other lab results are available at this time. Labs 10/19/2021:Ionized calcium 5.3.  10/6/2021 BMP normal with exception of glucose 132, BUN 19, calcium 10.5.  LFTs normal TB 0.6, ALP 41, ALT 17, AST 16.  Hemoglobin A1c 7. 5/10/2021 alpha-fetoprotein 4.0. EGD 6/2/2021:No endoscopic abnormality to explain dysphagia status post dilation to 48 [place] with a Fuentes dilator.  Normal stomach, normal examined duodenum.  No specimens collected. Abdominal ultrasound 6/1/2021:Negative study.

## 2024-03-08 ENCOUNTER — TELEPHONE (OUTPATIENT)
Dept: FAMILY MEDICINE | Facility: CLINIC | Age: 74
End: 2024-03-08
Payer: MEDICARE

## 2024-03-08 NOTE — TELEPHONE ENCOUNTER
Called and spoke with Kristen at [facility] who states she will get another message to Joseline Díaz to get CT addended.

## 2024-03-08 NOTE — TELEPHONE ENCOUNTER
----- Message from Leah Childs LPN sent at 3/8/2024  9:47 AM CST -----    ----- Message -----  From: Aileen Self  Sent: 3/8/2024   9:44 AM CST  To: Buddy Mathew Staff    Joseline with [facility] imaging was calling back to speak with Deborah about this pt needs a addendum. Please give her a call back.    [phone]

## 2024-03-12 ENCOUNTER — PATIENT MESSAGE (OUTPATIENT)
Dept: ADMINISTRATIVE | Facility: HOSPITAL | Age: 74
End: 2024-03-12
Payer: MEDICARE

## 2024-03-19 NOTE — TELEPHONE ENCOUNTER
Joseline called back yesterday and said that she has put this in for radiologist to do addendum, but that he doesn't come there frequently. Said if it is not done by the end of this week she will ask another radiologist to read. Updated remind me.

## 2024-03-28 ENCOUNTER — TELEPHONE (OUTPATIENT)
Dept: FAMILY MEDICINE | Facility: CLINIC | Age: 74
End: 2024-03-28
Payer: MEDICARE

## 2024-03-28 NOTE — TELEPHONE ENCOUNTER
I don't see where addendum as been done. Left Joseline voicemail inquiring about status of addendum. Updated remind me to f/u next week.

## 2024-03-28 NOTE — TELEPHONE ENCOUNTER
----- Message from Joseline Archer RT sent at 3/15/2024 12:40 PM CDT -----  Regarding: addendum done?  Called [facility] again to see what is going on with this addendum. Joseline did not answer her phone but they gave me her extension. Left message that we have been working on this for over a week, need to know how long it takes to get an addendum done, the status of this, and whether she is even the Joseline that is working on this. Gave her my direct line to contact. Joseline extension [phone]. Signed off in errror. Remind me created to check on this next week.

## 2024-04-01 ENCOUNTER — PATIENT MESSAGE (OUTPATIENT)
Dept: FAMILY MEDICINE | Facility: CLINIC | Age: 74
End: 2024-04-01
Payer: MEDICARE

## 2024-04-01 DIAGNOSIS — E55.9 VITAMIN D DEFICIENCY: Primary | ICD-10-CM

## 2024-04-01 DIAGNOSIS — Q85.01 NEUROFIBROMATOSIS, TYPE 1: ICD-10-CM

## 2024-04-02 PROBLEM — Q85.01 NEUROFIBROMATOSIS, TYPE 1: Status: ACTIVE | Noted: 2024-04-02

## 2024-04-03 ENCOUNTER — TELEPHONE (OUTPATIENT)
Dept: FAMILY MEDICINE | Facility: CLINIC | Age: 74
End: 2024-04-03
Payer: MEDICARE

## 2024-04-03 NOTE — TELEPHONE ENCOUNTER
----- Message from Buddy Mathew MD sent at 4/3/2024  7:29 AM CDT -----  Call patient.  Radiologist looked at the CT scan again instead the nodule which was previously 5 mm is now shrunk to 4 mm left lower lobe.  Right upper lobe has a stable 3 mm nodule.  Nodules are not growing, recommend repeat CT in 12 months

## 2024-04-03 NOTE — TELEPHONE ENCOUNTER
Spoke with patient and informed of Dr. Mathew's message. Patient verbalized understanding with no further questions. Remind me created.

## 2024-04-03 NOTE — PROGRESS NOTES
Call patient.  Radiologist looked at the CT scan again instead the nodule which was previously 5 mm is now shrunk to 4 mm left lower lobe.  Right upper lobe has a stable 3 mm nodule.  Nodules are not growing, recommend repeat CT in 12 months

## 2024-04-09 ENCOUNTER — TELEPHONE (OUTPATIENT)
Dept: FAMILY MEDICINE | Facility: CLINIC | Age: 74
End: 2024-04-09
Payer: MEDICARE

## 2024-04-09 ENCOUNTER — OFFICE VISIT (OUTPATIENT)
Dept: OTOLARYNGOLOGY | Facility: CLINIC | Age: 74
End: 2024-04-09
Payer: MEDICARE

## 2024-04-09 VITALS
DIASTOLIC BLOOD PRESSURE: 85 MMHG | SYSTOLIC BLOOD PRESSURE: 153 MMHG | WEIGHT: 109.81 LBS | HEART RATE: 87 BPM | HEIGHT: 61 IN | BODY MASS INDEX: 20.73 KG/M2

## 2024-04-09 DIAGNOSIS — H61.23 BILATERAL IMPACTED CERUMEN: Primary | ICD-10-CM

## 2024-04-09 PROCEDURE — 69210 REMOVE IMPACTED EAR WAX UNI: CPT | Mod: S$PBB,,, | Performed by: PHYSICIAN ASSISTANT

## 2024-04-09 PROCEDURE — 99213 OFFICE O/P EST LOW 20 MIN: CPT | Mod: PBBFAC,PO,25 | Performed by: PHYSICIAN ASSISTANT

## 2024-04-09 PROCEDURE — 69210 REMOVE IMPACTED EAR WAX UNI: CPT | Mod: PBBFAC,PO | Performed by: PHYSICIAN ASSISTANT

## 2024-04-09 PROCEDURE — 99213 OFFICE O/P EST LOW 20 MIN: CPT | Mod: 25,S$PBB,, | Performed by: PHYSICIAN ASSISTANT

## 2024-04-09 PROCEDURE — 99999 PR PBB SHADOW E&M-EST. PATIENT-LVL III: CPT | Mod: PBBFAC,,, | Performed by: PHYSICIAN ASSISTANT

## 2024-04-09 NOTE — TELEPHONE ENCOUNTER
----- Message from Aarti Moran sent at 4/9/2024  8:35 AM CDT -----  Patient is getting blood work.  Is it to soon to do a calcium level for a proleo shot on the 04/29.?  Call me at [phone]

## 2024-04-09 NOTE — PROGRESS NOTES
[facility]    Subjective:      Patient: Cecilia Valenzuela Patient PCP: Buddy Mathew MD         :  1950     Sex:  female      MRN:  [id number]          Date of Visit: 2024      Chief Complaint: Cerumen Impaction    Interval History 2024: Pt presents to clinic for ear cleaning for cerumen impaction.     Interval History 10/10/2023: Pt presents to office for ear cleaning for cerumen impaction. Pt has been having issues with dizziness, but would like to defer at this time on dizziness evaluation. Pt denies ear pain/discharge.     Interval History 2023: Pt presents to clinic for ear cleaning. Pt did VPT and weaned off of meclizine and says this helped a lot with her dizziness and she does at home exercises. Pt states that she has left aural fullness and has sensation that ear wax is in her left ear. Pt states she had audiogram 2-3 years ago that showed normal hearing. Pt states that she has had longstanding bilateral tinnitus. Pt has no other complaints at this time.     Patient ID 2022: Cecilia Valenzuela is a 74 y.o. female who was self-referred for  ear cleaning . Pt previously seen by ENT MD Dr. Kaleb De La Torre and Marco A Lomeli for cerumen impaction and dizziness. Pt states she was previously told that she had vestibular weakness on the left side. Pt completed VPT and pt takes meclizine 12.5mg every night. Pt states she had flair up of dizziness last around 1 year ago and would like consult with VPT to go over exercises. Pt states that her ears have been getting clogged for around 1 week or so. Pt states the night before last she started having dizziness. Pt states she went she went through [facility] and MRI and she was told that she had vestibular weakness without retrocochlear pathology. Pt denies fever/chills, ear pain or ear discharge. There is not a prior history of ear surgery. Pt states that she has benign bony growth behind left ear that has not grown in size and there were plans  to have it shaved down with elective surgery with Dr. Lomeli, but pt never followed up for surgery.     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    Family History  Her family history includes Breast cancer in her maternal aunt and mother; Breast cancer (age of onset: 40) in her sister; Breast cancer (age of onset: 50) in her sister; Cancer in her father; Heart disease in her mother; Hypertension in her mother; Parkinsonism in her father; Rheum arthritis in her mother.    Past Surgical History:   Procedure Laterality Date    BACK SURGERY      BREAST BIOPSY Right 2017    CATARACT EXTRACTION Bilateral      SECTION      x5    DILATION AND CURETTAGE OF UTERUS      miss Ab    HYSTERECTOMY      MASTECTOMY Right 2017     Social History     Tobacco Use    Smoking status: Never     Passive exposure: Never    Smokeless tobacco: Never   Substance and Sexual Activity    Alcohol use: No    Drug use: No    Sexual activity: Never     Partners: Male     Medications  She has a current medication list which includes the following prescription(s): ascorbic acid, aspirin, azelastine, calcium carbonate/vitamin d3, denosumab, lisinopril, lorazepam, magnesium, meclizine, metoprolol succinate, multivitamin, fish oil-omega-3 fatty acids, omeprazole, pataday, and zinc glycinate.    Review of patient's allergies indicates:   Allergen Reactions    Methylprednisolone      Other reaction(s): Heart palpitations  PT STATES SHE HAS A REACTION TO  "ALL STEROIDS DUE TO HER DX; OF MVP..    Zofran [ondansetron hcl (pf)] Nausea Only    Corticosteroids (glucocorticoids) Other (See Comments)     Heart palpitations    Dilaudid [hydromorphone] Nausea And Vomiting     Does not help with pain    Fentanyl     Neosporin (neomycin-polymyx)     Norvasc [amlodipine]     Statins-hmg-coa reductase inhibitors      myalgias    Tetracyclines     Zoloft [sertraline]      Increased anxiety     Augmentin [amoxicillin-pot clavulanate] Nausea And Vomiting     All medications, allergies, and past history have been reviewed.    Objective:      Vitals:      10/10/2023     2:51 PM 10/18/2023     1:02 PM 4/9/2024    10:50 AM   Vitals - 1 value per visit   SYSTOLIC 141 127    DIASTOLIC 73 72    Pulse 59 75    Temp  97.9 °F (36.6 °C)    Resp 16 16    SPO2  99 %    Weight (lb) 110.01 113.2 109.79   Weight (kg) 49.9 51.347 49.8   Height 5' 1" (1.549 m) 5' 1" (1.549 m) 5' 1" (1.549 m)   BMI (Calculated) 20.8 21.4 20.8   Pain Score Zero Zero Two       Body surface area is 1.46 meters squared.    Physical Exam  Constitutional:       General: She is not in acute distress.     Appearance: Normal appearance. She is not ill-appearing.   HENT:      Head: Normocephalic and atraumatic.      Right Ear: Tympanic membrane, ear canal and external ear normal. There is impacted cerumen.      Left Ear: Tympanic membrane, ear canal and external ear normal. There is impacted cerumen.      Ears:        Nose: Nose normal.      Mouth/Throat:      Lips: Pink. No lesions.      Mouth: Mucous membranes are moist. No oral lesions.      Tongue: No lesions.      Palate: No lesions.      Pharynx: Oropharynx is clear. Uvula midline. No pharyngeal swelling, oropharyngeal exudate, posterior oropharyngeal erythema or uvula swelling.   Eyes:      General:         Right eye: No discharge.         Left eye: No discharge.      Extraocular Movements: Extraocular movements intact.      Conjunctiva/sclera: Conjunctivae normal. "   Pulmonary:      Effort: Pulmonary effort is normal.   Neurological:      General: No focal deficit present.      Mental Status: She is alert and oriented to person, place, and time. Mental status is at baseline.   Psychiatric:         Mood and Affect: Mood normal.         Behavior: Behavior normal.         Thought Content: Thought content normal.         Judgment: Judgment normal.       Ear Cerumen Removal     Date/Time: 4/9/2024 10:45 AM     Performed by: Asher Gaitan PA-C  Authorized by: Asher Gaitan PA-C       Local anesthetic:  None  Location details:  Both ears  Procedure type: curette    Cerumen  Removal Results:  Cerumen completely removed  Patient tolerance:  Patient tolerated the procedure well with no immediate complications       Labs:  WBC   Date Value Ref Range Status   04/06/2023 8.1 3.8 - 10.8 Thousand/uL Final     Platelets   Date Value Ref Range Status   04/06/2023 222 140 - 400 Thousand/uL Final     Creatinine   Date Value Ref Range Status   04/06/2023 0.68 0.60 - 1.00 mg/dL Final     TSH   Date Value Ref Range Status   05/14/2021 2.64 0.40 - 4.50 mIU/L Final     Glucose   Date Value Ref Range Status   04/06/2023 75 65 - 99 mg/dL Final     Comment:                   Fasting reference interval          All lab results and imaging results have been reviewed.    Assessment:        ICD-10-CM ICD-9-CM   1. Bilateral impacted cerumen  H61.23 380.4           Plan:      Bilateral ear cleaning performed today in office. Follow up in 6 months for routine ear cleaning.

## 2024-04-11 LAB
25(OH)D3+25(OH)D2 SERPL-MCNC: 42 NG/ML (ref 30–100)
ALBUMIN SERPL-MCNC: 4.6 G/DL (ref 3.6–5.1)
ALBUMIN/CREAT UR: 21 MG/G CREAT
ALBUMIN/GLOB SERPL: 1.4 (CALC) (ref 1–2.5)
ALP SERPL-CCNC: 51 U/L (ref 37–153)
ALT SERPL-CCNC: 12 U/L (ref 6–29)
APPEARANCE UR: CLEAR
AST SERPL-CCNC: 16 U/L (ref 10–35)
BACTERIA #/AREA URNS HPF: ABNORMAL /HPF
BACTERIA UR CULT: ABNORMAL
BACTERIA UR CULT: ABNORMAL
BASOPHILS # BLD AUTO: 49 CELLS/UL (ref 0–200)
BASOPHILS NFR BLD AUTO: 0.7 %
BILIRUB SERPL-MCNC: 0.4 MG/DL (ref 0.2–1.2)
BILIRUB UR QL STRIP: NEGATIVE
BUN SERPL-MCNC: 14 MG/DL (ref 7–25)
BUN/CREAT SERPL: NORMAL (CALC) (ref 6–22)
CALCIUM SERPL-MCNC: 9.8 MG/DL (ref 8.6–10.4)
CHLORIDE SERPL-SCNC: 103 MMOL/L (ref 98–110)
CHOLEST SERPL-MCNC: 227 MG/DL
CHOLEST/HDLC SERPL: 4.2 (CALC)
CO2 SERPL-SCNC: 23 MMOL/L (ref 20–32)
COLOR UR: YELLOW
CREAT SERPL-MCNC: 0.67 MG/DL (ref 0.6–1)
CREAT UR-MCNC: 109 MG/DL (ref 20–275)
EGFR: 91 ML/MIN/1.73M2
EOSINOPHIL # BLD AUTO: 70 CELLS/UL (ref 15–500)
EOSINOPHIL NFR BLD AUTO: 1 %
ERYTHROCYTE [DISTWIDTH] IN BLOOD BY AUTOMATED COUNT: 13.5 % (ref 11–15)
GLOBULIN SER CALC-MCNC: 3.4 G/DL (CALC) (ref 1.9–3.7)
GLUCOSE SERPL-MCNC: 80 MG/DL (ref 65–99)
GLUCOSE UR QL STRIP: NEGATIVE
HCT VFR BLD AUTO: 44.8 % (ref 35–45)
HDLC SERPL-MCNC: 54 MG/DL
HGB BLD-MCNC: 14.3 G/DL (ref 11.7–15.5)
HGB UR QL STRIP: NEGATIVE
HYALINE CASTS #/AREA URNS LPF: ABNORMAL /LPF
KETONES UR QL STRIP: NEGATIVE
LDLC SERPL CALC-MCNC: 142 MG/DL (CALC)
LEUKOCYTE ESTERASE UR QL STRIP: ABNORMAL
LYMPHOCYTES # BLD AUTO: 1092 CELLS/UL (ref 850–3900)
LYMPHOCYTES NFR BLD AUTO: 15.6 %
MCH RBC QN AUTO: 28.5 PG (ref 27–33)
MCHC RBC AUTO-ENTMCNC: 31.9 G/DL (ref 32–36)
MCV RBC AUTO: 89.2 FL (ref 80–100)
MICROALBUMIN UR-MCNC: 2.3 MG/DL
MONOCYTES # BLD AUTO: 357 CELLS/UL (ref 200–950)
MONOCYTES NFR BLD AUTO: 5.1 %
NEUTROPHILS # BLD AUTO: 5432 CELLS/UL (ref 1500–7800)
NEUTROPHILS NFR BLD AUTO: 77.6 %
NITRITE UR QL STRIP: NEGATIVE
NONHDLC SERPL-MCNC: 173 MG/DL (CALC)
PH UR STRIP: 6 [PH] (ref 5–8)
PLATELET # BLD AUTO: 249 THOUSAND/UL (ref 140–400)
PMV BLD REES-ECKER: 11.7 FL (ref 7.5–12.5)
POTASSIUM SERPL-SCNC: 3.7 MMOL/L (ref 3.5–5.3)
PROT SERPL-MCNC: 8 G/DL (ref 6.1–8.1)
PROT UR QL STRIP: NEGATIVE
RBC # BLD AUTO: 5.02 MILLION/UL (ref 3.8–5.1)
RBC #/AREA URNS HPF: ABNORMAL /HPF
SERVICE CMNT-IMP: ABNORMAL
SODIUM SERPL-SCNC: 139 MMOL/L (ref 135–146)
SP GR UR STRIP: 1.02 (ref 1–1.03)
SQUAMOUS #/AREA URNS HPF: ABNORMAL /HPF
TRIGL SERPL-MCNC: 174 MG/DL
TSH SERPL-ACNC: 1.97 MIU/L (ref 0.4–4.5)
WBC # BLD AUTO: 7 THOUSAND/UL (ref 3.8–10.8)
WBC #/AREA URNS HPF: ABNORMAL /HPF

## 2024-04-16 ENCOUNTER — OFFICE VISIT (OUTPATIENT)
Dept: FAMILY MEDICINE | Facility: CLINIC | Age: 74
End: 2024-04-16
Payer: MEDICARE

## 2024-04-16 VITALS
WEIGHT: 109.81 LBS | DIASTOLIC BLOOD PRESSURE: 60 MMHG | HEART RATE: 70 BPM | BODY MASS INDEX: 20.73 KG/M2 | HEIGHT: 61 IN | SYSTOLIC BLOOD PRESSURE: 110 MMHG | OXYGEN SATURATION: 99 %

## 2024-04-16 DIAGNOSIS — M79.10 MYALGIA DUE TO STATIN: ICD-10-CM

## 2024-04-16 DIAGNOSIS — T46.6X5A MYALGIA DUE TO STATIN: ICD-10-CM

## 2024-04-16 DIAGNOSIS — E78.2 MIXED HYPERLIPIDEMIA: ICD-10-CM

## 2024-04-16 DIAGNOSIS — F41.9 ANXIETY: ICD-10-CM

## 2024-04-16 DIAGNOSIS — Z00.00 ANNUAL PHYSICAL EXAM: Primary | ICD-10-CM

## 2024-04-16 DIAGNOSIS — I10 ESSENTIAL HYPERTENSION: ICD-10-CM

## 2024-04-16 DIAGNOSIS — M81.0 AGE-RELATED OSTEOPOROSIS WITHOUT CURRENT PATHOLOGICAL FRACTURE: ICD-10-CM

## 2024-04-16 DIAGNOSIS — Z85.3 HISTORY OF BREAST CANCER: ICD-10-CM

## 2024-04-16 DIAGNOSIS — Q85.01 NEUROFIBROMATOSIS, TYPE 1: ICD-10-CM

## 2024-04-16 PROCEDURE — 99397 PER PM REEVAL EST PAT 65+ YR: CPT | Mod: GZ,S$GLB,, | Performed by: NURSE PRACTITIONER

## 2024-04-16 RX ORDER — LISINOPRIL 5 MG/1
5 TABLET ORAL DAILY PRN
Qty: 90 TABLET | Refills: 1 | Status: SHIPPED | OUTPATIENT
Start: 2024-04-16

## 2024-04-16 RX ORDER — SERTRALINE HYDROCHLORIDE 25 MG/1
TABLET, FILM COATED ORAL
Qty: 60 TABLET | Refills: 11 | Status: SHIPPED | OUTPATIENT
Start: 2024-04-16

## 2024-04-16 RX ORDER — LORAZEPAM 0.5 MG/1
0.5 TABLET ORAL EVERY 12 HOURS PRN
Qty: 30 TABLET | Refills: 3 | Status: SHIPPED | OUTPATIENT
Start: 2024-04-16

## 2024-04-16 NOTE — PROGRESS NOTES
SUBJECTIVE:    Patient ID: Cecilia Valenzuela is a 74 y.o. female.    Chief Complaint: Follow-up (Pt brought medication list//Pt is here for a check up and medication refills//decline colo//ABRIL )    Pt here for annual physical-  f/u HTN, anxiety, hx of breast CA    Pt c/oing of worsening anxiety the past few months- has hx of intermittent anxiety but recently has been having stress going on in the family and has found she's feeling more anxious. Takes ativan prn which does help but sometimes has to take daily for a week or so when stress is really bad. Was previously tried on sertraline but admits only took it for acouple weeks and stopped- would now be interested in trying medication again    Follows with Dr. Lisa, cards- reports hasn't had any issues with palpitations the past year or two but reports starting having nightmares she thought was d/t metoprolol so she stopped medication. Brings in home BP log with BP ranging 110-150, only takes lisinopril for SBP >150 but thinks it brings her BP down too low when it's 110.    Reports went and saw Dr. Gomez at [facility] for neurofibromatosis w/u- was told she did have NF1 based on genetic testing. Was told nodules in skin and lungs were not NF however.  He recommended she reduce radiation exposure so she doesn't want to continue with LDCT for pulm nodules    Now seeing Dr. Francis, opth in [place]- was told she had vitreous detachment last year but told not to worry about. Pt denies any vision changes, flashers/floaters have resolved    Hx of breast CA and completed her 5 years and has been off anastrozole since October. Pt reports she really doesn't want to continue with annual mammogram d/t fears of radiation    Brings in copy of signed LW and HPOA- she has designated her son Jonathan Valenzuela and brother Ricardo Cifuentes        Telephone on 02/29/2024   Component Date Value Ref Range Status    WBC 04/09/2024 7.0  3.8 - 10.8 Thousand/uL Final    RBC 04/09/2024 5.02   3.80 - 5.10 Million/uL Final    Hemoglobin 04/09/2024 14.3  11.7 - 15.5 g/dL Final    Hematocrit 04/09/2024 44.8  35.0 - 45.0 % Final    MCV 04/09/2024 89.2  80.0 - 100.0 fL Final    MCH 04/09/2024 28.5  27.0 - 33.0 pg Final    MCHC 04/09/2024 31.9 (L)  32.0 - 36.0 g/dL Final    RDW 04/09/2024 13.5  11.0 - 15.0 % Final    Platelets 04/09/2024 249  140 - 400 Thousand/uL Final    MPV 04/09/2024 11.7  7.5 - 12.5 fL Final    Neutrophils, Abs 04/09/2024 5,432  1,500 - 7,800 cells/uL Final    Lymph # 04/09/2024 1,092  850 - 3,900 cells/uL Final    Mono # 04/09/2024 357  200 - 950 cells/uL Final    Eos # 04/09/2024 70  15 - 500 cells/uL Final    Baso # 04/09/2024 49  0 - 200 cells/uL Final    Neutrophils Relative 04/09/2024 77.6  % Final    Lymph % 04/09/2024 15.6  % Final    Mono % 04/09/2024 5.1  % Final    Eosinophil % 04/09/2024 1.0  % Final    Basophil % 04/09/2024 0.7  % Final    Glucose 04/09/2024 80  65 - 99 mg/dL Final    BUN 04/09/2024 14  7 - 25 mg/dL Final    Creatinine 04/09/2024 0.67  0.60 - 1.00 mg/dL Final    eGFR 04/09/2024 91  > OR = 60 mL/min/1.73m2 Final    BUN/Creatinine Ratio 04/09/2024 SEE NOTE:  6 - 22 (calc) Final    Sodium 04/09/2024 139  135 - 146 mmol/L Final    Potassium 04/09/2024 3.7  3.5 - 5.3 mmol/L Final    Chloride 04/09/2024 103  98 - 110 mmol/L Final    CO2 04/09/2024 23  20 - 32 mmol/L Final    Calcium 04/09/2024 9.8  8.6 - 10.4 mg/dL Final    Total Protein 04/09/2024 8.0  6.1 - 8.1 g/dL Final    Albumin 04/09/2024 4.6  3.6 - 5.1 g/dL Final    Globulin, Total 04/09/2024 3.4  1.9 - 3.7 g/dL (calc) Final    Albumin/Globulin Ratio 04/09/2024 1.4  1.0 - 2.5 (calc) Final    Total Bilirubin 04/09/2024 0.4  0.2 - 1.2 mg/dL Final    Alkaline Phosphatase 04/09/2024 51  37 - 153 U/L Final    AST 04/09/2024 16  10 - 35 U/L Final    ALT 04/09/2024 12  6 - 29 U/L Final    Creatinine, Urine 04/09/2024 109  20 - 275 mg/dL Final    Microalb, Ur 04/09/2024 2.3  See Note: mg/dL Final    Microalb/Creat  Ratio 04/09/2024 21  <30 mg/g creat Final    Color, UA 04/09/2024 YELLOW  YELLOW Final    Appearance, UA 04/09/2024 CLEAR  CLEAR Final    Specific Gravity, UA 04/09/2024 1.018  1.001 - 1.035 Final    pH, UA 04/09/2024 6.0  5.0 - 8.0 Final    Glucose, UA 04/09/2024 NEGATIVE  NEGATIVE Final    Bilirubin, UA 04/09/2024 NEGATIVE  NEGATIVE Final    Ketones, UA 04/09/2024 NEGATIVE  NEGATIVE Final    Occult Blood UA 04/09/2024 NEGATIVE  NEGATIVE Final    Protein, UA 04/09/2024 NEGATIVE  NEGATIVE Final    Nitrite, UA 04/09/2024 NEGATIVE  NEGATIVE Final    Leukocytes, UA 04/09/2024 TRACE (A)  NEGATIVE Final    WBC Casts, UA 04/09/2024 NONE SEEN  < OR = 5 /HPF Final    RBC Casts, UA 04/09/2024 0-2  < OR = 2 /HPF Final    Squam Epithel, UA 04/09/2024 NONE SEEN  < OR = 5 /HPF Final    Bacteria, UA 04/09/2024 NONE SEEN  NONE SEEN /HPF Final    Hyaline Casts, UA 04/09/2024 NONE SEEN  NONE SEEN /LPF Final    Service Cmt: 04/09/2024    Final    Reflexive Urine Culture 04/09/2024    Final    Urine Culture, Routine 04/09/2024    Final    TSH w/reflex to FT4 04/09/2024 1.97  0.40 - 4.50 mIU/L Final    Cholesterol 04/09/2024 227 (H)  <200 mg/dL Final    HDL 04/09/2024 54  > OR = 50 mg/dL Final    Triglycerides 04/09/2024 174 (H)  <150 mg/dL Final    LDL Cholesterol 04/09/2024 142 (H)  mg/dL (calc) Final    HDL/Cholesterol Ratio 04/09/2024 4.2  <5.0 (calc) Final    Non HDL Chol. 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 1/25/2022    Hyperlipidemia     Hypertension     Malignant neoplasm of upper-outer quadrant of right female breast 9/14/2017    right    Mitral  valve prolapse     Neurofibromatosis     Osteopenia     Premature beats     Raynaud's disease     S/P mastectomy, right 2022    Seborrheic keratosis     Thyroid nodule     Tinnitus     Undiagnosed cardiac murmurs 2019     Past Surgical History:   Procedure Laterality Date    BACK SURGERY      BREAST BIOPSY Right 2017    CATARACT EXTRACTION Bilateral      SECTION      x5    DILATION AND CURETTAGE OF UTERUS      miss Ab    HYSTERECTOMY      MASTECTOMY Right 2017     Family History   Problem Relation Name Age of Onset    Rheum arthritis Mother      Breast cancer Mother          over 85    Hypertension Mother      Heart disease Mother      Parkinsonism Father      Cancer Father      Breast cancer Sister  40        DCIS    Breast cancer Maternal Aunt          60's    Breast cancer Sister  50        DCIS    Ovarian cancer Neg Hx      Colon cancer Neg Hx      Melanoma Neg Hx         Tests to Keep You Healthy    Colon Cancer Screening: ORDERED  Last Blood Pressure <= 139/89 (2024): Yes      The CVD Risk score (Marcosino, et al., 2008) failed to calculate for the following reasons:    The 2008 CVD risk score is only valid for ages 30 to 74     Marital Status:   Alcohol History:  reports no history of alcohol use.  Tobacco History:  reports that she has never smoked. She has never been exposed to tobacco smoke. She has never used smokeless tobacco.  Drug History:  reports no history of drug use.    Health Maintenance Topics with due status: Not Due       Topic Last Completion Date    DEXA Scan 2023    Lipid Panel 2024     Immunization History   Administered Date(s) Administered    COVID-19, MRNA, LN-S, PF (Pfizer) (Purple Cap) 2021, 2021, 2021    Influenza - High Dose - PF (65 years and older) 10/06/2014, 2015, 10/11/2018, 2019    Influenza - Quadrivalent - High Dose - PF (65 years and older) 2020, 2021, 10/11/2022    Pneumococcal Conjugate -  13 Valent 01/21/2020    Pneumococcal Polysaccharide - 23 Valent 10/06/2014       Review of patient's allergies indicates:   Allergen Reactions    Methylprednisolone      Other reaction(s): Heart palpitations  PT STATES SHE HAS A REACTION TO ALL STEROIDS DUE TO HER DX; OF MVP..    Zofran [ondansetron hcl (pf)] Nausea Only    Corticosteroids (glucocorticoids) Other (See Comments)     Heart palpitations    Dilaudid [hydromorphone] Nausea And Vomiting     Does not help with pain    Fentanyl     Neosporin (neomycin-polymyx)     Norvasc [amlodipine]     Statins-hmg-coa reductase inhibitors      myalgias    Tetracyclines     Augmentin [amoxicillin-pot clavulanate] Nausea And Vomiting       Current Outpatient Medications:     ascorbic Acid (VITAMIN C) 500 mg CpSR, , Disp: , Rfl:     aspirin (ECOTRIN) 81 MG EC tablet, Take 81 mg by mouth once daily., Disp: , Rfl:     azelastine (ASTELIN) 137 mcg (0.1 %) nasal spray, 1 spray (137 mcg total) by Nasal route as needed for Rhinitis., Disp: 30 mL, Rfl: 3    calcium carbonate/vitamin D3 (CALCIUM 600 + D,3, ORAL), Take 1 tablet by mouth 2 (two) times a day., Disp: , Rfl:     denosumab (PROLIA) 60 mg/mL Syrg, Inject 60 mg into the skin every 6 (six) months. , Disp: , Rfl:     magnesium 30 mg Tab, Take 250 mg by mouth once., Disp: , Rfl:     meclizine (ANTIVERT) 12.5 mg tablet, , Disp: , Rfl:     multivitamin (THERAGRAN) per tablet, Take 1 tablet by mouth once daily., Disp: , Rfl:     omeprazole (PRILOSEC OTC) 20 MG tablet, Take 20 mg by mouth 2 (two) times daily before meals. Pt states that she take 20 mg in the morning ad 20 mg at night time., Disp: , Rfl:     PATADAY 0.2 % Drop, Place 1 drop into both eyes daily as needed (allergies). , Disp: , Rfl: 0    zinc glycinate 30 mg Cap, , Disp: , Rfl:     lisinopriL (PRINIVIL,ZESTRIL) 5 MG tablet, Take 1 tablet (5 mg total) by mouth daily as needed (BP >150)., Disp: 90 tablet, Rfl: 1    LORazepam (ATIVAN) 0.5 MG tablet, Take 1 tablet (0.5  "mg total) by mouth every 12 (twelve) hours as needed for Anxiety., Disp: 30 tablet, Rfl: 3    sertraline (ZOLOFT) 25 MG tablet, Start 1 tablet daily for 2 weeks then increase to 2 tablets daily, Disp: 60 tablet, Rfl: 11    Review of Systems   Constitutional:  Negative for activity change, chills, fatigue, fever and unexpected weight change.   HENT:  Negative for congestion, ear pain, sore throat, trouble swallowing and voice change.    Eyes:  Negative for visual disturbance (resolved).   Respiratory:  Negative for cough, shortness of breath and wheezing.    Cardiovascular:  Negative for chest pain, palpitations and leg swelling.   Gastrointestinal:  Negative for abdominal pain, diarrhea, nausea and vomiting.   Genitourinary:  Negative for dysuria, frequency and hematuria.   Musculoskeletal:  Negative for arthralgias and gait problem.   Skin:  Negative for rash.   Neurological:  Negative for dizziness, syncope, speech difficulty, weakness and headaches.   Psychiatric/Behavioral:  Negative for dysphoric mood. The patient is nervous/anxious (see HPI).           Objective:      Vitals:    04/16/24 1200   BP: 110/60   Pulse: 70   SpO2: 99%   Weight: 49.8 kg (109 lb 12.8 oz)   Height: 5' 1" (1.549 m)     Physical Exam  Constitutional:       General: She is not in acute distress.     Appearance: Normal appearance. She is well-developed and normal weight.      Comments: Thin small built WF   HENT:      Head: Normocephalic and atraumatic.      Right Ear: Tympanic membrane and ear canal normal.      Left Ear: Tympanic membrane and ear canal normal.   Neck:      Vascular: No carotid bruit.   Cardiovascular:      Rate and Rhythm: Normal rate and regular rhythm.      Heart sounds: No murmur heard.     No friction rub.   Pulmonary:      Effort: Pulmonary effort is normal. No respiratory distress.      Breath sounds: Normal breath sounds. No wheezing or rales.   Abdominal:      Palpations: Abdomen is soft.      Tenderness: There " is no abdominal tenderness.   Musculoskeletal:      Cervical back: Neck supple.      Right lower leg: No edema.      Left lower leg: No edema.   Lymphadenopathy:      Cervical: No cervical adenopathy.   Skin:     General: Skin is warm and dry.   Neurological:      General: No focal deficit present.      Mental Status: She is alert and oriented to person, place, and time.      Gait: Gait normal.   Psychiatric:         Mood and Affect: Mood is anxious.           Assessment:       1. Annual physical exam    2. Anxiety    3. Essential hypertension    4. Mixed hyperlipidemia    5. Myalgia due to statin    6. Neurofibromatosis, type 1    7. History of breast cancer           Plan:       1. Annual physical exam   - reviewed recent labs with patient    2. Anxiety  -patient admits to increase in her ongoing anxiety.  She would now like to retry sertraline for anxiety, discussed side effects and advised will need to give medication 4-6 weeks before it becomes effective.  She can continue with p.r.n. lorazepam until then.  Call for any worsening  -     LORazepam (ATIVAN) 0.5 MG tablet; Take 1 tablet (0.5 mg total) by mouth every 12 (twelve) hours as needed for Anxiety.  Dispense: 30 tablet; Refill: 3  -     sertraline (ZOLOFT) 25 MG tablet; Start 1 tablet daily for 2 weeks then increase to 2 tablets daily  Dispense: 60 tablet; Refill: 11    3. Essential hypertension  - BP well controlled today.  Patient reports taking lisinopril only SBP>150 though she feels 5mg dose is too much. She stopped metoprolol d/t nightmare SE which she reports has improved off med.  Has follow-up Dr. Lisa in September  -     lisinopriL (PRINIVIL,ZESTRIL) 5 MG tablet; Take 1 tablet (5 mg total) by mouth daily as needed (BP >150).  Dispense: 90 tablet; Refill: 1    4. Mixed hyperlipidemia   -reviewed recent lipids with patient, , mildly elevated TG and LDL remains elevated at 142 though patient reports history of statin myalgias and declines any  other medication for cholesterol lowering    5. Myalgia due to statin    6. Neurofibromatosis, type 1   -recent diagnosis at [facility]    7. History of breast cancer   -patient due for annual mammogram, she states she wants to reduce her radiation exposure so would like to continue with mammograms every 2 years instead of annual.  Advised given her history of breast cancer this isn't a recommended guidelines and cautioned on risks and benefits with early detection    8. Age related osteoporosis without current pathological fracture   -improved on last DEXA on Prolia    Follow up in about 3 months (around 7/16/2024) for anxiety.          Counseled on age and gender appropriate medical preventative services, including cancer screenings, immunizations, overall nutritional health, need for a consistent exercise regimen and an overall push towards maintaining a vigorous and active lifestyle.      4/16/2024 Cecilia Shaikh NP

## 2024-04-29 ENCOUNTER — INFUSION (OUTPATIENT)
Dept: INFUSION THERAPY | Facility: HOSPITAL | Age: 74
End: 2024-04-29
Attending: NURSE PRACTITIONER
Payer: MEDICARE

## 2024-04-29 VITALS
DIASTOLIC BLOOD PRESSURE: 65 MMHG | WEIGHT: 111.63 LBS | SYSTOLIC BLOOD PRESSURE: 133 MMHG | BODY MASS INDEX: 21.07 KG/M2 | TEMPERATURE: 98 F | HEART RATE: 65 BPM | RESPIRATION RATE: 17 BRPM | HEIGHT: 61 IN

## 2024-04-29 DIAGNOSIS — Z17.0 MALIGNANT NEOPLASM OF UPPER-OUTER QUADRANT OF RIGHT BREAST IN FEMALE, ESTROGEN RECEPTOR POSITIVE: Primary | ICD-10-CM

## 2024-04-29 DIAGNOSIS — M81.0 AGE-RELATED OSTEOPOROSIS WITHOUT CURRENT PATHOLOGICAL FRACTURE: ICD-10-CM

## 2024-04-29 DIAGNOSIS — Z79.811 LONG TERM CURRENT USE OF AROMATASE INHIBITOR: ICD-10-CM

## 2024-04-29 DIAGNOSIS — C50.411 MALIGNANT NEOPLASM OF UPPER-OUTER QUADRANT OF RIGHT BREAST IN FEMALE, ESTROGEN RECEPTOR POSITIVE: Primary | ICD-10-CM

## 2024-04-29 PROCEDURE — 96372 THER/PROPH/DIAG INJ SC/IM: CPT

## 2024-04-29 PROCEDURE — 63600175 PHARM REV CODE 636 W HCPCS: Mod: JZ,JG | Performed by: FAMILY MEDICINE

## 2024-04-29 RX ADMIN — DENOSUMAB 60 MG: 60 INJECTION SUBCUTANEOUS at 01:04

## 2024-04-29 NOTE — PLAN OF CARE
Problem: Fatigue  Goal: Improved Activity Tolerance  Outcome: Progressing  Intervention: Promote Improved Energy  Flowsheets (Taken 4/29/2024 1251)  Fatigue Management:   activity schedule adjusted   fatigue-related activity identified  Activity Management: Ambulated -L4  Environmental Support: calm environment promoted

## 2024-05-01 ENCOUNTER — TELEPHONE (OUTPATIENT)
Dept: FAMILY MEDICINE | Facility: CLINIC | Age: 74
End: 2024-05-01
Payer: MEDICARE

## 2024-05-05 NOTE — PROCEDURES
Ear Cerumen Removal    Date/Time: 4/9/2024 10:45 AM    Performed by: Asher Gaitan PA-C  Authorized by: Asher Gaitan PA-C      Local anesthetic:  None  Location details:  Both ears  Procedure type: curette    Cerumen  Removal Results:  Cerumen completely removed  Patient tolerance:  Patient tolerated the procedure well with no immediate complications

## 2024-05-28 ENCOUNTER — DASHBOARD ENCOUNTERS (OUTPATIENT)
Age: 74
End: 2024-05-28

## 2024-05-28 ENCOUNTER — LAB OUTSIDE AN ENCOUNTER (OUTPATIENT)
Dept: URBAN - METROPOLITAN AREA CLINIC 113 | Facility: CLINIC | Age: 74
End: 2024-05-28

## 2024-05-28 ENCOUNTER — OFFICE VISIT (OUTPATIENT)
Dept: URBAN - METROPOLITAN AREA CLINIC 113 | Facility: CLINIC | Age: 74
End: 2024-05-28
Payer: MEDICARE

## 2024-05-28 VITALS
BODY MASS INDEX: 30.76 KG/M2 | DIASTOLIC BLOOD PRESSURE: 69 MMHG | RESPIRATION RATE: 20 BRPM | WEIGHT: 191.4 LBS | HEART RATE: 75 BPM | HEIGHT: 66 IN | TEMPERATURE: 97.5 F | SYSTOLIC BLOOD PRESSURE: 119 MMHG

## 2024-05-28 DIAGNOSIS — K74.00 HEPATIC FIBROSIS: ICD-10-CM

## 2024-05-28 DIAGNOSIS — K21.00 GASTROESOPHAGEAL REFLUX DISEASE WITH ESOPHAGITIS WITHOUT HEMORRHAGE: ICD-10-CM

## 2024-05-28 DIAGNOSIS — R10.13 EPIGASTRIC PAIN: ICD-10-CM

## 2024-05-28 DIAGNOSIS — Z86.19 HISTORY OF HEPATITIS C: ICD-10-CM

## 2024-05-28 PROCEDURE — 99214 OFFICE O/P EST MOD 30 MIN: CPT | Performed by: INTERNAL MEDICINE

## 2024-05-28 RX ORDER — SEMAGLUTIDE 0.68 MG/ML
AS DIRECTED INJECTION, SOLUTION SUBCUTANEOUS
Status: ACTIVE | COMMUNITY

## 2024-05-28 RX ORDER — ASCORBIC ACID 500 MG
TAKE 1 TABLET DAILY TABLET ORAL
Refills: 0 | Status: ACTIVE | COMMUNITY
Start: 2007-04-18

## 2024-05-28 RX ORDER — LACTOBACILLUS RHAMNOSUS GG 10B CELL
TAKE 1 CAPSULE DAILY CAPSULE ORAL
Refills: 0 | Status: ACTIVE | COMMUNITY

## 2024-05-28 RX ORDER — PREDNISOLONE/MOXIFLOXACIN HCL 1 %-0.5 %
AS DIRECTED SUSPENSION, DROPS(FINAL DOSAGE FORM)(ML) OPHTHALMIC (EYE) 4 TIMES DAILY
Status: ON HOLD | COMMUNITY

## 2024-05-28 RX ORDER — DEXLANSOPRAZOLE 60 MG/1
TAKE 1 CAPSULE BY MOUTH EVERY DAY CAPSULE, DELAYED RELEASE ORAL ONCE A DAY
Qty: 90 | Refills: 4 | Status: ON HOLD | COMMUNITY

## 2024-05-28 RX ORDER — AZELASTINE HCL 205.5 UG/1
1 PUFF IN EACH NOSTRIL SPRAY NASAL
Refills: 0 | Status: ACTIVE | COMMUNITY
Start: 2018-10-05

## 2024-05-28 RX ORDER — FAMOTIDINE 40 MG/1
1 TABLET AT BEDTIME TABLET, FILM COATED ORAL ONCE A DAY
Qty: 30 | Refills: 5 | Status: ON HOLD | COMMUNITY

## 2024-05-28 RX ORDER — SODIUM, POTASSIUM,MAG SULFATES 17.5-3.13G
AS DIRECTED SOLUTION, RECONSTITUTED, ORAL ORAL
Status: ON HOLD | COMMUNITY
Start: 2022-06-01

## 2024-05-28 RX ORDER — SUCRALFATE 1 G/1
1 TABLET; MAY DISSOLVE IN 2 TEASPOONS OF WATER TABLET ORAL
Qty: 90 | Refills: 3 | OUTPATIENT

## 2024-05-28 RX ORDER — SUCRALFATE 1 G/1
1 TABLET; MAY DISSOLVE IN 2 TEASPOONS OF WATER TABLET ORAL
Qty: 90 | Refills: 3 | Status: ACTIVE | COMMUNITY
Start: 2024-03-06 | End: 2024-07-04

## 2024-05-28 RX ORDER — METFORMIN HYDROCHLORIDE 500 MG/1
1 TABLET WITH A MEAL TABLET, FILM COATED ORAL TWICE DAILY
Status: ACTIVE | COMMUNITY

## 2024-05-28 RX ORDER — OMEGA-3/DHA/EPA/FISH OIL 1000 MG
TAKE 1 CAPSULE DAILY CAPSULE ORAL
Refills: 0 | Status: ON HOLD | COMMUNITY

## 2024-05-28 RX ORDER — BRINZOLAMIDE/BRIMONIDINE TARTRATE 10; 2 MG/ML; MG/ML
1 DROP INTO AFFECTED EYE SUSPENSION/ DROPS OPHTHALMIC THREE TIMES A DAY
Status: ACTIVE | COMMUNITY

## 2024-05-28 RX ORDER — LATANOPROST/PF 0.005 %
INSTILL 1 DROP IN BOTH EYES AT BEDTIME DROPS OPHTHALMIC (EYE)
Refills: 0 | Status: ACTIVE | COMMUNITY
Start: 2014-05-10

## 2024-05-28 RX ORDER — ALBUTEROL SULFATE 90 UG/1
AEROSOL, METERED RESPIRATORY (INHALATION)
Qty: 18 | Refills: 0 | Status: ON HOLD | COMMUNITY
Start: 2012-01-02

## 2024-05-28 RX ORDER — LANSOPRAZOLE 30 MG/1
1 CAPSULE BEFORE A MEAL CAPSULE, DELAYED RELEASE ORAL TWICE A DAY
Qty: 60 CAPSULE | Refills: 5 | Status: ACTIVE | COMMUNITY
Start: 2024-03-06

## 2024-05-28 RX ORDER — LANSOPRAZOLE 15 MG/1
1 CAPSULE BEFORE BREAKFAST AND SUPPER CAPSULE, DELAYED RELEASE ORAL TWICE A DAY
Qty: 60 CAPSULE | Refills: 5 | Status: ACTIVE | COMMUNITY
Start: 2023-08-04

## 2024-05-28 RX ORDER — LANSOPRAZOLE 30 MG/1
1 CAPSULE BEFORE A MEAL CAPSULE, DELAYED RELEASE ORAL TWICE A DAY
Qty: 60 CAPSULE | Refills: 5 | OUTPATIENT

## 2024-05-28 RX ORDER — SODIUM, POTASSIUM,MAG SULFATES 17.5-3.13G
354 ML SOLUTION, RECONSTITUTED, ORAL ORAL ONCE
Qty: 354 MILLILITER | Refills: 0 | Status: ON HOLD | COMMUNITY

## 2024-05-28 RX ORDER — SODIUM PICOSULFATE, MAGNESIUM OXIDE, AND ANHYDROUS CITRIC ACID 10; 3.5; 12 MG/160ML; G/160ML; G/160ML
160 ML LIQUID ORAL
Qty: 320 MILLILITER | Refills: 0 | Status: ON HOLD | COMMUNITY

## 2024-05-28 RX ORDER — DICLOFENAC SODIUM 10 MG/G
GEL TOPICAL
Qty: 300 | Refills: 0 | Status: ACTIVE | COMMUNITY
Start: 2019-09-18

## 2024-05-28 RX ORDER — ALBUTEROL SULFATE 2.5 MG/3ML
SOLUTION RESPIRATORY (INHALATION)
Qty: 255 | Refills: 0 | Status: ACTIVE | COMMUNITY
Start: 2012-03-14

## 2024-05-28 RX ORDER — IPRATROPIUM BROMIDE 42 UG/1
SPRAY, METERED NASAL
Qty: 45 | Refills: 0 | Status: ACTIVE | COMMUNITY
Start: 2020-01-07

## 2024-05-28 RX ORDER — SODIUM, POTASSIUM,MAG SULFATES 17.5-3.13G
354 ML SOLUTION, RECONSTITUTED, ORAL ORAL
Qty: 354 MILLILITER | Refills: 0 | Status: ON HOLD | COMMUNITY

## 2024-05-28 RX ORDER — BUDESONIDE 0.5 MG/2ML
USE 1 UNIT DOSE VIA NEBULIZER TWO TIMES A DAY INHALANT ORAL
Qty: 60 | Refills: 0 | Status: ACTIVE | COMMUNITY
Start: 2012-03-30

## 2024-05-28 RX ORDER — TELMISARTAN 20 MG/1
1 TABLET TABLET ORAL ONCE A DAY
Refills: 0 | Status: ACTIVE | COMMUNITY
Start: 2019-11-04

## 2024-06-07 ENCOUNTER — TELEPHONE ENCOUNTER (OUTPATIENT)
Dept: URBAN - METROPOLITAN AREA SURGERY CENTER 25 | Facility: SURGERY CENTER | Age: 74
End: 2024-06-07

## 2024-06-17 ENCOUNTER — TELEPHONE ENCOUNTER (OUTPATIENT)
Dept: URBAN - METROPOLITAN AREA CLINIC 113 | Facility: CLINIC | Age: 74
End: 2024-06-17

## 2024-06-18 ENCOUNTER — OUT OF OFFICE VISIT (OUTPATIENT)
Dept: URBAN - METROPOLITAN AREA SURGERY CENTER 25 | Facility: SURGERY CENTER | Age: 74
End: 2024-06-18
Payer: MEDICARE

## 2024-06-18 ENCOUNTER — CLAIMS CREATED FROM THE CLAIM WINDOW (OUTPATIENT)
Dept: URBAN - METROPOLITAN AREA CLINIC 4 | Facility: CLINIC | Age: 74
End: 2024-06-18
Payer: MEDICARE

## 2024-06-18 DIAGNOSIS — K29.70 GASTRITIS, UNSPECIFIED, WITHOUT BLEEDING: ICD-10-CM

## 2024-06-18 DIAGNOSIS — K29.70 CHRONIC ACITVE GASTRITIS (H.PYLORI NEGATIVE): ICD-10-CM

## 2024-06-18 DIAGNOSIS — K31.89 ACHYLIA: ICD-10-CM

## 2024-06-18 DIAGNOSIS — R10.13 ABDOMINAL DISCOMFORT, EPIGASTRIC: ICD-10-CM

## 2024-06-18 DIAGNOSIS — K31.89 OTHER DISEASES OF STOMACH AND DUODENUM: ICD-10-CM

## 2024-06-18 DIAGNOSIS — K21.9 GASTRO-ESOPHAGEAL REFLUX DISEASE WITHOUT ESOPHAGITIS: ICD-10-CM

## 2024-06-18 DIAGNOSIS — R13.19 CERVICAL DYSPHAGIA: ICD-10-CM

## 2024-06-18 PROCEDURE — 43450 DILATE ESOPHAGUS 1/MULT PASS: CPT | Performed by: INTERNAL MEDICINE

## 2024-06-18 PROCEDURE — 88342 IMHCHEM/IMCYTCHM 1ST ANTB: CPT | Performed by: PATHOLOGY

## 2024-06-18 PROCEDURE — 43450 DILATE ESOPHAGUS 1/MULT PASS: CPT | Performed by: CLINIC/CENTER

## 2024-06-18 PROCEDURE — 43239 EGD BIOPSY SINGLE/MULTIPLE: CPT | Performed by: CLINIC/CENTER

## 2024-06-18 PROCEDURE — 88305 TISSUE EXAM BY PATHOLOGIST: CPT | Performed by: PATHOLOGY

## 2024-06-18 PROCEDURE — 00731 ANES UPR GI NDSC PX NOS: CPT | Performed by: ANESTHESIOLOGY

## 2024-06-18 PROCEDURE — 43239 EGD BIOPSY SINGLE/MULTIPLE: CPT | Performed by: INTERNAL MEDICINE

## 2024-06-18 PROCEDURE — 00731 ANES UPR GI NDSC PX NOS: CPT | Performed by: NURSE ANESTHETIST, CERTIFIED REGISTERED

## 2024-06-18 RX ORDER — BUDESONIDE 0.5 MG/2ML
USE 1 UNIT DOSE VIA NEBULIZER TWO TIMES A DAY INHALANT ORAL
Qty: 60 | Refills: 0 | Status: ACTIVE | COMMUNITY
Start: 2012-03-30

## 2024-06-18 RX ORDER — IPRATROPIUM BROMIDE 42 UG/1
SPRAY, METERED NASAL
Qty: 45 | Refills: 0 | Status: ACTIVE | COMMUNITY
Start: 2020-01-07

## 2024-06-18 RX ORDER — SODIUM, POTASSIUM,MAG SULFATES 17.5-3.13G
AS DIRECTED SOLUTION, RECONSTITUTED, ORAL ORAL
Status: ON HOLD | COMMUNITY
Start: 2022-06-01

## 2024-06-18 RX ORDER — DEXLANSOPRAZOLE 60 MG/1
TAKE 1 CAPSULE BY MOUTH EVERY DAY CAPSULE, DELAYED RELEASE ORAL ONCE A DAY
Qty: 90 | Refills: 4 | Status: ON HOLD | COMMUNITY

## 2024-06-18 RX ORDER — SUCRALFATE 1 G/1
1 TABLET; MAY DISSOLVE IN 2 TEASPOONS OF WATER TABLET ORAL
Qty: 90 | Refills: 3 | Status: ACTIVE | COMMUNITY

## 2024-06-18 RX ORDER — OMEGA-3/DHA/EPA/FISH OIL 1000 MG
TAKE 1 CAPSULE DAILY CAPSULE ORAL
Refills: 0 | Status: ON HOLD | COMMUNITY

## 2024-06-18 RX ORDER — AZELASTINE HYDROCHLORIDE 205.5 UG/1
1 PUFF IN EACH NOSTRIL SPRAY, METERED NASAL
Refills: 0 | Status: ACTIVE | COMMUNITY
Start: 2018-10-05

## 2024-06-18 RX ORDER — ASCORBIC ACID 500 MG
TAKE 1 TABLET DAILY TABLET ORAL
Refills: 0 | Status: ACTIVE | COMMUNITY
Start: 2007-04-18

## 2024-06-18 RX ORDER — SODIUM, POTASSIUM,MAG SULFATES 17.5-3.13G
354 ML SOLUTION, RECONSTITUTED, ORAL ORAL ONCE
Qty: 354 MILLILITER | Refills: 0 | Status: ON HOLD | COMMUNITY

## 2024-06-18 RX ORDER — SODIUM, POTASSIUM,MAG SULFATES 17.5-3.13G
354 ML SOLUTION, RECONSTITUTED, ORAL ORAL
Qty: 354 MILLILITER | Refills: 0 | Status: ON HOLD | COMMUNITY

## 2024-06-18 RX ORDER — DICLOFENAC SODIUM 10 MG/G
GEL TOPICAL
Qty: 300 | Refills: 0 | Status: ACTIVE | COMMUNITY
Start: 2019-09-18

## 2024-06-18 RX ORDER — TELMISARTAN 20 MG/1
1 TABLET TABLET ORAL ONCE A DAY
Refills: 0 | Status: ACTIVE | COMMUNITY
Start: 2019-11-04

## 2024-06-18 RX ORDER — METFORMIN HYDROCHLORIDE 500 MG/1
1 TABLET WITH A MEAL TABLET, FILM COATED ORAL TWICE DAILY
Status: ACTIVE | COMMUNITY

## 2024-06-18 RX ORDER — BRINZOLAMIDE/BRIMONIDINE TARTRATE 10; 2 MG/ML; MG/ML
1 DROP INTO AFFECTED EYE SUSPENSION/ DROPS OPHTHALMIC THREE TIMES A DAY
Status: ACTIVE | COMMUNITY

## 2024-06-18 RX ORDER — ALBUTEROL SULFATE 90 UG/1
AEROSOL, METERED RESPIRATORY (INHALATION)
Qty: 18 | Refills: 0 | Status: ON HOLD | COMMUNITY
Start: 2012-01-02

## 2024-06-18 RX ORDER — LANSOPRAZOLE 30 MG/1
1 CAPSULE BEFORE A MEAL CAPSULE, DELAYED RELEASE ORAL TWICE A DAY
Qty: 60 CAPSULE | Refills: 5 | Status: ACTIVE | COMMUNITY

## 2024-06-18 RX ORDER — SODIUM PICOSULFATE, MAGNESIUM OXIDE, AND ANHYDROUS CITRIC ACID 10; 3.5; 12 MG/160ML; G/160ML; G/160ML
160 ML LIQUID ORAL
Qty: 320 MILLILITER | Refills: 0 | Status: ON HOLD | COMMUNITY

## 2024-06-18 RX ORDER — LACTOBACILLUS RHAMNOSUS GG 10B CELL
TAKE 1 CAPSULE DAILY CAPSULE ORAL
Refills: 0 | Status: ACTIVE | COMMUNITY

## 2024-06-18 RX ORDER — LATANOPROST/PF 0.005 %
INSTILL 1 DROP IN BOTH EYES AT BEDTIME DROPS OPHTHALMIC (EYE)
Refills: 0 | Status: ACTIVE | COMMUNITY
Start: 2014-05-10

## 2024-06-18 RX ORDER — PREDNISOLONE/MOXIFLOXACIN HCL 1 %-0.5 %
AS DIRECTED SUSPENSION, DROPS(FINAL DOSAGE FORM)(ML) OPHTHALMIC (EYE) 4 TIMES DAILY
Status: ON HOLD | COMMUNITY

## 2024-06-18 RX ORDER — ALBUTEROL SULFATE 2.5 MG/3ML
SOLUTION RESPIRATORY (INHALATION)
Qty: 255 | Refills: 0 | Status: ACTIVE | COMMUNITY
Start: 2012-03-14

## 2024-06-18 RX ORDER — FAMOTIDINE 40 MG/1
1 TABLET AT BEDTIME TABLET, FILM COATED ORAL ONCE A DAY
Qty: 30 | Refills: 5 | Status: ON HOLD | COMMUNITY

## 2024-06-18 RX ORDER — LANSOPRAZOLE 15 MG/1
1 CAPSULE BEFORE BREAKFAST AND SUPPER CAPSULE, DELAYED RELEASE ORAL TWICE A DAY
Qty: 60 CAPSULE | Refills: 5 | Status: ACTIVE | COMMUNITY
Start: 2023-08-04

## 2024-06-18 RX ORDER — SEMAGLUTIDE 0.68 MG/ML
AS DIRECTED INJECTION, SOLUTION SUBCUTANEOUS
Status: ACTIVE | COMMUNITY

## 2024-06-21 ENCOUNTER — OFFICE VISIT (OUTPATIENT)
Dept: URBAN - METROPOLITAN AREA SURGERY CENTER 25 | Facility: SURGERY CENTER | Age: 74
End: 2024-06-21

## 2024-07-27 ENCOUNTER — HOSPITAL ENCOUNTER (EMERGENCY)
Facility: HOSPITAL | Age: 74
Discharge: HOME | End: 2024-07-28
Attending: STUDENT IN AN ORGANIZED HEALTH CARE EDUCATION/TRAINING PROGRAM
Payer: MEDICARE

## 2024-07-27 DIAGNOSIS — B34.9 VIRAL SYNDROME: Primary | ICD-10-CM

## 2024-07-27 DIAGNOSIS — J02.9 ACUTE VIRAL PHARYNGITIS: ICD-10-CM

## 2024-07-27 DIAGNOSIS — R05.9 COUGH: ICD-10-CM

## 2024-07-27 LAB
GROUP A STREP, MOLECULAR: NEGATIVE
INFLUENZA A, MOLECULAR: NEGATIVE
INFLUENZA B, MOLECULAR: NEGATIVE
SARS-COV-2 RDRP RESP QL NAA+PROBE: NEGATIVE
SPECIMEN SOURCE: NORMAL

## 2024-07-27 PROCEDURE — 71045 X-RAY EXAM CHEST 1 VIEW: CPT | Mod: 26,,, | Performed by: RADIOLOGY

## 2024-07-27 PROCEDURE — 87502 INFLUENZA DNA AMP PROBE: CPT | Performed by: STUDENT IN AN ORGANIZED HEALTH CARE EDUCATION/TRAINING PROGRAM

## 2024-07-27 PROCEDURE — U0002 COVID-19 LAB TEST NON-CDC: HCPCS | Performed by: STUDENT IN AN ORGANIZED HEALTH CARE EDUCATION/TRAINING PROGRAM

## 2024-07-27 PROCEDURE — 71045 X-RAY EXAM CHEST 1 VIEW: CPT | Mod: TC

## 2024-07-27 PROCEDURE — 87651 STREP A DNA AMP PROBE: CPT | Performed by: STUDENT IN AN ORGANIZED HEALTH CARE EDUCATION/TRAINING PROGRAM

## 2024-07-27 PROCEDURE — 99283 EMERGENCY DEPT VISIT LOW MDM: CPT | Mod: 25

## 2024-07-28 VITALS
WEIGHT: 108 LBS | TEMPERATURE: 100 F | HEIGHT: 62 IN | DIASTOLIC BLOOD PRESSURE: 80 MMHG | HEART RATE: 90 BPM | RESPIRATION RATE: 20 BRPM | SYSTOLIC BLOOD PRESSURE: 156 MMHG | OXYGEN SATURATION: 100 % | BODY MASS INDEX: 19.88 KG/M2

## 2024-07-28 NOTE — ED PROVIDER NOTES
"Encounter Date: 7/27/2024       History     Chief Complaint   Patient presents with    Sore Throat     Pt's daughter, Amanda states " she's not feeling well"   Pt wet to urgent care on 7/26/24 for c/o sore throat, pt went to [facility] and diagnosed with " bacterial infection" flu test was neg, covid test neg and rapid strep was neg, placed on z pack and advil  Currently present with c/o worsening sore throat, reports pain with swallowing,only able to eat or drink " a little bit"  reports temp of " 100.3" tonight       74-year-old female with significant medical history below.  She presents with chief complaint of 4 day history of sore throat, generalized malaise.  She was seen by urgent care facility had negative COVID and flu test.  and was started on Z-Adrián yesterday for "bacterial infection".  She denies associated fever, chills, nausea, vomiting, shortness of breath or chest pain.    The history is provided by the patient. No  was used.     Review of patient's allergies indicates:   Allergen Reactions    Methylprednisolone      Other reaction(s): Heart palpitations  PT STATES SHE HAS A REACTION TO ALL STEROIDS DUE TO HER DX; OF MVP..    Zofran [ondansetron hcl (pf)] Nausea Only    Corticosteroids (glucocorticoids) Other (See Comments)     Heart palpitations    Dilaudid [hydromorphone] Nausea And Vomiting     Does not help with pain    Fentanyl     Neosporin (neomycin-polymyx)     Norvasc [amlodipine]     Statins-hmg-coa reductase inhibitors      myalgias    Tetracyclines     Augmentin [amoxicillin-pot clavulanate] Nausea And Vomiting     Past Medical History:   Diagnosis Date    Arthritis     Atherosclerosis     in left carotid artery    Carotid artery plaque, left 9/7/2023    ER+ (estrogen receptor positive status) 1/25/2022    Fibrocystic breast     GERD (gastroesophageal reflux disease)     HER2-negative carcinoma of right breast 1/25/2022    Hyperlipidemia     Hypertension  "    Malignant neoplasm of upper-outer quadrant of right female breast 2017    right    Mitral valve prolapse     Neurofibromatosis     Osteopenia     Premature beats     Raynaud's disease     S/P mastectomy, right 2022    Seborrheic keratosis     Thyroid nodule     Tinnitus     Undiagnosed cardiac murmurs 2019     Past Surgical History:   Procedure Laterality Date    BACK SURGERY      BREAST BIOPSY Right     CATARACT EXTRACTION Bilateral      SECTION      x5    DILATION AND CURETTAGE OF UTERUS      miss Ab    HYSTERECTOMY      MASTECTOMY Right      Family History   Problem Relation Name Age of Onset    Rheum arthritis Mother      Breast cancer Mother          over 85    Hypertension Mother      Heart disease Mother      Parkinsonism Father      Cancer Father      Breast cancer Sister  40        DCIS    Breast cancer Maternal Aunt          60's    Breast cancer Sister  50        DCIS    Ovarian cancer Neg Hx      Colon cancer Neg Hx      Melanoma Neg Hx       Social History     Tobacco Use    Smoking status: Never     Passive exposure: Never    Smokeless tobacco: Never   Substance Use Topics    Alcohol use: No    Drug use: No     Review of Systems   Constitutional:  Positive for activity change and fatigue.   HENT:  Positive for sore throat.    Eyes: Negative.    Respiratory: Negative.     Cardiovascular: Negative.    Gastrointestinal: Negative.    Genitourinary: Negative.    Musculoskeletal: Negative.    Skin: Negative.    Neurological: Negative.    Hematological: Negative.        Physical Exam     Initial Vitals [24 2206]   BP Pulse Resp Temp SpO2   (!) 197/100 93 20 99.9 °F (37.7 °C) 96 %      MAP       --         Physical Exam    Nursing note and vitals reviewed.  Constitutional: She appears well-developed.   HENT:   Head: Normocephalic.   Left Ear: External ear normal.   Nose: Nose normal.   Mouth/Throat: Oropharynx is clear and moist. No oropharyngeal exudate.   Eyes: Pupils  are equal, round, and reactive to light.   Neck: No JVD present.   Normal range of motion.  Pulmonary/Chest: Breath sounds normal. No respiratory distress.   Abdominal: Abdomen is soft.   Musculoskeletal:         General: Normal range of motion.      Cervical back: Normal range of motion.     Lymphadenopathy:     She has no cervical adenopathy.   Neurological: She is alert and oriented to person, place, and time. GCS score is 15. GCS eye subscore is 4. GCS verbal subscore is 5. GCS motor subscore is 6.   Skin: Skin is warm. Capillary refill takes less than 2 seconds.         ED Course   Procedures  Labs Reviewed   GROUP A STREP, MOLECULAR       Result Value    Group A Strep, Molecular Negative     INFLUENZA A & B BY MOLECULAR    Influenza A, Molecular Negative      Influenza B, Molecular Negative      Flu A & B Source Nasal Swab     SARS-COV-2 RNA AMPLIFICATION, QUAL    SARS-CoV-2 RNA, Amplification, Qual Negative            Imaging Results              X-Ray Chest AP Portable (Final result)  Result time 07/28/24 00:49:53      Final result by Javier Gallegos MD (07/28/24 00:49:53)                   Impression:      Coarse interstitial attenuation.  No convincing radiographic evidence of acute intrathoracic process on this single view.      Electronically signed by: Javier Gallegos MD  Date:    07/28/2024  Time:    00:49               Narrative:    EXAMINATION:  XR CHEST AP PORTABLE    CLINICAL HISTORY:  Cough, unspecified    TECHNIQUE:  Single frontal view of the chest was performed.    COMPARISON:  Chest radiograph 07/14/2019, CT chest 02/27/2024    FINDINGS:  Cardiac silhouette appears within normal limits.  Lungs are symmetrically expanded with mild chronic coarse interstitial attenuation.  No large confluent airspace consolidation identified.  No significant volume of pleural fluid or pneumothorax identified.  Osseous structures demonstrate degenerative changes.                                        Medications - No data to display  Medical Decision Making  Ddx bacterial versus viral pharyngitis, viral syndrome, others  He is afebrile hemodynamically stable well-appearing  COVID negative, flu negative, strep negative  Chest x-ray showed no evidence of infection  Advised lozenges, saltwater gargles.  Symptomatic care.  Patient was seen and reevaluated. Patient's symptoms seem to be stable. I discussed the patient's diagnosis, treatment plan, and plan for discharge with the patient. Patient was instructed to follow up with PCP and was given strict return precautions to the ED. The patient voiced understanding and agreed with the plan      Amount and/or Complexity of Data Reviewed  Radiology: ordered.                                      Clinical Impression:  Final diagnoses:  [R05.9] Cough  [B34.9] Viral syndrome (Primary)  [J02.9] Acute viral pharyngitis          ED Disposition Condition    Discharge Stable          ED Prescriptions    None       Follow-up Information       Follow up With Specialties Details Why Contact Info    Buddy Mathew MD Family Medicine, Home Health Services, Hospice Services   [address] [facility]  [address] [place] [id number]  [phone]               Ameya Honeycutt MD  07/28/24 0054

## 2024-07-28 NOTE — DISCHARGE INSTRUCTIONS
Salt water 3 to 4 times each day. Mix 1/2 teaspoon (2.5 grams) salt with a cup (240 mL) of warm water.    Suck on hard candy or cough drops.    Increased rest

## 2024-07-29 ENCOUNTER — PATIENT MESSAGE (OUTPATIENT)
Dept: FAMILY MEDICINE | Facility: CLINIC | Age: 74
End: 2024-07-29
Payer: MEDICARE

## 2024-07-29 RX ORDER — BENZONATATE 100 MG/1
100 CAPSULE ORAL 3 TIMES DAILY PRN
Qty: 30 CAPSULE | Refills: 0 | Status: SHIPPED | OUTPATIENT
Start: 2024-07-29 | End: 2024-08-08

## 2024-07-30 ENCOUNTER — PATIENT MESSAGE (OUTPATIENT)
Dept: FAMILY MEDICINE | Facility: CLINIC | Age: 74
End: 2024-07-30
Payer: MEDICARE

## 2024-08-01 NOTE — TELEPHONE ENCOUNTER
Please let Patient know the loose stools are likely due to antibiotic.  If she is having severe watery diarrhea she can take Imodium otherwise I would just avoid high sugar and high fat for the next few days and see what her stools are doing.  Make sure she stays well hydrated

## 2024-08-16 ENCOUNTER — OFFICE VISIT (OUTPATIENT)
Dept: URBAN - METROPOLITAN AREA CLINIC 113 | Facility: CLINIC | Age: 74
End: 2024-08-16

## 2024-08-16 RX ORDER — LACTOBACILLUS RHAMNOSUS GG 10B CELL
TAKE 1 CAPSULE DAILY CAPSULE ORAL
Refills: 0 | Status: ACTIVE | COMMUNITY

## 2024-08-16 RX ORDER — DICLOFENAC SODIUM 10 MG/G
GEL TOPICAL
Qty: 300 | Refills: 0 | Status: ACTIVE | COMMUNITY
Start: 2019-09-18

## 2024-08-16 RX ORDER — ALBUTEROL SULFATE 90 UG/1
AEROSOL, METERED RESPIRATORY (INHALATION)
Qty: 18 | Refills: 0 | Status: ON HOLD | COMMUNITY
Start: 2012-01-02

## 2024-08-16 RX ORDER — LANSOPRAZOLE 30 MG/1
1 CAPSULE BEFORE A MEAL CAPSULE, DELAYED RELEASE ORAL TWICE A DAY
Qty: 60 CAPSULE | Refills: 5 | Status: ACTIVE | COMMUNITY

## 2024-08-16 RX ORDER — SODIUM PICOSULFATE, MAGNESIUM OXIDE, AND ANHYDROUS CITRIC ACID 10; 3.5; 12 MG/160ML; G/160ML; G/160ML
160 ML LIQUID ORAL
Qty: 320 MILLILITER | Refills: 0 | Status: ON HOLD | COMMUNITY

## 2024-08-16 RX ORDER — SEMAGLUTIDE 0.68 MG/ML
AS DIRECTED INJECTION, SOLUTION SUBCUTANEOUS
Status: ACTIVE | COMMUNITY

## 2024-08-16 RX ORDER — LANSOPRAZOLE 15 MG/1
1 CAPSULE BEFORE BREAKFAST AND SUPPER CAPSULE, DELAYED RELEASE ORAL TWICE A DAY
Qty: 60 CAPSULE | Refills: 5 | Status: ACTIVE | COMMUNITY
Start: 2023-08-04

## 2024-08-16 RX ORDER — AZELASTINE HYDROCHLORIDE 205.5 UG/1
1 PUFF IN EACH NOSTRIL SPRAY, METERED NASAL
Refills: 0 | Status: ACTIVE | COMMUNITY
Start: 2018-10-05

## 2024-08-16 RX ORDER — PREDNISOLONE/MOXIFLOXACIN HCL 1 %-0.5 %
AS DIRECTED SUSPENSION, DROPS(FINAL DOSAGE FORM)(ML) OPHTHALMIC (EYE) 4 TIMES DAILY
Status: ON HOLD | COMMUNITY

## 2024-08-16 RX ORDER — SODIUM, POTASSIUM,MAG SULFATES 17.5-3.13G
354 ML SOLUTION, RECONSTITUTED, ORAL ORAL
Qty: 354 MILLILITER | Refills: 0 | Status: ON HOLD | COMMUNITY

## 2024-08-16 RX ORDER — OMEGA-3/DHA/EPA/FISH OIL 1000 MG
TAKE 1 CAPSULE DAILY CAPSULE ORAL
Refills: 0 | Status: ON HOLD | COMMUNITY

## 2024-08-16 RX ORDER — DEXLANSOPRAZOLE 60 MG/1
TAKE 1 CAPSULE BY MOUTH EVERY DAY CAPSULE, DELAYED RELEASE ORAL ONCE A DAY
Qty: 90 | Refills: 4 | Status: ON HOLD | COMMUNITY

## 2024-08-16 RX ORDER — BUDESONIDE 0.5 MG/2ML
USE 1 UNIT DOSE VIA NEBULIZER TWO TIMES A DAY INHALANT ORAL
Qty: 60 | Refills: 0 | Status: ACTIVE | COMMUNITY
Start: 2012-03-30

## 2024-08-16 RX ORDER — BRINZOLAMIDE/BRIMONIDINE TARTRATE 10; 2 MG/ML; MG/ML
1 DROP INTO AFFECTED EYE SUSPENSION/ DROPS OPHTHALMIC THREE TIMES A DAY
Status: ACTIVE | COMMUNITY

## 2024-08-16 RX ORDER — ALBUTEROL SULFATE 2.5 MG/3ML
SOLUTION RESPIRATORY (INHALATION)
Qty: 255 | Refills: 0 | Status: ACTIVE | COMMUNITY
Start: 2012-03-14

## 2024-08-16 RX ORDER — SODIUM, POTASSIUM,MAG SULFATES 17.5-3.13G
AS DIRECTED SOLUTION, RECONSTITUTED, ORAL ORAL
Status: ON HOLD | COMMUNITY
Start: 2022-06-01

## 2024-08-16 RX ORDER — SODIUM, POTASSIUM,MAG SULFATES 17.5-3.13G
354 ML SOLUTION, RECONSTITUTED, ORAL ORAL ONCE
Qty: 354 MILLILITER | Refills: 0 | Status: ON HOLD | COMMUNITY

## 2024-08-16 RX ORDER — TELMISARTAN 20 MG/1
1 TABLET TABLET ORAL ONCE A DAY
Refills: 0 | Status: ACTIVE | COMMUNITY
Start: 2019-11-04

## 2024-08-16 RX ORDER — LATANOPROST/PF 0.005 %
INSTILL 1 DROP IN BOTH EYES AT BEDTIME DROPS OPHTHALMIC (EYE)
Refills: 0 | Status: ACTIVE | COMMUNITY
Start: 2014-05-10

## 2024-08-16 RX ORDER — SUCRALFATE 1 G/1
1 TABLET; MAY DISSOLVE IN 2 TEASPOONS OF WATER TABLET ORAL
Qty: 90 | Refills: 3 | Status: ACTIVE | COMMUNITY

## 2024-08-16 RX ORDER — FAMOTIDINE 40 MG/1
1 TABLET AT BEDTIME TABLET, FILM COATED ORAL ONCE A DAY
Qty: 30 | Refills: 5 | Status: ON HOLD | COMMUNITY

## 2024-08-16 RX ORDER — METFORMIN HYDROCHLORIDE 500 MG/1
1 TABLET WITH A MEAL TABLET, FILM COATED ORAL TWICE DAILY
Status: ACTIVE | COMMUNITY

## 2024-08-16 RX ORDER — ASCORBIC ACID 500 MG
TAKE 1 TABLET DAILY TABLET ORAL
Refills: 0 | Status: ACTIVE | COMMUNITY
Start: 2007-04-18

## 2024-08-16 RX ORDER — IPRATROPIUM BROMIDE 42 UG/1
SPRAY, METERED NASAL
Qty: 45 | Refills: 0 | Status: ACTIVE | COMMUNITY
Start: 2020-01-07

## 2024-08-16 NOTE — HPI-TODAY'S VISIT:
This is a 74-year-old female with a history of hepatitis C status post treatment with simeprevir (2015) with SVR, grade 2 stage II liver disease on biopsy (2006), GERD, postprandial epigastric pain, constipation, diarrhea, and a fraternal history of colon cancer due screening in 2027 presenting for follow-up after EGD. She was last seen in the office 5/28/2024 for postprandial epigastric pain.  prior labs and CT were unremarkable.  She was not taking Carafate at the time of her visit.  She was to continue lansoprazole twice a day and add Carafate 4 times a day.  EGD was ordered.  Recent liver evaluation with labs and imaging were unremarkable.  Repeat HCC screening recommended in 1 year. EGD 6/18/2024:No endoscopic abnormality to explain dysphagia status post empiric 46 K[address] Fuentes dilator, nonerosive gastritis characterized by erythema status post biopsy, normal examined duodenum, otherwise normal.  Pathology: Antral biopsies demonstrated chemical/reactive gastropathy without evidence of H. pylori or intestinal metaplasia.

## 2024-08-19 ENCOUNTER — PATIENT MESSAGE (OUTPATIENT)
Dept: FAMILY MEDICINE | Facility: CLINIC | Age: 74
End: 2024-08-19
Payer: MEDICARE

## 2024-08-19 ENCOUNTER — TELEPHONE (OUTPATIENT)
Dept: FAMILY MEDICINE | Facility: CLINIC | Age: 74
End: 2024-08-19
Payer: MEDICARE

## 2024-08-19 NOTE — TELEPHONE ENCOUNTER
had loose stool or watery diarrhea off and on since 7/31 after taking a z pack. I have tried low fat, low sugar diet, kaopectate and imodium. Everything will stop for a few days but then come back. [facility] did stool studies that were normal.  Just eating yogurt, advised oral probiotic. Any other recommendations?

## 2024-08-19 NOTE — TELEPHONE ENCOUNTER
----- Message from Marina Monahan sent at 8/19/2024  8:02 AM CDT -----  Pt has had diarrhea off and on since she has had z- pack would like to be seen today or tomorrow.   [phone]

## 2024-08-19 NOTE — TELEPHONE ENCOUNTER
Given stool tests were negative would continue with yogurt and probiotic and make sure she is drinking enough fluids.  If diarrhea persists past another week or so we can refer her to GI

## 2024-08-28 ENCOUNTER — OFFICE VISIT (OUTPATIENT)
Dept: FAMILY MEDICINE | Facility: CLINIC | Age: 74
End: 2024-08-28
Payer: MEDICARE

## 2024-08-28 VITALS
WEIGHT: 103 LBS | HEIGHT: 61 IN | HEART RATE: 72 BPM | DIASTOLIC BLOOD PRESSURE: 84 MMHG | SYSTOLIC BLOOD PRESSURE: 138 MMHG | BODY MASS INDEX: 19.45 KG/M2

## 2024-08-28 DIAGNOSIS — I10 ESSENTIAL HYPERTENSION: ICD-10-CM

## 2024-08-28 DIAGNOSIS — M81.0 AGE-RELATED OSTEOPOROSIS WITHOUT CURRENT PATHOLOGICAL FRACTURE: ICD-10-CM

## 2024-08-28 DIAGNOSIS — F41.9 ANXIETY: Primary | ICD-10-CM

## 2024-08-28 DIAGNOSIS — K21.9 GASTROESOPHAGEAL REFLUX DISEASE, UNSPECIFIED WHETHER ESOPHAGITIS PRESENT: ICD-10-CM

## 2024-08-28 PROCEDURE — 99214 OFFICE O/P EST MOD 30 MIN: CPT | Mod: S$GLB,,, | Performed by: NURSE PRACTITIONER

## 2024-08-28 RX ORDER — PANTOPRAZOLE SODIUM 40 MG/1
TABLET, DELAYED RELEASE ORAL
Qty: 60 TABLET | Refills: 5 | Status: SHIPPED | OUTPATIENT
Start: 2024-08-28

## 2024-08-28 RX ORDER — LORAZEPAM 0.5 MG/1
0.5 TABLET ORAL EVERY 12 HOURS PRN
Qty: 30 TABLET | Refills: 3 | Status: SHIPPED | OUTPATIENT
Start: 2024-08-28

## 2024-08-28 NOTE — PROGRESS NOTES
SUBJECTIVE:    Patient ID: Cecilia Valenzuela is a 74 y.o. female.    Chief Complaint: Anxiety (No bottles//Pt is here for a check up and pt would like to discuss her medication//Decline colo//ABRIL )    Pt here for f/u HTN, anxiety, hx of breast CA    Pt reports late July started with URI symptoms- seen at  and tested negative for COVID. Treated with zpak and then developed profuse diarrhea- was seen in ER and had negative stool tests including Cdiff. Pt reports diarrhea persisted off/on for several weeks but has now resolved. Did lose a few lbs while she was sick with URI and then diarrhea but appetite is now improved.    Was started on sertraline at last visit for anxiety c/o's- pt reports she tried it a couple times but didn't tolerate it. Has been taking 1/2 ativan in the evening which helps her sleep but hasn't needed it during the day    C/oing of joint stiffness, worries about it being a side effect of prolia    C/o's she doesn't think omeprazole is effective anymore- having more reflux symptoms and she increased omeprazole to 60mg daily without much improvement. Denies any black/bloody stool, no abd pain.     Has f/u with  next month- hasn't had any recent issues with palpitations    previously went and saw Dr. 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 Was told nodules in skin and lungs were not NF however.  He recommended she reduce radiation exposure so she doesn't want to continue with LDCT for pulm nodules    Hx of breast CA and completed her 5 years and has been off anastrozole since October. Pt reports she really doesn't want to continue with annual mammogram d/t fears of radiation            Admission on 08/05/2024, Discharged on 08/06/2024   Component Date Value Ref Range Status    Group A Strep, Molecular 08/05/2024 Negative  Negative Final    WBC 08/05/2024 7.99  3.90 - 12.70 K/uL Final    RBC 08/05/2024 4.92  4.00 - 5.40 M/uL Final     Hemoglobin 08/05/2024 13.8  12.0 - 16.0 g/dL Final    Hematocrit 08/05/2024 41.6  37.0 - 48.5 % Final    MCV 08/05/2024 85  82 - 98 fL Final    MCH 08/05/2024 28.0  27.0 - 31.0 pg Final    MCHC 08/05/2024 33.2  32.0 - 36.0 g/dL Final    RDW 08/05/2024 13.3  11.5 - 14.5 % Final    Platelets 08/05/2024 273  150 - 450 K/uL Final    MPV 08/05/2024 11.0  9.2 - 12.9 fL Final    Immature Granulocytes 08/05/2024 0.3  0.0 - 0.5 % Final    Gran # (ANC) 08/05/2024 5.5  1.8 - 7.7 K/uL Final    Immature Grans (Abs) 08/05/2024 0.02  0.00 - 0.04 K/uL Final    Lymph # 08/05/2024 1.7  1.0 - 4.8 K/uL Final    Mono # 08/05/2024 0.6  0.3 - 1.0 K/uL Final    Eos # 08/05/2024 0.1  0.0 - 0.5 K/uL Final    Baso # 08/05/2024 0.03  0.00 - 0.20 K/uL Final    nRBC 08/05/2024 0  0 /100 WBC Final    Gran % 08/05/2024 68.9  38.0 - 73.0 % Final    Lymph % 08/05/2024 21.7  18.0 - 48.0 % Final    Mono % 08/05/2024 7.6  4.0 - 15.0 % Final    Eosinophil % 08/05/2024 1.1  0.0 - 8.0 % Final    Basophil % 08/05/2024 0.4  0.0 - 1.9 % Final    Differential Method 08/05/2024 Automated   Final    Sodium 08/05/2024 130 (L)  136 - 145 mmol/L Final    Potassium 08/05/2024 3.6  3.5 - 5.1 mmol/L Final    Chloride 08/05/2024 97  95 - 110 mmol/L Final    CO2 08/05/2024 21 (L)  22 - 31 mmol/L Final    Glucose 08/05/2024 98  70 - 110 mg/dL Final    BUN 08/05/2024 17  7 - 18 mg/dL Final    Creatinine 08/05/2024 0.63  0.50 - 1.40 mg/dL Final    Calcium 08/05/2024 9.8  8.4 - 10.2 mg/dL Final    Total Protein 08/05/2024 8.8 (H)  6.0 - 8.4 g/dL Final    Albumin 08/05/2024 4.8  3.5 - 5.2 g/dL Final    Total Bilirubin 08/05/2024 0.7  0.2 - 1.3 mg/dL Final    Alkaline Phosphatase 08/05/2024 57  38 - 145 U/L Final    AST 08/05/2024 85 (H)  14 - 36 U/L Final    ALT 08/05/2024 115 (H)  0 - 35 U/L Final    Anion Gap 08/05/2024 12  5 - 12 mmol/L Final    eGFR 08/05/2024 >60  >60 mL/min/1.73 m^2 Final    SARS-CoV2 (COVID-19) Qualitative P* 08/05/2024 Negative  Negative Final     Influenza A, Molecular 08/05/2024 Negative  Negative Final    Influenza B, Molecular 08/05/2024 Negative  Negative Final    RSV Ag by Molecular Method 08/05/2024 Negative  Negative Final    C. diff PCR 08/06/2024 Negative  Negative Final    GPP - Campylobacter 08/06/2024 Not Detected  Not Detected Final    Plesiomonas shigelloides 08/06/2024 Not Detected  Not Detected Final    GPP - Salmonella 08/06/2024 Not Detected  Not Detected Final    Vibrio 08/06/2024 Not Detected  Not Detected Final    GPP - Vibrio cholera 08/06/2024 Not Detected  Not Detected Final    GPP - Yersinia enterocolitica 08/06/2024 Not Detected  Not Detected Final    Enteroaggregative E coli 08/06/2024 Not Detected  Not Detected Final    Enteropathogenic E coli 08/06/2024 Not Detected  Not Detected Final    GPP - Enterotoxigenic E coli (ETEC) 08/06/2024 Not Detected  Not Detected Final    GPP - Shiga Toxin-producing E coli* 08/06/2024 Not Detected  Not Detected Final    Shigella/Enteroinvasive E coli 08/06/2024 Not Detected  Not Detected Final    GPP - Cryptosporidium 08/06/2024 Not Detected  Not Detected Final    Cyclospora cayetanensis 08/06/2024 Not Detected  Not Detected Final    GPP - Entamoeba histolytica 08/06/2024 Not Detected  Not Detected Final    GPP - Giardia lamblia 08/06/2024 Not Detected  Not Detected Final    GPP - Adenovirus 40/41 08/06/2024 Not Detected  Not Detected Final    Astrovirus 08/06/2024 Not Detected  Not Detected Final    GPP - Norovirus GI/GII 08/06/2024 Not Detected  Not Detected Final    GPP - Rotavirus A 08/06/2024 Not Detected  Not Detected Final    Sapovirus 08/06/2024 Not Detected  Not Detected Final   Admission on 07/27/2024, Discharged on 07/28/2024   Component Date Value Ref Range Status    Group A Strep, Molecular 07/27/2024 Negative  Negative Final    Influenza A, Molecular 07/27/2024 Negative  Negative Final    Influenza B, Molecular 07/27/2024 Negative  Negative Final    Flu A & B Source 07/27/2024 Nasal  Swab   Final    SARS-CoV-2 RNA, Amplification, Qual 07/27/2024 Negative  Negative Final   Telephone on 02/29/2024   Component Date Value Ref Range Status    WBC 04/09/2024 7.0  3.8 - 10.8 Thousand/uL Final    RBC 04/09/2024 5.02  3.80 - 5.10 Million/uL Final    Hemoglobin 04/09/2024 14.3  11.7 - 15.5 g/dL Final    Hematocrit 04/09/2024 44.8  35.0 - 45.0 % Final    MCV 04/09/2024 89.2  80.0 - 100.0 fL Final    MCH 04/09/2024 28.5  27.0 - 33.0 pg Final    MCHC 04/09/2024 31.9 (L)  32.0 - 36.0 g/dL Final    RDW 04/09/2024 13.5  11.0 - 15.0 % Final    Platelets 04/09/2024 249  140 - 400 Thousand/uL Final    MPV 04/09/2024 11.7  7.5 - 12.5 fL Final    Neutrophils, Abs 04/09/2024 5,432  1,500 - 7,800 cells/uL Final    Lymph # 04/09/2024 1,092  850 - 3,900 cells/uL Final    Mono # 04/09/2024 357  200 - 950 cells/uL Final    Eos # 04/09/2024 70  15 - 500 cells/uL Final    Baso # 04/09/2024 49  0 - 200 cells/uL Final    Neutrophils Relative 04/09/2024 77.6  % Final    Lymph % 04/09/2024 15.6  % Final    Mono % 04/09/2024 5.1  % Final    Eosinophil % 04/09/2024 1.0  % Final    Basophil % 04/09/2024 0.7  % Final    Glucose 04/09/2024 80  65 - 99 mg/dL Final    BUN 04/09/2024 14  7 - 25 mg/dL Final    Creatinine 04/09/2024 0.67  0.60 - 1.00 mg/dL Final    eGFR 04/09/2024 91  > OR = 60 mL/min/1.73m2 Final    BUN/Creatinine Ratio 04/09/2024 SEE NOTE:  6 - 22 (calc) Final    Sodium 04/09/2024 139  135 - 146 mmol/L Final    Potassium 04/09/2024 3.7  3.5 - 5.3 mmol/L Final    Chloride 04/09/2024 103  98 - 110 mmol/L Final    CO2 04/09/2024 23  20 - 32 mmol/L Final    Calcium 04/09/2024 9.8  8.6 - 10.4 mg/dL Final    Total Protein 04/09/2024 8.0  6.1 - 8.1 g/dL Final    Albumin 04/09/2024 4.6  3.6 - 5.1 g/dL Final    Globulin, Total 04/09/2024 3.4  1.9 - 3.7 g/dL (calc) Final    Albumin/Globulin Ratio 04/09/2024 1.4  1.0 - 2.5 (calc) Final    Total Bilirubin 04/09/2024 0.4  0.2 - 1.2 mg/dL Final    Alkaline Phosphatase 04/09/2024 51   37 - 153 U/L Final    AST 04/09/2024 16  10 - 35 U/L Final    ALT 04/09/2024 12  6 - 29 U/L Final    Creatinine, Urine 04/09/2024 109  20 - 275 mg/dL Final    Microalb, Ur 04/09/2024 2.3  See Note: mg/dL Final    Microalb/Creat Ratio 04/09/2024 21  <30 mg/g creat Final    Color, UA 04/09/2024 YELLOW  YELLOW Final    Appearance, UA 04/09/2024 CLEAR  CLEAR Final    Specific Gravity, UA 04/09/2024 1.018  1.001 - 1.035 Final    pH, UA 04/09/2024 6.0  5.0 - 8.0 Final    Glucose, UA 04/09/2024 NEGATIVE  NEGATIVE Final    Bilirubin, UA 04/09/2024 NEGATIVE  NEGATIVE Final    Ketones, UA 04/09/2024 NEGATIVE  NEGATIVE Final    Occult Blood UA 04/09/2024 NEGATIVE  NEGATIVE Final    Protein, UA 04/09/2024 NEGATIVE  NEGATIVE Final    Nitrite, UA 04/09/2024 NEGATIVE  NEGATIVE Final    Leukocytes, UA 04/09/2024 TRACE (A)  NEGATIVE Final    WBC Casts, UA 04/09/2024 NONE SEEN  < OR = 5 /HPF Final    RBC Casts, UA 04/09/2024 0-2  < OR = 2 /HPF Final    Squam Epithel, UA 04/09/2024 NONE SEEN  < OR = 5 /HPF Final    Bacteria, UA 04/09/2024 NONE SEEN  NONE SEEN /HPF Final    Hyaline Casts, UA 04/09/2024 NONE SEEN  NONE SEEN /LPF Final    Service Cmt: 04/09/2024    Final    Reflexive Urine Culture 04/09/2024    Final    Urine Culture, Routine 04/09/2024    Final    TSH w/reflex to FT4 04/09/2024 1.97  0.40 - 4.50 mIU/L Final    Cholesterol 04/09/2024 227 (H)  <200 mg/dL Final    HDL 04/09/2024 54  > OR = 50 mg/dL Final    Triglycerides 04/09/2024 174 (H)  <150 mg/dL Final    LDL Cholesterol 04/09/2024 142 (H)  mg/dL (calc) Final    HDL/Cholesterol Ratio 04/09/2024 4.2  <5.0 (calc) Final    Non HDL Chol. (LDL+VLDL) 04/09/2024 173 (H)  <130 mg/dL (calc) Final    Vitamin D, 25-OH, Total 04/09/2024 42  30 - 100 ng/mL Final       Past Medical History:   Diagnosis Date    Arthritis     Atherosclerosis     in left carotid artery    Carotid artery plaque, left 9/7/2023    ER+ (estrogen receptor positive status) 1/25/2022    Fibrocystic breast      GERD (gastroesophageal reflux disease)     HER2-negative carcinoma of right breast 2022    Hyperlipidemia     Hypertension     Malignant neoplasm of upper-outer quadrant of right female breast 2017    right    Mitral valve prolapse     Neurofibromatosis     Osteopenia     Premature beats     Raynaud's disease     S/P mastectomy, right 2022    Seborrheic keratosis     Thyroid nodule     Tinnitus     Undiagnosed cardiac murmurs 2019     Past Surgical History:   Procedure Laterality Date    APPENDECTOMY  1973    at time of     BACK SURGERY      BREAST BIOPSY Right 2017    BREAST SURGERY  2017    Right masectomy    CATARACT EXTRACTION Bilateral      SECTION      x5    DILATION AND CURETTAGE OF UTERUS      miss Ab    EYE SURGERY  2013    cataract, both eyes    HYSTERECTOMY      MASTECTOMY Right 2017    SPINE SURGERY  -    twice- lower lamenectomy     Family History   Problem Relation Name Age of Onset    Rheum arthritis Mother Amanda Cifuentes     Breast cancer Mother Amanda Cifuentes         over 85    Hypertension Mother Amanda Cifuentes     Heart disease Mother Amanda Cifuentes     Arthritis Mother Amanda Cifuentes     Asthma Mother Amanda Cifuentes     Cancer Mother Amanda Cifuentes         breast    COPD Mother Amanda Cifuentes     Parkinsonism Father Abdi Young     Cancer Father Abdi Young         mouth    Breast cancer Sister Marian Young 40        DCIS    Arthritis Sister Marian Young     Breast cancer Maternal Aunt          60's    Breast cancer Sister  50        DCIS    Stroke Maternal Grandfather Avelino     Stroke Maternal Grandmother paternal grandmother     Stroke Paternal Grandmother maternal grandmother     Cancer Maternal Aunt Nancy         breast    Cancer Sister Tonie         DCIS    Cancer Sister Thao         DCIS    Ovarian cancer Neg Hx      Colon cancer Neg Hx      Melanoma Neg Hx         Tests to Keep You Healthy    Colon Cancer Screening: ORDERED  Last Blood Pressure <=  139/89 (8/28/2024): Yes      The CVD Risk score (D'Agostino, et al., 2008) failed to calculate for the following reasons:    The 2008 CVD risk score is only valid for ages 30 to 74     Marital Status:   Alcohol History:  reports no history of alcohol use.  Tobacco History:  reports that she has never smoked. She has never been exposed to tobacco smoke. She has never used smokeless tobacco.  Drug History:  reports no history of drug use.    Health Maintenance Topics with due status: Not Due       Topic Last Completion Date    DEXA Scan 04/04/2023    Influenza Vaccine 10/13/2023    Lipid Panel 04/09/2024     Immunization History   Administered Date(s) Administered    COVID-19, MRNA, LN-S, PF (Pfizer) (Purple Cap) 01/09/2021, 01/30/2021, 08/18/2021    Influenza - High Dose - PF (65 years and older) 10/06/2014, 11/12/2015, 10/11/2018, 11/01/2019    Influenza - Quadrivalent - High Dose - PF (65 years and older) 09/09/2020, 09/14/2021, 10/11/2022    Pneumococcal Conjugate - 13 Valent 01/21/2020    Pneumococcal Polysaccharide - 23 Valent 10/06/2014       Review of patient's allergies indicates:   Allergen Reactions    Methylprednisolone      Other reaction(s): Heart palpitations  PT STATES SHE HAS A REACTION TO ALL STEROIDS DUE TO HER DX; OF MVP..    Zofran [ondansetron hcl (pf)] Nausea Only    Corticosteroids (glucocorticoids) Other (See Comments)     Heart palpitations    Dilaudid [hydromorphone] Nausea And Vomiting     Does not help with pain    Fentanyl     Neosporin (neomycin-polymyx)     Norvasc [amlodipine]     Statins-hmg-coa reductase inhibitors      myalgias    Tetracyclines     Augmentin [amoxicillin-pot clavulanate] Nausea And Vomiting       Current Outpatient Medications:     ascorbic Acid (VITAMIN C) 500 mg CpSR, , Disp: , Rfl:     aspirin (ECOTRIN) 81 MG EC tablet, Take 81 mg by mouth once daily., Disp: , Rfl:     azelastine (ASTELIN) 137 mcg (0.1 %) nasal spray, 1 spray (137 mcg total) by Nasal route  as needed for Rhinitis., Disp: 30 mL, Rfl: 3    calcium carbonate/vitamin D3 (CALCIUM 600 + D,3, ORAL), Take 1 tablet by mouth 2 (two) times a day., Disp: , Rfl:     denosumab (PROLIA) 60 mg/mL Syrg, Inject 60 mg into the skin every 6 (six) months. , Disp: , Rfl:     lisinopriL (PRINIVIL,ZESTRIL) 5 MG tablet, Take 1 tablet (5 mg total) by mouth daily as needed (BP >150)., Disp: 90 tablet, Rfl: 1    LORazepam (ATIVAN) 0.5 MG tablet, Take 1 tablet (0.5 mg total) by mouth every 12 (twelve) hours as needed for Anxiety., Disp: 30 tablet, Rfl: 3    magnesium 30 mg Tab, Take 250 mg by mouth once., Disp: , Rfl:     meclizine (ANTIVERT) 12.5 mg tablet, , Disp: , Rfl:     multivitamin (THERAGRAN) per tablet, Take 1 tablet by mouth once daily., Disp: , Rfl:     pantoprazole (PROTONIX) 40 MG tablet, Take 1 tablet twice a day for 2 weeks then 1 tablet daily, Disp: 60 tablet, Rfl: 5    PATADAY 0.2 % Drop, Place 1 drop into both eyes daily as needed (allergies). , Disp: , Rfl: 0    zinc glycinate 30 mg Cap, , Disp: , Rfl:     Review of Systems   Constitutional:  Negative for activity change, chills, fatigue, fever and unexpected weight change (wt down 6lbs since last visit).   HENT:  Negative for congestion, ear pain, hearing loss, rhinorrhea, sore throat, trouble swallowing and voice change.    Eyes:  Negative for discharge and visual disturbance.   Respiratory:  Negative for cough, chest tightness, shortness of breath and wheezing.    Cardiovascular:  Negative for chest pain, palpitations and leg swelling.   Gastrointestinal:  Negative for abdominal pain, blood in stool, constipation, diarrhea, nausea and vomiting.   Endocrine: Negative for polydipsia and polyuria.   Genitourinary:  Negative for difficulty urinating, dysuria, frequency, hematuria and menstrual problem.   Musculoskeletal:  Negative for arthralgias, gait problem, joint swelling and neck pain.        C/o's of joint stiffness   Skin:  Negative for rash.  "  Neurological:  Negative for dizziness, syncope, speech difficulty, weakness and headaches.   Psychiatric/Behavioral:  Negative for confusion, dysphoric mood and sleep disturbance (taking 1/2 tab of lorazepam for sleep). The patient is nervous/anxious (didn't tolerate sertraline, thinks anxiety is stable).           Objective:      Vitals:    08/28/24 0959 08/28/24 1009 08/28/24 1040   BP: (!) 148/70 (!) 146/74 138/84   Pulse: 72     Weight: 46.7 kg (103 lb)     Height: 5' 1" (1.549 m)       Physical Exam  Constitutional:       General: She is not in acute distress.     Appearance: Normal appearance. She is well-developed and normal weight.      Comments: Thin small built WF   HENT:      Head: Normocephalic and atraumatic.      Right Ear: Tympanic membrane and ear canal normal.      Left Ear: Tympanic membrane and ear canal normal.   Neck:      Vascular: No carotid bruit.   Cardiovascular:      Rate and Rhythm: Normal rate and regular rhythm.      Heart sounds: No murmur heard.     No friction rub.   Pulmonary:      Effort: Pulmonary effort is normal. No respiratory distress.      Breath sounds: Normal breath sounds. No wheezing or rales.   Abdominal:      Palpations: Abdomen is soft.      Tenderness: There is no abdominal tenderness.   Musculoskeletal:      Cervical back: Neck supple.      Right lower leg: No edema.      Left lower leg: No edema.   Lymphadenopathy:      Cervical: No cervical adenopathy.   Skin:     General: Skin is warm and dry.   Neurological:      General: No focal deficit present.      Mental Status: She is alert and oriented to person, place, and time.      Gait: Gait normal.   Psychiatric:         Mood and Affect: Mood is anxious.           Assessment:       1. Anxiety    2. Essential hypertension    3. Gastroesophageal reflux disease, unspecified whether esophagitis present    4. Age-related osteoporosis without current pathological fracture           Plan:       1. Anxiety  -pt reports " didn't tolerate sertraline and doesn't want to try another medication. Continue with lorazepam prn though cautioned on overuse  -     LORazepam (ATIVAN) 0.5 MG tablet; Take 1 tablet (0.5 mg total) by mouth every 12 (twelve) hours as needed for Anxiety.  Dispense: 30 tablet; Refill: 3    2. Essential hypertension   -BP improved on recheck. Reports home BP ranging 120-130s    3. Gastroesophageal reflux disease, unspecified whether esophagitis present  -pt c/o's of worsening reflux on omeprazole. Discussed diet and will switch to pantoprazole though cautioned if symptoms are not improving would rec referral to GI  -     pantoprazole (PROTONIX) 40 MG tablet; Take 1 tablet twice a day for 2 weeks then 1 tablet daily  Dispense: 60 tablet; Refill: 5    4. Age-related osteoporosis without current pathological fracture   -improved on last DEXA, continue with prolia    Follow up in about 6 months (around 2/28/2025) for anxiety, HTN.      Pt declines any further colon cancer screening-    Counseled on age and gender appropriate medical preventative services, including cancer screenings, immunizations, overall nutritional health, need for a consistent exercise regimen and an overall push towards maintaining a vigorous and active lifestyle.      8/28/2024 Cecilia Shaikh NP

## 2024-09-16 NOTE — PROGRESS NOTES
Subjective:    Patient ID:  Cecilia Valenzuela is a 74 y.o. female who presents for Follow-up and Medication Management        HPI    ER VISIT TWICE VIRAL SYNDROME THEN DIARRHEA LIVER ENZYMES WERE ELEVATED, RECORDS REVIEWED, DOING WELL NOW, BP  LOG OK, SEE ROS  Past Medical History:   Diagnosis Date    Arthritis     Atherosclerosis     in left carotid artery    Carotid artery plaque, left 2023    ER+ (estrogen receptor positive status) 2022    Fibrocystic breast     GERD (gastroesophageal reflux disease)     HER2-negative carcinoma of right breast 2022    Hyperlipidemia     Hypertension     Malignant neoplasm of upper-outer quadrant of right female breast 2017    right    Mitral valve prolapse     Neurofibromatosis     Osteopenia     Premature beats     Raynaud's disease     S/P mastectomy, right 2022    Seborrheic keratosis     Thyroid nodule     Tinnitus     Undiagnosed cardiac murmurs 2019     Past Surgical History:   Procedure Laterality Date    APPENDECTOMY  1973    at time of     BACK SURGERY      BREAST BIOPSY Right 2017    BREAST SURGERY  2017    Right masectomy    CATARACT EXTRACTION Bilateral      SECTION      x5    DILATION AND CURETTAGE OF UTERUS      miss Ab    EYE SURGERY  2013    cataract, both eyes    HYSTERECTOMY      MASTECTOMY Right 2017    SPINE SURGERY  -    twice- lower lamenectomy     Family History   Problem Relation Name Age of Onset    Rheum arthritis Mother Amanda Cifuentes     Breast cancer Mother Amanda Cifuentes         over 85    Hypertension Mother Amanda Cifuentes     Heart disease Mother Amanda Cifuentes     Arthritis Mother Amanda Cifuentes     Asthma Mother Amanda Cifuentes     Cancer Mother Amanda Cifuentes         breast    COPD Mother Amanda Cifuentes     Parkinsonism Father Abdi Young     Cancer Father Abdi Young         mouth    Breast cancer Sister Marian Young 40        DCIS    Arthritis Sister Marian Young     Breast cancer Maternal Aunt           60's    Breast cancer Sister  50        DCIS    Stroke Maternal Grandfather Avelino     Stroke Maternal Grandmother paternal grandmother     Stroke Paternal Grandmother maternal grandmother     Cancer Maternal Aunt Nancy         breast    Cancer Sister Tonie         DCIS    Cancer Sister Thao         DCIS    Ovarian cancer Neg Hx      Colon cancer Neg Hx      Melanoma Neg Hx       Social History     Socioeconomic History    Marital status:    Tobacco Use    Smoking status: Never     Passive exposure: Never    Smokeless tobacco: Never   Substance and Sexual Activity    Alcohol use: No    Drug use: Never    Sexual activity: Not Currently     Partners: Male     Birth control/protection: Post-menopausal     Social Determinants of Health     Financial Resource Strain: Low Risk  (8/27/2024)    Overall Financial Resource Strain (CARDIA)     Difficulty of Paying Living Expenses: Not hard at all   Food Insecurity: No Food Insecurity (8/27/2024)    Hunger Vital Sign     Worried About Running Out of Food in the Last Year: Never true     Ran Out of Food in the Last Year: Never true   Transportation Needs: No Transportation Needs (10/10/2022)    PRAPARE - Transportation     Lack of Transportation (Medical): No     Lack of Transportation (Non-Medical): No   Physical Activity: Sufficiently Active (8/27/2024)    Exercise Vital Sign     Days of Exercise per Week: 5 days     Minutes of Exercise per Session: 50 min   Stress: No Stress Concern Present (8/27/2024)    [employer] - Occupational Stress Questionnaire     Feeling of Stress : Not at all   Housing Stability: Low Risk  (10/10/2022)    Housing Stability Vital Sign     Unable to Pay for Housing in the Last Year: No     Number of Places Lived in the Last Year: 1     Unstable Housing in the Last Year: No       Review of patient's allergies indicates:   Allergen Reactions    Methylprednisolone      Other reaction(s): Heart  palpitations  PT STATES SHE HAS A REACTION TO ALL STEROIDS DUE TO HER DX; OF MVP..    Zofran [ondansetron hcl (pf)] Nausea Only    Corticosteroids (glucocorticoids) Other (See Comments)     Heart palpitations    Dilaudid [hydromorphone] Nausea And Vomiting     Does not help with pain    Fentanyl     Neosporin (neomycin-polymyx)     Norvasc [amlodipine]     Statins-hmg-coa reductase inhibitors      myalgias    Tetracyclines     Augmentin [amoxicillin-pot clavulanate] Nausea And Vomiting       Current Outpatient Medications:     amoxicillin (AMOXIL) 500 MG Tab, Take 500 mg by mouth every 12 (twelve) hours., Disp: , Rfl:     ascorbic Acid (VITAMIN C) 500 mg CpSR, , Disp: , Rfl:     aspirin (ECOTRIN) 81 MG EC tablet, Take 81 mg by mouth once daily., Disp: , Rfl:     azelastine (ASTELIN) 137 mcg (0.1 %) nasal spray, 1 spray (137 mcg total) by Nasal route as needed for Rhinitis., Disp: 30 mL, Rfl: 3    calcium carbonate/vitamin D3 (CALCIUM 600 + D,3, ORAL), Take 1 tablet by mouth 2 (two) times a day., Disp: , Rfl:     denosumab (PROLIA) 60 mg/mL Syrg, Inject 60 mg into the skin every 6 (six) months. , Disp: , Rfl:     LORazepam (ATIVAN) 0.5 MG tablet, Take 1 tablet (0.5 mg total) by mouth every 12 (twelve) hours as needed for Anxiety., Disp: 30 tablet, Rfl: 3    magnesium 30 mg Tab, Take 250 mg by mouth once. PRN, Disp: , Rfl:     meclizine (ANTIVERT) 12.5 mg tablet, , Disp: , Rfl:     multivitamin (THERAGRAN) per tablet, Take 1 tablet by mouth once daily., Disp: , Rfl:     pantoprazole (PROTONIX) 40 MG tablet, Take 1 tablet twice a day for 2 weeks then 1 tablet daily, Disp: 60 tablet, Rfl: 5    PATADAY 0.2 % Drop, Place 1 drop into both eyes daily as needed (allergies). , Disp: , Rfl: 0    zinc glycinate 30 mg Cap, , Disp: , Rfl:     lisinopriL (PRINIVIL,ZESTRIL) 5 MG tablet, Take 1 tablet (5 mg total) by mouth daily as needed (BP >150)., Disp: 90 tablet, Rfl: 0    Review of Systems   Constitutional: Negative for chills,  "diaphoresis, fever, malaise/fatigue, night sweats and weight loss.   HENT:  Negative for congestion and sore throat.    Eyes:  Negative for blurred vision and visual disturbance.   Cardiovascular:  Negative for chest pain, claudication, cyanosis, dyspnea on exertion, irregular heartbeat, leg swelling, near-syncope, orthopnea, palpitations (RARE), paroxysmal nocturnal dyspnea and syncope.   Respiratory:  Negative for cough, hemoptysis, shortness of breath and wheezing.    Endocrine: Negative.    Hematologic/Lymphatic: Negative for adenopathy. Does not bruise/bleed easily.   Skin:  Negative for color change and rash.   Musculoskeletal:  Negative for back pain and falls.   Gastrointestinal:  Negative for abdominal pain, change in bowel habit, dysphagia, jaundice, melena and nausea. Diarrhea: RESOLVED.  Genitourinary:  Negative for dysuria and flank pain.   Neurological:  Negative for dizziness, focal weakness, headaches, light-headedness, loss of balance, numbness and weakness.   Psychiatric/Behavioral:  Negative for altered mental status and depression.    Allergic/Immunologic: Negative for persistent infections.        Objective:      Vitals:    09/17/24 1047 09/17/24 1052   BP: (!) 148/72 128/74   Pulse: 66    Weight: 47.8 kg (105 lb 6.1 oz)    Height: 5' 1" (1.549 m)    PainSc: 0-No pain      Body mass index is 19.91 kg/m².    Physical Exam  Constitutional:       General: She is not in acute distress.     Appearance: She is well-developed.   HENT:      Head: Normocephalic and atraumatic.   Eyes:      General: No scleral icterus.     Extraocular Movements: Extraocular movements intact.      Pupils: Pupils are equal, round, and reactive to light.   Neck:      Vascular: Normal carotid pulses. No JVD.   Cardiovascular:      Rate and Rhythm: Normal rate and regular rhythm. No extrasystoles are present.     Pulses:           Carotid pulses are 2+ on the right side and 2+ on the left side.       Radial pulses are 2+ on " the right side and 2+ on the left side.        Posterior tibial pulses are 2+ on the right side and 2+ on the left side.      Heart sounds:      No friction rub. No gallop.   Pulmonary:      Effort: Pulmonary effort is normal.      Breath sounds: Normal breath sounds and air entry. No rales.   Chest:      Chest wall: No tenderness.   Abdominal:      General: Bowel sounds are normal.      Palpations: Abdomen is soft.      Tenderness: There is no abdominal tenderness.   Musculoskeletal:      Cervical back: Neck supple.      Right lower leg: No edema.      Left lower leg: No edema.   Skin:     General: Skin is warm and dry.   Neurological:      General: No focal deficit present.      Mental Status: She is alert and oriented to person, place, and time.      Cranial Nerves: Cranial nerves 2-12 are intact.   Psychiatric:         Mood and Affect: Mood normal.         Speech: Speech normal.         Behavior: Behavior normal.                 ..    Chemistry        Component Value Date/Time     (L) 08/05/2024 2227    K 3.6 08/05/2024 2227    CL 97 08/05/2024 2227    CO2 21 (L) 08/05/2024 2227    BUN 17 08/05/2024 2227    CREATININE 0.63 08/05/2024 2227    GLU 98 08/05/2024 2227        Component Value Date/Time    CALCIUM 9.8 08/05/2024 2227    ALKPHOS 57 08/05/2024 2227    AST 85 (H) 08/05/2024 2227     (H) 08/05/2024 2227    BILITOT 0.7 08/05/2024 2227    ESTGFRAFRICA 102 03/28/2022 1321    EGFRNONAA 88 03/28/2022 1321            ..  Lab Results   Component Value Date    CHOL 227 (H) 04/09/2024    CHOL 246 (H) 04/06/2023    CHOL 219 (H) 09/07/2022     Lab Results   Component Value Date    HDL 54 04/09/2024    HDL 66 04/06/2023    HDL 55 09/07/2022     Lab Results   Component Value Date    LDLCALC 142 (H) 04/09/2024    LDLCALC 157 (H) 04/06/2023    LDLCALC 134 (H) 09/07/2022     Lab Results   Component Value Date    TRIG 174 (H) 04/09/2024    TRIG 115 04/06/2023    TRIG 161 (H) 09/07/2022     Lab Results    Component Value Date    CHOLHDL 4.2 04/09/2024    CHOLHDL 3.7 04/06/2023    CHOLHDL 4.0 09/07/2022     ..  Lab Results   Component Value Date    WBC 7.99 08/05/2024    HGB 13.8 08/05/2024    HCT 41.6 08/05/2024    MCV 85 08/05/2024     08/05/2024       Test(s) Reviewed  I have reviewed the following in detail:  [] Stress test   [] Angiography   [] Echocardiogram   [x] Labs   [x] Other:       Assessment:         ICD-10-CM ICD-9-CM   1. Essential hypertension  I10 401.9   2. Carotid artery plaque, left  I65.22 433.10   3. Aortic valve sclerosis  I35.8 424.1   4. Hypercholesterolemia  E78.00 272.0   5. Premature beats  I49.49 427.60     Problem List Items Addressed This Visit          Cardiac/Vascular    Essential hypertension - Primary    Relevant Medications    lisinopriL (PRINIVIL,ZESTRIL) 5 MG tablet    Premature beats    Hypercholesterolemia    Aortic valve sclerosis    Carotid artery plaque, left        Plan:     ALL CV CLINICALLY STABLE, NO ANGINA, NO HF, NO TIA, NO SIGNIFICANT CLINICAL ARRHYTHMIA,CONTINUE CURRENT MEDS, EDUCATION, DIET, EXERCISE , RTC IN 1 YEAR      Essential hypertension  Comments:  SITUATIONAL  Orders:  -     lisinopriL (PRINIVIL,ZESTRIL) 5 MG tablet; Take 1 tablet (5 mg total) by mouth daily as needed (BP >150).  Dispense: 90 tablet; Refill: 0    Carotid artery plaque, left    Aortic valve sclerosis    Hypercholesterolemia    Premature beats    RTC Low level/low impact aerobic exercise 5x's/wk. Heart healthy diet and risk factor modification.    See labs and med orders.    Aerobic exercise 5x's/wk. Heart healthy diet and risk factor modification.    See labs and med orders.

## 2024-09-17 ENCOUNTER — OFFICE VISIT (OUTPATIENT)
Dept: CARDIOLOGY | Facility: CLINIC | Age: 74
End: 2024-09-17
Payer: MEDICARE

## 2024-09-17 VITALS
SYSTOLIC BLOOD PRESSURE: 128 MMHG | BODY MASS INDEX: 19.9 KG/M2 | WEIGHT: 105.38 LBS | HEART RATE: 66 BPM | HEIGHT: 61 IN | DIASTOLIC BLOOD PRESSURE: 74 MMHG

## 2024-09-17 DIAGNOSIS — I10 ESSENTIAL HYPERTENSION: Primary | ICD-10-CM

## 2024-09-17 DIAGNOSIS — I49.49 PREMATURE BEATS: Chronic | ICD-10-CM

## 2024-09-17 DIAGNOSIS — E78.00 HYPERCHOLESTEROLEMIA: ICD-10-CM

## 2024-09-17 DIAGNOSIS — I35.8 AORTIC VALVE SCLEROSIS: Chronic | ICD-10-CM

## 2024-09-17 DIAGNOSIS — I65.22 CAROTID ARTERY PLAQUE, LEFT: Chronic | ICD-10-CM

## 2024-09-17 PROBLEM — R09.89 BRUIT OF RIGHT CAROTID ARTERY: Status: RESOLVED | Noted: 2022-09-15 | Resolved: 2024-09-17

## 2024-09-17 PROCEDURE — 99214 OFFICE O/P EST MOD 30 MIN: CPT | Mod: S$PBB,,, | Performed by: INTERNAL MEDICINE

## 2024-09-17 PROCEDURE — 99999 PR PBB SHADOW E&M-EST. PATIENT-LVL III: CPT | Mod: PBBFAC,,, | Performed by: INTERNAL MEDICINE

## 2024-09-17 PROCEDURE — 99213 OFFICE O/P EST LOW 20 MIN: CPT | Mod: PBBFAC,PO | Performed by: INTERNAL MEDICINE

## 2024-09-17 RX ORDER — AMOXICILLIN 500 MG/1
500 TABLET, FILM COATED ORAL EVERY 12 HOURS
COMMUNITY

## 2024-09-17 RX ORDER — LISINOPRIL 5 MG/1
5 TABLET ORAL DAILY PRN
Qty: 90 TABLET | Refills: 0 | Status: SHIPPED | OUTPATIENT
Start: 2024-09-17

## 2024-09-27 DIAGNOSIS — J06.9 UPPER RESPIRATORY TRACT INFECTION, UNSPECIFIED TYPE: ICD-10-CM

## 2024-09-30 RX ORDER — AZELASTINE 1 MG/ML
1 SPRAY, METERED NASAL
Qty: 30 ML | Refills: 3 | Status: SHIPPED | OUTPATIENT
Start: 2024-09-30

## 2024-09-30 NOTE — TELEPHONE ENCOUNTER
Pt is needing a refill on her astelin nasal spray. Last office visit 08/28/2024. Next office visit 02/25/2025.

## 2024-10-08 ENCOUNTER — OFFICE VISIT (OUTPATIENT)
Dept: OTOLARYNGOLOGY | Facility: CLINIC | Age: 74
End: 2024-10-08
Payer: MEDICARE

## 2024-10-08 VITALS — HEIGHT: 61 IN | BODY MASS INDEX: 19.32 KG/M2 | WEIGHT: 102.31 LBS

## 2024-10-08 DIAGNOSIS — H61.23 BILATERAL IMPACTED CERUMEN: Primary | ICD-10-CM

## 2024-10-08 PROCEDURE — 99213 OFFICE O/P EST LOW 20 MIN: CPT | Mod: 25,S$PBB,, | Performed by: PHYSICIAN ASSISTANT

## 2024-10-08 PROCEDURE — 99213 OFFICE O/P EST LOW 20 MIN: CPT | Mod: PBBFAC,PO | Performed by: PHYSICIAN ASSISTANT

## 2024-10-08 PROCEDURE — 69210 REMOVE IMPACTED EAR WAX UNI: CPT | Mod: S$PBB,,, | Performed by: PHYSICIAN ASSISTANT

## 2024-10-08 PROCEDURE — 69210 REMOVE IMPACTED EAR WAX UNI: CPT | Mod: 50,PBBFAC,PO | Performed by: PHYSICIAN ASSISTANT

## 2024-10-08 PROCEDURE — 99999 PR PBB SHADOW E&M-EST. PATIENT-LVL III: CPT | Mod: PBBFAC,,, | Performed by: PHYSICIAN ASSISTANT

## 2024-10-08 NOTE — PROGRESS NOTES
[facility]    Subjective:      Patient: Cecilia Valenzuela Patient PCP: Buddy Mathew MD         :  1950     Sex:  female      MRN:  [id number]          Date of Visit: 10/08/2024      Chief Complaint: Cerumen Impaction    Interval History 10/08/2024: Pt presents to office for routine 6 month ear cleaning for cerumen impaction. Pt denies ear pain/discharge. Pt denies issues with hearing.     Interval History 2024: Pt presents to clinic for ear cleaning for cerumen impaction.     Interval History 10/10/2023: Pt presents to office for ear cleaning for cerumen impaction. Pt has been having issues with dizziness, but would like to defer at this time on dizziness evaluation. Pt denies ear pain/discharge.     Interval History 2023: Pt presents to clinic for ear cleaning. Pt did VPT and weaned off of meclizine and says this helped a lot with her dizziness and she does at home exercises. Pt states that she has left aural fullness and has sensation that ear wax is in her left ear. Pt states she had audiogram 2-3 years ago that showed normal hearing. Pt states that she has had longstanding bilateral tinnitus. Pt has no other complaints at this time.     Patient ID 2022: Cecilia Valenzuela is a 74 y.o. female who was self-referred for  ear cleaning . Pt previously seen by ENT MD Dr. Kaleb De La Torre and Marco A Lomeli for cerumen impaction and dizziness. Pt states she was previously told that she had vestibular weakness on the left side. Pt completed VPT and pt takes meclizine 12.5mg every night. Pt states she had flair up of dizziness last around 1 year ago and would like consult with VPT to go over exercises. Pt states that her ears have been getting clogged for around 1 week or so. Pt states the night before last she started having dizziness. Pt states she went she went through [facility] and MRI and she was told that she had vestibular weakness without retrocochlear pathology. Pt denies fever/chills, ear  pain or ear discharge. There is not a prior history of ear surgery. Pt states that she has benign bony growth behind left ear that has not grown in size and there were plans to have it shaved down with elective surgery with Dr. Lomeli, but pt never followed up for surgery.     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    Family History  Her family history includes Arthritis in her mother and sister; Asthma in her mother; Breast cancer in her maternal aunt and mother; Breast cancer (age of onset: 40) in her sister; Breast cancer (age of onset: 50) in her sister; COPD in her mother; Cancer in her father, maternal aunt, mother, sister, and sister; Heart disease in her mother; Hypertension in her mother; Parkinsonism in her father; Rheum arthritis in her mother; Stroke in her maternal grandfather, maternal grandmother, and paternal grandmother.    Past Surgical History:   Procedure Laterality Date    APPENDECTOMY  1973    at time of     BACK SURGERY      BREAST BIOPSY Right 2017    BREAST SURGERY  2017    Right masectomy    CATARACT EXTRACTION Bilateral      SECTION      x5    DILATION AND CURETTAGE OF UTERUS      miss Ab    EYE SURGERY  2013    cataract, both eyes    HYSTERECTOMY      MASTECTOMY Right 2017    SPINE SURGERY  -    twice- lower lamenectomy     Social History     Tobacco Use    Smoking status: Never     Passive exposure: Never    Smokeless tobacco: Never   Substance and Sexual  "Activity    Alcohol use: No    Drug use: Never    Sexual activity: Not Currently     Partners: Male     Birth control/protection: Post-menopausal     Medications  She has a current medication list which includes the following prescription(s): amoxicillin, ascorbic acid, aspirin, azelastine, calcium carbonate/vitamin d3, denosumab, lisinopril, lorazepam, magnesium, meclizine, multivitamin, pantoprazole, pataday, and zinc glycinate.    Review of patient's allergies indicates:   Allergen Reactions    Methylprednisolone      Other reaction(s): Heart palpitations  PT STATES SHE HAS A REACTION TO ALL STEROIDS DUE TO HER DX; OF MVP..    Zofran [ondansetron hcl (pf)] Nausea Only    Corticosteroids (glucocorticoids) Other (See Comments)     Heart palpitations    Dilaudid [hydromorphone] Nausea And Vomiting     Does not help with pain    Fentanyl     Neosporin (neomycin-polymyx)     Norvasc [amlodipine]     Statins-hmg-coa reductase inhibitors      myalgias    Tetracyclines     Augmentin [amoxicillin-pot clavulanate] Nausea And Vomiting     All medications, allergies, and past history have been reviewed.    Objective:      Vitals:      8/28/2024     9:59 AM 9/17/2024    10:47 AM 10/8/2024    10:40 AM   Vitals - 1 value per visit   SYSTOLIC 148 148    DIASTOLIC 70 72    Pulse 72 66    Weight (lb) 103 105.38 102.29   Weight (kg) 46.72 47.8 46.4   Height 5' 1" (1.549 m) 5' 1" (1.549 m) 5' 1" (1.549 m)   BMI (Calculated) 19.5 19.9 19.3   Pain Score Zero Zero Zero       Body surface area is 1.41 meters squared.    Physical Exam  Constitutional:       General: She is not in acute distress.     Appearance: Normal appearance. She is not ill-appearing.   HENT:      Head: Normocephalic and atraumatic.      Right Ear: Tympanic membrane, ear canal and external ear normal. There is impacted cerumen.      Left Ear: Tympanic membrane, ear canal and external ear normal. There is impacted cerumen.      Ears:        Nose: Nose normal.      " Mouth/Throat:      Lips: Pink. No lesions.      Mouth: Mucous membranes are moist. No oral lesions.      Tongue: No lesions.      Palate: No lesions.      Pharynx: Oropharynx is clear. Uvula midline. No pharyngeal swelling, oropharyngeal exudate, posterior oropharyngeal erythema or uvula swelling.   Eyes:      General:         Right eye: No discharge.         Left eye: No discharge.      Extraocular Movements: Extraocular movements intact.      Conjunctiva/sclera: Conjunctivae normal.   Pulmonary:      Effort: Pulmonary effort is normal.   Neurological:      General: No focal deficit present.      Mental Status: She is alert and oriented to person, place, and time. Mental status is at baseline.   Psychiatric:         Mood and Affect: Mood normal.         Behavior: Behavior normal.         Thought Content: Thought content normal.         Judgment: Judgment normal.     Ear Cerumen Removal     Date/Time: 10/8/2024 10:45 AM     Performed by: Asher Gaitan PA-C  Authorized by: Asher Gaitan PA-C       Local anesthetic:  None  Location details:  Both ears  Procedure type: curette    Cerumen  Removal Results:  Cerumen completely removed  Patient tolerance:  Patient tolerated the procedure well with no immediate complications    Labs:  WBC   Date Value Ref Range Status   08/05/2024 7.99 3.90 - 12.70 K/uL Final     Platelets   Date Value Ref Range Status   08/05/2024 273 150 - 450 K/uL Final     Creatinine   Date Value Ref Range Status   08/05/2024 0.63 0.50 - 1.40 mg/dL Final     TSH   Date Value Ref Range Status   05/14/2021 2.64 0.40 - 4.50 mIU/L Final     Glucose   Date Value Ref Range Status   08/05/2024 98 70 - 110 mg/dL Final     Comment:     The ADA recommends the following guidelines for fasting glucose:    Normal:       less than 100 mg/dL    Prediabetes:  100 mg/dL to 125 mg/dL    Diabetes:     126 mg/dL or higher       All lab results and imaging results have been reviewed.    Assessment:         ICD-10-CM ICD-9-CM   1. Bilateral impacted cerumen  H61.23 380.4      Plan:      Bilateral ear cleaning performed today in office. Follow up in 6 months for routine ear cleaning.

## 2024-10-08 NOTE — PROCEDURES
Ear Cerumen Removal    Date/Time: 10/8/2024 10:45 AM    Performed by: Asher Gaitan PA-C  Authorized by: Asher Gaitan PA-C      Local anesthetic:  None  Location details:  Both ears  Procedure type: curette    Cerumen  Removal Results:  Cerumen completely removed  Patient tolerance:  Patient tolerated the procedure well with no immediate complications

## 2024-10-16 ENCOUNTER — TELEPHONE (OUTPATIENT)
Dept: FAMILY MEDICINE | Facility: CLINIC | Age: 74
End: 2024-10-16
Payer: MEDICARE

## 2024-10-16 DIAGNOSIS — Z79.899 ENCOUNTER FOR LONG-TERM (CURRENT) USE OF HIGH-RISK MEDICATION: ICD-10-CM

## 2024-10-16 DIAGNOSIS — M81.0 AGE-RELATED OSTEOPOROSIS WITHOUT CURRENT PATHOLOGICAL FRACTURE: Primary | ICD-10-CM

## 2024-10-16 NOTE — TELEPHONE ENCOUNTER
----- Message from Arsh Lakhani sent at 10/16/2024 11:03 AM CDT -----    ----- Message -----  From: Florence Chaudhari  Sent: 10/16/2024  10:09 AM CDT  To: Cecilia Shaikh Staff    Please review patient's Appointment Desk for an upcoming PROLIA INJECTION appointment.       The following are needed for this appointment.   Reminding to please contact patient, if needed:      ORDER.  Current / active in the Epic Therapy Plan, signed within a year.  LABS. Serum Calcium within 6 weeks of treatment.    DEXA SCAN within the last 2 years   DENTAL PRECAUTION CONFIRMED WITH PATIENT. No recent invasive dental procedures within the last month (tooth extraction, etc). A patient will need to bring a Dental Clearance signed by a dental provider if there was any recent dental procedure within the last month.   FOLLOW-UP APPOINTMENT within the last 12 months with the diagnosis mentioned in the visit notes.         Any item unavailable by the day prior to the scheduled appointment will cause the appointment to be CANCELLED.        To reschedule appointments, please contact [facility] INFUSION SCHEDULING POOL or call [phone].       Thank you,  [facility], Infusion Department

## 2024-10-30 ENCOUNTER — CLINICAL SUPPORT (OUTPATIENT)
Dept: FAMILY MEDICINE | Facility: CLINIC | Age: 74
End: 2024-10-30
Payer: MEDICARE

## 2024-10-30 ENCOUNTER — INFUSION (OUTPATIENT)
Dept: INFUSION THERAPY | Facility: HOSPITAL | Age: 74
End: 2024-10-30
Attending: NURSE PRACTITIONER
Payer: MEDICARE

## 2024-10-30 VITALS
RESPIRATION RATE: 18 BRPM | DIASTOLIC BLOOD PRESSURE: 65 MMHG | HEIGHT: 61 IN | BODY MASS INDEX: 19.16 KG/M2 | WEIGHT: 101.5 LBS | TEMPERATURE: 97 F | HEART RATE: 63 BPM | SYSTOLIC BLOOD PRESSURE: 123 MMHG | OXYGEN SATURATION: 100 %

## 2024-10-30 DIAGNOSIS — Z17.0 MALIGNANT NEOPLASM OF UPPER-OUTER QUADRANT OF RIGHT BREAST IN FEMALE, ESTROGEN RECEPTOR POSITIVE: Primary | ICD-10-CM

## 2024-10-30 DIAGNOSIS — Z23 NEED FOR VACCINATION: Primary | ICD-10-CM

## 2024-10-30 DIAGNOSIS — M81.0 AGE-RELATED OSTEOPOROSIS WITHOUT CURRENT PATHOLOGICAL FRACTURE: ICD-10-CM

## 2024-10-30 DIAGNOSIS — C50.411 MALIGNANT NEOPLASM OF UPPER-OUTER QUADRANT OF RIGHT BREAST IN FEMALE, ESTROGEN RECEPTOR POSITIVE: Primary | ICD-10-CM

## 2024-10-30 DIAGNOSIS — Z79.811 LONG TERM CURRENT USE OF AROMATASE INHIBITOR: ICD-10-CM

## 2024-10-30 PROCEDURE — 96372 THER/PROPH/DIAG INJ SC/IM: CPT

## 2024-10-30 PROCEDURE — 90653 IIV ADJUVANT VACCINE IM: CPT | Mod: S$GLB,,, | Performed by: FAMILY MEDICINE

## 2024-10-30 PROCEDURE — G0008 ADMIN INFLUENZA VIRUS VAC: HCPCS | Mod: S$GLB,,, | Performed by: FAMILY MEDICINE

## 2024-10-30 PROCEDURE — 63600175 PHARM REV CODE 636 W HCPCS: Mod: JZ,JG | Performed by: FAMILY MEDICINE

## 2024-10-30 RX ADMIN — DENOSUMAB 60 MG: 60 INJECTION SUBCUTANEOUS at 11:10

## 2024-11-25 ENCOUNTER — TELEPHONE ENCOUNTER (OUTPATIENT)
Dept: URBAN - METROPOLITAN AREA CLINIC 113 | Facility: CLINIC | Age: 74
End: 2024-11-25

## 2024-11-26 ENCOUNTER — OFFICE VISIT (OUTPATIENT)
Dept: URBAN - METROPOLITAN AREA CLINIC 113 | Facility: CLINIC | Age: 74
End: 2024-11-26
Payer: MEDICARE

## 2024-11-26 ENCOUNTER — LAB OUTSIDE AN ENCOUNTER (OUTPATIENT)
Dept: URBAN - METROPOLITAN AREA CLINIC 113 | Facility: CLINIC | Age: 74
End: 2024-11-26

## 2024-11-26 VITALS
TEMPERATURE: 96.4 F | HEIGHT: 66 IN | BODY MASS INDEX: 32.14 KG/M2 | WEIGHT: 200 LBS | SYSTOLIC BLOOD PRESSURE: 139 MMHG | DIASTOLIC BLOOD PRESSURE: 82 MMHG | HEART RATE: 61 BPM | RESPIRATION RATE: 20 BRPM

## 2024-11-26 DIAGNOSIS — R17 TOTAL BILIRUBIN, ELEVATED: ICD-10-CM

## 2024-11-26 DIAGNOSIS — R10.11 RIGHT UPPER QUADRANT ABDOMINAL PAIN: ICD-10-CM

## 2024-11-26 PROCEDURE — 99214 OFFICE O/P EST MOD 30 MIN: CPT | Performed by: NURSE PRACTITIONER

## 2024-11-26 RX ORDER — ALBUTEROL SULFATE 90 UG/1
AEROSOL, METERED RESPIRATORY (INHALATION)
Qty: 18 | Refills: 0 | Status: ON HOLD | COMMUNITY
Start: 2012-01-02

## 2024-11-26 RX ORDER — SEMAGLUTIDE 0.68 MG/ML
AS DIRECTED INJECTION, SOLUTION SUBCUTANEOUS
Status: ACTIVE | COMMUNITY

## 2024-11-26 RX ORDER — FAMOTIDINE 40 MG/1
1 TABLET AT BEDTIME TABLET, FILM COATED ORAL ONCE A DAY
Qty: 30 | Refills: 5 | Status: ON HOLD | COMMUNITY

## 2024-11-26 RX ORDER — SODIUM, POTASSIUM,MAG SULFATES 17.5-3.13G
354 ML SOLUTION, RECONSTITUTED, ORAL ORAL ONCE
Qty: 354 MILLILITER | Refills: 0 | Status: ON HOLD | COMMUNITY

## 2024-11-26 RX ORDER — LANSOPRAZOLE 15 MG/1
1 CAPSULE BEFORE BREAKFAST AND SUPPER CAPSULE, DELAYED RELEASE ORAL TWICE A DAY
Qty: 60 CAPSULE | Refills: 5 | Status: ON HOLD | COMMUNITY
Start: 2023-08-04

## 2024-11-26 RX ORDER — LACTOBACILLUS RHAMNOSUS GG 10B CELL
TAKE 1 CAPSULE DAILY CAPSULE ORAL
Refills: 0 | Status: ACTIVE | COMMUNITY

## 2024-11-26 RX ORDER — SODIUM PICOSULFATE, MAGNESIUM OXIDE, AND ANHYDROUS CITRIC ACID 10; 3.5; 12 MG/160ML; G/160ML; G/160ML
160 ML LIQUID ORAL
Qty: 320 MILLILITER | Refills: 0 | Status: ON HOLD | COMMUNITY

## 2024-11-26 RX ORDER — HYOSCYAMINE SULFATE 0.125 MG
1 - 2 TABLETS UNDER THE TONGUE AND ALLOW TO DISSOLVE TABLET,DISINTEGRATING ORAL
Qty: 40 | Refills: 3 | OUTPATIENT
Start: 2024-11-26 | End: 2025-03-26

## 2024-11-26 RX ORDER — ALBUTEROL SULFATE 2.5 MG/3ML
SOLUTION RESPIRATORY (INHALATION)
Qty: 255 | Refills: 0 | Status: ACTIVE | COMMUNITY
Start: 2012-03-14

## 2024-11-26 RX ORDER — SUCRALFATE 1 G/1
1 TABLET; MAY DISSOLVE IN 2 TEASPOONS OF WATER TABLET ORAL
Qty: 90 | Refills: 3 | Status: ON HOLD | COMMUNITY

## 2024-11-26 RX ORDER — OMEGA-3/DHA/EPA/FISH OIL 1000 MG
TAKE 1 CAPSULE DAILY CAPSULE ORAL
Refills: 0 | Status: ON HOLD | COMMUNITY

## 2024-11-26 RX ORDER — ASCORBIC ACID 500 MG
TAKE 1 TABLET DAILY TABLET ORAL
Refills: 0 | Status: ACTIVE | COMMUNITY
Start: 2007-04-18

## 2024-11-26 RX ORDER — PREDNISOLONE/MOXIFLOXACIN HCL 1 %-0.5 %
AS DIRECTED SUSPENSION, DROPS(FINAL DOSAGE FORM)(ML) OPHTHALMIC (EYE) 4 TIMES DAILY
Status: ON HOLD | COMMUNITY

## 2024-11-26 RX ORDER — SODIUM, POTASSIUM,MAG SULFATES 17.5-3.13G
AS DIRECTED SOLUTION, RECONSTITUTED, ORAL ORAL
Status: ON HOLD | COMMUNITY
Start: 2022-06-01

## 2024-11-26 RX ORDER — AZELASTINE HYDROCHLORIDE 205.5 UG/1
1 PUFF IN EACH NOSTRIL SPRAY, METERED NASAL
Refills: 0 | Status: ACTIVE | COMMUNITY
Start: 2018-10-05

## 2024-11-26 RX ORDER — TELMISARTAN 20 MG/1
1 TABLET TABLET ORAL ONCE A DAY
Refills: 0 | Status: ACTIVE | COMMUNITY
Start: 2019-11-04

## 2024-11-26 RX ORDER — LATANOPROST/PF 0.005 %
INSTILL 1 DROP IN BOTH EYES AT BEDTIME DROPS OPHTHALMIC (EYE)
Refills: 0 | Status: ACTIVE | COMMUNITY
Start: 2014-05-10

## 2024-11-26 RX ORDER — IPRATROPIUM BROMIDE 42 UG/1
SPRAY, METERED NASAL
Qty: 45 | Refills: 0 | Status: ACTIVE | COMMUNITY
Start: 2020-01-07

## 2024-11-26 RX ORDER — SODIUM, POTASSIUM,MAG SULFATES 17.5-3.13G
354 ML SOLUTION, RECONSTITUTED, ORAL ORAL
Qty: 354 MILLILITER | Refills: 0 | Status: ON HOLD | COMMUNITY

## 2024-11-26 RX ORDER — BUDESONIDE 0.5 MG/2ML
USE 1 UNIT DOSE VIA NEBULIZER TWO TIMES A DAY INHALANT ORAL
Qty: 60 | Refills: 0 | Status: ACTIVE | COMMUNITY
Start: 2012-03-30

## 2024-11-26 RX ORDER — METFORMIN HYDROCHLORIDE 500 MG/1
1 TABLET WITH A MEAL TABLET, FILM COATED ORAL TWICE DAILY
Status: ACTIVE | COMMUNITY

## 2024-11-26 RX ORDER — BRINZOLAMIDE/BRIMONIDINE TARTRATE 10; 2 MG/ML; MG/ML
1 DROP INTO AFFECTED EYE SUSPENSION/ DROPS OPHTHALMIC THREE TIMES A DAY
Status: ACTIVE | COMMUNITY

## 2024-11-26 RX ORDER — LANSOPRAZOLE 30 MG/1
1 CAPSULE BEFORE A MEAL CAPSULE, DELAYED RELEASE ORAL TWICE A DAY
Qty: 60 CAPSULE | Refills: 5 | Status: ACTIVE | COMMUNITY

## 2024-11-26 RX ORDER — DICLOFENAC SODIUM 10 MG/G
GEL TOPICAL
Qty: 300 | Refills: 0 | Status: ACTIVE | COMMUNITY
Start: 2019-09-18

## 2024-11-26 RX ORDER — DEXLANSOPRAZOLE 60 MG/1
TAKE 1 CAPSULE BY MOUTH EVERY DAY CAPSULE, DELAYED RELEASE ORAL ONCE A DAY
Qty: 90 | Refills: 4 | Status: ON HOLD | COMMUNITY

## 2024-11-26 NOTE — PHYSICAL EXAM GASTROINTESTINAL
Abdomen , soft, tender RUQ, nondistended , no guarding or rigidity , no masses palpable , normal bowel sounds , Liver and Spleen , no hepatosplenomegaly

## 2024-11-26 NOTE — HPI-OTHER HISTORIES
Labs 11/22/2024:CBC: WBC 4.09, hemoglobin 13.4, MCV 84.6, platelet 121. BMP normal with exception of calcium 10.8. LFTs: TB 1.2, ALP 58, ALT 13, AST 24. Albumin 5.1. Lipase 34. Urinalysis normal with exception of trace ketones.  CT abdomen and pelvis with contrast 11/22/2024:No acute abnormality in the abdomen or pelvis.  EGD 6/18/2024:No endoscopic abnormality to explain dysphagia status post empiric 46 Cuban Fuentes dilator, nonerosive gastritis characterized by erythema status post biopsy, normal examined duodenum, otherwise normal. Pathology: Antral biopsies demonstrated chemical/reactive gastropathy without evidence of H. pylori or intestinal metaplasia.  CT abdomen with contrast 3/15/2024:No acute intra-abdominal findings. Patchy nondependent groundglass micronodules with 1 cm groundglass opacity in the base of the right upper lobe. Findings are indeterminate and could reflect infectious/inflammatory process. However, recommend follow-up CT chest in 3 to 6 months with further recommendations pending stability.  Labs 3/21/2024 CBC:WBC 4.5, hemoglobin 12.6, MCV 84, platelet 135. BMP normal with exception of sodium 145, calcium 10.4. LFTs normal TB 0.8, ALP 61, ALT 8, AST 16. Cholesterol panel normal. Hemoglobin A1c 6.6.  Colonoscopy 8/16/2022:BBPS 8, sigmoid and descending diverticulosis, nonbleeding internal no specimens collected.  Due to her family history, repeat colonoscopy recommended in 2027. recall set  EGD 5/3/2022:No endoscopic abnormality evident to explain dysphagia status post empiric dilation with a 45 and 48 Cuban Savary dilator, normal stomach, normal examined duodenum. CT of the abdomen and pelvis with IV contrast 2/25/2022:No acute abnormality in the abdomen and pelvis.  Scattered colonic diverticula without evidence of acute diverticulitis.  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  Total arthroplasty of the left hip. Labs  2/15/2022:Alpha-fetoprotein 4.2.  No other lab results are available at this time. Labs 10/19/2021:Ionized calcium 5.3.  10/6/2021 BMP normal with exception of glucose 132, BUN 19, calcium 10.5.  LFTs normal TB 0.6, ALP 41, ALT 17, AST 16.  Hemoglobin A1c 7. 5/10/2021 alpha-fetoprotein 4.0. EGD 6/2/2021:No endoscopic abnormality to explain dysphagia status post dilation to 48 Cuban with a Fuentes dilator.  Normal stomach, normal examined duodenum.  No specimens collected. Abdominal ultrasound 6/1/2021:Negative study.

## 2024-11-26 NOTE — HPI-TODAY'S VISIT:
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  She was last seen in the office 5/28/2024 for postprandial epigastric pain.  prior labs and CT were unremarkable.  She was not taking Carafate at the time of her visit.  She was to continue lansoprazole twice a day and add Carafate 4 times a day.  EGD was ordered.  Recent liver evaluation with labs and imaging were unremarkable.  Repeat HCC screening recommended in 1 year.  She reports onset of diarrhea and pain in the right upper quadrant on 11/21.  She went to the emergency department at [place] on 11/22.  Labs and a CT were performed.  They informed her that she likely had a virus.  She saw her primary care physician yesterday who stopped Ozempic and recommended that she increase dicyclomine to 2 per dose.  She has been using this and oxycodone and reports persistent pain radiating to her back.  It is constant.  She is avoiding food due to exacerbation of symptoms.  She denies positional association.  She reports nausea without vomiting.  She has been using ondansetron providing some relief.  Diarrhea resolved on 11/23.  She had a regular bowel movement that day.  She has not had a stool since.  She denies feeling constipated.  She denies red blood per rectum or melena.  She reports a low-grade fever at onset.  This has resolved.  She admits some chills.  She is also taking lansoprazole twice a day per Dr. Zepeda's recommendations.

## 2024-11-28 LAB
ABSOLUTE BASOPHILS: 37
ABSOLUTE EOSINOPHILS: 31
ABSOLUTE LYMPHOCYTES: 2672
ABSOLUTE MONOCYTES: 378
ABSOLUTE NEUTROPHILS: 3081
ALBUMIN/GLOBULIN RATIO: 1.9
ALBUMIN: 4.5
ALKALINE PHOSPHATASE: 49
ALT (SGPT): 10
AST (SGOT): 13
BASOPHILS: 0.6
BILIRUBIN, DIRECT: 0.2
BILIRUBIN, INDIRECT: 0.8
BILIRUBIN, TOTAL: 1
EOSINOPHILS: 0.5
GLOBULIN: 2.4
HEMATOCRIT: 39.9
HEMOGLOBIN: 12.5
LYMPHOCYTES: 43.1
MCH: 26.3
MCHC: 31.3
MCV: 83.8
MONOCYTES: 6.1
MPV: 14
NEUTROPHILS: 49.7
PLATELET COUNT: 159
PROTEIN, TOTAL: 6.9
RDW: 12.8
RED BLOOD CELL COUNT: 4.76
WHITE BLOOD CELL COUNT: 6.2

## 2024-12-02 ENCOUNTER — LAB OUTSIDE AN ENCOUNTER (OUTPATIENT)
Dept: URBAN - METROPOLITAN AREA CLINIC 113 | Facility: CLINIC | Age: 74
End: 2024-12-02

## 2024-12-02 ENCOUNTER — OFFICE VISIT (OUTPATIENT)
Dept: URBAN - METROPOLITAN AREA CLINIC 107 | Facility: CLINIC | Age: 74
End: 2024-12-02

## 2024-12-02 ENCOUNTER — TELEPHONE ENCOUNTER (OUTPATIENT)
Dept: URBAN - METROPOLITAN AREA CLINIC 113 | Facility: CLINIC | Age: 74
End: 2024-12-02

## 2024-12-02 PROBLEM — 301717006: Status: ACTIVE | Noted: 2024-12-02

## 2024-12-02 PROBLEM — 176271000119108: Status: ACTIVE | Noted: 2024-12-02

## 2024-12-02 PROBLEM — 123608004: Status: ACTIVE | Noted: 2024-12-02

## 2024-12-02 RX ORDER — TELMISARTAN 20 MG/1
1 TABLET TABLET ORAL ONCE A DAY
Refills: 0 | Status: ACTIVE | COMMUNITY
Start: 2019-11-04

## 2024-12-02 RX ORDER — ASCORBIC ACID 500 MG
TAKE 1 TABLET DAILY TABLET ORAL
Refills: 0 | Status: ACTIVE | COMMUNITY
Start: 2007-04-18

## 2024-12-02 RX ORDER — IPRATROPIUM BROMIDE 42 UG/1
SPRAY, METERED NASAL
Qty: 45 | Refills: 0 | Status: ACTIVE | COMMUNITY
Start: 2020-01-07

## 2024-12-02 RX ORDER — DICLOFENAC SODIUM 10 MG/G
GEL TOPICAL
Qty: 300 | Refills: 0 | Status: ACTIVE | COMMUNITY
Start: 2019-09-18

## 2024-12-02 RX ORDER — BRINZOLAMIDE/BRIMONIDINE TARTRATE 10; 2 MG/ML; MG/ML
1 DROP INTO AFFECTED EYE SUSPENSION/ DROPS OPHTHALMIC THREE TIMES A DAY
Status: ACTIVE | COMMUNITY

## 2024-12-02 RX ORDER — HYOSCYAMINE SULFATE 0.125 MG
1 - 2 TABLETS UNDER THE TONGUE AND ALLOW TO DISSOLVE TABLET,DISINTEGRATING ORAL
Qty: 40 | Refills: 3 | Status: ACTIVE | COMMUNITY
Start: 2024-11-26 | End: 2025-03-26

## 2024-12-02 RX ORDER — FAMOTIDINE 40 MG/1
1 TABLET AT BEDTIME TABLET, FILM COATED ORAL ONCE A DAY
Qty: 30 | Refills: 5 | Status: ON HOLD | COMMUNITY

## 2024-12-02 RX ORDER — SEMAGLUTIDE 0.68 MG/ML
AS DIRECTED INJECTION, SOLUTION SUBCUTANEOUS
Status: ACTIVE | COMMUNITY

## 2024-12-02 RX ORDER — SODIUM, POTASSIUM,MAG SULFATES 17.5-3.13G
354 ML SOLUTION, RECONSTITUTED, ORAL ORAL
Qty: 354 MILLILITER | Refills: 0 | Status: ON HOLD | COMMUNITY

## 2024-12-02 RX ORDER — SODIUM, POTASSIUM,MAG SULFATES 17.5-3.13G
354 ML SOLUTION, RECONSTITUTED, ORAL ORAL ONCE
Qty: 354 MILLILITER | Refills: 0 | Status: ON HOLD | COMMUNITY

## 2024-12-02 RX ORDER — SODIUM, POTASSIUM,MAG SULFATES 17.5-3.13G
AS DIRECTED SOLUTION, RECONSTITUTED, ORAL ORAL
Status: ON HOLD | COMMUNITY
Start: 2022-06-01

## 2024-12-02 RX ORDER — PREDNISOLONE/MOXIFLOXACIN HCL 1 %-0.5 %
AS DIRECTED SUSPENSION, DROPS(FINAL DOSAGE FORM)(ML) OPHTHALMIC (EYE) 4 TIMES DAILY
Status: ON HOLD | COMMUNITY

## 2024-12-02 RX ORDER — SUCRALFATE 1 G/1
1 TABLET; MAY DISSOLVE IN 2 TEASPOONS OF WATER TABLET ORAL
Qty: 90 | Refills: 3 | Status: ON HOLD | COMMUNITY

## 2024-12-02 RX ORDER — BUDESONIDE 0.5 MG/2ML
USE 1 UNIT DOSE VIA NEBULIZER TWO TIMES A DAY INHALANT ORAL
Qty: 60 | Refills: 0 | Status: ACTIVE | COMMUNITY
Start: 2012-03-30

## 2024-12-02 RX ORDER — DEXLANSOPRAZOLE 60 MG/1
TAKE 1 CAPSULE BY MOUTH EVERY DAY CAPSULE, DELAYED RELEASE ORAL ONCE A DAY
Qty: 90 | Refills: 4 | Status: ON HOLD | COMMUNITY

## 2024-12-02 RX ORDER — ALBUTEROL SULFATE 2.5 MG/3ML
SOLUTION RESPIRATORY (INHALATION)
Qty: 255 | Refills: 0 | Status: ACTIVE | COMMUNITY
Start: 2012-03-14

## 2024-12-02 RX ORDER — LANSOPRAZOLE 15 MG/1
1 CAPSULE BEFORE BREAKFAST AND SUPPER CAPSULE, DELAYED RELEASE ORAL TWICE A DAY
Qty: 60 CAPSULE | Refills: 5 | Status: ON HOLD | COMMUNITY
Start: 2023-08-04

## 2024-12-02 RX ORDER — SODIUM PICOSULFATE, MAGNESIUM OXIDE, AND ANHYDROUS CITRIC ACID 10; 3.5; 12 MG/160ML; G/160ML; G/160ML
160 ML LIQUID ORAL
Qty: 320 MILLILITER | Refills: 0 | Status: ON HOLD | COMMUNITY

## 2024-12-02 RX ORDER — METFORMIN HYDROCHLORIDE 500 MG/1
1 TABLET WITH A MEAL TABLET, FILM COATED ORAL TWICE DAILY
Status: ACTIVE | COMMUNITY

## 2024-12-02 RX ORDER — LATANOPROST/PF 0.005 %
INSTILL 1 DROP IN BOTH EYES AT BEDTIME DROPS OPHTHALMIC (EYE)
Refills: 0 | Status: ACTIVE | COMMUNITY
Start: 2014-05-10

## 2024-12-02 RX ORDER — AZELASTINE HYDROCHLORIDE 205.5 UG/1
1 PUFF IN EACH NOSTRIL SPRAY, METERED NASAL
Refills: 0 | Status: ACTIVE | COMMUNITY
Start: 2018-10-05

## 2024-12-02 RX ORDER — ALBUTEROL SULFATE 90 UG/1
AEROSOL, METERED RESPIRATORY (INHALATION)
Qty: 18 | Refills: 0 | Status: ON HOLD | COMMUNITY
Start: 2012-01-02

## 2024-12-02 RX ORDER — LACTOBACILLUS RHAMNOSUS GG 10B CELL
TAKE 1 CAPSULE DAILY CAPSULE ORAL
Refills: 0 | Status: ACTIVE | COMMUNITY

## 2024-12-02 RX ORDER — LANSOPRAZOLE 30 MG/1
1 CAPSULE BEFORE A MEAL CAPSULE, DELAYED RELEASE ORAL TWICE A DAY
Qty: 60 CAPSULE | Refills: 5 | Status: ACTIVE | COMMUNITY

## 2024-12-02 RX ORDER — OMEGA-3/DHA/EPA/FISH OIL 1000 MG
TAKE 1 CAPSULE DAILY CAPSULE ORAL
Refills: 0 | Status: ON HOLD | COMMUNITY

## 2024-12-02 NOTE — HPI-OTHER HISTORIES
Labs 11/22/2024:CBC: WBC 4.09, hemoglobin 13.4, MCV 84.6, platelet 121. BMP normal with exception of calcium 10.8. LFTs: TB 1.2, ALP 58, ALT 13, AST 24. Albumin 5.1. Lipase 34. Urinalysis normal with exception of trace ketones.  CT abdomen and pelvis with contrast 11/22/2024:No acute abnormality in the abdomen or pelvis.  EGD 6/18/2024:No endoscopic abnormality to explain dysphagia status post empiric 46 Taiwanese Fuentes dilator, nonerosive gastritis characterized by erythema status post biopsy, normal examined duodenum, otherwise normal. Pathology: Antral biopsies demonstrated chemical/reactive gastropathy without evidence of H. pylori or intestinal metaplasia.  CT abdomen with contrast 3/15/2024:No acute intra-abdominal findings. Patchy nondependent groundglass micronodules with 1 cm groundglass opacity in the base of the right upper lobe. Findings are indeterminate and could reflect infectious/inflammatory process. However, recommend follow-up CT chest in 3 to 6 months with further recommendations pending stability.  Labs 3/21/2024 CBC:WBC 4.5, hemoglobin 12.6, MCV 84, platelet 135. BMP normal with exception of sodium 145, calcium 10.4. LFTs normal TB 0.8, ALP 61, ALT 8, AST 16. Cholesterol panel normal. Hemoglobin A1c 6.6.  Colonoscopy 8/16/2022:BBPS 8, sigmoid and descending diverticulosis, nonbleeding internal no specimens collected.  Due to her family history, repeat colonoscopy recommended in 2027. recall set  EGD 5/3/2022:No endoscopic abnormality evident to explain dysphagia status post empiric dilation with a 45 and 48 Taiwanese Savary dilator, normal stomach, normal examined duodenum. CT of the abdomen and pelvis with IV contrast 2/25/2022:No acute abnormality in the abdomen and pelvis.  Scattered colonic diverticula without evidence of acute diverticulitis.  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  Total arthroplasty of the left hip. Labs  2/15/2022:Alpha-fetoprotein 4.2.  No other lab results are available at this time. Labs 10/19/2021:Ionized calcium 5.3.  10/6/2021 BMP normal with exception of glucose 132, BUN 19, calcium 10.5.  LFTs normal TB 0.6, ALP 41, ALT 17, AST 16.  Hemoglobin A1c 7. 5/10/2021 alpha-fetoprotein 4.0. EGD 6/2/2021:No endoscopic abnormality to explain dysphagia status post dilation to 48 Taiwanese with a Fuentes dilator.  Normal stomach, normal examined duodenum.  No specimens collected. Abdominal ultrasound 6/1/2021:Negative study.

## 2024-12-02 NOTE — HPI-TODAY'S VISIT:
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  She was last seen in the office 5/28/2024 for postprandial epigastric pain.  prior labs and CT were unremarkable.  She was not taking Carafate at the time of her visit.  She was to continue lansoprazole twice a day and add Carafate 4 times a day.  EGD was ordered.  Recent liver evaluation with labs and imaging were unremarkable.  Repeat HCC screening recommended in 1 year.  She reports onset of diarrhea and pain in the right upper quadrant on 11/21.  She went to the emergency department at [facility] on 11/22.  Labs and a CT were performed.  They informed her that she likely had a virus.  She saw her primary care physician yesterday who stopped Ozempic and recommended that she increase dicyclomine to 2 per dose.  She has been using this and oxycodone and reports persistent pain radiating to her back.  It is constant.  She is avoiding food due to exacerbation of symptoms.  She denies positional association.  She reports nausea without vomiting.  She has been using ondansetron providing some relief.  Diarrhea resolved on 11/23.  She had a regular bowel movement that day.  She has not had a stool since.  She denies feeling constipated.  She denies red blood per rectum or melena.  She reports a low-grade fever at onset.  This has resolved.  She admits some chills.  She is also taking lansoprazole twice a day per Dr. Zepeda's recommendations.

## 2024-12-04 ENCOUNTER — OFFICE VISIT (OUTPATIENT)
Dept: URBAN - METROPOLITAN AREA CLINIC 113 | Facility: CLINIC | Age: 74
End: 2024-12-04
Payer: MEDICARE

## 2024-12-04 VITALS
BODY MASS INDEX: 32.14 KG/M2 | HEIGHT: 66 IN | HEART RATE: 70 BPM | DIASTOLIC BLOOD PRESSURE: 76 MMHG | RESPIRATION RATE: 14 BRPM | TEMPERATURE: 97.7 F | WEIGHT: 200 LBS | SYSTOLIC BLOOD PRESSURE: 144 MMHG

## 2024-12-04 DIAGNOSIS — R10.11 RIGHT UPPER QUADRANT ABDOMINAL PAIN: ICD-10-CM

## 2024-12-04 DIAGNOSIS — R17 TOTAL BILIRUBIN, ELEVATED: ICD-10-CM

## 2024-12-04 DIAGNOSIS — K83.8 COMMON BILE DUCT DILATION: ICD-10-CM

## 2024-12-04 PROCEDURE — 99213 OFFICE O/P EST LOW 20 MIN: CPT | Performed by: NURSE PRACTITIONER

## 2024-12-04 RX ORDER — ALBUTEROL SULFATE 90 UG/1
AEROSOL, METERED RESPIRATORY (INHALATION)
Qty: 18 | Refills: 0 | Status: ON HOLD | COMMUNITY
Start: 2012-01-02

## 2024-12-04 RX ORDER — SODIUM PICOSULFATE, MAGNESIUM OXIDE, AND ANHYDROUS CITRIC ACID 10; 3.5; 12 MG/160ML; G/160ML; G/160ML
160 ML LIQUID ORAL
Qty: 320 MILLILITER | Refills: 0 | Status: ON HOLD | COMMUNITY

## 2024-12-04 RX ORDER — OMEGA-3/DHA/EPA/FISH OIL 1000 MG
TAKE 1 CAPSULE DAILY CAPSULE ORAL
Refills: 0 | Status: ON HOLD | COMMUNITY

## 2024-12-04 RX ORDER — SODIUM, POTASSIUM,MAG SULFATES 17.5-3.13G
AS DIRECTED SOLUTION, RECONSTITUTED, ORAL ORAL
Status: ON HOLD | COMMUNITY
Start: 2022-06-01

## 2024-12-04 RX ORDER — LACTOBACILLUS RHAMNOSUS GG 10B CELL
TAKE 1 CAPSULE DAILY CAPSULE ORAL
Refills: 0 | Status: ACTIVE | COMMUNITY

## 2024-12-04 RX ORDER — METFORMIN HYDROCHLORIDE 500 MG/1
1 TABLET WITH A MEAL TABLET, FILM COATED ORAL TWICE DAILY
Status: ACTIVE | COMMUNITY

## 2024-12-04 RX ORDER — OXYCODONE HYDROCHLORIDE 15 MG/1
1 TABLET TABLET ORAL
Status: ACTIVE | COMMUNITY

## 2024-12-04 RX ORDER — PREDNISOLONE/MOXIFLOXACIN HCL 1 %-0.5 %
AS DIRECTED SUSPENSION, DROPS(FINAL DOSAGE FORM)(ML) OPHTHALMIC (EYE) 4 TIMES DAILY
Status: ON HOLD | COMMUNITY

## 2024-12-04 RX ORDER — TELMISARTAN 20 MG/1
1 TABLET TABLET ORAL ONCE A DAY
Refills: 0 | Status: ACTIVE | COMMUNITY
Start: 2019-11-04

## 2024-12-04 RX ORDER — DICLOFENAC SODIUM 10 MG/G
GEL TOPICAL
Qty: 300 | Refills: 0 | Status: ACTIVE | COMMUNITY
Start: 2019-09-18

## 2024-12-04 RX ORDER — SODIUM, POTASSIUM,MAG SULFATES 17.5-3.13G
354 ML SOLUTION, RECONSTITUTED, ORAL ORAL
Qty: 354 MILLILITER | Refills: 0 | Status: ON HOLD | COMMUNITY

## 2024-12-04 RX ORDER — FAMOTIDINE 40 MG/1
1 TABLET AT BEDTIME TABLET, FILM COATED ORAL ONCE A DAY
Qty: 30 | Refills: 5 | Status: ON HOLD | COMMUNITY

## 2024-12-04 RX ORDER — ALBUTEROL SULFATE 2.5 MG/3ML
SOLUTION RESPIRATORY (INHALATION)
Qty: 255 | Refills: 0 | Status: ACTIVE | COMMUNITY
Start: 2012-03-14

## 2024-12-04 RX ORDER — LANSOPRAZOLE 30 MG/1
1 CAPSULE BEFORE A MEAL CAPSULE, DELAYED RELEASE ORAL TWICE A DAY
Qty: 60 CAPSULE | Refills: 5 | Status: ACTIVE | COMMUNITY

## 2024-12-04 RX ORDER — IPRATROPIUM BROMIDE 42 UG/1
SPRAY, METERED NASAL
Qty: 45 | Refills: 0 | Status: ACTIVE | COMMUNITY
Start: 2020-01-07

## 2024-12-04 RX ORDER — SODIUM, POTASSIUM,MAG SULFATES 17.5-3.13G
354 ML SOLUTION, RECONSTITUTED, ORAL ORAL ONCE
Qty: 354 MILLILITER | Refills: 0 | Status: ON HOLD | COMMUNITY

## 2024-12-04 RX ORDER — HYOSCYAMINE SULFATE 0.125 MG
1 - 2 TABLETS UNDER THE TONGUE AND ALLOW TO DISSOLVE TABLET,DISINTEGRATING ORAL
Qty: 40 | Refills: 3 | Status: ACTIVE | COMMUNITY
Start: 2024-11-26 | End: 2025-03-26

## 2024-12-04 RX ORDER — AZELASTINE HYDROCHLORIDE 205.5 UG/1
1 PUFF IN EACH NOSTRIL SPRAY, METERED NASAL
Refills: 0 | Status: ACTIVE | COMMUNITY
Start: 2018-10-05

## 2024-12-04 RX ORDER — BRINZOLAMIDE/BRIMONIDINE TARTRATE 10; 2 MG/ML; MG/ML
1 DROP INTO AFFECTED EYE SUSPENSION/ DROPS OPHTHALMIC THREE TIMES A DAY
Status: ACTIVE | COMMUNITY

## 2024-12-04 RX ORDER — BUDESONIDE 0.5 MG/2ML
USE 1 UNIT DOSE VIA NEBULIZER TWO TIMES A DAY INHALANT ORAL
Qty: 60 | Refills: 0 | Status: ACTIVE | COMMUNITY
Start: 2012-03-30

## 2024-12-04 RX ORDER — LANSOPRAZOLE 15 MG/1
1 CAPSULE BEFORE BREAKFAST AND SUPPER CAPSULE, DELAYED RELEASE ORAL TWICE A DAY
Qty: 60 CAPSULE | Refills: 5 | Status: ON HOLD | COMMUNITY
Start: 2023-08-04

## 2024-12-04 RX ORDER — SEMAGLUTIDE 0.68 MG/ML
AS DIRECTED INJECTION, SOLUTION SUBCUTANEOUS
Status: ACTIVE | COMMUNITY

## 2024-12-04 RX ORDER — LATANOPROST/PF 0.005 %
INSTILL 1 DROP IN BOTH EYES AT BEDTIME DROPS OPHTHALMIC (EYE)
Refills: 0 | Status: ACTIVE | COMMUNITY
Start: 2014-05-10

## 2024-12-04 RX ORDER — SUCRALFATE 1 G/1
1 TABLET; MAY DISSOLVE IN 2 TEASPOONS OF WATER TABLET ORAL
Qty: 90 | Refills: 3 | Status: ON HOLD | COMMUNITY

## 2024-12-04 RX ORDER — ASCORBIC ACID 500 MG
TAKE 1 TABLET DAILY TABLET ORAL
Refills: 0 | Status: ACTIVE | COMMUNITY
Start: 2007-04-18

## 2024-12-04 RX ORDER — DEXLANSOPRAZOLE 60 MG/1
TAKE 1 CAPSULE BY MOUTH EVERY DAY CAPSULE, DELAYED RELEASE ORAL ONCE A DAY
Qty: 90 | Refills: 4 | Status: ON HOLD | COMMUNITY

## 2024-12-04 NOTE — HPI-OTHER HISTORIES
Abdominal ultrasound 11/27/2024:Prominent common bile duct measuring 8 to 9 mm in diameter. Given reported history of elevated bilirubin, recommend further evaluation with MRCP. No cholelithiasis or findings of acute cholecystitis. Normal sonographic appearance of the liver.

## 2024-12-04 NOTE — HPI-TODAY'S VISIT:
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  She was last seen 11/26/2024 for right upper quad abdominal pain with diarrhea.  She been evaluated at the ED in [place].  Labs were notable for mildly elevated bilirubin of 1.2.  CT demonstrated no acute abnormality.  She was tender on exam.  Viral gastroenteritis or acute gallbladder disease was within the differential.  Discussed with Dr. Trujillo who agreed with a stat ultrasound.  She was advised to return to the emergency room for persistent, acute symptoms.  She was prescribed hyoscyamine for symptomatic relief as dicyclomine had been ineffective.  Abdominal ultrasound 11/27/2024:Prominent common bile duct measuring 8 to 9 mm in diameter.  Given reported history of elevated bilirubin, recommend further evaluation with MRCP.  No cholelithiasis or findings of acute cholecystitis.  Normal sonographic appearance of the liver.  Results were communicated to the patient and MRI/MRCP ordered ASAP. She reports persistent pain in the right upper quadrant.  If she tries to eat, it is worse.  She reports it is constant.  There is no association with position change and no improvement with position change.  She reports she will awaken from sleep with pain.  She has tried hyoscyamine without improvement.  She is taking oxycodone per pain management and reports no improvement of pain after dosing.  Occasionally, she reports severe pain that will cause her to scream.  She reports pain is a 30 on a 1-10 scale.  She reports nausea without vomiting.  Currently, the MRI/MRCP is scheduled next week.

## 2024-12-04 NOTE — PHYSICAL EXAM GASTROINTESTINAL
Abdomen , soft, mildly tender RUQ, nondistended , no guarding or rigidity , no masses palpable , normal bowel sounds , Liver and Spleen , no hepatosplenomegaly

## 2024-12-12 ENCOUNTER — TELEPHONE ENCOUNTER (OUTPATIENT)
Dept: URBAN - METROPOLITAN AREA CLINIC 113 | Facility: CLINIC | Age: 74
End: 2024-12-12

## 2024-12-12 ENCOUNTER — OFFICE VISIT (OUTPATIENT)
Dept: URBAN - METROPOLITAN AREA CLINIC 107 | Facility: CLINIC | Age: 74
End: 2024-12-12

## 2024-12-13 ENCOUNTER — TELEPHONE ENCOUNTER (OUTPATIENT)
Dept: URBAN - METROPOLITAN AREA CLINIC 113 | Facility: CLINIC | Age: 74
End: 2024-12-13

## 2024-12-14 ENCOUNTER — TELEPHONE ENCOUNTER (OUTPATIENT)
Dept: URBAN - METROPOLITAN AREA CLINIC 113 | Facility: CLINIC | Age: 74
End: 2024-12-14

## 2024-12-16 ENCOUNTER — LAB OUTSIDE AN ENCOUNTER (OUTPATIENT)
Dept: URBAN - METROPOLITAN AREA CLINIC 113 | Facility: CLINIC | Age: 74
End: 2024-12-16

## 2024-12-16 ENCOUNTER — TELEPHONE ENCOUNTER (OUTPATIENT)
Dept: URBAN - METROPOLITAN AREA CLINIC 113 | Facility: CLINIC | Age: 74
End: 2024-12-16

## 2024-12-16 PROBLEM — 169083003: Status: ACTIVE | Noted: 2024-12-16

## 2024-12-17 ENCOUNTER — OFFICE VISIT (OUTPATIENT)
Dept: URBAN - METROPOLITAN AREA MEDICAL CENTER 19 | Facility: MEDICAL CENTER | Age: 74
End: 2024-12-17

## 2024-12-17 ENCOUNTER — LAB OUTSIDE AN ENCOUNTER (OUTPATIENT)
Dept: URBAN - METROPOLITAN AREA CLINIC 113 | Facility: CLINIC | Age: 74
End: 2024-12-17

## 2024-12-17 RX ORDER — SODIUM, POTASSIUM,MAG SULFATES 17.5-3.13G
354 ML SOLUTION, RECONSTITUTED, ORAL ORAL ONCE
Qty: 354 MILLILITER | Refills: 0 | Status: ON HOLD | COMMUNITY

## 2024-12-17 RX ORDER — LANSOPRAZOLE 15 MG/1
1 CAPSULE BEFORE BREAKFAST AND SUPPER CAPSULE, DELAYED RELEASE ORAL TWICE A DAY
Qty: 60 CAPSULE | Refills: 5 | Status: ON HOLD | COMMUNITY
Start: 2023-08-04

## 2024-12-17 RX ORDER — LACTOBACILLUS RHAMNOSUS GG 10B CELL
TAKE 1 CAPSULE DAILY CAPSULE ORAL
Refills: 0 | Status: ACTIVE | COMMUNITY

## 2024-12-17 RX ORDER — METFORMIN HYDROCHLORIDE 500 MG/1
1 TABLET WITH A MEAL TABLET, FILM COATED ORAL TWICE DAILY
Status: ACTIVE | COMMUNITY

## 2024-12-17 RX ORDER — DEXLANSOPRAZOLE 60 MG/1
TAKE 1 CAPSULE BY MOUTH EVERY DAY CAPSULE, DELAYED RELEASE ORAL ONCE A DAY
Qty: 90 | Refills: 4 | Status: ON HOLD | COMMUNITY

## 2024-12-17 RX ORDER — FAMOTIDINE 40 MG/1
1 TABLET AT BEDTIME TABLET, FILM COATED ORAL ONCE A DAY
Qty: 30 | Refills: 5 | Status: ON HOLD | COMMUNITY

## 2024-12-17 RX ORDER — SODIUM PICOSULFATE, MAGNESIUM OXIDE, AND ANHYDROUS CITRIC ACID 10; 3.5; 12 MG/160ML; G/160ML; G/160ML
160 ML LIQUID ORAL
Qty: 320 MILLILITER | Refills: 0 | Status: ON HOLD | COMMUNITY

## 2024-12-17 RX ORDER — HYOSCYAMINE SULFATE 0.125 MG
1 - 2 TABLETS UNDER THE TONGUE AND ALLOW TO DISSOLVE TABLET,DISINTEGRATING ORAL
Qty: 40 | Refills: 3 | Status: ACTIVE | COMMUNITY
Start: 2024-11-26 | End: 2025-03-26

## 2024-12-17 RX ORDER — SEMAGLUTIDE 0.68 MG/ML
AS DIRECTED INJECTION, SOLUTION SUBCUTANEOUS
Status: ACTIVE | COMMUNITY

## 2024-12-17 RX ORDER — LATANOPROST/PF 0.005 %
INSTILL 1 DROP IN BOTH EYES AT BEDTIME DROPS OPHTHALMIC (EYE)
Refills: 0 | Status: ACTIVE | COMMUNITY
Start: 2014-05-10

## 2024-12-17 RX ORDER — SODIUM, POTASSIUM,MAG SULFATES 17.5-3.13G
354 ML SOLUTION, RECONSTITUTED, ORAL ORAL
Qty: 354 MILLILITER | Refills: 0 | Status: ON HOLD | COMMUNITY

## 2024-12-17 RX ORDER — ALBUTEROL SULFATE 2.5 MG/3ML
SOLUTION RESPIRATORY (INHALATION)
Qty: 255 | Refills: 0 | Status: ACTIVE | COMMUNITY
Start: 2012-03-14

## 2024-12-17 RX ORDER — ALBUTEROL SULFATE 90 UG/1
AEROSOL, METERED RESPIRATORY (INHALATION)
Qty: 18 | Refills: 0 | Status: ON HOLD | COMMUNITY
Start: 2012-01-02

## 2024-12-17 RX ORDER — TELMISARTAN 20 MG/1
1 TABLET TABLET ORAL ONCE A DAY
Refills: 0 | Status: ACTIVE | COMMUNITY
Start: 2019-11-04

## 2024-12-17 RX ORDER — SUCRALFATE 1 G/1
1 TABLET; MAY DISSOLVE IN 2 TEASPOONS OF WATER TABLET ORAL
Qty: 90 | Refills: 3 | Status: ON HOLD | COMMUNITY

## 2024-12-17 RX ORDER — LANSOPRAZOLE 30 MG/1
1 CAPSULE BEFORE A MEAL CAPSULE, DELAYED RELEASE ORAL TWICE A DAY
Qty: 60 CAPSULE | Refills: 5 | Status: ACTIVE | COMMUNITY

## 2024-12-17 RX ORDER — DICLOFENAC SODIUM 10 MG/G
GEL TOPICAL
Qty: 300 | Refills: 0 | Status: ACTIVE | COMMUNITY
Start: 2019-09-18

## 2024-12-17 RX ORDER — PREDNISOLONE/MOXIFLOXACIN HCL 1 %-0.5 %
AS DIRECTED SUSPENSION, DROPS(FINAL DOSAGE FORM)(ML) OPHTHALMIC (EYE) 4 TIMES DAILY
Status: ON HOLD | COMMUNITY

## 2024-12-17 RX ORDER — IPRATROPIUM BROMIDE 42 UG/1
SPRAY, METERED NASAL
Qty: 45 | Refills: 0 | Status: ACTIVE | COMMUNITY
Start: 2020-01-07

## 2024-12-17 RX ORDER — AZELASTINE HYDROCHLORIDE 205.5 UG/1
1 PUFF IN EACH NOSTRIL SPRAY, METERED NASAL
Refills: 0 | Status: ACTIVE | COMMUNITY
Start: 2018-10-05

## 2024-12-17 RX ORDER — BUDESONIDE 0.5 MG/2ML
USE 1 UNIT DOSE VIA NEBULIZER TWO TIMES A DAY INHALANT ORAL
Qty: 60 | Refills: 0 | Status: ACTIVE | COMMUNITY
Start: 2012-03-30

## 2024-12-17 RX ORDER — SODIUM, POTASSIUM,MAG SULFATES 17.5-3.13G
AS DIRECTED SOLUTION, RECONSTITUTED, ORAL ORAL
Status: ON HOLD | COMMUNITY
Start: 2022-06-01

## 2024-12-17 RX ORDER — OMEGA-3/DHA/EPA/FISH OIL 1000 MG
TAKE 1 CAPSULE DAILY CAPSULE ORAL
Refills: 0 | Status: ON HOLD | COMMUNITY

## 2024-12-17 RX ORDER — OXYCODONE HYDROCHLORIDE 15 MG/1
1 TABLET TABLET ORAL
Status: ACTIVE | COMMUNITY

## 2024-12-17 RX ORDER — BRINZOLAMIDE/BRIMONIDINE TARTRATE 10; 2 MG/ML; MG/ML
1 DROP INTO AFFECTED EYE SUSPENSION/ DROPS OPHTHALMIC THREE TIMES A DAY
Status: ACTIVE | COMMUNITY

## 2024-12-17 RX ORDER — ASCORBIC ACID 500 MG
TAKE 1 TABLET DAILY TABLET ORAL
Refills: 0 | Status: ACTIVE | COMMUNITY
Start: 2007-04-18

## 2025-02-11 ENCOUNTER — TELEPHONE (OUTPATIENT)
Dept: FAMILY MEDICINE | Facility: CLINIC | Age: 75
End: 2025-02-11
Payer: MEDICARE

## 2025-02-11 DIAGNOSIS — I10 ESSENTIAL HYPERTENSION: ICD-10-CM

## 2025-02-11 DIAGNOSIS — E78.2 MIXED HYPERLIPIDEMIA: ICD-10-CM

## 2025-02-11 DIAGNOSIS — Z79.899 ENCOUNTER FOR LONG-TERM (CURRENT) USE OF MEDICATIONS: Primary | ICD-10-CM

## 2025-02-25 ENCOUNTER — OFFICE VISIT (OUTPATIENT)
Dept: FAMILY MEDICINE | Facility: CLINIC | Age: 75
End: 2025-02-25
Payer: MEDICARE

## 2025-02-25 VITALS
BODY MASS INDEX: 19.86 KG/M2 | SYSTOLIC BLOOD PRESSURE: 124 MMHG | DIASTOLIC BLOOD PRESSURE: 70 MMHG | WEIGHT: 105.19 LBS | OXYGEN SATURATION: 95 % | HEIGHT: 61 IN | HEART RATE: 67 BPM

## 2025-02-25 DIAGNOSIS — F41.9 ANXIETY: ICD-10-CM

## 2025-02-25 DIAGNOSIS — Q85.01 NEUROFIBROMATOSIS, TYPE 1: ICD-10-CM

## 2025-02-25 DIAGNOSIS — I10 ESSENTIAL HYPERTENSION: Primary | ICD-10-CM

## 2025-02-25 DIAGNOSIS — Z85.3 HISTORY OF BREAST CANCER: ICD-10-CM

## 2025-02-25 DIAGNOSIS — Z12.31 SCREENING MAMMOGRAM, ENCOUNTER FOR: ICD-10-CM

## 2025-02-25 DIAGNOSIS — M81.0 AGE-RELATED OSTEOPOROSIS WITHOUT CURRENT PATHOLOGICAL FRACTURE: ICD-10-CM

## 2025-02-25 DIAGNOSIS — K21.9 GASTROESOPHAGEAL REFLUX DISEASE, UNSPECIFIED WHETHER ESOPHAGITIS PRESENT: ICD-10-CM

## 2025-02-25 PROCEDURE — 99214 OFFICE O/P EST MOD 30 MIN: CPT | Mod: S$GLB,,, | Performed by: NURSE PRACTITIONER

## 2025-02-25 RX ORDER — METHYLPREDNISOLONE 4 MG
TABLET, DOSE PACK ORAL
COMMUNITY
Start: 2025-01-10

## 2025-02-25 RX ORDER — LORAZEPAM 0.5 MG/1
0.5 TABLET ORAL EVERY 12 HOURS PRN
Qty: 30 TABLET | Refills: 5 | Status: SHIPPED | OUTPATIENT
Start: 2025-02-25

## 2025-02-25 NOTE — PROGRESS NOTES
"  SUBJECTIVE:    Patient ID: Cecilia Valenzuela is a 74 y.o. female.    Chief Complaint: Follow-up (Pt brought medication list//Pt is here for a check up and medication refills//Pt decline colo//ABRIL )      History of Present Illness    CHIEF COMPLAINT:  Cecilia presents today for six month follow-up of hypertension, anxiety, and history of breast cancer.    Pt reports overall she's been doing well. Was sick around Lisha time with a cold but symptoms have now all resolved.    Had follow-up Dr. Lisa, cards in September- no changes made.  Reports blood pressure has been stable and no issues with palpitations recently.  Monitors blood pressure regularly and Has prescription for lisinopril which she takes only as needed maybe 3 to 4 times a year    Pantoprazole added at last visit for heartburn complaints which she reports has resolved on medication    MEDICAL HISTORY:  previously went and saw Dr. 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 She had a positive Cologuard test in February 2020 but has not undergone colonoscopy and declines any further evaluation- states she would not consider undergoing any treatment for colon CA and "doesn't want to know". She denies black stools, blood in stools, or unexplained weight loss. Her last mammogram was two years ago, with next one due in April.    Hx of breast CA and completed her 5 years and has been off anastrozole since October.  She would like to have mammograms only every 2 years due to her concerns for excess radiation    LABS:  Reviewed recent labs with patient- Complete blood count, kidney function, potassium, liver enzymes, and thyroid hormone are normal. Blood glucose is 74. Lipid panel shows total cholesterol 212, triglycerides decreased from 174 to 154, and LDL decreased from 157 to 130.  She has a history of statin myalgias      ROS:  General: no fever, no chills, no fatigue, no weight gain, no weight loss  Eyes: " no vision changes, no redness, no discharge  ENT: no ear pain, no nasal congestion, no sore throat  Cardiovascular: no chest pain, no palpitations, no lower extremity edema  Respiratory: complains of cough, no shortness of breath  Gastrointestinal: no abdominal pain, no nausea, no vomiting, no diarrhea, no constipation, no blood in stool, no melena, complains of heartburn  Genitourinary: no dysuria, no hematuria, no frequency  Musculoskeletal: no joint pain, no muscle pain  Skin: no rash, complains of lesion  Neurological: no headache, no dizziness, no numbness, no tingling  Psychiatric: no anxiety, no depression, no sleep difficulty                 Telephone on 02/11/2025   Component Date Value Ref Range Status    WBC 02/18/2025 6.5  3.8 - 10.8 Thousand/uL Final    RBC 02/18/2025 4.91  3.80 - 5.10 Million/uL Final    Hemoglobin 02/18/2025 14.3  11.7 - 15.5 g/dL Final    Hematocrit 02/18/2025 44.7  35.0 - 45.0 % Final    MCV 02/18/2025 91.0  80.0 - 100.0 fL Final    MCH 02/18/2025 29.1  27.0 - 33.0 pg Final    MCHC 02/18/2025 32.0  32.0 - 36.0 g/dL Final    RDW 02/18/2025 13.8  11.0 - 15.0 % Final    Platelets 02/18/2025 233  140 - 400 Thousand/uL Final    MPV 02/18/2025 11.9  7.5 - 12.5 fL Final    Neutrophils, Abs 02/18/2025 4,758  1,500 - 7,800 cells/uL Final    Lymph # 02/18/2025 1,125  850 - 3,900 cells/uL Final    Mono # 02/18/2025 345  200 - 950 cells/uL Final    Eos # 02/18/2025 221  15 - 500 cells/uL Final    Baso # 02/18/2025 52  0 - 200 cells/uL Final    Neutrophils Relative 02/18/2025 73.2  % Final    Lymph % 02/18/2025 17.3  % Final    Mono % 02/18/2025 5.3  % Final    Eosinophil % 02/18/2025 3.4  % Final    Basophil % 02/18/2025 0.8  % Final    Glucose 02/18/2025 74  65 - 99 mg/dL Final    BUN 02/18/2025 18  7 - 25 mg/dL Final    Creatinine 02/18/2025 0.71  0.60 - 1.00 mg/dL Final    eGFR 02/18/2025 89  > OR = 60 mL/min/1.73m2 Final    BUN/Creatinine Ratio 02/18/2025 SEE NOTE:  6 - 22 (calc) Final     Sodium 02/18/2025 137  135 - 146 mmol/L Final    Potassium 02/18/2025 4.0  3.5 - 5.3 mmol/L Final    Chloride 02/18/2025 102  98 - 110 mmol/L Final    CO2 02/18/2025 25  20 - 32 mmol/L Final    Calcium 02/18/2025 9.4  8.6 - 10.4 mg/dL Final    Total Protein 02/18/2025 7.3  6.1 - 8.1 g/dL Final    Albumin 02/18/2025 4.0  3.6 - 5.1 g/dL Final    Globulin, Total 02/18/2025 3.3  1.9 - 3.7 g/dL (calc) Final    Albumin/Globulin Ratio 02/18/2025 1.2  1.0 - 2.5 (calc) Final    Total Bilirubin 02/18/2025 0.5  0.2 - 1.2 mg/dL Final    Alkaline Phosphatase 02/18/2025 62  37 - 153 U/L Final    AST 02/18/2025 19  10 - 35 U/L Final    ALT 02/18/2025 21  6 - 29 U/L Final    Cholesterol 02/18/2025 212 (H)  <200 mg/dL Final    HDL 02/18/2025 55  > OR = 50 mg/dL Final    Triglycerides 02/18/2025 154 (H)  <150 mg/dL Final    LDL Cholesterol 02/18/2025 130 (H)  mg/dL (calc) Final    HDL/Cholesterol Ratio 02/18/2025 3.9  <5.0 (calc) Final    Non HDL Chol. (LDL+VLDL) 02/18/2025 157 (H)  <130 mg/dL (calc) Final    TSH w/reflex to FT4 02/18/2025 1.78  0.40 - 4.50 mIU/L Final   Telephone on 10/16/2024   Component Date Value Ref Range Status    Calcium 10/23/2024 10.3  8.6 - 10.4 mg/dL Final       Past Medical History:   Diagnosis Date    Allergy 47 years ago    upper respiratory controlled with local honey.    Anxiety     under extreme stress    Arthritis     Atherosclerosis     in left carotid artery    Carotid artery plaque, left 09/07/2023    ER+ (estrogen receptor positive status) 01/25/2022    Fibrocystic breast     GERD (gastroesophageal reflux disease)     HER2-negative carcinoma of right breast 01/25/2022    Hyperlipidemia     Hypertension     Joint pain 2 years    caused my letrozole    Malignant neoplasm of upper-outer quadrant of right female breast 09/14/2017    right    Mitral valve prolapse     Neurofibromatosis     Osteopenia     Premature beats     Raynaud's disease     S/P mastectomy, right 01/25/2022    Seborrheic  keratosis     Thyroid nodule     Tinnitus     Undiagnosed cardiac murmurs 2019     Past Surgical History:   Procedure Laterality Date    APPENDECTOMY  1973    at time of     BACK SURGERY      BREAST BIOPSY Right 2017    BREAST SURGERY  2017    Right masectomy    CATARACT EXTRACTION Bilateral      SECTION      x5    DILATION AND CURETTAGE OF UTERUS      miss Ab    EYE SURGERY  2013    cataract, both eyes    HYSTERECTOMY      MASTECTOMY Right 2017    SKIN BIOPSY  5 years ago    diagnosed neurofibroma    SPINE SURGERY  -    twice- lower lamenectomy     Family History   Problem Relation Name Age of Onset    Rheum arthritis Mother Amanda Young     Breast cancer Mother Amanda Cifuentes         over 85    Hypertension Mother Amanda Young     Heart disease Mother Amanda Cifuentes     Arthritis Mother Amanda Cifuentes     Asthma Mother Amanda Young     Cancer Mother Amanda Young         breast    COPD Mother Amanda Cifuentes     Parkinsonism Father Abdi Young     Cancer Father Abdi Young         mouth    Breast cancer Sister Marian Young 40        DCIS    Arthritis Sister Marian Young     Psoriasis Sister Marian Young     Breast cancer Maternal Aunt          60's    Breast cancer Sister  50        DCIS    Stroke Maternal Grandfather Avelino     Stroke Maternal Grandmother paternal grandmother     Stroke Paternal Grandmother maternal grandmother     Cancer Maternal Aunt Nancy         breast    Cancer Sister Tonie         DCIS    Cancer Sister Thao         DCIS    Ovarian cancer Neg Hx      Colon cancer Neg Hx      Melanoma Neg Hx         Tests to Keep You Healthy    Colon Cancer Screening: ORDERED  Last Blood Pressure <= 139/89 (2025): Yes      The CVD Risk score (KIKI'Agoino, et al., 2008) failed to calculate for the following reasons:    The 2008 CVD risk score is only valid for ages 30 to 74     Marital Status:   Alcohol History:  reports no history of alcohol use.  Tobacco History:   "reports that she has never smoked. She has never been exposed to tobacco smoke. She has never used smokeless tobacco.  Drug History:  reports no history of drug use.    Health Maintenance Topics with due status: Not Due       Topic Last Completion Date    DEXA Scan 04/04/2023    Lipid Panel 02/18/2025     Immunization History   Administered Date(s) Administered    COVID-19, MRNA, LN-S, PF (Pfizer) (Purple Cap) 01/09/2021, 01/30/2021, 08/18/2021    Influenza 10/13/2023    Influenza - Quadrivalent - High Dose - PF (65 years and older) 09/09/2020, 09/14/2021, 10/11/2022    Influenza - Trivalent - Fluad - Adjuvanted - PF (65 years and older 10/30/2024    Influenza - Trivalent - Fluzone High Dose - PF (65 years and older) 10/06/2014, 11/12/2015, 10/11/2018, 11/01/2019    Pneumococcal Conjugate - 13 Valent 01/21/2020    Pneumococcal Polysaccharide - 23 Valent 10/06/2014       Review of patient's allergies indicates:   Allergen Reactions    Methylprednisolone      Other reaction(s): Heart palpitations  PT STATES SHE HAS A REACTION TO ALL STEROIDS DUE TO HER DX; OF MVP..    Zofran [ondansetron hcl (pf)] Nausea Only    Corticosteroids (glucocorticoids) Other (See Comments)     Heart palpitations    Dilaudid [hydromorphone] Nausea And Vomiting     Does not help with pain    Fentanyl     Neosporin (neomycin-polymyx)     Norvasc [amlodipine]     Statins-hmg-coa reductase inhibitors      myalgias    Tetracyclines     Augmentin [amoxicillin-pot clavulanate] Nausea And Vomiting     Current Medications[1]        Objective:      Vitals:    02/25/25 1128   BP: 124/70   Pulse: 67   SpO2: 95%   Weight: 47.7 kg (105 lb 3.2 oz)   Height: 5' 1" (1.549 m)       Physical Exam  Constitutional:       General: She is not in acute distress.     Appearance: Normal appearance. She is well-developed and normal weight.      Comments: Thin small built WF   HENT:      Head: Normocephalic and atraumatic.      Right Ear: Tympanic membrane and ear canal " normal.      Left Ear: Tympanic membrane and ear canal normal.   Neck:      Vascular: No carotid bruit.   Cardiovascular:      Rate and Rhythm: Normal rate and regular rhythm.      Heart sounds: No murmur heard.     No friction rub.   Pulmonary:      Effort: Pulmonary effort is normal. No respiratory distress.      Breath sounds: Normal breath sounds. No wheezing or rales.   Abdominal:      Palpations: Abdomen is soft.      Tenderness: There is no abdominal tenderness.   Musculoskeletal:      Cervical back: Neck supple.      Right lower leg: No edema.      Left lower leg: No edema.   Lymphadenopathy:      Cervical: No cervical adenopathy.   Skin:     General: Skin is warm and dry.   Neurological:      General: No focal deficit present.      Mental Status: She is alert and oriented to person, place, and time.      Gait: Gait normal.   Psychiatric:         Mood and Affect: Mood normal.         Behavior: Behavior normal. Behavior is cooperative.         Thought Content: Thought content normal.          Assessment:       1. Essential hypertension    2. Anxiety    3. Age-related osteoporosis without current pathological fracture    4. Neurofibromatosis, type 1    5. Screening mammogram, encounter for    6. History of breast cancer    7. Gastroesophageal reflux disease, unspecified whether esophagitis present           Assessment & Plan    - Assessed hypertension: BP well-controlled at 124/70  - Reviewed recent labs: CBC, metabolic panel, lipid panel, and thyroid function all within normal limits  - Noted improved cholesterol levels: total 212, HDL still below target of 50-55, triglycerides decreased to 154, LDL improved from 157 to 130  - Considered bone density scan timing in relation to Prolia treatment  - Evaluated anxiety management with current medication regimen  - Discussed breast cancer screening frequency, agreed to biennial mammograms given patient's history and preferences    HYPERTENSION:  - Measured the  patient's blood pressure at 124/70 with a pulse of 67, which is evaluated as very good.  - Ordered urine specimen for annual protein check related to blood pressure management.  - Instructed the patient to maintain current blood pressure management regimen.    NEUROFIBROMATOSIS:  - Noted the presence of nodules on the patient's skin due to neurofibromatosis.  - Acknowledged the diagnosis of neurofibromatosis.      ANXIETY:  - Inquired about the patient's anxiety, which was reported as stable.  - Continued lorazepam (Ativan) 0.5 tablet nightly for anxiety and sleep management.  - Refilled lorazepam prescription and sent to [facility].    OSTEOPOROSIS:  - Reviewed the bone density scan from April 2023.  - Discussed the ongoing need for bone density scans.  - Continued Prolia injections for osteoporosis management.    GERD:  - Evaluated the patient's response to Pantoprazole, noting improvement in heartburn symptoms.  - Continued pantoprazole daily for GERD management.      HYPERLIPIDEMIA:  - Reviewed cholesterol levels: total 212, HDL below target of 50-55, triglycerides decreased from 174 to 154, LDL decreased from 130 to 157.  - Evaluated cholesterol levels as improved but still above target.  Patient with history of statin myalgias and does not want to consider other cholesterol-lowering medication  - Advised continued monitoring and management of hyperlipidemia.      FOLLOW UP:  - Follow up in 6 months.  - Contact office if needed before next scheduled appointment.        Plan:       1. Essential hypertension    2. Anxiety  -     LORazepam (ATIVAN) 0.5 MG tablet; Take 1 tablet (0.5 mg total) by mouth every 12 (twelve) hours as needed for Anxiety.  Dispense: 30 tablet; Refill: 5    3. Age-related osteoporosis without current pathological fracture    4. Neurofibromatosis, type 1    5. Screening mammogram, encounter for  -     Mammo Digital Screening Bilat w/ John (XPD); Future; Expected date: 02/25/2025    6.  History of breast cancer    7. Gastroesophageal reflux disease, unspecified whether esophagitis present      Follow up in about 6 months (around 8/25/2025).          Counseled on age and gender appropriate medical preventative services, including cancer screenings, immunizations, overall nutritional health, need for a consistent exercise regimen and an overall push towards maintaining a vigorous and active lifestyle.      This note was generated with the assistance of ambient listening technology. Verbal consent was obtained by the patient and accompanying visitor(s) for the recording of patient appointment to facilitate this note. I attest to having reviewed and edited the generated note for accuracy, though some syntax or spelling errors may persist. Please contact the author of this note for any clarification.       2/25/2025 Cecilia Shaikh NP           [1]   Current Outpatient Medications:     amoxicillin (AMOXIL) 500 MG Tab, Take 500 mg by mouth every 12 (twelve) hours., Disp: , Rfl:     ascorbic Acid (VITAMIN C) 500 mg CpSR, , Disp: , Rfl:     azelastine (ASTELIN) 137 mcg (0.1 %) nasal spray, 1 spray (137 mcg total) by Nasal route as needed for Rhinitis., Disp: 30 mL, Rfl: 3    calcium carbonate/vitamin D3 (CALCIUM 600 + D,3, ORAL), Take 1 tablet by mouth 2 (two) times a day., Disp: , Rfl:     denosumab (PROLIA) 60 mg/mL Syrg, Inject 60 mg into the skin every 6 (six) months. , Disp: , Rfl:     glucosamine sulfate (GLUCOSAMINE) 500 mg Tab, , Disp: , Rfl:     lisinopriL (PRINIVIL,ZESTRIL) 5 MG tablet, Take 1 tablet (5 mg total) by mouth daily as needed (BP >150)., Disp: 90 tablet, Rfl: 0    magnesium 30 mg Tab, Take 250 mg by mouth once. PRN, Disp: , Rfl:     meclizine (ANTIVERT) 12.5 mg tablet, 2 (two) times daily as needed., Disp: , Rfl:     multivitamin (THERAGRAN) per tablet, Take 1 tablet by mouth once daily., Disp: , Rfl:     pantoprazole (PROTONIX) 40 MG tablet, Take 1 tablet twice a day for 2 weeks  then 1 tablet daily, Disp: 60 tablet, Rfl: 5    PATADAY 0.2 % Drop, Place 1 drop into both eyes daily as needed (allergies). , Disp: , Rfl: 0    zinc glycinate 30 mg Cap, , Disp: , Rfl:     LORazepam (ATIVAN) 0.5 MG tablet, Take 1 tablet (0.5 mg total) by mouth every 12 (twelve) hours as needed for Anxiety., Disp: 30 tablet, Rfl: 5

## 2025-03-24 ENCOUNTER — RESULTS FOLLOW-UP (OUTPATIENT)
Dept: FAMILY MEDICINE | Facility: CLINIC | Age: 75
End: 2025-03-24

## 2025-04-03 ENCOUNTER — TELEPHONE (OUTPATIENT)
Dept: FAMILY MEDICINE | Facility: CLINIC | Age: 75
End: 2025-04-03
Payer: MEDICARE

## 2025-04-03 DIAGNOSIS — R91.1 PULMONARY NODULE: Primary | ICD-10-CM

## 2025-04-03 NOTE — TELEPHONE ENCOUNTER
----- Message from Nurse Lynn sent at 4/3/2024  1:51 PM CDT -----  Regarding: Repeat CT  4/3/24       ----- Message from Buddy Mathew MD sent at 4/3/2024  7:29 AM CDT -----  Call patient.  Radiologist looked at the CT scan again instead the nodule which was previously 5 mm is now shrunk to 4 mm left lower lobe.  Right upper lobe has a stable 3 mm nodule.  Nodules are not growing, recommend repeat CT in 12 months

## 2025-04-07 ENCOUNTER — TELEPHONE (OUTPATIENT)
Dept: FAMILY MEDICINE | Facility: CLINIC | Age: 75
End: 2025-04-07
Payer: MEDICARE

## 2025-04-07 NOTE — TELEPHONE ENCOUNTER
Patient got a call from ct to schedule from a reminder Joseline set to repeat CT chest in 12 months, looks like Cecilia signed order, she is questioning because she was under the understanding the nodule had shrunk and did not nee any more CT's, also has Neurofibromatosis and supposed to be limiting radiation exposure

## 2025-04-07 NOTE — TELEPHONE ENCOUNTER
----- Message from Beth sent at 4/7/2025 11:36 AM CDT -----  Pt got a message about a ct scan being ordered by vern and would like to talk to nurse [phone]

## 2025-04-07 NOTE — TELEPHONE ENCOUNTER
Please let Patient know that the CT scan from February 2024 did show the nodule in the left lower lobe had decreased in size from 5 to 4 mm and there is a stable 3 mm nodule in the right upper lobe.  The radiologist just recommended annual CT scan for routine follow-up.  Given the small size of nodules I think it would be fine to hold off for now on further imaging

## 2025-04-07 NOTE — TELEPHONE ENCOUNTER
Spoke with pt in regards to recent message sent. Verbalized verbatim per Cecilia. Pt acknowledged understanding.

## 2025-04-08 ENCOUNTER — OFFICE VISIT (OUTPATIENT)
Dept: OTOLARYNGOLOGY | Facility: CLINIC | Age: 75
End: 2025-04-08
Payer: MEDICARE

## 2025-04-08 VITALS — HEIGHT: 61 IN | WEIGHT: 106 LBS | BODY MASS INDEX: 20.01 KG/M2

## 2025-04-08 DIAGNOSIS — H61.23 BILATERAL IMPACTED CERUMEN: Primary | ICD-10-CM

## 2025-04-08 DIAGNOSIS — K21.9 LARYNGOPHARYNGEAL REFLUX (LPR): ICD-10-CM

## 2025-04-08 PROCEDURE — 99999 PR PBB SHADOW E&M-EST. PATIENT-LVL III: CPT | Mod: PBBFAC,,, | Performed by: OTOLARYNGOLOGY

## 2025-04-08 PROCEDURE — 99213 OFFICE O/P EST LOW 20 MIN: CPT | Mod: S$PBB,,, | Performed by: OTOLARYNGOLOGY

## 2025-04-08 PROCEDURE — 99213 OFFICE O/P EST LOW 20 MIN: CPT | Mod: PBBFAC,PN | Performed by: OTOLARYNGOLOGY

## 2025-04-08 NOTE — PROGRESS NOTES
"Subjective:       Patient ID: Cecilia Valenzuela is a 75 y.o. female.    Chief Complaint: Ear Fullness (Patient would like to have her ears cleaned today. ) and Hoarse (Patient is having c/o Hoarseness. " Sometimes I get hoarse but not all the time." )      This patient has a history of some buildup of wax and comes in today just to have that checked and she wonders if that is pertinent to air travel that is coming up next month.  She isn't currently having any active issue.  The other ear nose and throat problem that she has a addressed is hoarseness that is seems to be improved by pantoprazole taking every morning although she sounds a little bit hoarse as we discuss this.          Objective:      ENT Physical Exam    So at least on today's exam she does not have any wax collection on either side tympanic membranes and ear canals look normal.  Her voice is slightly hoarse        Assessment:       1. History of Bilateral impacted cerumen    2. Laryngopharyngeal reflux (LPR)         Plan:          We discussed some of the logic behind checking yours for ear wax and how that probably isn't a factor with air travel.  We also discussed the PPIs she is on her osteoporosis diagnosis with Prolia injections and the role of intermittent use of b.i.d. PPIs if she has a flare-up despite taking the once a day pantoprazole.        "

## 2025-04-17 ENCOUNTER — TELEPHONE (OUTPATIENT)
Dept: FAMILY MEDICINE | Facility: CLINIC | Age: 75
End: 2025-04-17
Payer: MEDICARE

## 2025-04-17 DIAGNOSIS — Z78.0 MENOPAUSE: ICD-10-CM

## 2025-04-17 DIAGNOSIS — M81.0 AGE-RELATED OSTEOPOROSIS WITHOUT CURRENT PATHOLOGICAL FRACTURE: Primary | ICD-10-CM

## 2025-04-17 DIAGNOSIS — Z13.820 SCREENING FOR OSTEOPOROSIS: ICD-10-CM

## 2025-04-17 NOTE — TELEPHONE ENCOUNTER
Calcium ordered, please let patient know insurance may not cover Prolia without DEXA every 2 years

## 2025-04-17 NOTE — TELEPHONE ENCOUNTER
Notified pt calcium lab due, uses [facility] here, also last DEXA 4/2023, pt said no more needed, but infusion center said within 2 years

## 2025-04-30 ENCOUNTER — TELEPHONE (OUTPATIENT)
Dept: FAMILY MEDICINE | Facility: CLINIC | Age: 75
End: 2025-04-30
Payer: MEDICARE

## 2025-04-30 NOTE — TELEPHONE ENCOUNTER
----- Message from MARISA Zambrano sent at 4/30/2025 10:50 AM CDT -----  Regarding: Prolia Injection  Pt is on our schedule for Prolia tomorrow, 5/1/25, and her DEXA scan is older than 2 years and her orders also need to be signed.  I spoke with pt and she is aware of the need for the repeat DEXA scan.  If you could please follow up with her to make sure her DEXA scan is repeated an her orders are signed, we can get her back on our schedule for her next prolia injection.  [facility]

## 2025-05-04 ENCOUNTER — RESULTS FOLLOW-UP (OUTPATIENT)
Dept: FAMILY MEDICINE | Facility: CLINIC | Age: 75
End: 2025-05-04

## 2025-05-05 NOTE — PROGRESS NOTES
[facility]    Subjective:      Patient: Cecilia Valenzuela Patient PCP: Cecilia Shaikh NP         :  1950     Sex:  female      MRN:  [id number]          Date of Visit: 2025      Chief Complaint: Cerumen Impaction    Patient ID [id number]: Cecilia Valenzuela is a 75 y.o. female who presents to office for ear cleaning. Pt states that she has been having aural fullness. No change in hearing or ear pain/discharge. She has upcoming flight to [place] and usually has issues with her ears when flying.     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    Family History  Her family history includes Arthritis in her mother and sister; Asthma in her mother; Breast cancer in her maternal aunt and mother; Breast cancer (age of onset: 40) in her sister; Breast cancer (age of onset: 50) in her sister; COPD in her mother; Cancer in her father, maternal aunt, mother, sister, and sister; Heart disease in her mother; Hypertension in her mother; Parkinsonism in her father; Psoriasis in her sister; Rheum arthritis in her mother; Stroke in her maternal grandfather, maternal grandmother, and paternal grandmother.    Past Surgical History:   Procedure Laterality Date    APPENDECTOMY  1973    at time of     BACK SURGERY      BREAST BIOPSY Right 2017    BREAST SURGERY  2017    Right masectomy    CATARACT EXTRACTION Bilateral      SECTION      x5     "DILATION AND CURETTAGE OF UTERUS      miss Ab    EYE SURGERY  2013    cataract, both eyes    HYSTERECTOMY      MASTECTOMY Right 2017    SKIN BIOPSY  5 years ago    diagnosed neurofibroma    SPINE SURGERY  1986-87    twice- lower lamenectomy     Social History     Tobacco Use    Smoking status: Never     Passive exposure: Never    Smokeless tobacco: Never   Substance and Sexual Activity    Alcohol use: No    Drug use: Never    Sexual activity: Not Currently     Partners: Male     Birth control/protection: Post-menopausal     Medications  She has a current medication list which includes the following prescription(s): ascorbic acid, azelastine, calcium carbonate/vitamin d3, denosumab, glucosamine sulfate, lisinopril, lorazepam, magnesium, meclizine, multivitamin, pantoprazole, pataday, zinc glycinate, and amoxicillin.    Review of patient's allergies indicates:   Allergen Reactions    Methylprednisolone      Other reaction(s): Heart palpitations  PT STATES SHE HAS A REACTION TO ALL STEROIDS DUE TO HER DX; OF MVP..    Zofran [ondansetron hcl (pf)] Nausea Only    Corticosteroids (glucocorticoids) Other (See Comments)     Heart palpitations    Dilaudid [hydromorphone] Nausea And Vomiting     Does not help with pain    Fentanyl     Neosporin (neomycin-polymyx)     Norvasc [amlodipine]     Statins-hmg-coa reductase inhibitors      myalgias    Tetracyclines     Augmentin [amoxicillin-pot clavulanate] Nausea And Vomiting     All medications, allergies, and past history have been reviewed.    Objective:      Vitals:      3/18/2025    10:58 AM 4/8/2025    10:00 AM 5/6/2025    10:18 AM   Vitals - 1 value per visit   Weight (lb) 105 106 106.7   Weight (kg) 47.628 48.081 48.4   Height 5' 1" (1.549 m) 5' 1" (1.549 m) 5' 1" (1.549 m)   BMI (Calculated) 19.8 20 20.2       Body surface area is 1.44 meters squared.    Physical Exam  Constitutional:       General: She is not in acute distress.     Appearance: Normal appearance. She is " not ill-appearing.   HENT:      Head: Normocephalic and atraumatic.      Right Ear: Tympanic membrane, ear canal and external ear normal. There is impacted cerumen (partial-cleared in office with curette).      Left Ear: Tympanic membrane, ear canal and external ear normal. There is impacted cerumen (partial-cleared in office with curette).      Nose: Nose normal.      Mouth/Throat:      Lips: Pink. No lesions.      Mouth: Mucous membranes are moist. No oral lesions.      Tongue: No lesions.      Palate: No lesions.      Pharynx: Oropharynx is clear. Uvula midline. No pharyngeal swelling, oropharyngeal exudate, posterior oropharyngeal erythema or uvula swelling.   Eyes:      General:         Right eye: No discharge.         Left eye: No discharge.      Extraocular Movements: Extraocular movements intact.      Conjunctiva/sclera: Conjunctivae normal.   Pulmonary:      Effort: Pulmonary effort is normal.   Neurological:      General: No focal deficit present.      Mental Status: She is alert and oriented to person, place, and time. Mental status is at baseline.   Psychiatric:         Mood and Affect: Mood normal.         Behavior: Behavior normal.         Thought Content: Thought content normal.         Judgment: Judgment normal.         Labs:  WBC   Date Value Ref Range Status   02/18/2025 6.5 3.8 - 10.8 Thousand/uL Final     Platelets   Date Value Ref Range Status   02/18/2025 233 140 - 400 Thousand/uL Final     Creatinine   Date Value Ref Range Status   02/18/2025 0.71 0.60 - 1.00 mg/dL Final     TSH   Date Value Ref Range Status   05/14/2021 2.64 0.40 - 4.50 mIU/L Final     Glucose   Date Value Ref Range Status   02/18/2025 74 65 - 99 mg/dL Final     Comment:                   Fasting reference interval          All lab results and imaging results have been reviewed.    Assessment:        ICD-10-CM ICD-9-CM   1. Bilateral impacted cerumen  H61.23 380.4        Plan:      When flying:  Take Sudafed 60 mg 1 hour  "prior to flying. Afrin nasal spray 30 minutes before take-off. Chew gum/pop ears gently when taking off and coming down. Drink water. Use "Airplanes" or [employer] "Flight Mates" ear plugs designed for flying (available on [employer]).     Bilateral ear cleaning performed today in office for partial cerumen impactions. Follow up in 6 months for routine ear cleaning.   "

## 2025-05-06 ENCOUNTER — OFFICE VISIT (OUTPATIENT)
Dept: OTOLARYNGOLOGY | Facility: CLINIC | Age: 75
End: 2025-05-06
Payer: MEDICARE

## 2025-05-06 ENCOUNTER — PATIENT MESSAGE (OUTPATIENT)
Dept: OTOLARYNGOLOGY | Facility: CLINIC | Age: 75
End: 2025-05-06

## 2025-05-06 VITALS — BODY MASS INDEX: 20.14 KG/M2 | WEIGHT: 106.69 LBS | HEIGHT: 61 IN

## 2025-05-06 DIAGNOSIS — H61.23 BILATERAL IMPACTED CERUMEN: Primary | ICD-10-CM

## 2025-05-06 PROCEDURE — 99213 OFFICE O/P EST LOW 20 MIN: CPT | Mod: S$PBB,,, | Performed by: PHYSICIAN ASSISTANT

## 2025-05-06 PROCEDURE — 99999 PR PBB SHADOW E&M-EST. PATIENT-LVL III: CPT | Mod: PBBFAC,,, | Performed by: PHYSICIAN ASSISTANT

## 2025-05-06 PROCEDURE — 99213 OFFICE O/P EST LOW 20 MIN: CPT | Mod: PBBFAC,PO | Performed by: PHYSICIAN ASSISTANT

## 2025-05-08 ENCOUNTER — INFUSION (OUTPATIENT)
Dept: INFUSION THERAPY | Facility: HOSPITAL | Age: 75
End: 2025-05-08
Attending: NURSE PRACTITIONER
Payer: MEDICARE

## 2025-05-08 VITALS
TEMPERATURE: 98 F | HEART RATE: 71 BPM | RESPIRATION RATE: 17 BRPM | DIASTOLIC BLOOD PRESSURE: 75 MMHG | BODY MASS INDEX: 20.42 KG/M2 | HEIGHT: 61 IN | SYSTOLIC BLOOD PRESSURE: 131 MMHG | WEIGHT: 108.13 LBS | OXYGEN SATURATION: 100 %

## 2025-05-08 DIAGNOSIS — Z79.811 LONG TERM CURRENT USE OF AROMATASE INHIBITOR: ICD-10-CM

## 2025-05-08 DIAGNOSIS — Z17.0 MALIGNANT NEOPLASM OF UPPER-OUTER QUADRANT OF RIGHT BREAST IN FEMALE, ESTROGEN RECEPTOR POSITIVE: Primary | ICD-10-CM

## 2025-05-08 DIAGNOSIS — M81.0 AGE-RELATED OSTEOPOROSIS WITHOUT CURRENT PATHOLOGICAL FRACTURE: ICD-10-CM

## 2025-05-08 DIAGNOSIS — C50.411 MALIGNANT NEOPLASM OF UPPER-OUTER QUADRANT OF RIGHT BREAST IN FEMALE, ESTROGEN RECEPTOR POSITIVE: Primary | ICD-10-CM

## 2025-05-08 PROCEDURE — 96372 THER/PROPH/DIAG INJ SC/IM: CPT

## 2025-05-08 PROCEDURE — 63600175 PHARM REV CODE 636 W HCPCS: Mod: JZ,TB | Performed by: NURSE PRACTITIONER

## 2025-05-08 RX ADMIN — DENOSUMAB 60 MG: 60 INJECTION SUBCUTANEOUS at 03:05

## 2025-06-19 ENCOUNTER — OFFICE VISIT (OUTPATIENT)
Dept: URBAN - METROPOLITAN AREA CLINIC 113 | Facility: CLINIC | Age: 75
End: 2025-06-19
Payer: MEDICARE

## 2025-06-19 DIAGNOSIS — K83.8 COMMON BILE DUCT DILATION: ICD-10-CM

## 2025-06-19 DIAGNOSIS — R17 TOTAL BILIRUBIN, ELEVATED: ICD-10-CM

## 2025-06-19 DIAGNOSIS — R93.89 ABNORMAL MRI: ICD-10-CM

## 2025-06-19 DIAGNOSIS — R10.11 RIGHT UPPER QUADRANT ABDOMINAL PAIN: ICD-10-CM

## 2025-06-19 PROCEDURE — 99213 OFFICE O/P EST LOW 20 MIN: CPT | Performed by: INTERNAL MEDICINE

## 2025-06-19 RX ORDER — TELMISARTAN 20 MG/1
1 TABLET TABLET ORAL ONCE A DAY
Refills: 0 | Status: ON HOLD | COMMUNITY
Start: 2019-11-04

## 2025-06-19 RX ORDER — OMEGA-3/DHA/EPA/FISH OIL 1000 MG
TAKE 1 CAPSULE DAILY CAPSULE ORAL
Refills: 0 | Status: ON HOLD | COMMUNITY

## 2025-06-19 RX ORDER — LANSOPRAZOLE 30 MG/1
1 CAPSULE BEFORE A MEAL CAPSULE, DELAYED RELEASE ORAL TWICE A DAY
Qty: 60 CAPSULE | Refills: 5 | Status: ACTIVE | COMMUNITY

## 2025-06-19 RX ORDER — SEMAGLUTIDE 0.68 MG/ML
AS DIRECTED INJECTION, SOLUTION SUBCUTANEOUS
Status: ON HOLD | COMMUNITY

## 2025-06-19 RX ORDER — METFORMIN HYDROCHLORIDE 500 MG/1
1 TABLET WITH A MEAL TABLET, FILM COATED ORAL TWICE DAILY
Status: ON HOLD | COMMUNITY

## 2025-06-19 RX ORDER — AZELASTINE HYDROCHLORIDE 205.5 UG/1
1 PUFF IN EACH NOSTRIL SPRAY, METERED NASAL
Refills: 0 | Status: ON HOLD | COMMUNITY
Start: 2018-10-05

## 2025-06-19 RX ORDER — LATANOPROST/PF 0.005 %
INSTILL 1 DROP IN BOTH EYES AT BEDTIME DROPS OPHTHALMIC (EYE)
Refills: 0 | Status: ON HOLD | COMMUNITY
Start: 2014-05-10

## 2025-06-19 RX ORDER — DICLOFENAC SODIUM TOPICAL GEL, 1% 10 MG/G
GEL TOPICAL
Qty: 300 | Refills: 0 | Status: ON HOLD | COMMUNITY
Start: 2019-09-18

## 2025-06-19 RX ORDER — PREDNISOLONE/MOXIFLOXACIN HCL 1 %-0.5 %
AS DIRECTED SUSPENSION, DROPS(FINAL DOSAGE FORM)(ML) OPHTHALMIC (EYE) 4 TIMES DAILY
Status: ON HOLD | COMMUNITY

## 2025-06-19 RX ORDER — LACTOBACILLUS RHAMNOSUS GG 10B CELL
TAKE 1 CAPSULE DAILY CAPSULE ORAL
Refills: 0 | Status: ACTIVE | COMMUNITY

## 2025-06-19 RX ORDER — IPRATROPIUM BROMIDE 42 UG/1
SPRAY, METERED NASAL
Qty: 45 | Refills: 0 | Status: ON HOLD | COMMUNITY
Start: 2020-01-07

## 2025-06-19 RX ORDER — BUDESONIDE 0.5 MG/2ML
USE 1 UNIT DOSE VIA NEBULIZER TWO TIMES A DAY INHALANT ORAL
Qty: 60 | Refills: 0 | Status: ACTIVE | COMMUNITY
Start: 2012-03-30

## 2025-06-19 RX ORDER — SODIUM PICOSULFATE, MAGNESIUM OXIDE, AND ANHYDROUS CITRIC ACID 10; 3.5; 12 MG/160ML; G/160ML; G/160ML
160 ML LIQUID ORAL
Qty: 320 MILLILITER | Refills: 0 | Status: ON HOLD | COMMUNITY

## 2025-06-19 RX ORDER — METHOCARBAMOL TABLETS 500 MG/1
TABLET, COATED ORAL
Qty: 40 TABLET | Status: ACTIVE | COMMUNITY

## 2025-06-19 RX ORDER — OMEPRAZOLE 40 MG/1
TAKE 1 CAPSULE (40 MG TOTAL) BY MOUTH IN THE MORNING AND BEFORE BEDTIME CAPSULE, DELAYED RELEASE ORAL
Qty: 180 EACH | Refills: 0 | Status: ACTIVE | COMMUNITY

## 2025-06-19 RX ORDER — PREGABALIN 100 MG/1
CAPSULE ORAL
Qty: 56 CAPSULE | Status: ACTIVE | COMMUNITY

## 2025-06-19 RX ORDER — ALBUTEROL SULFATE 90 UG/1
AEROSOL, METERED RESPIRATORY (INHALATION)
Qty: 18 | Refills: 0 | Status: ON HOLD | COMMUNITY
Start: 2012-01-02

## 2025-06-19 RX ORDER — SODIUM, POTASSIUM,MAG SULFATES 17.5-3.13G
AS DIRECTED SOLUTION, RECONSTITUTED, ORAL ORAL
Status: ON HOLD | COMMUNITY
Start: 2022-06-01

## 2025-06-19 RX ORDER — BRINZOLAMIDE/BRIMONIDINE TARTRATE 10; 2 MG/ML; MG/ML
1 DROP INTO AFFECTED EYE SUSPENSION/ DROPS OPHTHALMIC THREE TIMES A DAY
Status: ON HOLD | COMMUNITY

## 2025-06-19 RX ORDER — DEXLANSOPRAZOLE 60 MG/1
TAKE 1 CAPSULE BY MOUTH EVERY DAY CAPSULE, DELAYED RELEASE ORAL ONCE A DAY
Qty: 90 | Refills: 4 | Status: ON HOLD | COMMUNITY

## 2025-06-19 RX ORDER — SODIUM, POTASSIUM,MAG SULFATES 17.5-3.13G
354 ML SOLUTION, RECONSTITUTED, ORAL ORAL ONCE
Qty: 354 MILLILITER | Refills: 0 | Status: ON HOLD | COMMUNITY

## 2025-06-19 RX ORDER — TIRZEPATIDE 5 MG/.5ML
INJECTION, SOLUTION SUBCUTANEOUS
Qty: 2 MILLILITER | Status: ACTIVE | COMMUNITY

## 2025-06-19 RX ORDER — OXYCODONE HYDROCHLORIDE 15 MG/1
1 TABLET TABLET ORAL
Status: ACTIVE | COMMUNITY

## 2025-06-19 RX ORDER — ALBUTEROL SULFATE 2.5 MG/3ML
SOLUTION RESPIRATORY (INHALATION)
Qty: 255 | Refills: 0 | Status: ACTIVE | COMMUNITY
Start: 2012-03-14

## 2025-06-19 RX ORDER — SODIUM, POTASSIUM,MAG SULFATES 17.5-3.13G
354 ML SOLUTION, RECONSTITUTED, ORAL ORAL
Qty: 354 MILLILITER | Refills: 0 | Status: ON HOLD | COMMUNITY

## 2025-06-19 RX ORDER — ASCORBIC ACID 500 MG
TAKE 1 TABLET DAILY TABLET ORAL
Refills: 0 | Status: ACTIVE | COMMUNITY
Start: 2007-04-18

## 2025-06-19 RX ORDER — SUCRALFATE 1 G/1
1 TABLET; MAY DISSOLVE IN 2 TEASPOONS OF WATER TABLET ORAL
Qty: 90 | Refills: 3 | Status: ON HOLD | COMMUNITY

## 2025-06-19 RX ORDER — FAMOTIDINE 40 MG/1
1 TABLET AT BEDTIME TABLET, FILM COATED ORAL ONCE A DAY
Qty: 30 | Refills: 5 | Status: ON HOLD | COMMUNITY

## 2025-06-19 RX ORDER — LANSOPRAZOLE 15 MG/1
1 CAPSULE BEFORE BREAKFAST AND SUPPER CAPSULE, DELAYED RELEASE ORAL TWICE A DAY
Qty: 60 CAPSULE | Refills: 5 | Status: ON HOLD | COMMUNITY
Start: 2023-08-04

## 2025-06-19 NOTE — EXAM-PHYSICAL EXAM
General- no acute distress Eyes- anicteric HENT- normocephalic, atraumatic head Neck- no lymphadenopathy Chest- normal breath sounds Heart- regular rate and rhythm Abdomen- soft, non tender, non distended, bowel sounds present Musculoskeletal- abnormal gait and station, c/w prior CVA Skin- no rashes Neurologic- Alert and oriented x 3; flexion contracture Right arm; walks with limp/cane due to CVA Psychiatric- stable mood, appropriate affect

## 2025-06-19 NOTE — HPI-TODAY'S VISIT:
This is a 75-year-old female with a fraternal history of colon cancer due screening in 2027, and a personal history of hepatitis C status posttreatment with simeprevir (2015) with SVR, grade 2 stage II liver disease on biopsy (2006), GERD, postprandial epigastric pain, constipation, diarrhea presenting for short interval follow-up regarding abdominal pain. She was last seen here on 12/4/24.  She was seen 11/26/2024 for right upper quad abdominal pain with diarrhea.  She been evaluated at the ED in [place].  Labs were notable for mildly elevated bilirubin of 1.2.  CT demonstrated no acute abnormality.  She was tender on exam.  Viral gastroenteritis or acute gallbladder disease was within the differential.  Discussed with Dr. Trujillo who agreed with a stat ultrasound.  She was advised to return to the emergency room for persistent, acute symptoms.  She was prescribed hyoscyamine for symptomatic relief as dicyclomine had been ineffective.  Abdominal ultrasound 11/27/2024:Prominent common bile duct measuring 8 to 9 mm in diameter.  Given reported history of elevated bilirubin, recommend further evaluation with MRCP.  No cholelithiasis or findings of acute cholecystitis.  Normal sonographic appearance of the liver.  Results were communicated to the patient and MRI/MRCP ordered ASAP.  At her 12/4/24 OV, she reported persistent constant RUQ pain worsened by food ingestion which could awaken her from sleep.  There was no association with position change and no improvement with position change. She had tried hyoscyamine without improvement.  She was taking oxycodone per pain management with no improvement of pain after dosing.  Occasionally, she reported severe pain that will "cause her to scream," up to a "30" on a 1-10 scale.  She reported nausea without vomiting. The patient underwent an MRI/MRCP which showed a mildly dilated bile duct with smooth tapering into the ampulla without any focal filling defect or other abnormality.  The patient then underwent an endoscopic ultrasound by Dr. Rodney Head which was relatively unremarkable.    The patient returns today with complete resolution of her right upper quadrant pain.  The pain stopped after her Ozempic was discontinued and has not recurred.  However, she recently started Mounjaro, so she is somewhat concerned about the possibility that the pain may recur on this medication.

## 2025-06-24 DIAGNOSIS — K21.9 GASTROESOPHAGEAL REFLUX DISEASE, UNSPECIFIED WHETHER ESOPHAGITIS PRESENT: ICD-10-CM

## 2025-06-24 RX ORDER — PANTOPRAZOLE SODIUM 40 MG/1
40 TABLET, DELAYED RELEASE ORAL DAILY
Qty: 90 TABLET | Refills: 3 | Status: SHIPPED | OUTPATIENT
Start: 2025-06-24

## 2025-06-30 ENCOUNTER — TELEPHONE ENCOUNTER (OUTPATIENT)
Dept: URBAN - METROPOLITAN AREA CLINIC 113 | Facility: CLINIC | Age: 75
End: 2025-06-30

## 2025-08-26 ENCOUNTER — OFFICE VISIT (OUTPATIENT)
Dept: FAMILY MEDICINE | Facility: CLINIC | Age: 75
End: 2025-08-26
Payer: MEDICARE

## 2025-08-26 VITALS
HEIGHT: 61 IN | DIASTOLIC BLOOD PRESSURE: 70 MMHG | BODY MASS INDEX: 20.08 KG/M2 | OXYGEN SATURATION: 99 % | WEIGHT: 106.38 LBS | SYSTOLIC BLOOD PRESSURE: 128 MMHG | HEART RATE: 64 BPM

## 2025-08-26 DIAGNOSIS — M81.0 AGE-RELATED OSTEOPOROSIS WITHOUT CURRENT PATHOLOGICAL FRACTURE: ICD-10-CM

## 2025-08-26 DIAGNOSIS — Q85.01 NEUROFIBROMATOSIS, TYPE 1: ICD-10-CM

## 2025-08-26 DIAGNOSIS — Z85.3 HISTORY OF BREAST CANCER: ICD-10-CM

## 2025-08-26 DIAGNOSIS — F41.9 ANXIETY: ICD-10-CM

## 2025-08-26 DIAGNOSIS — F51.01 PRIMARY INSOMNIA: Primary | ICD-10-CM

## 2025-08-26 PROCEDURE — 99214 OFFICE O/P EST MOD 30 MIN: CPT | Mod: S$GLB,,, | Performed by: NURSE PRACTITIONER

## 2025-08-26 PROCEDURE — G2211 COMPLEX E/M VISIT ADD ON: HCPCS | Mod: S$GLB,,, | Performed by: NURSE PRACTITIONER

## 2025-08-26 RX ORDER — LORAZEPAM 0.5 MG/1
0.5 TABLET ORAL EVERY 12 HOURS PRN
Qty: 30 TABLET | Refills: 5 | Status: SHIPPED | OUTPATIENT
Start: 2025-08-26

## (undated) DEVICE — NEOGUARD COVER 4X30CM STERILE

## (undated) DEVICE — GAUZE FLUFF XXLG 36X36 2 PLY

## (undated) DEVICE — SUT VICRYL 3-0 27 SH

## (undated) DEVICE — SUT VICRYL PLUS 3-0 SH 18IN

## (undated) DEVICE — SYR DISP LL 5CC

## (undated) DEVICE — GAUZE SPONGE 4X4 12PLY

## (undated) DEVICE — SUT ETHILON 2-0 PSLX 30IN

## (undated) DEVICE — ELECTRODE REM PLYHSV RETURN 9

## (undated) DEVICE — DRAIN CHANNEL ROUND 19FR

## (undated) DEVICE — ADHESIVE MASTISOL VIAL 48/BX

## (undated) DEVICE — EVACUATOR WOUND BULB 100CC

## (undated) DEVICE — SEE MEDLINE ITEM 157128

## (undated) DEVICE — STOCKINET 4INX48

## (undated) DEVICE — CLOSURE SKIN STERI STRIP 1/2X4

## (undated) DEVICE — SUT 2-0 VICRYL / SH (J417)

## (undated) DEVICE — NDL 18GA X1 1/2 REG BEVEL

## (undated) DEVICE — CONTAINER SPECIMEN STRL 4OZ

## (undated) DEVICE — BLADE 4IN EDGE INSULATED

## (undated) DEVICE — APPLICATOR CHLORAPREP ORN 26ML

## (undated) DEVICE — ELECTRODE BLADE INSULATED 1 IN

## (undated) DEVICE — SEE MEDLINE ITEM 157117

## (undated) DEVICE — SUT MCRYL PLUS 4-0 PS2 27IN

## (undated) DEVICE — SPONGE IV DRAIN 4X4 STERILE

## (undated) DEVICE — SEE MEDLINE ITEM 146417

## (undated) DEVICE — COVER LIGHT HANDLE 80/CA

## (undated) DEVICE — DRAPE STERI INSTRUMENT 1018

## (undated) DEVICE — NDL HYPO REG 25G X 1 1/2

## (undated) DEVICE — BRA PST-SURGICAL WHITE 34-36IN

## (undated) DEVICE — TRAY MINOR GEN SURG